# Patient Record
Sex: MALE | Race: WHITE | Employment: OTHER | URBAN - METROPOLITAN AREA
[De-identification: names, ages, dates, MRNs, and addresses within clinical notes are randomized per-mention and may not be internally consistent; named-entity substitution may affect disease eponyms.]

---

## 2017-03-15 ENCOUNTER — TELEPHONE (OUTPATIENT)
Dept: FAMILY MEDICINE CLINIC | Age: 42
End: 2017-03-15

## 2017-04-28 ENCOUNTER — TELEPHONE (OUTPATIENT)
Dept: FAMILY MEDICINE CLINIC | Age: 42
End: 2017-04-28

## 2017-05-09 ENCOUNTER — TELEPHONE (OUTPATIENT)
Dept: FAMILY MEDICINE CLINIC | Age: 42
End: 2017-05-09

## 2017-05-09 DIAGNOSIS — G40.909 SEIZURE DISORDER (HCC): Primary | ICD-10-CM

## 2017-05-12 ENCOUNTER — HOSPITAL ENCOUNTER (OUTPATIENT)
Dept: OTHER | Age: 42
Discharge: HOME | End: 2017-05-12
Attending: FAMILY MEDICINE | Admitting: FAMILY MEDICINE

## 2017-05-12 LAB
A/G RATIO: 1.2 (ref 1.1–2.2)
ALBUMIN SERPL-MCNC: 4.4 G/DL (ref 3.4–5)
ALP BLD-CCNC: 126 U/L (ref 40–129)
ALT SERPL-CCNC: 15 U/L (ref 10–40)
ANION GAP SERPL CALCULATED.3IONS-SCNC: 14 MMOL/L (ref 3–16)
AST SERPL-CCNC: 21 U/L (ref 15–37)
BASOPHILS ABSOLUTE: 0 K/UL (ref 0–0.2)
BASOPHILS RELATIVE PERCENT: 0.3 %
BILIRUB SERPL-MCNC: 0.3 MG/DL (ref 0–1)
BUN BLDV-MCNC: 9 MG/DL (ref 7–20)
CALCIUM SERPL-MCNC: 9.4 MG/DL (ref 8.3–10.6)
CHLORIDE BLD-SCNC: 96 MMOL/L (ref 99–110)
CO2: 27 MMOL/L (ref 21–32)
CREAT SERPL-MCNC: 0.7 MG/DL (ref 0.9–1.3)
EOSINOPHILS ABSOLUTE: 0.1 K/UL (ref 0–0.6)
EOSINOPHILS RELATIVE PERCENT: 1.5 %
GFR AFRICAN AMERICAN: >60
GFR NON-AFRICAN AMERICAN: >60
GLOBULIN: 3.8 G/DL
GLUCOSE BLD-MCNC: 99 MG/DL (ref 70–99)
HCT VFR BLD CALC: 38.4 % (ref 40.5–52.5)
HEMOGLOBIN: 12.8 G/DL (ref 13.5–17.5)
LYMPHOCYTES ABSOLUTE: 1.5 K/UL (ref 1–5.1)
LYMPHOCYTES RELATIVE PERCENT: 31.3 %
MCH RBC QN AUTO: 29.7 PG (ref 26–34)
MCHC RBC AUTO-ENTMCNC: 33.5 G/DL (ref 31–36)
MCV RBC AUTO: 88.6 FL (ref 80–100)
MONOCYTES ABSOLUTE: 0.6 K/UL (ref 0–1.3)
MONOCYTES RELATIVE PERCENT: 11.5 %
NEUTROPHILS ABSOLUTE: 2.7 K/UL (ref 1.7–7.7)
NEUTROPHILS RELATIVE PERCENT: 55.4 %
PDW BLD-RTO: 13.3 % (ref 12.4–15.4)
PHENYTOIN DOSE AMOUNT: ABNORMAL
PHENYTOIN LEVEL: 7.4 UG/ML (ref 10–20)
PLATELET # BLD: 190 K/UL (ref 135–450)
PMV BLD AUTO: 9.1 FL (ref 5–10.5)
POTASSIUM SERPL-SCNC: 4.3 MMOL/L (ref 3.5–5.1)
RBC # BLD: 4.33 M/UL (ref 4.2–5.9)
SODIUM BLD-SCNC: 137 MMOL/L (ref 136–145)
TOTAL PROTEIN: 8.2 G/DL (ref 6.4–8.2)
WBC # BLD: 4.9 K/UL (ref 4–11)

## 2017-05-13 LAB — LAMOTRIGINE LEVEL: 15 UG/ML (ref 2.5–15)

## 2017-05-16 ENCOUNTER — TELEPHONE (OUTPATIENT)
Dept: FAMILY MEDICINE CLINIC | Age: 42
End: 2017-05-16

## 2017-06-14 ENCOUNTER — OFFICE VISIT (OUTPATIENT)
Dept: FAMILY MEDICINE CLINIC | Age: 42
End: 2017-06-14

## 2017-06-14 VITALS
DIASTOLIC BLOOD PRESSURE: 80 MMHG | RESPIRATION RATE: 16 BRPM | SYSTOLIC BLOOD PRESSURE: 122 MMHG | WEIGHT: 153.5 LBS | HEART RATE: 75 BPM | OXYGEN SATURATION: 98 % | BODY MASS INDEX: 22.67 KG/M2

## 2017-06-14 DIAGNOSIS — L21.9 SEBORRHEIC DERMATITIS: ICD-10-CM

## 2017-06-14 DIAGNOSIS — G40.909 SEIZURE DISORDER (HCC): Primary | ICD-10-CM

## 2017-06-14 DIAGNOSIS — M75.00 ADHESIVE CAPSULITIS OF SHOULDER, UNSPECIFIED LATERALITY: ICD-10-CM

## 2017-06-14 DIAGNOSIS — N34.2 INFECTIVE URETHRITIS: ICD-10-CM

## 2017-06-14 PROCEDURE — 99214 OFFICE O/P EST MOD 30 MIN: CPT | Performed by: FAMILY MEDICINE

## 2017-06-14 RX ORDER — LEVETIRACETAM 500 MG/1
TABLET ORAL
Qty: 540 TABLET | Refills: 3 | Status: SHIPPED | OUTPATIENT
Start: 2017-06-14 | End: 2018-07-22 | Stop reason: SDUPTHER

## 2017-06-14 RX ORDER — PHENYTOIN SODIUM 100 MG/1
CAPSULE, EXTENDED RELEASE ORAL
Qty: 180 CAPSULE | Refills: 3 | Status: SHIPPED | OUTPATIENT
Start: 2017-06-14 | End: 2018-04-27 | Stop reason: SDUPTHER

## 2017-06-14 RX ORDER — DOXYCYCLINE 100 MG/1
100 TABLET ORAL 2 TIMES DAILY
Qty: 20 TABLET | Refills: 0 | Status: SHIPPED | OUTPATIENT
Start: 2017-06-14 | End: 2017-06-24

## 2017-06-14 RX ORDER — LAMOTRIGINE 150 MG/1
TABLET ORAL
Qty: 180 TABLET | Refills: 3 | Status: SHIPPED | OUTPATIENT
Start: 2017-06-14 | End: 2018-07-22 | Stop reason: SDUPTHER

## 2017-06-14 RX ORDER — LAMOTRIGINE 25 MG/1
TABLET ORAL
Qty: 180 TABLET | Refills: 3 | Status: SHIPPED | OUTPATIENT
Start: 2017-06-14 | End: 2018-07-22 | Stop reason: SDUPTHER

## 2017-06-14 RX ORDER — IBUPROFEN 800 MG/1
800 TABLET ORAL 3 TIMES DAILY PRN
Qty: 270 TABLET | Refills: 3 | Status: SHIPPED | OUTPATIENT
Start: 2017-06-14 | End: 2018-09-25 | Stop reason: SDUPTHER

## 2017-06-14 ASSESSMENT — PATIENT HEALTH QUESTIONNAIRE - PHQ9
SUM OF ALL RESPONSES TO PHQ9 QUESTIONS 1 & 2: 1
SUM OF ALL RESPONSES TO PHQ QUESTIONS 1-9: 1
2. FEELING DOWN, DEPRESSED OR HOPELESS: 0
1. LITTLE INTEREST OR PLEASURE IN DOING THINGS: 1

## 2017-10-20 ENCOUNTER — TELEPHONE (OUTPATIENT)
Dept: FAMILY MEDICINE CLINIC | Age: 42
End: 2017-10-20

## 2017-10-20 DIAGNOSIS — Z85.6 HISTORY OF LEUKEMIA: ICD-10-CM

## 2017-10-20 DIAGNOSIS — G40.909 SEIZURE DISORDER (HCC): Primary | ICD-10-CM

## 2017-10-20 NOTE — TELEPHONE ENCOUNTER
Pt request blood work order for [facility] - for everything - divantin level        FYI - right shoulder surgery will be around 3/1/18 - will call later for pre-op visit

## 2017-10-24 LAB
A/G RATIO: 1.2 (ref 1.1–2.2)
ALBUMIN SERPL-MCNC: 4.8 G/DL (ref 3.4–5)
ALP BLD-CCNC: 133 U/L (ref 40–129)
ALT SERPL-CCNC: 17 U/L (ref 10–40)
ANION GAP SERPL CALCULATED.3IONS-SCNC: 13 MMOL/L (ref 3–16)
AST SERPL-CCNC: 20 U/L (ref 15–37)
BILIRUB SERPL-MCNC: <0.2 MG/DL (ref 0–1)
BUN BLDV-MCNC: 13 MG/DL (ref 7–20)
CALCIUM SERPL-MCNC: 9.9 MG/DL (ref 8.3–10.6)
CHLORIDE BLD-SCNC: 102 MMOL/L (ref 99–110)
CO2: 30 MMOL/L (ref 21–32)
CREAT SERPL-MCNC: 0.7 MG/DL (ref 0.9–1.3)
GFR AFRICAN AMERICAN: >60
GFR NON-AFRICAN AMERICAN: >60
GLOBULIN: 4 G/DL
GLUCOSE BLD-MCNC: 99 MG/DL (ref 70–99)
HCT VFR BLD CALC: 43.1 % (ref 40.5–52.5)
HEMOGLOBIN: 14 G/DL (ref 13.5–17.5)
MCH RBC QN AUTO: 29.6 PG (ref 26–34)
MCHC RBC AUTO-ENTMCNC: 32.5 G/DL (ref 31–36)
MCV RBC AUTO: 91.1 FL (ref 80–100)
PDW BLD-RTO: 13.2 % (ref 12.4–15.4)
PLATELET # BLD: 194 K/UL (ref 135–450)
PMV BLD AUTO: 9.5 FL (ref 5–10.5)
POTASSIUM SERPL-SCNC: 4.4 MMOL/L (ref 3.5–5.1)
RBC # BLD: 4.73 M/UL (ref 4.2–5.9)
SODIUM BLD-SCNC: 145 MMOL/L (ref 136–145)
TOTAL PROTEIN: 8.8 G/DL (ref 6.4–8.2)
WBC # BLD: 4 K/UL (ref 4–11)

## 2017-10-26 LAB
PHENYTOIN % FREE: 9.6 % (ref 8–14)
PHENYTOIN DOSE AMOUNT: ABNORMAL
PHENYTOIN DOSE FREQUENCY: ABNORMAL
PHENYTOIN DOSE ROUTE: ABNORMAL
PHENYTOIN FREE: 0.8 UG/ML (ref 1–2.5)
PHENYTOIN LEVEL: 8.3 UG/ML (ref 10–20)
PHENYTOIN TYPE OF DRAW: ABNORMAL

## 2017-11-03 ENCOUNTER — HOSPITAL ENCOUNTER (OUTPATIENT)
Dept: OTHER | Age: 42
Discharge: HOME | End: 2017-11-03
Attending: FAMILY MEDICINE | Admitting: FAMILY MEDICINE

## 2018-02-13 ENCOUNTER — OFFICE VISIT (OUTPATIENT)
Dept: FAMILY MEDICINE CLINIC | Age: 43
End: 2018-02-13

## 2018-02-13 ENCOUNTER — HOSPITAL ENCOUNTER (OUTPATIENT)
Dept: OTHER | Age: 43
Discharge: HOME | End: 2018-02-13
Attending: PHYSICIAN ASSISTANT | Admitting: PHYSICIAN ASSISTANT

## 2018-02-13 VITALS
DIASTOLIC BLOOD PRESSURE: 88 MMHG | BODY MASS INDEX: 23.33 KG/M2 | TEMPERATURE: 98 F | SYSTOLIC BLOOD PRESSURE: 124 MMHG | WEIGHT: 158 LBS

## 2018-02-13 DIAGNOSIS — R10.33 PERIUMBILICAL ABDOMINAL PAIN: Primary | ICD-10-CM

## 2018-02-13 DIAGNOSIS — R10.33 PERIUMBILICAL ABDOMINAL PAIN: ICD-10-CM

## 2018-02-13 PROCEDURE — 99213 OFFICE O/P EST LOW 20 MIN: CPT | Performed by: PHYSICIAN ASSISTANT

## 2018-02-13 ASSESSMENT — ENCOUNTER SYMPTOMS
DIARRHEA: 0
SHORTNESS OF BREATH: 0
VOMITING: 0
NAUSEA: 0
BACK PAIN: 0
ABDOMINAL PAIN: 1
CONSTIPATION: 0

## 2018-02-13 NOTE — PROGRESS NOTES
Subjective:      Patient ID: Burton Camarillo is a 43 y.o. male. HPI  Patient is here today with a 4-5 day history of periumbilical abdominal pain. It is a constant pain that gets much worse and feels sharp when he stands up. He has to sit down to make it feel a little better. No n/v/d or fever. Appetite has been normal. He usually has a BM twice a week. He went last on Saturday. Pain does not radiate anywhere. He has no urinary symptoms. Review of Systems   Constitutional: Negative for appetite change, chills and fever. Respiratory: Negative for shortness of breath. Cardiovascular: Negative for chest pain. Gastrointestinal: Positive for abdominal pain. Negative for constipation, diarrhea, nausea and vomiting. Genitourinary: Negative for difficulty urinating, dysuria, frequency, hematuria and urgency. Musculoskeletal: Negative for back pain. Objective:   Physical Exam   Constitutional: He is oriented to person, place, and time. Vital signs are normal. He appears well-developed and well-nourished. He is cooperative. Cardiovascular: Normal rate, regular rhythm and normal heart sounds. Pulmonary/Chest: Effort normal and breath sounds normal. No respiratory distress. He has no decreased breath sounds. Abdominal: Soft. Normal appearance and bowel sounds are normal. He exhibits no distension and no mass. There is no hepatosplenomegaly. There is no tenderness. There is no rigidity, no rebound, no guarding and no CVA tenderness. No hernia. Neurological: He is alert and oriented to person, place, and time. Assessment:      Chela Marrero was seen today for abdominal pain. Diagnoses and all orders for this visit:    Periumbilical abdominal pain  -     XR ABDOMEN (complete, including decubitus and/or erect views)             Plan:      Get xrays, suspect possible constipation.

## 2018-02-15 ENCOUNTER — TELEPHONE (OUTPATIENT)
Dept: FAMILY MEDICINE CLINIC | Age: 43
End: 2018-02-15

## 2018-02-16 ENCOUNTER — HOSPITAL ENCOUNTER (OUTPATIENT)
Dept: OTHER | Age: 43
Discharge: HOME | End: 2018-02-16
Attending: PHYSICIAN ASSISTANT | Admitting: PHYSICIAN ASSISTANT

## 2018-02-16 ENCOUNTER — OFFICE VISIT (OUTPATIENT)
Dept: FAMILY MEDICINE CLINIC | Age: 43
End: 2018-02-16

## 2018-02-16 ENCOUNTER — HOSPITAL ENCOUNTER (OUTPATIENT)
Dept: CT IMAGING | Age: 43
Discharge: HOME | End: 2018-02-16
Attending: PHYSICIAN ASSISTANT | Admitting: PHYSICIAN ASSISTANT

## 2018-02-16 VITALS
TEMPERATURE: 97 F | OXYGEN SATURATION: 97 % | WEIGHT: 155.6 LBS | HEART RATE: 82 BPM | BODY MASS INDEX: 22.98 KG/M2 | SYSTOLIC BLOOD PRESSURE: 128 MMHG | DIASTOLIC BLOOD PRESSURE: 80 MMHG

## 2018-02-16 DIAGNOSIS — R10.32 LLQ ABDOMINAL PAIN: Primary | ICD-10-CM

## 2018-02-16 DIAGNOSIS — R10.32 LLQ ABDOMINAL PAIN: ICD-10-CM

## 2018-02-16 DIAGNOSIS — R19.7 DIARRHEA, UNSPECIFIED TYPE: ICD-10-CM

## 2018-02-16 DIAGNOSIS — K52.9 COLITIS: Primary | ICD-10-CM

## 2018-02-16 DIAGNOSIS — R10.32 LEFT LOWER QUADRANT PAIN: ICD-10-CM

## 2018-02-16 LAB
ANION GAP SERPL CALCULATED.3IONS-SCNC: 12 MMOL/L (ref 3–16)
BASOPHILS ABSOLUTE: 0 K/UL (ref 0–0.2)
BASOPHILS RELATIVE PERCENT: 0.2 %
BUN BLDV-MCNC: 16 MG/DL (ref 7–20)
CALCIUM SERPL-MCNC: 9.7 MG/DL (ref 8.3–10.6)
CHLORIDE BLD-SCNC: 100 MMOL/L (ref 99–110)
CO2: 29 MMOL/L (ref 21–32)
CREAT SERPL-MCNC: 0.8 MG/DL (ref 0.9–1.3)
EOSINOPHILS ABSOLUTE: 0.1 K/UL (ref 0–0.6)
EOSINOPHILS RELATIVE PERCENT: 1.9 %
GFR AFRICAN AMERICAN: >60
GFR NON-AFRICAN AMERICAN: >60
GLUCOSE BLD-MCNC: 88 MG/DL (ref 70–99)
HCT VFR BLD CALC: 40.6 % (ref 40.5–52.5)
HEMOGLOBIN: 13.7 G/DL (ref 13.5–17.5)
LYMPHOCYTES ABSOLUTE: 1.7 K/UL (ref 1–5.1)
LYMPHOCYTES RELATIVE PERCENT: 33.4 %
MCH RBC QN AUTO: 30.1 PG (ref 26–34)
MCHC RBC AUTO-ENTMCNC: 33.8 G/DL (ref 31–36)
MCV RBC AUTO: 89 FL (ref 80–100)
MONOCYTES ABSOLUTE: 0.6 K/UL (ref 0–1.3)
MONOCYTES RELATIVE PERCENT: 11.5 %
NEUTROPHILS ABSOLUTE: 2.8 K/UL (ref 1.7–7.7)
NEUTROPHILS RELATIVE PERCENT: 53 %
PDW BLD-RTO: 13.3 % (ref 12.4–15.4)
PLATELET # BLD: 239 K/UL (ref 135–450)
PMV BLD AUTO: 8.5 FL (ref 5–10.5)
POTASSIUM SERPL-SCNC: 4.4 MMOL/L (ref 3.5–5.1)
RBC # BLD: 4.56 M/UL (ref 4.2–5.9)
SODIUM BLD-SCNC: 141 MMOL/L (ref 136–145)
WBC # BLD: 5.2 K/UL (ref 4–11)

## 2018-02-16 PROCEDURE — 99213 OFFICE O/P EST LOW 20 MIN: CPT | Performed by: PHYSICIAN ASSISTANT

## 2018-02-16 RX ORDER — AMOXICILLIN AND CLAVULANATE POTASSIUM 875; 125 MG/1; MG/1
1 TABLET, FILM COATED ORAL 2 TIMES DAILY
Qty: 14 TABLET | Refills: 0 | Status: SHIPPED | OUTPATIENT
Start: 2018-02-16 | End: 2018-02-23

## 2018-02-16 ASSESSMENT — ENCOUNTER SYMPTOMS
BACK PAIN: 0
DIARRHEA: 1
CONSTIPATION: 0
SHORTNESS OF BREATH: 0
ABDOMINAL PAIN: 1
NAUSEA: 0
VOMITING: 0

## 2018-02-26 ENCOUNTER — TELEPHONE (OUTPATIENT)
Dept: FAMILY MEDICINE CLINIC | Age: 43
End: 2018-02-26

## 2018-03-02 DIAGNOSIS — K52.9 COLITIS: ICD-10-CM

## 2018-03-03 RX ORDER — CIPROFLOXACIN 500 MG/1
500 TABLET, FILM COATED ORAL 2 TIMES DAILY
Qty: 20 TABLET | Refills: 0 | Status: SHIPPED | OUTPATIENT
Start: 2018-03-03 | End: 2018-03-13

## 2018-03-03 NOTE — TELEPHONE ENCOUNTER
Patient is calling back wanting to know why is it taking so long to call this medication in. Patient is stating that he isn't feeling any better.       Once he stopped the medication all the symptoms all came back    Please advise

## 2018-03-28 RX ORDER — AMOXICILLIN AND CLAVULANATE POTASSIUM 875; 125 MG/1; MG/1
TABLET, FILM COATED ORAL
Qty: 14 TABLET | Refills: 0 | OUTPATIENT
Start: 2018-03-28

## 2018-04-20 ENCOUNTER — OFFICE VISIT (OUTPATIENT)
Dept: FAMILY MEDICINE CLINIC | Age: 43
End: 2018-04-20

## 2018-04-20 ENCOUNTER — HOSPITAL ENCOUNTER (OUTPATIENT)
Dept: OTHER | Age: 43
Discharge: HOME | End: 2018-04-20
Attending: FAMILY MEDICINE | Admitting: FAMILY MEDICINE

## 2018-04-20 VITALS
OXYGEN SATURATION: 97 % | TEMPERATURE: 98.7 F | WEIGHT: 155 LBS | DIASTOLIC BLOOD PRESSURE: 74 MMHG | BODY MASS INDEX: 20.99 KG/M2 | HEIGHT: 72 IN | HEART RATE: 94 BPM | SYSTOLIC BLOOD PRESSURE: 120 MMHG

## 2018-04-20 DIAGNOSIS — L21.9 SEBORRHEIC DERMATITIS: ICD-10-CM

## 2018-04-20 DIAGNOSIS — Z01.818 PREOP EXAMINATION: ICD-10-CM

## 2018-04-20 DIAGNOSIS — G40.909 SEIZURE DISORDER (HCC): ICD-10-CM

## 2018-04-20 DIAGNOSIS — M75.00 ADHESIVE CAPSULITIS OF SHOULDER, UNSPECIFIED LATERALITY: ICD-10-CM

## 2018-04-20 DIAGNOSIS — M75.121 COMPLETE TEAR OF RIGHT ROTATOR CUFF: Primary | ICD-10-CM

## 2018-04-20 LAB
PHENYTOIN DOSE AMOUNT: ABNORMAL
PHENYTOIN LEVEL: 6 UG/ML (ref 10–20)

## 2018-04-20 PROCEDURE — 99214 OFFICE O/P EST MOD 30 MIN: CPT | Performed by: FAMILY MEDICINE

## 2018-04-22 LAB — LAMOTRIGINE LEVEL: 12.3 UG/ML (ref 2.5–15)

## 2018-04-24 ENCOUNTER — HOSPITAL ENCOUNTER (OUTPATIENT)
Dept: PREADMISSION TESTING | Age: 43
Discharge: HOME | End: 2018-04-25
Attending: ORTHOPAEDIC SURGERY | Admitting: ORTHOPAEDIC SURGERY

## 2018-04-26 RX ORDER — SODIUM CHLORIDE, SODIUM LACTATE, POTASSIUM CHLORIDE, CALCIUM CHLORIDE 600; 310; 30; 20 MG/100ML; MG/100ML; MG/100ML; MG/100ML
INJECTION, SOLUTION INTRAVENOUS CONTINUOUS
Status: CANCELLED | OUTPATIENT
Start: 2018-04-26

## 2018-04-27 ENCOUNTER — HOSPITAL ENCOUNTER (OUTPATIENT)
Dept: SURGERY | Age: 43
Discharge: HOME | End: 2018-04-27
Admitting: ORTHOPAEDIC SURGERY

## 2018-04-27 VITALS
TEMPERATURE: 97.5 F | RESPIRATION RATE: 16 BRPM | HEART RATE: 98 BPM | OXYGEN SATURATION: 98 % | BODY MASS INDEX: 20.99 KG/M2 | WEIGHT: 155 LBS | DIASTOLIC BLOOD PRESSURE: 80 MMHG | SYSTOLIC BLOOD PRESSURE: 134 MMHG | HEIGHT: 72 IN

## 2018-04-27 DIAGNOSIS — M19.90 OSTEOARTHRITIS: ICD-10-CM

## 2018-04-27 DIAGNOSIS — M75.121 COMPLETE TEAR OF RIGHT ROTATOR CUFF: ICD-10-CM

## 2018-04-27 RX ORDER — HYDROMORPHONE HCL 110MG/55ML
0.25 PATIENT CONTROLLED ANALGESIA SYRINGE INTRAVENOUS EVERY 5 MIN PRN
Status: DISCONTINUED | OUTPATIENT
Start: 2018-04-27 | End: 2018-04-28 | Stop reason: HOSPADM

## 2018-04-27 RX ORDER — SODIUM CHLORIDE, SODIUM LACTATE, POTASSIUM CHLORIDE, CALCIUM CHLORIDE 600; 310; 30; 20 MG/100ML; MG/100ML; MG/100ML; MG/100ML
INJECTION, SOLUTION INTRAVENOUS CONTINUOUS
Status: DISCONTINUED | OUTPATIENT
Start: 2018-04-27 | End: 2018-04-28 | Stop reason: HOSPADM

## 2018-04-27 RX ORDER — METOCLOPRAMIDE HYDROCHLORIDE 5 MG/ML
10 INJECTION INTRAMUSCULAR; INTRAVENOUS
Status: COMPLETED | OUTPATIENT
Start: 2018-04-27 | End: 2018-04-27

## 2018-04-27 RX ORDER — PROMETHAZINE HYDROCHLORIDE 25 MG/ML
6.25 INJECTION, SOLUTION INTRAMUSCULAR; INTRAVENOUS
Status: ACTIVE | OUTPATIENT
Start: 2018-04-27 | End: 2018-04-27

## 2018-04-27 RX ORDER — MIDAZOLAM HYDROCHLORIDE 1 MG/ML
INJECTION INTRAMUSCULAR; INTRAVENOUS
Status: DISPENSED
Start: 2018-04-27 | End: 2018-04-27

## 2018-04-27 RX ORDER — LABETALOL HYDROCHLORIDE 5 MG/ML
5 INJECTION, SOLUTION INTRAVENOUS EVERY 10 MIN PRN
Status: DISCONTINUED | OUTPATIENT
Start: 2018-04-27 | End: 2018-04-28 | Stop reason: HOSPADM

## 2018-04-27 RX ORDER — ROPIVACAINE HYDROCHLORIDE 5 MG/ML
INJECTION, SOLUTION EPIDURAL; INFILTRATION; PERINEURAL
Status: DISPENSED
Start: 2018-04-27 | End: 2018-04-27

## 2018-04-27 RX ORDER — HYDRALAZINE HYDROCHLORIDE 20 MG/ML
5 INJECTION INTRAMUSCULAR; INTRAVENOUS EVERY 10 MIN PRN
Status: DISCONTINUED | OUTPATIENT
Start: 2018-04-27 | End: 2018-04-28 | Stop reason: HOSPADM

## 2018-04-27 RX ORDER — MORPHINE SULFATE 2 MG/ML
1 INJECTION, SOLUTION INTRAMUSCULAR; INTRAVENOUS EVERY 5 MIN PRN
Status: DISCONTINUED | OUTPATIENT
Start: 2018-04-27 | End: 2018-04-28 | Stop reason: HOSPADM

## 2018-04-27 RX ORDER — HYDROCODONE BITARTRATE AND ACETAMINOPHEN 10; 325 MG/1; MG/1
1-2 TABLET ORAL
Qty: 50 TABLET | Refills: 0 | Status: SHIPPED | OUTPATIENT
Start: 2018-04-27 | End: 2018-05-04

## 2018-04-27 RX ORDER — FENTANYL CITRATE 50 UG/ML
INJECTION, SOLUTION INTRAMUSCULAR; INTRAVENOUS
Status: DISPENSED
Start: 2018-04-27 | End: 2018-04-27

## 2018-04-27 RX ORDER — OXYCODONE HYDROCHLORIDE 5 MG/1
5 TABLET ORAL PRN
Status: ACTIVE | OUTPATIENT
Start: 2018-04-27 | End: 2018-04-27

## 2018-04-27 RX ORDER — HYDROMORPHONE HCL 110MG/55ML
0.5 PATIENT CONTROLLED ANALGESIA SYRINGE INTRAVENOUS EVERY 5 MIN PRN
Status: DISCONTINUED | OUTPATIENT
Start: 2018-04-27 | End: 2018-04-28 | Stop reason: HOSPADM

## 2018-04-27 RX ORDER — OXYCODONE HYDROCHLORIDE 5 MG/1
10 TABLET ORAL PRN
Status: ACTIVE | OUTPATIENT
Start: 2018-04-27 | End: 2018-04-27

## 2018-04-27 RX ORDER — MEPERIDINE HYDROCHLORIDE 25 MG/ML
12.5 INJECTION INTRAMUSCULAR; INTRAVENOUS; SUBCUTANEOUS EVERY 5 MIN PRN
Status: DISCONTINUED | OUTPATIENT
Start: 2018-04-27 | End: 2018-04-28 | Stop reason: HOSPADM

## 2018-04-27 RX ORDER — DIPHENHYDRAMINE HYDROCHLORIDE 50 MG/ML
12.5 INJECTION INTRAMUSCULAR; INTRAVENOUS
Status: ACTIVE | OUTPATIENT
Start: 2018-04-27 | End: 2018-04-27

## 2018-04-27 RX ORDER — PHENYTOIN SODIUM 100 MG/1
CAPSULE, EXTENDED RELEASE ORAL
Qty: 270 CAPSULE | Refills: 1 | Status: SHIPPED | OUTPATIENT
Start: 2018-04-27 | End: 2019-10-28 | Stop reason: SDUPTHER

## 2018-04-27 RX ADMIN — METOCLOPRAMIDE HYDROCHLORIDE 10 MG: 5 INJECTION INTRAMUSCULAR; INTRAVENOUS at 11:25

## 2018-04-27 RX ADMIN — SODIUM CHLORIDE, SODIUM LACTATE, POTASSIUM CHLORIDE, CALCIUM CHLORIDE: 600; 310; 30; 20 INJECTION, SOLUTION INTRAVENOUS at 07:08

## 2018-04-27 ASSESSMENT — PAIN - FUNCTIONAL ASSESSMENT: PAIN_FUNCTIONAL_ASSESSMENT: 0-10

## 2018-04-27 ASSESSMENT — PAIN SCALES - GENERAL
PAINLEVEL_OUTOF10: 0

## 2018-06-12 ENCOUNTER — OFFICE VISIT (OUTPATIENT)
Dept: FAMILY MEDICINE CLINIC | Age: 43
End: 2018-06-12

## 2018-06-12 VITALS
BODY MASS INDEX: 21.37 KG/M2 | HEART RATE: 97 BPM | WEIGHT: 155.4 LBS | TEMPERATURE: 98.7 F | OXYGEN SATURATION: 97 % | DIASTOLIC BLOOD PRESSURE: 70 MMHG | SYSTOLIC BLOOD PRESSURE: 118 MMHG

## 2018-06-12 DIAGNOSIS — L25.9 CONTACT DERMATITIS, UNSPECIFIED CONTACT DERMATITIS TYPE, UNSPECIFIED TRIGGER: Primary | ICD-10-CM

## 2018-06-12 PROCEDURE — 99213 OFFICE O/P EST LOW 20 MIN: CPT | Performed by: FAMILY MEDICINE

## 2018-06-12 RX ORDER — PREDNISONE 20 MG/1
TABLET ORAL
Qty: 15 TABLET | Refills: 0 | Status: SHIPPED | OUTPATIENT
Start: 2018-06-12 | End: 2019-10-28

## 2018-07-22 DIAGNOSIS — G40.909 SEIZURE DISORDER (HCC): ICD-10-CM

## 2018-07-23 RX ORDER — LAMOTRIGINE 150 MG/1
TABLET ORAL
Qty: 180 TABLET | Refills: 3 | Status: SHIPPED | OUTPATIENT
Start: 2018-07-23 | End: 2019-05-18 | Stop reason: SDUPTHER

## 2018-07-23 RX ORDER — LAMOTRIGINE 25 MG/1
TABLET ORAL
Qty: 180 TABLET | Refills: 3 | Status: SHIPPED | OUTPATIENT
Start: 2018-07-23 | End: 2019-05-18 | Stop reason: SDUPTHER

## 2018-07-23 RX ORDER — LEVETIRACETAM 500 MG/1
TABLET ORAL
Qty: 540 TABLET | Refills: 3 | Status: SHIPPED | OUTPATIENT
Start: 2018-07-23 | End: 2019-05-18 | Stop reason: SDUPTHER

## 2018-07-23 RX ORDER — PHENYTOIN SODIUM 100 MG/1
CAPSULE, EXTENDED RELEASE ORAL
Qty: 180 CAPSULE | Refills: 3 | Status: SHIPPED | OUTPATIENT
Start: 2018-07-23 | End: 2019-05-18 | Stop reason: SDUPTHER

## 2018-09-14 ENCOUNTER — HOSPITAL ENCOUNTER (OUTPATIENT)
Dept: OTHER | Age: 43
Discharge: HOME | End: 2018-09-14
Attending: PHYSICIAN ASSISTANT | Admitting: PHYSICIAN ASSISTANT

## 2018-09-14 ENCOUNTER — OFFICE VISIT (OUTPATIENT)
Dept: FAMILY MEDICINE CLINIC | Age: 43
End: 2018-09-14

## 2018-09-14 VITALS
HEART RATE: 75 BPM | WEIGHT: 152.6 LBS | OXYGEN SATURATION: 98 % | BODY MASS INDEX: 20.99 KG/M2 | DIASTOLIC BLOOD PRESSURE: 68 MMHG | SYSTOLIC BLOOD PRESSURE: 102 MMHG

## 2018-09-14 DIAGNOSIS — R22.1 NECK MASS: Primary | ICD-10-CM

## 2018-09-14 DIAGNOSIS — R22.1 NECK MASS: ICD-10-CM

## 2018-09-14 DIAGNOSIS — R22.1 LUMP ON NECK: Primary | ICD-10-CM

## 2018-09-14 PROCEDURE — 99213 OFFICE O/P EST LOW 20 MIN: CPT | Performed by: PHYSICIAN ASSISTANT

## 2018-09-14 ASSESSMENT — ENCOUNTER SYMPTOMS
CHEST TIGHTNESS: 0
BACK PAIN: 0
SHORTNESS OF BREATH: 0

## 2018-09-14 NOTE — PATIENT INSTRUCTIONS
Avinash Connell was seen today for mass. Diagnoses and all orders for this visit:    Neck mass  -     XR Cervical Spine 2 or 3 VW; Future  -     XR THORACIC SPINE (3 VIEWS); Future       Get xrays.

## 2018-09-20 ENCOUNTER — HOSPITAL ENCOUNTER (OUTPATIENT)
Dept: ULTRASOUND IMAGING | Age: 43
Discharge: HOME | End: 2018-09-20
Attending: PHYSICIAN ASSISTANT | Admitting: PHYSICIAN ASSISTANT

## 2018-09-20 DIAGNOSIS — R22.1 LOCALIZED SWELLING, MASS OR LUMP OF NECK: ICD-10-CM

## 2018-09-20 DIAGNOSIS — R22.1 LUMP ON NECK: ICD-10-CM

## 2018-09-25 DIAGNOSIS — M75.00 ADHESIVE CAPSULITIS OF SHOULDER, UNSPECIFIED LATERALITY: ICD-10-CM

## 2018-09-26 RX ORDER — IBUPROFEN 800 MG/1
TABLET ORAL
Qty: 270 TABLET | Refills: 0 | Status: SHIPPED | OUTPATIENT
Start: 2018-09-26 | End: 2019-02-09 | Stop reason: SDUPTHER

## 2018-10-01 ENCOUNTER — TELEPHONE (OUTPATIENT)
Dept: FAMILY MEDICINE CLINIC | Age: 43
End: 2018-10-01

## 2018-10-01 DIAGNOSIS — R22.1 LUMP ON NECK: Primary | ICD-10-CM

## 2018-10-02 ENCOUNTER — TELEPHONE (OUTPATIENT)
Dept: FAMILY MEDICINE CLINIC | Age: 43
End: 2018-10-02

## 2018-10-02 DIAGNOSIS — R22.1 LUMP ON NECK: Primary | ICD-10-CM

## 2019-01-29 ENCOUNTER — HOSPITAL ENCOUNTER (OUTPATIENT)
Dept: MRI IMAGING | Age: 44
Discharge: HOME | End: 2019-01-29
Payer: MEDICARE

## 2019-01-29 DIAGNOSIS — M54.2 NECK PAIN: ICD-10-CM

## 2019-01-29 PROCEDURE — 72141 MRI NECK SPINE W/O DYE: CPT

## 2019-02-06 ENCOUNTER — TELEPHONE (OUTPATIENT)
Dept: FAMILY MEDICINE CLINIC | Age: 44
End: 2019-02-06

## 2019-02-09 DIAGNOSIS — M75.00 ADHESIVE CAPSULITIS OF SHOULDER, UNSPECIFIED LATERALITY: ICD-10-CM

## 2019-02-12 RX ORDER — IBUPROFEN 800 MG/1
TABLET ORAL
Qty: 270 TABLET | Refills: 0 | Status: SHIPPED | OUTPATIENT
Start: 2019-02-12 | End: 2019-05-18 | Stop reason: SDUPTHER

## 2019-04-08 ENCOUNTER — OFFICE VISIT (OUTPATIENT)
Dept: FAMILY MEDICINE CLINIC | Age: 44
End: 2019-04-08
Payer: MEDICARE

## 2019-04-08 VITALS
OXYGEN SATURATION: 98 % | DIASTOLIC BLOOD PRESSURE: 76 MMHG | SYSTOLIC BLOOD PRESSURE: 124 MMHG | BODY MASS INDEX: 20.49 KG/M2 | HEART RATE: 82 BPM | TEMPERATURE: 98.2 F | WEIGHT: 149 LBS

## 2019-04-08 DIAGNOSIS — R10.30 LOWER ABDOMINAL PAIN: Primary | ICD-10-CM

## 2019-04-08 PROCEDURE — 99213 OFFICE O/P EST LOW 20 MIN: CPT | Performed by: PHYSICIAN ASSISTANT

## 2019-04-08 RX ORDER — CIPROFLOXACIN 500 MG/1
500 TABLET, FILM COATED ORAL 2 TIMES DAILY
Qty: 10 TABLET | Refills: 0 | Status: SHIPPED | OUTPATIENT
Start: 2019-04-08 | End: 2019-04-13

## 2019-04-08 ASSESSMENT — PATIENT HEALTH QUESTIONNAIRE - PHQ9
SUM OF ALL RESPONSES TO PHQ QUESTIONS 1-9: 2
1. LITTLE INTEREST OR PLEASURE IN DOING THINGS: 1
2. FEELING DOWN, DEPRESSED OR HOPELESS: 1
SUM OF ALL RESPONSES TO PHQ9 QUESTIONS 1 & 2: 2
SUM OF ALL RESPONSES TO PHQ QUESTIONS 1-9: 2

## 2019-04-08 ASSESSMENT — ENCOUNTER SYMPTOMS
ABDOMINAL PAIN: 1
DIARRHEA: 0
SHORTNESS OF BREATH: 0
BACK PAIN: 0
NAUSEA: 0
VOMITING: 0
CONSTIPATION: 0

## 2019-04-08 NOTE — PATIENT INSTRUCTIONS
Ab Luke was seen today for abdominal pain. Diagnoses and all orders for this visit:    Lower abdominal pain  -     ciprofloxacin (CIPRO) 500 MG tablet; Take 1 tablet by mouth 2 times daily for 5 days       Push fluids and bland diet to rest the colon. Call me Thursday with status.

## 2019-04-08 NOTE — PROGRESS NOTES
Subjective:      Patient ID: Jennifer Jett is a 40 y.o. male. HPI  Patient is here today for a 4 day history of lower abdominal pain. He says it is an intermittent pain between a 5-8/10. No n/v/d. He says he has a BM every 2-3 days. He says it is solid but not too hard. No black or bloody stools. He did have colitis last year and it feels the same way. Review of Systems   Constitutional: Negative for appetite change, chills and fever. Respiratory: Negative for shortness of breath. Cardiovascular: Negative for chest pain. Gastrointestinal: Positive for abdominal pain. Negative for constipation, diarrhea, nausea and vomiting. Genitourinary: Negative for difficulty urinating, dysuria, frequency, hematuria and urgency. Musculoskeletal: Negative for back pain. Objective:   Physical Exam   Constitutional: He is oriented to person, place, and time. Vital signs are normal. He appears well-developed and well-nourished. He is cooperative. Cardiovascular: Normal rate, regular rhythm and normal heart sounds. Pulmonary/Chest: Effort normal and breath sounds normal. He has no decreased breath sounds. He has no wheezes. He has no rhonchi. He has no rales. Abdominal: Soft. Normal appearance and bowel sounds are normal. He exhibits no distension and no mass. There is no hepatosplenomegaly. There is no tenderness. There is no rigidity, no rebound, no guarding and no CVA tenderness. No hernia. Neurological: He is alert and oriented to person, place, and time. Assessment:      Moraima Valdez was seen today for abdominal pain. Diagnoses and all orders for this visit:    Lower abdominal pain  -     ciprofloxacin (CIPRO) 500 MG tablet; Take 1 tablet by mouth 2 times daily for 5 days             Plan: Will treat for colitis, bowel rest with fluids and bland diet, call in 3 days with status.         Juana Ryder

## 2019-04-10 ENCOUNTER — TELEPHONE (OUTPATIENT)
Dept: FAMILY MEDICINE CLINIC | Age: 44
End: 2019-04-10

## 2019-04-10 NOTE — TELEPHONE ENCOUNTER
Patient called in to request to speak with Dr. Clifford Gonzales, he seen Polly Busby on 4/8/19 for abdominal pain, was given an antibiotic. Patient would not disclose to me exactly what he would like to speak directly to the physician. I advised him that the provider is in with patients, and that I would give him the message. Please call him back ASAP. Patient kept referring to Polly Busby as Reginaldo Ospina, insisted on speaking with Reginaldo Ospina, patient was confused. Please call patient back.

## 2019-04-10 NOTE — TELEPHONE ENCOUNTER
Patient wanted to let Efren Knapp know he did have a bm this morning and he is still experiencing lower abdominal pain. He is still taking Cipro and has 4 days to go but he wanted to know if he could bypass getting an x-ray and just get an MRI? States Efren Knapp told him the next step would be an x-ray and he does not think the x-ray will show anything and he would like to do the MRI instead. Please advise.

## 2019-04-12 ENCOUNTER — HOSPITAL ENCOUNTER (EMERGENCY)
Age: 44
Discharge: HOME | End: 2019-04-12
Attending: EMERGENCY MEDICINE
Payer: MEDICARE

## 2019-04-12 ENCOUNTER — HOSPITAL ENCOUNTER (OUTPATIENT)
Dept: CT IMAGING | Age: 44
Discharge: HOME | End: 2019-04-12
Payer: MEDICARE

## 2019-04-12 VITALS
OXYGEN SATURATION: 98 % | HEIGHT: 73 IN | HEART RATE: 81 BPM | SYSTOLIC BLOOD PRESSURE: 159 MMHG | BODY MASS INDEX: 19.88 KG/M2 | DIASTOLIC BLOOD PRESSURE: 87 MMHG | RESPIRATION RATE: 18 BRPM | TEMPERATURE: 97.5 F | WEIGHT: 150 LBS

## 2019-04-12 DIAGNOSIS — R10.30 LOWER ABDOMINAL PAIN: Primary | ICD-10-CM

## 2019-04-12 DIAGNOSIS — R10.30 LOWER ABDOMINAL PAIN: ICD-10-CM

## 2019-04-12 DIAGNOSIS — N20.0 KIDNEY STONE ON RIGHT SIDE: Primary | ICD-10-CM

## 2019-04-12 DIAGNOSIS — K52.9 COLITIS: ICD-10-CM

## 2019-04-12 LAB
A/G RATIO: 1.2 (ref 1.1–2.2)
ALBUMIN SERPL-MCNC: 4.5 G/DL (ref 3.4–5)
ALP BLD-CCNC: 120 U/L (ref 40–129)
ALT SERPL-CCNC: 12 U/L (ref 10–40)
ANION GAP SERPL CALCULATED.3IONS-SCNC: 10 MMOL/L (ref 3–16)
AST SERPL-CCNC: 15 U/L (ref 15–37)
BASOPHILS ABSOLUTE: 0 K/UL (ref 0–0.2)
BASOPHILS RELATIVE PERCENT: 0.4 %
BILIRUB SERPL-MCNC: 0.3 MG/DL (ref 0–1)
BILIRUBIN URINE: NEGATIVE
BLOOD, URINE: ABNORMAL
BUN BLDV-MCNC: 14 MG/DL (ref 7–20)
CALCIUM SERPL-MCNC: 9.2 MG/DL (ref 8.3–10.6)
CHLORIDE BLD-SCNC: 97 MMOL/L (ref 99–110)
CLARITY: CLEAR
CO2: 27 MMOL/L (ref 21–32)
COLOR: YELLOW
CREAT SERPL-MCNC: 0.9 MG/DL (ref 0.9–1.3)
EOSINOPHILS ABSOLUTE: 0.1 K/UL (ref 0–0.6)
EOSINOPHILS RELATIVE PERCENT: 1.5 %
EPITHELIAL CELLS, UA: 1 /HPF (ref 0–5)
GFR AFRICAN AMERICAN: >60
GFR NON-AFRICAN AMERICAN: >60
GLOBULIN: 3.7 G/DL
GLUCOSE BLD-MCNC: 99 MG/DL (ref 70–99)
GLUCOSE URINE: NEGATIVE MG/DL
HCT VFR BLD CALC: 41.1 % (ref 40.5–52.5)
HEMOGLOBIN: 13.8 G/DL (ref 13.5–17.5)
HYALINE CASTS: 2 /LPF (ref 0–8)
KEPPRA DOSE AMT: NORMAL
KEPPRA: 44.9 UG/ML (ref 6–46)
KETONES, URINE: NEGATIVE MG/DL
LEUKOCYTE ESTERASE, URINE: ABNORMAL
LYMPHOCYTES ABSOLUTE: 1.7 K/UL (ref 1–5.1)
LYMPHOCYTES RELATIVE PERCENT: 32.8 %
MCH RBC QN AUTO: 30.3 PG (ref 26–34)
MCHC RBC AUTO-ENTMCNC: 33.6 G/DL (ref 31–36)
MCV RBC AUTO: 90.2 FL (ref 80–100)
MICROSCOPIC EXAMINATION: YES
MONOCYTES ABSOLUTE: 0.6 K/UL (ref 0–1.3)
MONOCYTES RELATIVE PERCENT: 11.6 %
NEUTROPHILS ABSOLUTE: 2.7 K/UL (ref 1.7–7.7)
NEUTROPHILS RELATIVE PERCENT: 53.7 %
NITRITE, URINE: NEGATIVE
PDW BLD-RTO: 13.2 % (ref 12.4–15.4)
PH UA: 5.5 (ref 5–8)
PHENYTOIN DOSE AMOUNT: NORMAL
PHENYTOIN LEVEL: 12 UG/ML (ref 10–20)
PLATELET # BLD: 212 K/UL (ref 135–450)
PMV BLD AUTO: 8.3 FL (ref 5–10.5)
POTASSIUM SERPL-SCNC: 4.2 MMOL/L (ref 3.5–5.1)
PROTEIN UA: NEGATIVE MG/DL
RBC # BLD: 4.56 M/UL (ref 4.2–5.9)
RBC UA: 221 /HPF (ref 0–4)
SODIUM BLD-SCNC: 134 MMOL/L (ref 136–145)
SPECIFIC GRAVITY UA: >1.03 (ref 1–1.03)
TOTAL PROTEIN: 8.2 G/DL (ref 6.4–8.2)
URINE REFLEX TO CULTURE: YES
URINE TYPE: ABNORMAL
UROBILINOGEN, URINE: 0.2 E.U./DL
WBC # BLD: 5.1 K/UL (ref 4–11)
WBC UA: 5 /HPF (ref 0–5)

## 2019-04-12 PROCEDURE — 6370000000 HC RX 637 (ALT 250 FOR IP): Performed by: EMERGENCY MEDICINE

## 2019-04-12 PROCEDURE — 2580000003 HC RX 258: Performed by: EMERGENCY MEDICINE

## 2019-04-12 PROCEDURE — 96375 TX/PRO/DX INJ NEW DRUG ADDON: CPT

## 2019-04-12 PROCEDURE — 80053 COMPREHEN METABOLIC PANEL: CPT

## 2019-04-12 PROCEDURE — 80177 DRUG SCRN QUAN LEVETIRACETAM: CPT

## 2019-04-12 PROCEDURE — 6360000004 HC RX CONTRAST MEDICATION: Performed by: FAMILY MEDICINE

## 2019-04-12 PROCEDURE — 81001 URINALYSIS AUTO W/SCOPE: CPT

## 2019-04-12 PROCEDURE — 99283 EMERGENCY DEPT VISIT LOW MDM: CPT

## 2019-04-12 PROCEDURE — 96361 HYDRATE IV INFUSION ADD-ON: CPT

## 2019-04-12 PROCEDURE — 85025 COMPLETE CBC W/AUTO DIFF WBC: CPT

## 2019-04-12 PROCEDURE — 80185 ASSAY OF PHENYTOIN TOTAL: CPT

## 2019-04-12 PROCEDURE — 6360000002 HC RX W HCPCS: Performed by: EMERGENCY MEDICINE

## 2019-04-12 PROCEDURE — 96374 THER/PROPH/DIAG INJ IV PUSH: CPT

## 2019-04-12 PROCEDURE — 87086 URINE CULTURE/COLONY COUNT: CPT

## 2019-04-12 PROCEDURE — 74177 CT ABD & PELVIS W/CONTRAST: CPT

## 2019-04-12 RX ORDER — KETOROLAC TROMETHAMINE 30 MG/ML
30 INJECTION, SOLUTION INTRAMUSCULAR; INTRAVENOUS ONCE
Status: COMPLETED | OUTPATIENT
Start: 2019-04-12 | End: 2019-04-12

## 2019-04-12 RX ORDER — TAMSULOSIN HYDROCHLORIDE 0.4 MG/1
0.4 CAPSULE ORAL ONCE
Status: COMPLETED | OUTPATIENT
Start: 2019-04-12 | End: 2019-04-12

## 2019-04-12 RX ORDER — 0.9 % SODIUM CHLORIDE 0.9 %
1000 INTRAVENOUS SOLUTION INTRAVENOUS ONCE
Status: COMPLETED | OUTPATIENT
Start: 2019-04-12 | End: 2019-04-12

## 2019-04-12 RX ORDER — ONDANSETRON 2 MG/ML
4 INJECTION INTRAMUSCULAR; INTRAVENOUS
Status: DISCONTINUED | OUTPATIENT
Start: 2019-04-12 | End: 2019-04-12 | Stop reason: HOSPADM

## 2019-04-12 RX ORDER — ONDANSETRON 4 MG/1
4 TABLET, ORALLY DISINTEGRATING ORAL EVERY 8 HOURS PRN
Qty: 20 TABLET | Refills: 0 | Status: SHIPPED | OUTPATIENT
Start: 2019-04-12 | End: 2019-10-28 | Stop reason: ALTCHOICE

## 2019-04-12 RX ORDER — OXYCODONE HYDROCHLORIDE AND ACETAMINOPHEN 5; 325 MG/1; MG/1
1 TABLET ORAL EVERY 6 HOURS PRN
Qty: 16 TABLET | Refills: 0 | Status: SHIPPED | OUTPATIENT
Start: 2019-04-12 | End: 2019-04-15

## 2019-04-12 RX ORDER — MORPHINE SULFATE 4 MG/ML
4 INJECTION, SOLUTION INTRAMUSCULAR; INTRAVENOUS
Status: DISCONTINUED | OUTPATIENT
Start: 2019-04-12 | End: 2019-04-12 | Stop reason: HOSPADM

## 2019-04-12 RX ORDER — TAMSULOSIN HYDROCHLORIDE 0.4 MG/1
0.4 CAPSULE ORAL DAILY
Qty: 14 CAPSULE | Refills: 0 | Status: SHIPPED | OUTPATIENT
Start: 2019-04-12 | End: 2020-04-20 | Stop reason: ALTCHOICE

## 2019-04-12 RX ADMIN — TAMSULOSIN HYDROCHLORIDE 0.4 MG: 0.4 CAPSULE ORAL at 19:44

## 2019-04-12 RX ADMIN — IOPAMIDOL 75 ML: 755 INJECTION, SOLUTION INTRAVENOUS at 16:18

## 2019-04-12 RX ADMIN — KETOROLAC TROMETHAMINE 30 MG: 30 INJECTION, SOLUTION INTRAMUSCULAR at 19:44

## 2019-04-12 RX ADMIN — ONDANSETRON 4 MG: 2 INJECTION INTRAMUSCULAR; INTRAVENOUS at 19:44

## 2019-04-12 RX ADMIN — IOHEXOL 50 ML: 240 INJECTION, SOLUTION INTRATHECAL; INTRAVASCULAR; INTRAVENOUS; ORAL at 16:18

## 2019-04-12 RX ADMIN — MORPHINE SULFATE 4 MG: 4 INJECTION INTRAVENOUS at 19:44

## 2019-04-12 RX ADMIN — SODIUM CHLORIDE 1000 ML: 9 INJECTION, SOLUTION INTRAVENOUS at 19:44

## 2019-04-12 ASSESSMENT — PAIN DESCRIPTION - LOCATION: LOCATION: FLANK

## 2019-04-12 ASSESSMENT — PAIN SCALES - GENERAL
PAINLEVEL_OUTOF10: 10
PAINLEVEL_OUTOF10: 10
PAINLEVEL_OUTOF10: 5

## 2019-04-12 ASSESSMENT — PAIN DESCRIPTION - PAIN TYPE: TYPE: ACUTE PAIN

## 2019-04-12 ASSESSMENT — PAIN DESCRIPTION - ORIENTATION: ORIENTATION: RIGHT;LEFT

## 2019-04-13 NOTE — ED PROVIDER NOTES
[facility] Emergency Department    CHIEF COMPLAINT  Chief Complaint   Patient presents with    Flank Pain     pt has had bilateral flank pain for several wks. pt sent over from Ct due to having kidney stone and pain control. HISTORY OF PRESENT ILLNESS  Jovanna Powell is a 40 y.o. male  who presents to the ED complaining of CT showing R sided 3mm kidney stone with hydro just prior to arrival.  He has tried ibuprofen at home for sx but due to their severity he presents to the ED. Denies fevers. Sx for several weeks. No h/o stones. He has denied any dysuria, hematuria vomiting or diarrhea. He also has been on Cipro empirically for presumed colitis by PCP. Denies back pains. The pain is mostly in the suprapubic area and RLQ but also a little on the LLQ. No other complaints, modifying factors or associated symptoms. I have reviewed the following from the nursing documentation.     Past Medical History:   Diagnosis Date    Adhesive capsulitis of shoulder     Arthritis     Complex partial seizures with impaired consciousness at onset [facility])     [facility]ukemia in remission [facility] [address] [facility]     Seborrheic dermatitis, unspecified     Seizures ([facility] [address] [facility]     Last seizure 3/2018     Past Surgical History:   Procedure Laterality Date    LOBECTOMY FOR SEIZURE FOCUS      SHOULDER ARTHROSCOPY Right 04/27/2018    RIGHT SHOULDER ARTHROSCOPY SUBACROMIAL DECOMPRESSION, OPEN     Family History   Problem Relation Age of Onset    Diabetes Father      Social History     Socioeconomic History    Marital status: Single     Spouse name: Not on file    Number of children: Not on file    Years of education: Not on file    Highest education level: Not on file   Occupational History    Not on file   Social Needs    Financial resource strain: Not on file    Food insecurity:     Worry: Not on file     Inability: Not on file    Transportation needs:     Medical: Not on file     Non-medical: Not on file   Tobacco (KEPPRA) 500 MG tablet TAKE 3 TABLETS TWICE A  tablet 3    lamoTRIgine (LAMICTAL) 25 MG tablet TAKE 1 TABLET TWICE A  tablet 3    lamoTRIgine (LAMICTAL) 150 MG tablet TAKE 1 TABLET TWICE A  tablet 3    DILANTIN 100 MG ER capsule TAKE 1 CAPSULE TWICE A  capsule 3    predniSONE (DELTASONE) 20 MG tablet 1 TID for 3 day then 1 BID 15 tablet 0    DILANTIN 100 MG ER capsule Take one capsule in the morning and 2 at bedtime 270 capsule 1    Boswellia-Glucosamine-Vit D (GLUCOSAMINE COMPLEX PO) Take by mouth      betamethasone valerate (VALISONE) 0.1 % cream Apply topically 1 times daily. 30 g 5    multivitamin (THERAGRAN) per tablet Take 1 tablet by mouth daily. No Known Allergies    REVIEW OF SYSTEMS  10 systems reviewed, pertinent positives per HPI otherwise noted to be negative. PHYSICAL EXAM  BP (!) 159/87   Pulse 81   Temp 97.5 °F (36.4 °C) (Oral)   Resp 18   Ht 6' 1\" (1.854 m)   Wt 150 lb (68 kg)   SpO2 98%   BMI 19.79 kg/m²    GENERAL APPEARANCE: Awake and alert. Cooperative. No distress. HENT: Normocephalic. Atraumatic. Mucous membranes are moist.  NECK: Supple. EYES: PERRL. EOM's grossly intact. HEART/CHEST: RRR. No murmurs. No chest wall tenderness. LUNGS: Respirations unlabored. CTAB. Good air exchange. Speaking comfortably in full sentences. ABDOMEN: No CVAT bilat. Mild RLQ, suprapubic and LLQ ttp without any upper tenderness. Soft. Non-distended. No masses. No organomegaly. No guarding or rebound. Normal bowel sounds throughout. MUSCULOSKELETAL: No extremity edema. Compartments soft. No deformity. No tenderness in the extremities. All extremities neurovascularly intact. SKIN: Warm and dry. No acute rashes. NEUROLOGICAL: Alert and oriented. CN's 2-12 intact. No gross facial drooping. Strength 5/5, sensation intact. 2 plus DTR's in knees bilaterally. Gait normal.  PSYCHIATRIC: Normal mood and affect.     LABS  I have reviewed all labs for this visit.    Results for orders placed or performed during the hospital encounter of 04/12/19   CBC Auto Differential   Result Value Ref Range    WBC 5.1 4.0 - 11.0 K/uL    RBC 4.56 4.20 - 5.90 M/uL    Hemoglobin 13.8 13.5 - 17.5 g/dL    Hematocrit 41.1 40.5 - 52.5 %    MCV 90.2 80.0 - 100.0 fL    MCH 30.3 26.0 - 34.0 pg    MCHC 33.6 31.0 - 36.0 g/dL    RDW 13.2 12.4 - 15.4 %    Platelets 592 261 - 344 K/uL    MPV 8.3 5.0 - 10.5 fL    Neutrophils % 53.7 %    Lymphocytes % 32.8 %    Monocytes % 11.6 %    Eosinophils % 1.5 %    Basophils % 0.4 %    Neutrophils # 2.7 1.7 - 7.7 K/uL    Lymphocytes # 1.7 1.0 - 5.1 K/uL    Monocytes # 0.6 0.0 - 1.3 K/uL    Eosinophils # 0.1 0.0 - 0.6 K/uL    Basophils # 0.0 0.0 - 0.2 K/uL   Comprehensive Metabolic Panel   Result Value Ref Range    Sodium 134 (L) 136 - 145 mmol/L    Potassium 4.2 3.5 - 5.1 mmol/L    Chloride 97 (L) 99 - 110 mmol/L    CO2 27 21 - 32 mmol/L    Anion Gap 10 3 - 16    Glucose 99 70 - 99 mg/dL    BUN 14 7 - 20 mg/dL    CREATININE 0.9 0.9 - 1.3 mg/dL    GFR Non-African American >60 >60    GFR African American >60 >60    Calcium 9.2 8.3 - 10.6 mg/dL    Total Protein 8.2 6.4 - 8.2 g/dL    Alb 4.5 3.4 - 5.0 g/dL    Albumin/Globulin Ratio 1.2 1.1 - 2.2    Total Bilirubin 0.3 0.0 - 1.0 mg/dL    Alkaline Phosphatase 120 40 - 129 U/L    ALT 12 10 - 40 U/L    AST 15 15 - 37 U/L    Globulin 3.7 g/dL   Urinalysis Reflex to Culture   Result Value Ref Range    Color, UA YELLOW Straw/Yellow    Clarity, UA Clear Clear    Glucose, Ur Negative Negative mg/dL    Bilirubin Urine Negative Negative    Ketones, Urine Negative Negative mg/dL    Specific Gravity, UA >1.030 1.005 - 1.030    Blood, Urine LARGE (A) Negative    pH, UA 5.5 5.0 - 8.0    Protein, UA Negative Negative mg/dL    Urobilinogen, Urine 0.2 <2.0 E.U./dL    Nitrite, Urine Negative Negative    Leukocyte Esterase, Urine SMALL (A) Negative    Microscopic Examination YES     Urine Reflex to Culture Yes     Urine Type Not Specified    Levetiracetam level   Result Value Ref Range    Levetiracetam Lvl 44.9 6.0 - 46.0 ug/mL    KEPPRA Dose Amt Unknown    Phenytoin level, total   Result Value Ref Range    Phenytoin Lvl 12.0 10.0 - 20.0 ug/mL    Phenytoin Dose Amount Unknown    Microscopic Urinalysis   Result Value Ref Range    Hyaline Casts, UA 2 0 - 8 /LPF    WBC, UA 5 0 - 5 /HPF    RBC,  (H) 0 - 4 /HPF    Epi Cells 1 0 - 5 /HPF     RADIOLOGY    Ct Abdomen Pelvis W Iv Contrast Additional Contrast? Oral    Result Date: 4/12/2019  EXAMINATION: CT OF THE ABDOMEN AND PELVIS WITH CONTRAST 4/12/2019 4:10 pm TECHNIQUE: CT of the abdomen and pelvis was performed with the administration of intravenous contrast. Multiplanar reformatted images are provided for review. Dose modulation, iterative reconstruction, and/or weight based adjustment of the mA/kV was utilized to reduce the radiation dose to as low as reasonably achievable. COMPARISON: 02/16/2018 HISTORY: ORDERING SYSTEM PROVIDED HISTORY: Lower abdominal pain TECHNOLOGIST PROVIDED HISTORY: OKay to add pelvis per Dr. Emily Mckenna, patients order was abd only but patient is having lower abd pain Additional Contrast?->Oral Reason for exam:->lower abdominal pain, history of colitis Ordering Physician Provided Reason for Exam: lower abd pain Acuity: Acute Type of Exam: Initial FINDINGS: Lower Chest: Motion artifact degrades image quality. There is bibasilar atelectasis. Organs: The liver, spleen, pancreas, adrenal glands and kidneys are unremarkable. There are left simple renal cysts and bilateral punctate nonobstructing nephrolithiasis. However, within the right ureterovesicular junction, there is a 3 mm obstructing nephrolithiasis causing mild hydroureteronephrosis. GI/Bowel: There is no bowel obstruction. Stool is present throughout the colon. The appendix is not visualized. Pelvis: The pelvic viscera are within normal limits.  Peritoneum/Retroperitoneum: There is no evidence of free fluid or adenopathy. Bones/Soft Tissues: Degenerative changes involve the thoracolumbar spine and bilateral hips. 1. 3 mm obstructing nephrolithiasis within the right UVJ causing mild hydroureteronephrosis. ED COURSE/MDM  Patient seen and evaluated. Old records reviewed. Labs and imaging reviewed and results discussed with patient. After initial evaluation, differential diagnostic considerations included: kidney stone, pyelonephritis, UTI, appendicitis, bowel obstruction, diverticulitis, hernia, gastritis/gastroenteritis, pancreatitis, cholecystitis, hepatitis, constipation, IBS, IBD    The patient's ED workup was notable for R sided kidney stone, presumably present for weeks, without EDDIE or UTI or sepsis/systemic illness. Sx controlled well in ED. Trial of opiate/zofran and continuing nsaids for home as well as flomax and a urine strainer. Despite its small size it has been several weeks so I do not suspect it will pass at this point. Hydration encouraged. Outpt urology management is appropriate at this point with return precautions discussed. No fever. During the patient's ED course, the patient was given:  Medications   morphine injection 4 mg (4 mg Intravenous Given 4/12/19 1944)   ondansetron (ZOFRAN) injection 4 mg (4 mg Intravenous Given 4/12/19 1944)   ketorolac (TORADOL) injection 30 mg (30 mg Intravenous Given 4/12/19 1944)   0.9 % sodium chloride IV bolus 1,000 mL (0 mLs Intravenous Stopped 4/12/19 2047)   tamsulosin (FLOMAX) capsule 0.4 mg (0.4 mg Oral Given 4/12/19 1944)        CLINICAL IMPRESSION  1. Kidney stone on right side        Blood pressure (!) 159/87, pulse 81, temperature 97.5 °F (36.4 °C), temperature source Oral, resp. rate 18, height 6' 1\" (1.854 m), weight 150 lb (68 kg), SpO2 98 %. Dianna Barraza was discharged to home in stable condition.     I have discussed the findings of today's workup with the patient and addressed the patient's questions and concerns. Important warning signs as well as new or worsening symptoms which would necessitate immediate return to the ED were discussed. The plan is to discharge from the ED at this time, and the patient is in stable condition. The patient acknowledged understanding is agreeable with this plan. Patient was given scripts for the following medications. I counseled patient how to take these medications. Discharge Medication List as of 4/12/2019  8:39 PM      START taking these medications    Details   tamsulosin (FLOMAX) 0.4 MG capsule Take 1 capsule by mouth daily, Disp-14 capsule, R-0Print      oxyCODONE-acetaminophen (PERCOCET) 5-325 MG per tablet Take 1 tablet by mouth every 6 hours as needed for Pain (CAUTION: This medication can cause dizziness.  Do not drive or operate heavy machinery when on this medication.) for up to 3 days. , Disp-16 tablet, R-0Print      ondansetron (ZOFRAN ODT) 4 MG disintegrating tablet Take 1 tablet by mouth every 8 hours as needed for Nausea or Vomiting, Disp-20 tablet, R-0Print             Follow-up with:  Jojo Foss MD  [address] [phone]    Schedule an appointment as soon as possible for a visit in 3 days  For symptom re-evaluation    [facility] Emergency Department  [address]  Go to   If symptoms worsen      DISCLAIMER: This chart was created using Dragon dictation software. Efforts were made by me to ensure accuracy, however some errors may be present due to limitations of this technology and occasionally words are not transcribed correctly.         Jhonathan Fulton MD  04/12/19 [id number]

## 2019-04-14 LAB — URINE CULTURE, ROUTINE: NORMAL

## 2019-04-19 ENCOUNTER — APPOINTMENT (OUTPATIENT)
Dept: CT IMAGING | Age: 44
End: 2019-04-19
Payer: MEDICARE

## 2019-05-18 DIAGNOSIS — M75.00 ADHESIVE CAPSULITIS OF SHOULDER, UNSPECIFIED LATERALITY: ICD-10-CM

## 2019-05-18 DIAGNOSIS — G40.909 SEIZURE DISORDER (HCC): ICD-10-CM

## 2019-05-20 RX ORDER — PHENYTOIN SODIUM 100 MG/1
CAPSULE, EXTENDED RELEASE ORAL
Qty: 180 CAPSULE | Refills: 0 | Status: SHIPPED | OUTPATIENT
Start: 2019-05-20 | End: 2019-08-14 | Stop reason: SDUPTHER

## 2019-05-20 RX ORDER — IBUPROFEN 800 MG/1
TABLET ORAL
Qty: 270 TABLET | Refills: 0 | Status: SHIPPED | OUTPATIENT
Start: 2019-05-20 | End: 2019-08-14 | Stop reason: SDUPTHER

## 2019-05-20 RX ORDER — LEVETIRACETAM 500 MG/1
TABLET ORAL
Qty: 540 TABLET | Refills: 0 | Status: SHIPPED | OUTPATIENT
Start: 2019-05-20 | End: 2019-08-14 | Stop reason: SDUPTHER

## 2019-05-20 RX ORDER — LAMOTRIGINE 25 MG/1
TABLET ORAL
Qty: 180 TABLET | Refills: 0 | Status: SHIPPED | OUTPATIENT
Start: 2019-05-20 | End: 2019-08-14 | Stop reason: SDUPTHER

## 2019-05-20 RX ORDER — LAMOTRIGINE 150 MG/1
TABLET ORAL
Qty: 180 TABLET | Refills: 0 | Status: SHIPPED | OUTPATIENT
Start: 2019-05-20 | End: 2019-08-14 | Stop reason: SDUPTHER

## 2019-08-14 DIAGNOSIS — M75.00 ADHESIVE CAPSULITIS OF SHOULDER, UNSPECIFIED LATERALITY: ICD-10-CM

## 2019-08-14 DIAGNOSIS — G40.909 SEIZURE DISORDER (HCC): ICD-10-CM

## 2019-08-15 RX ORDER — LAMOTRIGINE 150 MG/1
TABLET ORAL
Qty: 180 TABLET | Refills: 0 | Status: SHIPPED | OUTPATIENT
Start: 2019-08-15 | End: 2019-10-28 | Stop reason: SDUPTHER

## 2019-08-15 RX ORDER — PHENYTOIN SODIUM 100 MG/1
CAPSULE, EXTENDED RELEASE ORAL
Qty: 180 CAPSULE | Refills: 0 | Status: SHIPPED | OUTPATIENT
Start: 2019-08-15 | End: 2019-10-28

## 2019-08-15 RX ORDER — IBUPROFEN 800 MG/1
TABLET ORAL
Qty: 270 TABLET | Refills: 0 | Status: SHIPPED | OUTPATIENT
Start: 2019-08-15 | End: 2019-10-28 | Stop reason: SDUPTHER

## 2019-08-15 RX ORDER — LEVETIRACETAM 500 MG/1
TABLET ORAL
Qty: 540 TABLET | Refills: 0 | Status: SHIPPED | OUTPATIENT
Start: 2019-08-15 | End: 2019-10-28 | Stop reason: SDUPTHER

## 2019-08-15 RX ORDER — LAMOTRIGINE 25 MG/1
TABLET ORAL
Qty: 180 TABLET | Refills: 0 | Status: SHIPPED | OUTPATIENT
Start: 2019-08-15 | End: 2019-10-28 | Stop reason: SDUPTHER

## 2019-09-23 ENCOUNTER — TELEPHONE (OUTPATIENT)
Dept: FAMILY MEDICINE CLINIC | Age: 44
End: 2019-09-23

## 2019-09-23 DIAGNOSIS — G40.909 SEIZURE DISORDER (HCC): Primary | ICD-10-CM

## 2019-10-24 DIAGNOSIS — G40.909 SEIZURE DISORDER (HCC): ICD-10-CM

## 2019-10-24 LAB
A/G RATIO: 1.4 (ref 1.1–2.2)
ALBUMIN SERPL-MCNC: 4.4 G/DL (ref 3.4–5)
ALP BLD-CCNC: 110 U/L (ref 40–129)
ALT SERPL-CCNC: 12 U/L (ref 10–40)
ANION GAP SERPL CALCULATED.3IONS-SCNC: 16 MMOL/L (ref 3–16)
AST SERPL-CCNC: 15 U/L (ref 15–37)
BILIRUB SERPL-MCNC: 0.3 MG/DL (ref 0–1)
BUN BLDV-MCNC: 14 MG/DL (ref 7–20)
CALCIUM SERPL-MCNC: 9.4 MG/DL (ref 8.3–10.6)
CHLORIDE BLD-SCNC: 102 MMOL/L (ref 99–110)
CO2: 27 MMOL/L (ref 21–32)
CREAT SERPL-MCNC: 0.9 MG/DL (ref 0.9–1.3)
GFR AFRICAN AMERICAN: >60
GFR NON-AFRICAN AMERICAN: >60
GLOBULIN: 3.1 G/DL
GLUCOSE BLD-MCNC: 100 MG/DL (ref 70–99)
HCT VFR BLD CALC: 41.7 % (ref 40.5–52.5)
HEMOGLOBIN: 13.9 G/DL (ref 13.5–17.5)
MCH RBC QN AUTO: 30.2 PG (ref 26–34)
MCHC RBC AUTO-ENTMCNC: 33.3 G/DL (ref 31–36)
MCV RBC AUTO: 90.8 FL (ref 80–100)
PDW BLD-RTO: 13.6 % (ref 12.4–15.4)
PHENYTOIN DOSE AMOUNT: NORMAL
PHENYTOIN LEVEL: 10.6 UG/ML (ref 10–20)
PLATELET # BLD: 213 K/UL (ref 135–450)
PMV BLD AUTO: 8.8 FL (ref 5–10.5)
POTASSIUM SERPL-SCNC: 4.4 MMOL/L (ref 3.5–5.1)
RBC # BLD: 4.59 M/UL (ref 4.2–5.9)
SODIUM BLD-SCNC: 145 MMOL/L (ref 136–145)
TOTAL PROTEIN: 7.5 G/DL (ref 6.4–8.2)
WBC # BLD: 4.5 K/UL (ref 4–11)

## 2019-10-26 LAB — LAMOTRIGINE LEVEL: 15.7 UG/ML (ref 2.5–15)

## 2019-10-28 ENCOUNTER — OFFICE VISIT (OUTPATIENT)
Dept: FAMILY MEDICINE CLINIC | Age: 44
End: 2019-10-28
Payer: MEDICARE

## 2019-10-28 VITALS
WEIGHT: 157.13 LBS | HEART RATE: 82 BPM | RESPIRATION RATE: 12 BRPM | BODY MASS INDEX: 22 KG/M2 | OXYGEN SATURATION: 97 % | SYSTOLIC BLOOD PRESSURE: 104 MMHG | HEIGHT: 71 IN | DIASTOLIC BLOOD PRESSURE: 80 MMHG

## 2019-10-28 DIAGNOSIS — M75.00 ADHESIVE CAPSULITIS OF SHOULDER, UNSPECIFIED LATERALITY: ICD-10-CM

## 2019-10-28 DIAGNOSIS — G40.909 SEIZURE DISORDER (HCC): Primary | ICD-10-CM

## 2019-10-28 DIAGNOSIS — Z23 NEED FOR INFLUENZA VACCINATION: ICD-10-CM

## 2019-10-28 DIAGNOSIS — Z85.6 HISTORY OF LEUKEMIA: ICD-10-CM

## 2019-10-28 DIAGNOSIS — R26.89 BALANCE DISORDER: ICD-10-CM

## 2019-10-28 PROCEDURE — 90686 IIV4 VACC NO PRSV 0.5 ML IM: CPT | Performed by: FAMILY MEDICINE

## 2019-10-28 PROCEDURE — 99214 OFFICE O/P EST MOD 30 MIN: CPT | Performed by: FAMILY MEDICINE

## 2019-10-28 PROCEDURE — G0008 ADMIN INFLUENZA VIRUS VAC: HCPCS | Performed by: FAMILY MEDICINE

## 2019-10-28 RX ORDER — LAMOTRIGINE 150 MG/1
TABLET ORAL
Qty: 180 TABLET | Refills: 1 | Status: SHIPPED | OUTPATIENT
Start: 2019-10-28 | End: 2020-04-20 | Stop reason: SDUPTHER

## 2019-10-28 RX ORDER — IBUPROFEN 800 MG/1
TABLET ORAL
Qty: 270 TABLET | Refills: 1 | Status: ON HOLD | OUTPATIENT
Start: 2019-10-28 | End: 2020-05-10

## 2019-10-28 RX ORDER — LEVETIRACETAM 500 MG/1
TABLET ORAL
Qty: 540 TABLET | Refills: 1 | Status: SHIPPED | OUTPATIENT
Start: 2019-10-28 | End: 2020-04-20 | Stop reason: SDUPTHER

## 2019-10-28 RX ORDER — PHENYTOIN SODIUM 100 MG/1
CAPSULE, EXTENDED RELEASE ORAL
Qty: 270 CAPSULE | Refills: 1 | Status: SHIPPED | OUTPATIENT
Start: 2019-10-28 | End: 2020-04-20 | Stop reason: SDUPTHER

## 2019-10-28 RX ORDER — LAMOTRIGINE 25 MG/1
TABLET ORAL
Qty: 180 TABLET | Refills: 1 | Status: SHIPPED | OUTPATIENT
Start: 2019-10-28 | End: 2020-04-20 | Stop reason: SDUPTHER

## 2020-01-30 NOTE — TELEPHONE ENCOUNTER
RAPID3 (0-30) Cumulative Score  12.5          RAPID3 Weighted Score (divide #4 by 3 and that is the weighted score)  4.2       Jade Laureano [facility]  1/30/2020 9:09 AM     Please advise

## 2020-04-20 ENCOUNTER — VIRTUAL VISIT (OUTPATIENT)
Dept: INTERNAL MEDICINE CLINIC | Age: 45
End: 2020-04-20
Payer: MEDICARE

## 2020-04-20 PROBLEM — M15.0 PRIMARY OSTEOARTHRITIS INVOLVING MULTIPLE JOINTS: Status: ACTIVE | Noted: 2020-04-20

## 2020-04-20 PROBLEM — M15.9 PRIMARY OSTEOARTHRITIS INVOLVING MULTIPLE JOINTS: Status: ACTIVE | Noted: 2020-04-20

## 2020-04-20 PROBLEM — M75.121 COMPLETE TEAR OF RIGHT ROTATOR CUFF: Status: RESOLVED | Noted: 2018-04-27 | Resolved: 2020-04-20

## 2020-04-20 PROCEDURE — 99214 OFFICE O/P EST MOD 30 MIN: CPT | Performed by: FAMILY MEDICINE

## 2020-04-20 RX ORDER — LAMOTRIGINE 25 MG/1
TABLET ORAL
Qty: 180 TABLET | Refills: 1 | Status: SHIPPED | OUTPATIENT
Start: 2020-04-20 | End: 2020-05-06 | Stop reason: SDUPTHER

## 2020-04-20 RX ORDER — LEVETIRACETAM 500 MG/1
TABLET ORAL
Qty: 540 TABLET | Refills: 1 | Status: SHIPPED | OUTPATIENT
Start: 2020-04-20 | End: 2020-05-06 | Stop reason: SDUPTHER

## 2020-04-20 RX ORDER — PHENYTOIN SODIUM 100 MG/1
CAPSULE, EXTENDED RELEASE ORAL
Qty: 270 CAPSULE | Refills: 1 | Status: SHIPPED | OUTPATIENT
Start: 2020-04-20 | End: 2020-05-06 | Stop reason: SDUPTHER

## 2020-04-20 RX ORDER — ACETAMINOPHEN 500 MG
1000 TABLET ORAL 3 TIMES DAILY PRN
Qty: 540 TABLET | Refills: 1 | Status: SHIPPED | OUTPATIENT
Start: 2020-04-20 | End: 2020-05-06 | Stop reason: SDUPTHER

## 2020-04-20 RX ORDER — LAMOTRIGINE 150 MG/1
TABLET ORAL
Qty: 180 TABLET | Refills: 1 | Status: SHIPPED | OUTPATIENT
Start: 2020-04-20 | End: 2020-05-06 | Stop reason: SDUPTHER

## 2020-04-20 NOTE — PROGRESS NOTES
Vitals/Constitutional/EENT/Resp/CV/GI//MS/Neuro/Skin/Heme-Lymph-Imm. ASSESSMENT/PLAN:  Shania Marcus was seen today for 6 month follow-up and joint pain. Diagnoses and all orders for this visit:    Seizure disorder ([facility] [address]  -     levETIRAcetam (KEPPRA) 500 MG tablet; TAKE 3 TABLETS TWICE A DAY    Primary osteoarthritis involving multiple joints    Adhesive capsulitis of shoulder, unspecified laterality    Seborrheic dermatitis    Other orders  -     lamoTRIgine (LAMICTAL) 25 MG tablet; TAKE 1 TABLET TWICE A DAY  -     lamoTRIgine (LAMICTAL) 150 MG tablet; TAKE 1 TABLET TWICE A DAY  -     DILANTIN 100 MG ER capsule; Take one capsule in the morning and 2 at bedtime  -     acetaminophen (TYLENOL) 500 MG tablet; Take 2 tablets by mouth 3 times daily as needed for Pain        Shania Marcus was seen today for 6 month follow-up and joint pain. Diagnoses and all orders for this visit:    Seizure disorder ([facility] [address]  -     levETIRAcetam (KEPPRA) 500 MG tablet; TAKE 3 TABLETS TWICE A DAY    Primary osteoarthritis involving multiple joints    Adhesive capsulitis of shoulder, unspecified laterality    Seborrheic dermatitis    Other orders  -     lamoTRIgine (LAMICTAL) 25 MG tablet; TAKE 1 TABLET TWICE A DAY  -     lamoTRIgine (LAMICTAL) 150 MG tablet; TAKE 1 TABLET TWICE A DAY  -     DILANTIN 100 MG ER capsule; Take one capsule in the morning and 2 at bedtime  -     acetaminophen (TYLENOL) 500 MG tablet; Take 2 tablets by mouth 3 times daily as needed for Pain    Patient was advised to continue with current medications regards to seizure disorder. He does need laboratory assessment performed and this will be done after the coronavirus pandemic. Continue with moisturizing soaps and cortisone cream to the skin area    With the symptoms consistent with osteoarthritis involving predominantly knees and elbows I recommend that he start taking Tylenol 1000 mg 3 times daily as needed and he still may use the ibuprofen if necessary.   I also recommended local heat to the affected areas especially at nighttime. If his symptoms are persistent after 2 weeks we discussed x-ray examination of knees and elbows. Return in about 6 months (around 10/20/2020). Please note that this chart was generated using Dragon dictation software. Although every effort was made to ensure the accuracy of this automated transcription, some errors in transcription may have occurred. An  electronic signature was used to authenticate this note. --Sandra Carver MD on 4/20/2020 at 12:57 PM        Pursuant to the emergency declaration under the [facility], [facility] waiver authority and the [employer] Act, this Virtual  Visit was conducted, with patient's consent, to reduce the patient's risk of exposure to COVID-19 and provide continuity of care for an established patient. Services were provided through a video synchronous discussion virtually to substitute for in-person clinic visit.

## 2020-05-01 ENCOUNTER — OFFICE VISIT (OUTPATIENT)
Dept: INTERNAL MEDICINE CLINIC | Age: 45
End: 2020-05-01
Payer: MEDICARE

## 2020-05-01 ENCOUNTER — TELEPHONE (OUTPATIENT)
Dept: FAMILY MEDICINE CLINIC | Age: 45
End: 2020-05-01

## 2020-05-01 VITALS
HEART RATE: 90 BPM | TEMPERATURE: 98.6 F | DIASTOLIC BLOOD PRESSURE: 82 MMHG | SYSTOLIC BLOOD PRESSURE: 124 MMHG | OXYGEN SATURATION: 96 %

## 2020-05-01 PROCEDURE — 99213 OFFICE O/P EST LOW 20 MIN: CPT | Performed by: FAMILY MEDICINE

## 2020-05-01 RX ORDER — POLYETHYLENE GLYCOL 3350 17 G/17G
17 POWDER, FOR SOLUTION ORAL DAILY
Qty: 1530 G | Refills: 1 | Status: ON HOLD | OUTPATIENT
Start: 2020-05-01 | End: 2020-06-06 | Stop reason: HOSPADM

## 2020-05-01 RX ORDER — CIPROFLOXACIN 500 MG/1
500 TABLET, FILM COATED ORAL 2 TIMES DAILY
Qty: 14 TABLET | Refills: 0 | Status: SHIPPED | OUTPATIENT
Start: 2020-05-01 | End: 2020-05-08

## 2020-05-01 RX ORDER — POLYETHYLENE GLYCOL 3350 17 G/17G
17 POWDER, FOR SOLUTION ORAL DAILY
Qty: 1530 G | Refills: 1 | Status: SHIPPED | OUTPATIENT
Start: 2020-05-01 | End: 2020-05-01 | Stop reason: SDUPTHER

## 2020-05-01 ASSESSMENT — PATIENT HEALTH QUESTIONNAIRE - PHQ9
SUM OF ALL RESPONSES TO PHQ9 QUESTIONS 1 & 2: 0
2. FEELING DOWN, DEPRESSED OR HOPELESS: 0
SUM OF ALL RESPONSES TO PHQ QUESTIONS 1-9: 0
SUM OF ALL RESPONSES TO PHQ QUESTIONS 1-9: 0
1. LITTLE INTEREST OR PLEASURE IN DOING THINGS: 0

## 2020-05-01 NOTE — PROGRESS NOTES
Subjective:      Patient ID: Robert Glaser is a 39 y.o. male. CC: Patient presents for acute medical problem-abdominal pain. Medical assistant notes reviewed. HPI Patient presents with lower abdominal pain for the past 3 days. Patient states he has been on Ibuprofen for quite some time now and he is wondering if that is part of his problem. For the last 3 days patient has lower abdominal discomfort. He feels this is just below his umbilicus. He started taking milk of magnesia and his bowels have been moving for the last couple days. Typically his bowels are moving once or twice a week. He had similar issues in 2018 and 2019. He had CT scans performed both demonstrating constipation although he did have a kidney stone last year. Kidney stone is resolved and he denies any issues in that regard. He states he is eating okay and denies any fevers or chills. The pain is persistent in nature and not changed with activity. Review of Systems     No Known Allergies    Objective:   Physical Exam  Constitutional:       General: He is not in acute distress. Appearance: Normal appearance. He is well-developed. Abdominal:      General: Bowel sounds are normal. There is no distension. Palpations: Abdomen is soft. Abdomen is not rigid. There is no hepatomegaly, splenomegaly or mass. Tenderness: There is abdominal tenderness in the suprapubic area. There is no right CVA tenderness, left CVA tenderness, guarding or rebound. Hernia: No hernia is present. Comments: Pain is located 3 cm below the umbilical area and somewhat suprapubic. Skin:     General: Skin is warm. Findings: No rash. Neurological:      Mental Status: He is alert. Mental status is at baseline. Psychiatric:         Behavior: Behavior is cooperative. Assessment:      Virginia Walters was seen today for abdominal pain.     Diagnoses and all orders for this visit:    Periumbilical abdominal pain    Other orders  - Discontinue: polyethylene glycol (GLYCOLAX) 17 GM/SCOOP powder; Take 17 g by mouth daily  -     ciprofloxacin (CIPRO) 500 MG tablet; Take 1 tablet by mouth 2 times daily for 7 days  -     polyethylene glycol (GLYCOLAX) 17 GM/SCOOP powder; Take 17 g by mouth daily            Plan:      CT scans for the last several years reviewed with patient. He did have a kidney stone last year but predominately has had constipation. Patient thought this was similar to when he had diverticulitis in 2018. Plan is to go ahead and treated with antibiotic therapy but I encouraged patient to stay on a high-fiber diet and start MiraLAX daily. RTC PRN    Please note that this chart was generated using Dragon dictation software. Although every effort was made to ensure the accuracy of this automated transcription, some errors in transcription may have occurred.

## 2020-05-01 NOTE — TELEPHONE ENCOUNTER
Pt stated that he is having pain as well on lower stomach and belly button. He was just in the office a few mins ago, he forgot to tell him.

## 2020-05-01 NOTE — PATIENT INSTRUCTIONS
Patient Education        Learning About Foods That Are Good Sources of Fiber  What foods are high in fiber? The foods you eat contain nutrients, such as vitamins and minerals. Fiber is a nutrient. Your body needs the right amount to stay healthy and work as it should. You can use the list below to help you make choices about which foods to eat. Here are some examples of foods that are good sources of fiber. Fruits  · Apple  · Apricot  · Avocado  · Banana  · Blackberries  · Cherries  · Melon  · Pear  · Raspberries  Grains  · Amaranth  · Barley  · Bran cereal  · Farro  · Oat bran  · Oatmeal  · Quinoa  · Rice (brown or wild)  · Whole-grain bread  · Whole-grain English muffin  Protein foods  · Almonds  · Beans (black, kidney, navy, castañeda)  · Jeffery seeds  · Garbanzo beans  · Lentils  · Pumpkin seeds  · Split peas  · Sunflower seeds  Vegetables  · Artichoke  · Broccoli  · Randolph sprouts  · Cabbage  · Carrots  · Cauliflower  · Eggplant  · Green peas  · Kale  · Pumpkin  · Sweet potato  · White potato  Work with your doctor to find out how much of this nutrient you need. Depending on your health, you may need more or less of it in your diet. Where can you learn more? Go to https://[email or web address]. org and sign in to your [employer] account. Enter F355 in the [facility] box to learn more about \"Learning About Foods That Are Good Sources of Fiber. \"     If you do not have an account, please click on the \"Sign Up Now\" link. Current as of: August 21, 2019Content Version: 12.4  © 1046-1475 Healthwise, Incorporated. Care instructions adapted under license by [facility] [place] ([facility]). If you have questions about a medical condition or this instruction, always ask your healthcare professional. [address] any warranty or liability for your use of this information.

## 2020-05-06 RX ORDER — LAMOTRIGINE 25 MG/1
TABLET ORAL
Qty: 180 TABLET | Refills: 1 | Status: SHIPPED | OUTPATIENT
Start: 2020-05-06 | End: 2020-05-07 | Stop reason: SDUPTHER

## 2020-05-06 RX ORDER — PHENYTOIN SODIUM 100 MG/1
CAPSULE, EXTENDED RELEASE ORAL
Qty: 270 CAPSULE | Refills: 1 | Status: SHIPPED | OUTPATIENT
Start: 2020-05-06 | End: 2020-05-07 | Stop reason: SDUPTHER

## 2020-05-06 RX ORDER — LEVETIRACETAM 500 MG/1
TABLET ORAL
Qty: 180 TABLET | Refills: 1 | Status: SHIPPED | OUTPATIENT
Start: 2020-05-06 | End: 2020-05-07 | Stop reason: SDUPTHER

## 2020-05-06 RX ORDER — ACETAMINOPHEN 500 MG
1000 TABLET ORAL 3 TIMES DAILY PRN
Qty: 180 TABLET | Refills: 0 | Status: SHIPPED | OUTPATIENT
Start: 2020-05-06

## 2020-05-06 RX ORDER — LAMOTRIGINE 150 MG/1
TABLET ORAL
Qty: 180 TABLET | Refills: 1 | Status: SHIPPED | OUTPATIENT
Start: 2020-05-06 | End: 2020-05-07 | Stop reason: SDUPTHER

## 2020-05-07 RX ORDER — LAMOTRIGINE 25 MG/1
TABLET ORAL
Qty: 180 TABLET | Refills: 1 | Status: ON HOLD | OUTPATIENT
Start: 2020-05-07 | End: 2020-05-31 | Stop reason: HOSPADM

## 2020-05-07 RX ORDER — PHENYTOIN SODIUM 100 MG/1
CAPSULE, EXTENDED RELEASE ORAL
Qty: 270 CAPSULE | Refills: 1 | Status: ON HOLD | OUTPATIENT
Start: 2020-05-07 | End: 2020-05-31 | Stop reason: HOSPADM

## 2020-05-07 RX ORDER — LEVETIRACETAM 500 MG/1
TABLET ORAL
Qty: 180 TABLET | Refills: 1 | Status: ON HOLD | OUTPATIENT
Start: 2020-05-07 | End: 2020-05-31 | Stop reason: HOSPADM

## 2020-05-07 RX ORDER — LAMOTRIGINE 150 MG/1
TABLET ORAL
Qty: 180 TABLET | Refills: 1 | Status: ON HOLD | OUTPATIENT
Start: 2020-05-07 | End: 2020-05-31 | Stop reason: HOSPADM

## 2020-05-10 ENCOUNTER — APPOINTMENT (OUTPATIENT)
Dept: CT IMAGING | Age: 45
DRG: 330 | End: 2020-05-10
Payer: MEDICARE

## 2020-05-10 ENCOUNTER — HOSPITAL ENCOUNTER (INPATIENT)
Age: 45
LOS: 21 days | Discharge: HOME HEALTH | DRG: 330 | End: 2020-05-31
Attending: EMERGENCY MEDICINE | Admitting: FAMILY MEDICINE
Payer: MEDICARE

## 2020-05-10 PROBLEM — K52.9 COLITIS: Status: ACTIVE | Noted: 2020-05-10

## 2020-05-10 LAB
A/G RATIO: 1.2 (ref 1.1–2.2)
ALBUMIN SERPL-MCNC: 4.6 G/DL (ref 3.4–5)
ALP BLD-CCNC: 122 U/L (ref 40–129)
ALT SERPL-CCNC: 18 U/L (ref 10–40)
ANION GAP SERPL CALCULATED.3IONS-SCNC: 10 MMOL/L (ref 3–16)
AST SERPL-CCNC: 19 U/L (ref 15–37)
BASOPHILS ABSOLUTE: 0 K/UL (ref 0–0.2)
BASOPHILS RELATIVE PERCENT: 0.4 %
BILIRUB SERPL-MCNC: 0.3 MG/DL (ref 0–1)
BILIRUBIN URINE: NEGATIVE
BLOOD, URINE: NEGATIVE
BUN BLDV-MCNC: 11 MG/DL (ref 7–20)
CALCIUM SERPL-MCNC: 9.5 MG/DL (ref 8.3–10.6)
CHLORIDE BLD-SCNC: 98 MMOL/L (ref 99–110)
CLARITY: CLEAR
CO2: 26 MMOL/L (ref 21–32)
COLOR: YELLOW
CREAT SERPL-MCNC: 0.9 MG/DL (ref 0.9–1.3)
EOSINOPHILS ABSOLUTE: 0 K/UL (ref 0–0.6)
EOSINOPHILS RELATIVE PERCENT: 0.9 %
EPITHELIAL CELLS, UA: 1 /HPF (ref 0–5)
GFR AFRICAN AMERICAN: >60
GFR NON-AFRICAN AMERICAN: >60
GLOBULIN: 4 G/DL
GLUCOSE BLD-MCNC: 108 MG/DL (ref 70–99)
GLUCOSE URINE: NEGATIVE MG/DL
HCT VFR BLD CALC: 42.6 % (ref 40.5–52.5)
HEMOGLOBIN: 14.3 G/DL (ref 13.5–17.5)
HYALINE CASTS: 2 /LPF (ref 0–8)
KETONES, URINE: NEGATIVE MG/DL
LACTIC ACID: 0.6 MMOL/L (ref 0.4–2)
LEUKOCYTE ESTERASE, URINE: ABNORMAL
LIPASE: 9 U/L (ref 13–60)
LYMPHOCYTES ABSOLUTE: 1 K/UL (ref 1–5.1)
LYMPHOCYTES RELATIVE PERCENT: 18.3 %
MCH RBC QN AUTO: 30.3 PG (ref 26–34)
MCHC RBC AUTO-ENTMCNC: 33.5 G/DL (ref 31–36)
MCV RBC AUTO: 90.4 FL (ref 80–100)
MICROSCOPIC EXAMINATION: YES
MONOCYTES ABSOLUTE: 0.6 K/UL (ref 0–1.3)
MONOCYTES RELATIVE PERCENT: 11.2 %
NEUTROPHILS ABSOLUTE: 3.8 K/UL (ref 1.7–7.7)
NEUTROPHILS RELATIVE PERCENT: 69.2 %
NITRITE, URINE: NEGATIVE
PDW BLD-RTO: 13.5 % (ref 12.4–15.4)
PH UA: 6.5 (ref 5–8)
PLATELET # BLD: 224 K/UL (ref 135–450)
PMV BLD AUTO: 8.6 FL (ref 5–10.5)
POTASSIUM SERPL-SCNC: 4.3 MMOL/L (ref 3.5–5.1)
PROTEIN UA: NEGATIVE MG/DL
RBC # BLD: 4.71 M/UL (ref 4.2–5.9)
RBC UA: 4 /HPF (ref 0–4)
SODIUM BLD-SCNC: 134 MMOL/L (ref 136–145)
SPECIFIC GRAVITY UA: >1.03 (ref 1–1.03)
TOTAL PROTEIN: 8.6 G/DL (ref 6.4–8.2)
URINE REFLEX TO CULTURE: ABNORMAL
URINE TYPE: ABNORMAL
UROBILINOGEN, URINE: 0.2 E.U./DL
WBC # BLD: 5.4 K/UL (ref 4–11)
WBC UA: 3 /HPF (ref 0–5)

## 2020-05-10 PROCEDURE — 36415 COLL VENOUS BLD VENIPUNCTURE: CPT

## 2020-05-10 PROCEDURE — 83605 ASSAY OF LACTIC ACID: CPT

## 2020-05-10 PROCEDURE — 74177 CT ABD & PELVIS W/CONTRAST: CPT

## 2020-05-10 PROCEDURE — 2580000003 HC RX 258: Performed by: FAMILY MEDICINE

## 2020-05-10 PROCEDURE — 2580000003 HC RX 258

## 2020-05-10 PROCEDURE — 85025 COMPLETE CBC W/AUTO DIFF WBC: CPT

## 2020-05-10 PROCEDURE — 80053 COMPREHEN METABOLIC PANEL: CPT

## 2020-05-10 PROCEDURE — 6370000000 HC RX 637 (ALT 250 FOR IP): Performed by: NURSE PRACTITIONER

## 2020-05-10 PROCEDURE — 83690 ASSAY OF LIPASE: CPT

## 2020-05-10 PROCEDURE — C9113 INJ PANTOPRAZOLE SODIUM, VIA: HCPCS | Performed by: PHYSICIAN ASSISTANT

## 2020-05-10 PROCEDURE — 99285 EMERGENCY DEPT VISIT HI MDM: CPT

## 2020-05-10 PROCEDURE — 96375 TX/PRO/DX INJ NEW DRUG ADDON: CPT

## 2020-05-10 PROCEDURE — 6360000004 HC RX CONTRAST MEDICATION: Performed by: PHYSICIAN ASSISTANT

## 2020-05-10 PROCEDURE — 1200000000 HC SEMI PRIVATE

## 2020-05-10 PROCEDURE — 6360000002 HC RX W HCPCS: Performed by: EMERGENCY MEDICINE

## 2020-05-10 PROCEDURE — 2580000003 HC RX 258: Performed by: EMERGENCY MEDICINE

## 2020-05-10 PROCEDURE — 6370000000 HC RX 637 (ALT 250 FOR IP): Performed by: EMERGENCY MEDICINE

## 2020-05-10 PROCEDURE — 6360000002 HC RX W HCPCS: Performed by: PHYSICIAN ASSISTANT

## 2020-05-10 PROCEDURE — 6360000002 HC RX W HCPCS: Performed by: FAMILY MEDICINE

## 2020-05-10 PROCEDURE — 6370000000 HC RX 637 (ALT 250 FOR IP): Performed by: FAMILY MEDICINE

## 2020-05-10 PROCEDURE — 81001 URINALYSIS AUTO W/SCOPE: CPT

## 2020-05-10 PROCEDURE — 96374 THER/PROPH/DIAG INJ IV PUSH: CPT

## 2020-05-10 RX ORDER — LACTULOSE 10 G/15ML
20 SOLUTION ORAL 3 TIMES DAILY
Status: COMPLETED | OUTPATIENT
Start: 2020-05-10 | End: 2020-05-10

## 2020-05-10 RX ORDER — 0.9 % SODIUM CHLORIDE 0.9 %
1000 INTRAVENOUS SOLUTION INTRAVENOUS ONCE
Status: COMPLETED | OUTPATIENT
Start: 2020-05-10 | End: 2020-05-10

## 2020-05-10 RX ORDER — SODIUM CHLORIDE 0.9 % (FLUSH) 0.9 %
10 SYRINGE (ML) INJECTION PRN
Status: DISCONTINUED | OUTPATIENT
Start: 2020-05-10 | End: 2020-05-31 | Stop reason: HOSPADM

## 2020-05-10 RX ORDER — PROMETHAZINE HYDROCHLORIDE 25 MG/1
12.5 TABLET ORAL EVERY 6 HOURS PRN
Status: DISCONTINUED | OUTPATIENT
Start: 2020-05-10 | End: 2020-05-10

## 2020-05-10 RX ORDER — ONDANSETRON 2 MG/ML
4 INJECTION INTRAMUSCULAR; INTRAVENOUS EVERY 6 HOURS PRN
Status: DISCONTINUED | OUTPATIENT
Start: 2020-05-10 | End: 2020-05-31 | Stop reason: HOSPADM

## 2020-05-10 RX ORDER — SODIUM CHLORIDE 9 MG/ML
INJECTION, SOLUTION INTRAVENOUS CONTINUOUS
Status: DISCONTINUED | OUTPATIENT
Start: 2020-05-10 | End: 2020-05-25

## 2020-05-10 RX ORDER — ONDANSETRON 2 MG/ML
4 INJECTION INTRAMUSCULAR; INTRAVENOUS EVERY 6 HOURS PRN
Status: DISCONTINUED | OUTPATIENT
Start: 2020-05-10 | End: 2020-05-10

## 2020-05-10 RX ORDER — ACETAMINOPHEN 650 MG/1
650 SUPPOSITORY RECTAL EVERY 6 HOURS PRN
Status: DISCONTINUED | OUTPATIENT
Start: 2020-05-10 | End: 2020-05-31 | Stop reason: HOSPADM

## 2020-05-10 RX ORDER — HYDROMORPHONE HYDROCHLORIDE 1 MG/ML
1 INJECTION, SOLUTION INTRAMUSCULAR; INTRAVENOUS; SUBCUTANEOUS ONCE
Status: COMPLETED | OUTPATIENT
Start: 2020-05-10 | End: 2020-05-10

## 2020-05-10 RX ORDER — LEVETIRACETAM 500 MG/1
1500 TABLET ORAL 2 TIMES DAILY
Status: DISCONTINUED | OUTPATIENT
Start: 2020-05-10 | End: 2020-05-11

## 2020-05-10 RX ORDER — SODIUM CHLORIDE 0.9 % (FLUSH) 0.9 %
10 SYRINGE (ML) INJECTION EVERY 12 HOURS SCHEDULED
Status: DISCONTINUED | OUTPATIENT
Start: 2020-05-10 | End: 2020-05-31 | Stop reason: HOSPADM

## 2020-05-10 RX ORDER — LAMOTRIGINE 100 MG/1
100 TABLET ORAL 2 TIMES DAILY
Status: DISCONTINUED | OUTPATIENT
Start: 2020-05-10 | End: 2020-05-10 | Stop reason: DRUGHIGH

## 2020-05-10 RX ORDER — SODIUM CHLORIDE 9 MG/ML
INJECTION, SOLUTION INTRAVENOUS
Status: COMPLETED
Start: 2020-05-10 | End: 2020-05-10

## 2020-05-10 RX ORDER — POLYETHYLENE GLYCOL 3350 17 G/17G
17 POWDER, FOR SOLUTION ORAL DAILY PRN
Status: DISCONTINUED | OUTPATIENT
Start: 2020-05-10 | End: 2020-05-16

## 2020-05-10 RX ORDER — PROMETHAZINE HYDROCHLORIDE 25 MG/1
12.5 TABLET ORAL EVERY 6 HOURS PRN
Status: DISCONTINUED | OUTPATIENT
Start: 2020-05-10 | End: 2020-05-31 | Stop reason: HOSPADM

## 2020-05-10 RX ORDER — HYDROMORPHONE HYDROCHLORIDE 1 MG/ML
1 INJECTION, SOLUTION INTRAMUSCULAR; INTRAVENOUS; SUBCUTANEOUS
Status: DISCONTINUED | OUTPATIENT
Start: 2020-05-10 | End: 2020-05-11

## 2020-05-10 RX ORDER — ONDANSETRON 2 MG/ML
4 INJECTION INTRAMUSCULAR; INTRAVENOUS ONCE
Status: COMPLETED | OUTPATIENT
Start: 2020-05-10 | End: 2020-05-10

## 2020-05-10 RX ORDER — MORPHINE SULFATE 4 MG/ML
4 INJECTION, SOLUTION INTRAMUSCULAR; INTRAVENOUS EVERY 4 HOURS PRN
Status: DISCONTINUED | OUTPATIENT
Start: 2020-05-10 | End: 2020-05-19

## 2020-05-10 RX ORDER — ACETAMINOPHEN 325 MG/1
650 TABLET ORAL EVERY 6 HOURS PRN
Status: DISCONTINUED | OUTPATIENT
Start: 2020-05-10 | End: 2020-05-27

## 2020-05-10 RX ORDER — PANTOPRAZOLE SODIUM 40 MG/10ML
40 INJECTION, POWDER, LYOPHILIZED, FOR SOLUTION INTRAVENOUS ONCE
Status: COMPLETED | OUTPATIENT
Start: 2020-05-10 | End: 2020-05-10

## 2020-05-10 RX ADMIN — SODIUM CHLORIDE 1000 ML: 9 INJECTION, SOLUTION INTRAVENOUS at 14:44

## 2020-05-10 RX ADMIN — PANTOPRAZOLE SODIUM 40 MG: 40 INJECTION, POWDER, FOR SOLUTION INTRAVENOUS at 13:08

## 2020-05-10 RX ADMIN — IOPAMIDOL 75 ML: 755 INJECTION, SOLUTION INTRAVENOUS at 13:42

## 2020-05-10 RX ADMIN — ENOXAPARIN SODIUM 40 MG: 40 INJECTION SUBCUTANEOUS at 21:53

## 2020-05-10 RX ADMIN — ONDANSETRON 4 MG: 2 INJECTION INTRAMUSCULAR; INTRAVENOUS at 17:08

## 2020-05-10 RX ADMIN — LEVETIRACETAM 1500 MG: 500 TABLET ORAL at 21:53

## 2020-05-10 RX ADMIN — LACTULOSE 20 G: 20 SOLUTION ORAL at 21:54

## 2020-05-10 RX ADMIN — PROMETHAZINE HYDROCHLORIDE 12.5 MG: 25 TABLET ORAL at 21:53

## 2020-05-10 RX ADMIN — LAMOTRIGINE 175 MG: 25 TABLET ORAL at 21:53

## 2020-05-10 RX ADMIN — HYDROMORPHONE HYDROCHLORIDE 1 MG: 1 INJECTION, SOLUTION INTRAMUSCULAR; INTRAVENOUS; SUBCUTANEOUS at 16:02

## 2020-05-10 RX ADMIN — POLYETHYLENE GLYCOL 3350, SODIUM SULFATE ANHYDROUS, SODIUM BICARBONATE, SODIUM CHLORIDE, POTASSIUM CHLORIDE 4000 ML: 236; 22.74; 6.74; 5.86; 2.97 POWDER, FOR SOLUTION ORAL at 18:10

## 2020-05-10 RX ADMIN — SODIUM CHLORIDE: 9 INJECTION, SOLUTION INTRAVENOUS at 16:45

## 2020-05-10 RX ADMIN — Medication 1000 ML: at 13:04

## 2020-05-10 RX ADMIN — HYDROMORPHONE HYDROCHLORIDE 1 MG: 1 INJECTION, SOLUTION INTRAMUSCULAR; INTRAVENOUS; SUBCUTANEOUS at 13:05

## 2020-05-10 RX ADMIN — ONDANSETRON 4 MG: 2 INJECTION INTRAMUSCULAR; INTRAVENOUS at 13:05

## 2020-05-10 RX ADMIN — MORPHINE SULFATE 4 MG: 4 INJECTION INTRAVENOUS at 18:10

## 2020-05-10 RX ADMIN — MORPHINE SULFATE 4 MG: 4 INJECTION INTRAVENOUS at 23:19

## 2020-05-10 RX ADMIN — SODIUM CHLORIDE 1000 ML: 9 INJECTION, SOLUTION INTRAVENOUS at 13:04

## 2020-05-10 ASSESSMENT — ENCOUNTER SYMPTOMS
ABDOMINAL PAIN: 1
NAUSEA: 1
WHEEZING: 0
BLOOD IN STOOL: 0
ANAL BLEEDING: 0
VOMITING: 1
DIARRHEA: 0
COLOR CHANGE: 0
STRIDOR: 0
COUGH: 0
ABDOMINAL DISTENTION: 0
RECTAL PAIN: 0
SHORTNESS OF BREATH: 0
BACK PAIN: 0

## 2020-05-10 ASSESSMENT — PAIN SCALES - GENERAL
PAINLEVEL_OUTOF10: 5
PAINLEVEL_OUTOF10: 10
PAINLEVEL_OUTOF10: 7
PAINLEVEL_OUTOF10: 7
PAINLEVEL_OUTOF10: 10
PAINLEVEL_OUTOF10: 10
PAINLEVEL_OUTOF10: 7

## 2020-05-10 ASSESSMENT — PAIN DESCRIPTION - ORIENTATION: ORIENTATION: LOWER

## 2020-05-10 ASSESSMENT — PAIN DESCRIPTION - LOCATION
LOCATION: ABDOMEN

## 2020-05-10 ASSESSMENT — PAIN DESCRIPTION - PAIN TYPE
TYPE: ACUTE PAIN

## 2020-05-10 ASSESSMENT — PAIN DESCRIPTION - PROGRESSION: CLINICAL_PROGRESSION: NOT CHANGED

## 2020-05-10 ASSESSMENT — PAIN DESCRIPTION - ONSET: ONSET: ON-GOING

## 2020-05-10 ASSESSMENT — PAIN DESCRIPTION - DESCRIPTORS
DESCRIPTORS: STABBING
DESCRIPTORS: SORE;SHARP;STABBING

## 2020-05-10 ASSESSMENT — PAIN DESCRIPTION - FREQUENCY: FREQUENCY: CONTINUOUS

## 2020-05-10 NOTE — ED PROVIDER NOTES
[address] [facility]        Pt Name: Isaac Barron  MRN: [id number]  [place] 1975  Date of evaluation: 5/10/2020  Provider: Yamila Parry PA-C  PCP: Melanie Willis MD     I have seen and evaluated this patient with my supervising physician Regina Simpson MD.    [address] [place] [address]       Chief Complaint   Patient presents with    Abdominal Pain     Pt to er with bilateral lower abod pain and emesis for a week, states emesis is brown. pt denies diarrhea and abdo hx. constipation       HISTORY OF PRESENT ILLNESS   (Location, Timing/Onset, Context/Setting, Quality, Duration, Modifying Factors, Severity, Associated Signs and Symptoms)  Note limiting factors. Isaac Barron is a 39 y.o. male with history of leukemia in remission, seizure disorder, arthritis who presents to the emergency department complaining of generalized abdominal pain, nausea and vomiting for 1 week. Patient denies diarrhea,  obstipation, bloody or black stool, documented fever or chills. He feels very fatigued and weak. He rates his generalized abdominal pain to be a 10 out of 10 on pain scale. He reports his emesis to appear bilious at times and sometimes coffee-ground. He has no history of peptic ulcer disease or bleeding ulcer. He does take a lot of ibuprofen. Denies ingestion of seafood,  food, spicy food, alcohol prior to the onset of his symptoms. He suffers from chronic constipation but unchanged from his baseline. Patient reports that he has been on Cipro and Flagyl since the first of this month. He addressed these issues with his primary care doctor. He is not feeling any better. Nursing Notes were all reviewed and agreed with or any disagreements were addressed in the HPI. REVIEW OF SYSTEMS    (2-9 systems for level 4, 10 or more for level 5)     Review of Systems   Constitutional: Positive for fatigue.  Negative for chills and THREE TIMES A DAY AS NEEDED FOR PAIN    LAMOTRIGINE (LAMICTAL) 150 MG TABLET    TAKE 1 TABLET TWICE A DAY    LAMOTRIGINE (LAMICTAL) 25 MG TABLET    TAKE 1 TABLET TWICE A DAY    LEVETIRACETAM (KEPPRA) 500 MG TABLET    TAKE 3 TABLETS TWICE A DAY    POLYETHYLENE GLYCOL (GLYCOLAX) 17 GM/SCOOP POWDER    Take 17 g by mouth daily         ALLERGIES     Patient has no known allergies. FAMILYHISTORY       Family History   Problem Relation Age of Onset    Diabetes Father           SOCIAL HISTORY       Social History     Tobacco Use    Smoking status: Never Smoker    Smokeless tobacco: Never Used   Substance Use Topics    Alcohol use: No    Drug use: No       SCREENINGS             PHYSICAL EXAM    (up to 7 for level 4, 8 or more for level 5)     ED Triage Vitals [05/10/20 1233]   BP Temp Temp Source Pulse Resp SpO2 Height Weight   (!) 143/86 98 °F (36.7 °C) Oral 100 18 95 % 6' (1.829 m) 159 lb 8 oz (72.3 kg)       Physical Exam  Vitals signs and nursing note reviewed. Constitutional:       Appearance: Normal appearance. He is well-developed. He is not toxic-appearing or diaphoretic. HENT:      Head: Normocephalic and atraumatic. Right Ear: External ear normal.      Left Ear: External ear normal.      Nose: Nose normal.      Mouth/Throat:      Mouth: Mucous membranes are moist.      Pharynx: Oropharynx is clear. Eyes:      General: No scleral icterus. Right eye: No discharge. Left eye: No discharge. Extraocular Movements: Extraocular movements intact. Conjunctiva/sclera: Conjunctivae normal.      Pupils: Pupils are equal, round, and reactive to light. Neck:      Musculoskeletal: Normal range of motion. Cardiovascular:      Rate and Rhythm: Normal rate. Pulmonary:      Effort: Pulmonary effort is normal.      Breath sounds: Normal breath sounds. Abdominal:      General: Bowel sounds are normal. There is no distension or abdominal bruit. Palpations: Abdomen is soft.  There Ultrasound and MRI are read by the radiologist. Plain radiographic images are visualized and preliminarily interpreted by the ED Provider with the below findings:        Interpretation per the Radiologist below, if available at the time of this note:    CT ABDOMEN PELVIS W IV CONTRAST Additional Contrast? None   Preliminary Result   Heavy stool burden within the colon. Proximal to a focal area of extremely   dense stool possibly related to impaction is inflammatory changes involving   the colon over a moderate length segment compatible with underlying colitis. Bilateral nonobstructing renal calculi. PROCEDURES   Unless otherwise noted below, none     Procedures    CRITICAL CARE TIME   N/A    CONSULTS:  Toño Brown HOSPITALIST  Dr Ricardo Baez and my attending spoke at [phone]. See his note for details regarding this conversation.          EMERGENCY DEPARTMENT COURSE and DIFFERENTIAL DIAGNOSIS/MDM:   Vitals:    Vitals:    05/10/20 1300 05/10/20 1330 05/10/20 1400 05/10/20 1430   BP: (!) 160/85 (!) 165/81 (!) 145/93 (!) 154/89   Pulse:  98  85   Resp:  16  16   Temp:       TempSrc:       SpO2:  97%  98%   Weight:       Height:           Patient was given the following medications:  Medications   0.9 % sodium chloride IV bolus 1,000 mL (1,000 mLs Intravenous New Bag 5/10/20 1444)   polyethylene glycol (GoLYTELY) solution 4,000 mL (has no administration in time range)   HYDROmorphone HCl PF (DILAUDID) injection 1 mg (has no administration in time range)   0.9 % sodium chloride bolus (0 mLs Intravenous Stopped 5/10/20 1431)   ondansetron (ZOFRAN) injection 4 mg (4 mg Intravenous Given 5/10/20 1305)   pantoprazole (PROTONIX) injection 40 mg (40 mg Intravenous Given 5/10/20 1308)   HYDROmorphone HCl PF (DILAUDID) injection 1 mg (1 mg Intravenous Given 5/10/20 1305)   iopamidol (ISOVUE-370) 76 % injection 75 mL (75 mLs Intravenous Given 5/10/20 1342)       This patient presents to the

## 2020-05-11 ENCOUNTER — APPOINTMENT (OUTPATIENT)
Dept: GENERAL RADIOLOGY | Age: 45
DRG: 330 | End: 2020-05-11
Payer: MEDICARE

## 2020-05-11 PROCEDURE — 6360000002 HC RX W HCPCS: Performed by: FAMILY MEDICINE

## 2020-05-11 PROCEDURE — 74019 RADEX ABDOMEN 2 VIEWS: CPT

## 2020-05-11 PROCEDURE — 94760 N-INVAS EAR/PLS OXIMETRY 1: CPT

## 2020-05-11 PROCEDURE — 1200000000 HC SEMI PRIVATE

## 2020-05-11 PROCEDURE — 6360000002 HC RX W HCPCS: Performed by: NURSE PRACTITIONER

## 2020-05-11 PROCEDURE — 74018 RADEX ABDOMEN 1 VIEW: CPT

## 2020-05-11 PROCEDURE — 6370000000 HC RX 637 (ALT 250 FOR IP): Performed by: NURSE PRACTITIONER

## 2020-05-11 PROCEDURE — 6370000000 HC RX 637 (ALT 250 FOR IP): Performed by: INTERNAL MEDICINE

## 2020-05-11 PROCEDURE — 6370000000 HC RX 637 (ALT 250 FOR IP): Performed by: FAMILY MEDICINE

## 2020-05-11 RX ORDER — BISACODYL 10 MG
10 SUPPOSITORY, RECTAL RECTAL DAILY PRN
Status: DISCONTINUED | OUTPATIENT
Start: 2020-05-11 | End: 2020-05-16

## 2020-05-11 RX ORDER — TAMSULOSIN HYDROCHLORIDE 0.4 MG/1
0.4 CAPSULE ORAL DAILY
Qty: 14 CAPSULE | Refills: 0 | Status: SHIPPED | OUTPATIENT
Start: 2020-05-11 | End: 2020-06-12 | Stop reason: ALTCHOICE

## 2020-05-11 RX ORDER — LEVETIRACETAM 5 MG/ML
500 INJECTION INTRAVASCULAR EVERY 12 HOURS
Status: DISCONTINUED | OUTPATIENT
Start: 2020-05-11 | End: 2020-05-12

## 2020-05-11 RX ORDER — LORAZEPAM 2 MG/ML
1 INJECTION INTRAMUSCULAR ONCE
Status: COMPLETED | OUTPATIENT
Start: 2020-05-11 | End: 2020-05-11

## 2020-05-11 RX ORDER — OXYMETAZOLINE HYDROCHLORIDE 0.05 G/100ML
2 SPRAY NASAL 2 TIMES DAILY
Status: DISPENSED | OUTPATIENT
Start: 2020-05-11 | End: 2020-05-14

## 2020-05-11 RX ADMIN — Medication 2 SPRAY: at 20:25

## 2020-05-11 RX ADMIN — ONDANSETRON 4 MG: 2 INJECTION INTRAMUSCULAR; INTRAVENOUS at 02:27

## 2020-05-11 RX ADMIN — LEVETIRACETAM 500 MG: 5 INJECTION INTRAVENOUS at 09:15

## 2020-05-11 RX ADMIN — POLYETHYLENE GLYCOL-3350 AND ELECTROLYTES 4000 ML: 236; 6.74; 5.86; 2.97; 22.74 POWDER, FOR SOLUTION ORAL at 20:26

## 2020-05-11 RX ADMIN — LAMOTRIGINE 175 MG: 25 TABLET ORAL at 20:25

## 2020-05-11 RX ADMIN — LORAZEPAM 1 MG: 2 INJECTION INTRAMUSCULAR; INTRAVENOUS at 05:18

## 2020-05-11 RX ADMIN — MORPHINE SULFATE 4 MG: 4 INJECTION INTRAVENOUS at 15:56

## 2020-05-11 RX ADMIN — ONDANSETRON 4 MG: 2 INJECTION INTRAMUSCULAR; INTRAVENOUS at 20:25

## 2020-05-11 RX ADMIN — LEVETIRACETAM 500 MG: 5 INJECTION INTRAVENOUS at 20:25

## 2020-05-11 ASSESSMENT — PAIN SCALES - GENERAL
PAINLEVEL_OUTOF10: 0
PAINLEVEL_OUTOF10: 9

## 2020-05-11 NOTE — PROGRESS NOTES
[facility] PROGRESS NOTE    5/11/2020 2:42 PM        Name: Star Martínez . Admitted: 5/10/2020  Primary Care Provider: Jamilah Vo MD (Tel: [phone])      Subjective:  Hx seizure disorder, childhood leukemia in remission, RA. He presented to hospital with n/v, abdominal pain. Found to have evidence of colitis and constipation on CT scan. Resting in bed. Says he is miserable with NG tube and would like to have it discontinued. Denies abdominal pain, nausea. GI consult pending. Nursing concerned with bleeding from \"skin tag\" left side. Note multiple red raised papules, possible cherry angiomas on skin.         Reviewed interval ancillary notes    Current Medications  bisacodyl (DULCOLAX) suppository 10 mg, Daily PRN  oxymetazoline (AFRIN) 0.05 % nasal spray 2 spray, BID  levetiracetam (KEPPRA) 500 mg/100 mL IVPB, Q12H  HYDROmorphone HCl PF (DILAUDID) injection 1 mg, Q1H PRN  0.9 % sodium chloride infusion, Continuous  0.9 % sodium chloride infusion, Continuous  morphine injection 4 mg, Q4H PRN  sodium chloride flush 0.9 % injection 10 mL, 2 times per day  sodium chloride flush 0.9 % injection 10 mL, PRN  acetaminophen (TYLENOL) tablet 650 mg, Q6H PRN    Or  acetaminophen (TYLENOL) suppository 650 mg, Q6H PRN  polyethylene glycol (GLYCOLAX) packet 17 g, Daily PRN  enoxaparin (LOVENOX) injection 40 mg, Nightly  lamoTRIgine (LAMICTAL) tablet 175 mg, BID  ondansetron (ZOFRAN) injection 4 mg, Q6H PRN  promethazine (PHENERGAN) tablet 12.5 mg, Q6H PRN        Objective:  BP (!) 160/85   Pulse 97   Temp 98.6 °F (37 °C) (Oral)   Resp 16   Ht 6' (1.829 m)   Wt 159 lb 8 oz (72.3 kg)   SpO2 97%   BMI 21.63 kg/m²     Intake/Output Summary (Last 24 hours) at 5/11/2020 1442  Last data filed at 5/10/2020 2143  Gross per 24 hour   Intake 120 ml   Output --   Net 120 ml      Wt Readings from Last 3 Encounters: with heavy stool burden, possible underlying colitis. Has received dulcolax, lactulose without significant results. GI consult pending,   3. Hx seizure. Continue IV anti-epileptics until taking po.   4. Abnormal UA. Small leukocytes, only 3 WBC on HPF. No dysuria. No culture or treatment indicated.        Diet: DIET CLEAR LIQUID;  Code:Full Code  DVT PPX: on enoxaparin      JOHN Germain CNP   5/11/2020 2:42 PM

## 2020-05-11 NOTE — PROGRESS NOTES
Pt unable to tolerate golytely, now vomiting. Unable to give antiemetic based on current orders. Page sent to hospitalist to adjust orders for antiemetics.

## 2020-05-11 NOTE — CONSULTS
GI Consult Note      Admission Date: 5/10/2020  Hospital Day: Hospital Day: 2  Attending: Baltazar Rock MD  Date of service: 5/11/20    Subjective:     Chief complaint/ Reason for consult:   Severe constipation     HPI: Liz Young is a 39 y.o.  male patient, who was seen at the request of Dr. Baltazar Rock MD.    History was obtained from chart review and the patient. Patient has h/o ALL dx when he was 2 and was in remission at age 9. He also has seizure disorder on multiple antiseizure medications. He has chronic intermittent constipation. However, developed severe constipation the past week. This is associated with nausea, vomiting and abdominal pain. He was unable to eat the past 2 days. CT A/P severe stool load, with areas of colonic thickening, concerning for colitis. He denies diarrhea or rectal bleeding. NGT was inserted overnight for N/V. He has RA and takes ibuprofen 1-2 tabs a day, sometimes as much as 1600 mg a day. He has been doing this for years. No narcotic use. He had tried MOM and Miralax but was ineffective. He also has a bleeding raised mole in this RLQ        Past Endoscopic History:  No prior EGD and colonoscopy                     Past Medical History:     Past Medical History:   Diagnosis Date    Adhesive capsulitis of shoulder     Arthritis     Complex partial seizures with impaired consciousness at onset [facility])     [facility]uk[facility] remission ([facility] [address]     Seborrheic dermatitis, unspecified     Seizures ([facility] [address]     Last seizure 3/2018       Past Surgical History:    Past Surgical History:   Procedure Laterality Date    LOBECTOMY FOR SEIZURE FOCUS      SHOULDER ARTHROSCOPY Right 04/27/2018    RIGHT SHOULDER ARTHROSCOPY SUBACROMIAL DECOMPRESSION, OPEN         Social History:     · Tobacco use:   reports that he has never smoked. He has never used smokeless tobacco.  · Alcohol use:   reports no history of alcohol use.   · Currently lives in:

## 2020-05-11 NOTE — H&P
HOSPITALISTS HISTORY AND PHYSICAL    [id number]    Patient Information:  Ranjana Finch is a 39 y.o. male [id number]  PCP:  Lc Wetzel MD (Tel: [phone] )    Chief complaint:    Chief Complaint   Patient presents with    Abdominal Pain     Pt to er with bilateral lower abod pain and emesis for a week, states emesis is brown. pt denies diarrhea and abdo hx. constipation    *    History of Present Illness:  Christian Osborn is a 39 y.o. male with h/o leukemia, seizure d/o presents with c/o vomiting and abdominal pain ongoing for several days. CT abdomen showed constipation and stool load. And concern for colitis. There is no bowel obstruction. The pt had multiple episodes of non bilious of emesis since admission  No diarrhea fever or chills. Denies blood in the stool or hematemesis     REVIEW OF SYSTEMS:   Constitutional: Negative for fever,chills or night sweats  ENT: Negative for rhinorrhea, epistaxis, hoarseness, sore throat. Respiratory: Negative for shortness of breath,wheezing  Cardiovascular: Negative for chest pain, palpitations   Gastrointestinal: Negative for nausea, vomiting, diarrhea  Genitourinary: Negative for polyuria, dysuria   Hematologic/Lymphatic: Negative for bleeding tendency, easy bruising  Musculoskeletal: Negative for myalgias and arthralgias  Neurologic: Negative for confusion,dysarthria. Skin: Negative for itching,rash  Psychiatric: Negative for depression,anxiety, agitation. Endocrine: Negative for polydipsia,polyuria,heat /cold intolerance. Past Medical History:   has a past medical history of Adhesive capsulitis of shoulder, Arthritis, Complex partial seizures with impaired consciousness at onset [facility]), Leukemia in remission ([facility] [address] Seborrheic dermatitis, unspecified, and Seizures ([facility] [address]      Past Surgical History:   has a past surgical history that includes lobectomy for seizure focus and Shoulder arthroscopy (TYLENOL) suppository 650 mg  650 mg Rectal Q6H PRN Gisselle Ryan MD        polyethylene glycol (GLYCOLAX) packet 17 g  17 g Oral Daily PRN Gisselle Ryan MD        enoxaparin (LOVENOX) injection 40 mg  40 mg Subcutaneous Nightly Amy Bonds MD   40 mg at 05/10/20 2153    lamoTRIgine (LAMICTAL) tablet 175 mg  175 mg Oral BID Gisselle Ryan MD   175 mg at 05/10/20 2153    ondansetron (ZOFRAN) injection 4 mg  4 mg Intravenous Q6H PRN Delories Saner, APRN - NP   4 mg at 05/11/20 4944    promethazine (PHENERGAN) tablet 12.5 mg  12.5 mg Oral Q6H PRN Delories Saner, APRN - NP   12.5 mg at 05/10/20 2153     Allergies:  No Known Allergies     Social History:   reports that he has never smoked. He has never used smokeless tobacco. He reports that he does not drink alcohol or use drugs. Family History:  family history includes Diabetes in his father. , *    Physical Exam:  BP (!) 171/95   Pulse 90   Temp 98 °F (36.7 °C) (Oral)   Resp 16   Ht 6' (1.829 m)   Wt 159 lb 8 oz (72.3 kg)   SpO2 97%   BMI 21.63 kg/m²     General appearance:  Appears comfortable. Well nourished  Eyes: Sclera clear, pupils equal  ENT: Moist mucus membranes, no thrush. Trachea midline. Cardiovascular: Regular rhythm, normal S1, S2. No murmur, gallop, rub. No edema in lower extremities  Respiratory: Clear to auscultation bilaterally, no wheeze, good inspiratory effort  Gastrointestinal: Abdomen soft, non-tender, not distended, normal bowel sounds  Musculoskeletal: No cyanosis in digits, neck supple  Neurology: Cranial nerves grossly intact. Alert and oriented in time, place and person. No speech or motor deficits  Psychiatry: Appropriate affect.  Not agitated  Skin: Warm, dry, normal turgor, no rash    Labs:  CBC:   Lab Results   Component Value Date    WBC 5.4 05/10/2020    RBC 4.71 05/10/2020    HGB 14.3 05/10/2020    HCT 42.6 05/10/2020    MCV 90.4 05/10/2020    MCH 30.3 05/10/2020    MCHC 33.5 05/10/2020    RDW 13.5 05/10/2020    PLT

## 2020-05-12 ENCOUNTER — APPOINTMENT (OUTPATIENT)
Dept: GENERAL RADIOLOGY | Age: 45
DRG: 330 | End: 2020-05-12
Payer: MEDICARE

## 2020-05-12 LAB
ANION GAP SERPL CALCULATED.3IONS-SCNC: 11 MMOL/L (ref 3–16)
BUN BLDV-MCNC: 10 MG/DL (ref 7–20)
CALCIUM SERPL-MCNC: 9.1 MG/DL (ref 8.3–10.6)
CHLORIDE BLD-SCNC: 101 MMOL/L (ref 99–110)
CO2: 24 MMOL/L (ref 21–32)
CREAT SERPL-MCNC: 0.7 MG/DL (ref 0.9–1.3)
GFR AFRICAN AMERICAN: >60
GFR NON-AFRICAN AMERICAN: >60
GLUCOSE BLD-MCNC: 79 MG/DL (ref 70–99)
HCT VFR BLD CALC: 37.5 % (ref 40.5–52.5)
HEMOGLOBIN: 12.7 G/DL (ref 13.5–17.5)
MCH RBC QN AUTO: 30.8 PG (ref 26–34)
MCHC RBC AUTO-ENTMCNC: 33.9 G/DL (ref 31–36)
MCV RBC AUTO: 90.8 FL (ref 80–100)
PDW BLD-RTO: 13.3 % (ref 12.4–15.4)
PLATELET # BLD: 200 K/UL (ref 135–450)
PMV BLD AUTO: 8.3 FL (ref 5–10.5)
POTASSIUM SERPL-SCNC: 4.6 MMOL/L (ref 3.5–5.1)
RBC # BLD: 4.13 M/UL (ref 4.2–5.9)
SODIUM BLD-SCNC: 136 MMOL/L (ref 136–145)
T4 FREE: 1 NG/DL (ref 0.9–1.8)
TSH REFLEX: 6.06 UIU/ML (ref 0.27–4.2)
WBC # BLD: 7.1 K/UL (ref 4–11)

## 2020-05-12 PROCEDURE — 94760 N-INVAS EAR/PLS OXIMETRY 1: CPT

## 2020-05-12 PROCEDURE — 84439 ASSAY OF FREE THYROXINE: CPT

## 2020-05-12 PROCEDURE — 6360000002 HC RX W HCPCS: Performed by: FAMILY MEDICINE

## 2020-05-12 PROCEDURE — 2580000003 HC RX 258: Performed by: NURSE PRACTITIONER

## 2020-05-12 PROCEDURE — 6360000002 HC RX W HCPCS: Performed by: NURSE PRACTITIONER

## 2020-05-12 PROCEDURE — 6370000000 HC RX 637 (ALT 250 FOR IP): Performed by: FAMILY MEDICINE

## 2020-05-12 PROCEDURE — 80048 BASIC METABOLIC PNL TOTAL CA: CPT

## 2020-05-12 PROCEDURE — 1200000000 HC SEMI PRIVATE

## 2020-05-12 PROCEDURE — 74019 RADEX ABDOMEN 2 VIEWS: CPT

## 2020-05-12 PROCEDURE — 2580000003 HC RX 258: Performed by: FAMILY MEDICINE

## 2020-05-12 PROCEDURE — 36415 COLL VENOUS BLD VENIPUNCTURE: CPT

## 2020-05-12 PROCEDURE — 84443 ASSAY THYROID STIM HORMONE: CPT

## 2020-05-12 PROCEDURE — 85027 COMPLETE CBC AUTOMATED: CPT

## 2020-05-12 RX ORDER — PHENYTOIN SODIUM 50 MG/ML
100 INJECTION, SOLUTION INTRAMUSCULAR; INTRAVENOUS EVERY 12 HOURS
Status: DISCONTINUED | OUTPATIENT
Start: 2020-05-12 | End: 2020-05-12 | Stop reason: SDUPTHER

## 2020-05-12 RX ADMIN — Medication 2 SPRAY: at 08:43

## 2020-05-12 RX ADMIN — SODIUM CHLORIDE: 9 INJECTION, SOLUTION INTRAVENOUS at 21:00

## 2020-05-12 RX ADMIN — LEVETIRACETAM 1500 MG: 100 INJECTION, SOLUTION INTRAVENOUS at 22:56

## 2020-05-12 RX ADMIN — MORPHINE SULFATE 4 MG: 4 INJECTION INTRAVENOUS at 04:04

## 2020-05-12 RX ADMIN — ONDANSETRON 4 MG: 2 INJECTION INTRAMUSCULAR; INTRAVENOUS at 04:14

## 2020-05-12 RX ADMIN — LEVETIRACETAM 500 MG: 5 INJECTION INTRAVENOUS at 08:43

## 2020-05-12 RX ADMIN — Medication 2 SPRAY: at 22:03

## 2020-05-12 RX ADMIN — SODIUM CHLORIDE: 9 INJECTION, SOLUTION INTRAVENOUS at 10:42

## 2020-05-12 RX ADMIN — ENOXAPARIN SODIUM 40 MG: 40 INJECTION SUBCUTANEOUS at 22:02

## 2020-05-12 RX ADMIN — LAMOTRIGINE 175 MG: 25 TABLET ORAL at 22:17

## 2020-05-12 RX ADMIN — SODIUM CHLORIDE: 9 INJECTION, SOLUTION INTRAVENOUS at 00:11

## 2020-05-12 RX ADMIN — ONDANSETRON 4 MG: 2 INJECTION INTRAMUSCULAR; INTRAVENOUS at 14:27

## 2020-05-12 RX ADMIN — LAMOTRIGINE 175 MG: 25 TABLET ORAL at 08:41

## 2020-05-12 RX ADMIN — PHENYTOIN SODIUM 100 MG: 50 INJECTION INTRAMUSCULAR; INTRAVENOUS at 11:26

## 2020-05-12 RX ADMIN — PHENYTOIN SODIUM 100 MG: 50 INJECTION INTRAMUSCULAR; INTRAVENOUS at 22:56

## 2020-05-12 RX ADMIN — Medication 10 ML: at 08:46

## 2020-05-12 ASSESSMENT — PAIN DESCRIPTION - ORIENTATION: ORIENTATION: LOWER

## 2020-05-12 ASSESSMENT — PAIN DESCRIPTION - PAIN TYPE: TYPE: ACUTE PAIN

## 2020-05-12 ASSESSMENT — PAIN DESCRIPTION - DESCRIPTORS: DESCRIPTORS: SHARP;CRAMPING

## 2020-05-12 ASSESSMENT — PAIN DESCRIPTION - FREQUENCY: FREQUENCY: CONTINUOUS

## 2020-05-12 ASSESSMENT — PAIN DESCRIPTION - LOCATION: LOCATION: ABDOMEN

## 2020-05-12 ASSESSMENT — PAIN SCALES - GENERAL
PAINLEVEL_OUTOF10: 10
PAINLEVEL_OUTOF10: 5

## 2020-05-12 ASSESSMENT — PAIN DESCRIPTION - PROGRESSION: CLINICAL_PROGRESSION: NOT CHANGED

## 2020-05-12 ASSESSMENT — PAIN DESCRIPTION - ONSET: ONSET: ON-GOING

## 2020-05-12 NOTE — PROGRESS NOTES
[facility] PROGRESS NOTE    5/12/2020 8:56 AM        Name: Franco Frankel . Admitted: 5/10/2020  Primary Care Provider: Maria L Sanchez MD (Tel: [phone])      Subjective:  Hx seizure disorder, childhood leukemia in remission, RA. He presented to hospital with n/v, abdominal pain. Found to have evidence of colitis and constipation on CT scan. Resting in bed. NG tube in place, currently clamped. Continues to report nausea, and abdominal discomfort but no vomiting. Received Go-lytely per NG tube and patient said he did have small BM.          Reviewed interval ancillary notes    Current Medications  bisacodyl (DULCOLAX) suppository 10 mg, Daily PRN  oxymetazoline (AFRIN) 0.05 % nasal spray 2 spray, BID  levetiracetam (KEPPRA) 500 mg/100 mL IVPB, Q12H  0.9 % sodium chloride infusion, Continuous  0.9 % sodium chloride infusion, Continuous  morphine injection 4 mg, Q4H PRN  sodium chloride flush 0.9 % injection 10 mL, 2 times per day  sodium chloride flush 0.9 % injection 10 mL, PRN  acetaminophen (TYLENOL) tablet 650 mg, Q6H PRN    Or  acetaminophen (TYLENOL) suppository 650 mg, Q6H PRN  polyethylene glycol (GLYCOLAX) packet 17 g, Daily PRN  enoxaparin (LOVENOX) injection 40 mg, Nightly  lamoTRIgine (LAMICTAL) tablet 175 mg, BID  ondansetron (ZOFRAN) injection 4 mg, Q6H PRN  promethazine (PHENERGAN) tablet 12.5 mg, Q6H PRN        Objective:  BP (!) 161/87   Pulse 96   Temp 98.4 °F (36.9 °C) (Oral)   Resp 14   Ht 6' (1.829 m)   Wt 159 lb 8 oz (72.3 kg)   SpO2 96%   BMI 21.63 kg/m²     Intake/Output Summary (Last 24 hours) at 5/12/2020 0856  Last data filed at 5/12/2020 0840  Gross per 24 hour   Intake 5355 ml   Output 250 ml   Net 5105 ml      Wt Readings from Last 3 Encounters:   05/10/20 159 lb 8 oz (72.3 kg)   10/28/19 157 lb 2 oz (71.3 kg)   04/12/19 150 lb (68 kg)     General:  Awake, alert, oriented in NAD  Skin:  Warm and dry. No active bleeding from mole/cherry angioma left side  Neck:  Supple. No JVD appreciated  Chest:  Normal effort. Clear to auscultation  Cardiovascular:  RRR, normal S1/S2, no murmur/gallop/rub  Abdomen:  Soft, nontender, +bowel sounds  Extremities:  No edema  Neurological: No focal deficits  Psychological: Normal mood and affect      Labs and Tests:  CBC:   Recent Labs     05/10/20  1310 05/12/20  0606   WBC 5.4 7.1   HGB 14.3 12.7*    200     BMP:    Recent Labs     05/10/20  1310 05/12/20  0606   * 136   K 4.3 4.6   CL 98* 101   CO2 26 24   BUN 11 10   CREATININE 0.9 0.7*   GLUCOSE 108* 79     Hepatic:   Recent Labs     05/10/20  1310   AST 19   ALT 18   BILITOT 0.3   ALKPHOS 122     CT abdomen 5/10/2020:  Heavy stool burden within the colon.  Proximal to a focal area of extremely   dense stool possibly related to impaction is inflammatory changes involving   the colon over a moderate length segment compatible with underlying colitis.       Bilateral nonobstructing renal calculi.         KUB for NG placement 5/10/2020:  FINDINGS:   The nasogastric tube, including distal side port, is in the gastric fundus. Nonspecific bowel gas pattern is noted. Marinus Brice is no free air.  Lung bases   are clear. Problem List  Active Problems:    Colitis  Resolved Problems:    * No resolved hospital problems. *       Assessment & Plan:   1. Intractable vomiting. Improved. NG clamped, no vomiting. Continue IV hydration until NG out and tolerating po.    2. Constipation. CT scan with heavy stool burden, possible underlying colitis. Received Go-lytely yesterday with \"small\" BM per patient. Appreciate GI recs. 3. Hx seizure. Resumed dilantin IV, confirmed dose with patient and increased Lamictal as taking at home. No seizure activity. 4. Abnormal UA. Small leukocytes, only 3 WBC on HPF. No dysuria. No culture or treatment indicated. 5. Skin lesion left side.  Possible raised

## 2020-05-12 NOTE — PROGRESS NOTES
Pt not tolerating prescribed golytley rate, reports \"pressure\" of stomach. Will slow administration for pt comfort.

## 2020-05-12 NOTE — PROGRESS NOTES
PRN morphine administered per pt request for pain 10/10 of lower left abdomen. PRN zofran administered for nausea. So far no vomiting of golytely, continue administration through NG tube until 1/2 gallon total given.

## 2020-05-12 NOTE — PROGRESS NOTES
tube within the stomach. 3. Bilateral nephrolithiasis. XR ABDOMEN FOR NG/OG/NE TUBE PLACEMENT   Final Result   Nasogastric tube projects to the proximal stomach. XR ABDOMEN (KUB) (SINGLE AP VIEW)   Final Result   NG tube placement. CT ABDOMEN PELVIS W IV CONTRAST Additional Contrast? None   Final Result   Heavy stool burden within the colon. Proximal to a focal area of extremely   dense stool possibly related to impaction is inflammatory changes involving   the colon over a moderate length segment compatible with underlying colitis. Bilateral nonobstructing renal calculi. Assessment:   The patient is a 39 y.o. old male  with following problems:     Active Problems:    Colitis  Resolved Problems:    * No resolved hospital problems. *     Severe constipation, as seen on CT with patchy colitis (? stercoral). Repeat Abd XR 5/11 confirms severe constipation with no SBO   Seizure disorder  RA. Heavy NSAID use   ALL as a child, now in remision   Bleeding raised mole/skin nodule in the RLQ - may need surgical excision as OP     Recommendations:   Zofran PRN   Repeat abdominal XR today    May start with clear liquids.  However, if GI symptoms worsen, keep NPO    Depending on clinical progress, we will do a colonoscopy (IP vs OP)       Aren Wan MD, MSc  [facility] [address]

## 2020-05-13 ENCOUNTER — ANESTHESIA EVENT (OUTPATIENT)
Dept: ENDOSCOPY | Age: 45
DRG: 330 | End: 2020-05-13
Payer: MEDICARE

## 2020-05-13 ENCOUNTER — ANESTHESIA (OUTPATIENT)
Dept: ENDOSCOPY | Age: 45
DRG: 330 | End: 2020-05-13
Payer: MEDICARE

## 2020-05-13 VITALS — DIASTOLIC BLOOD PRESSURE: 53 MMHG | OXYGEN SATURATION: 100 % | SYSTOLIC BLOOD PRESSURE: 108 MMHG

## 2020-05-13 PROCEDURE — 1200000000 HC SEMI PRIVATE

## 2020-05-13 PROCEDURE — 2709999900 HC NON-CHARGEABLE SUPPLY: Performed by: INTERNAL MEDICINE

## 2020-05-13 PROCEDURE — 2580000003 HC RX 258: Performed by: NURSE ANESTHETIST, CERTIFIED REGISTERED

## 2020-05-13 PROCEDURE — 6360000002 HC RX W HCPCS: Performed by: INTERNAL MEDICINE

## 2020-05-13 PROCEDURE — 2580000003 HC RX 258: Performed by: INTERNAL MEDICINE

## 2020-05-13 PROCEDURE — 2500000003 HC RX 250 WO HCPCS: Performed by: NURSE ANESTHETIST, CERTIFIED REGISTERED

## 2020-05-13 PROCEDURE — 7100000001 HC PACU RECOVERY - ADDTL 15 MIN: Performed by: INTERNAL MEDICINE

## 2020-05-13 PROCEDURE — 6360000002 HC RX W HCPCS: Performed by: NURSE PRACTITIONER

## 2020-05-13 PROCEDURE — 3700000001 HC ADD 15 MINUTES (ANESTHESIA): Performed by: INTERNAL MEDICINE

## 2020-05-13 PROCEDURE — 3609010300 HC COLONOSCOPY W/BIOPSY SINGLE/MULTIPLE: Performed by: INTERNAL MEDICINE

## 2020-05-13 PROCEDURE — 6360000002 HC RX W HCPCS: Performed by: NURSE ANESTHETIST, CERTIFIED REGISTERED

## 2020-05-13 PROCEDURE — 88342 IMHCHEM/IMCYTCHM 1ST ANTB: CPT

## 2020-05-13 PROCEDURE — 3609155600 HC COLONOSCOPY WITH DECOMPRESSION: Performed by: INTERNAL MEDICINE

## 2020-05-13 PROCEDURE — 0D7N8ZZ DILATION OF SIGMOID COLON, VIA NATURAL OR ARTIFICIAL OPENING ENDOSCOPIC: ICD-10-PCS | Performed by: INTERNAL MEDICINE

## 2020-05-13 PROCEDURE — 7100000000 HC PACU RECOVERY - FIRST 15 MIN: Performed by: INTERNAL MEDICINE

## 2020-05-13 PROCEDURE — 6370000000 HC RX 637 (ALT 250 FOR IP): Performed by: INTERNAL MEDICINE

## 2020-05-13 PROCEDURE — 88305 TISSUE EXAM BY PATHOLOGIST: CPT

## 2020-05-13 PROCEDURE — 6360000002 HC RX W HCPCS: Performed by: FAMILY MEDICINE

## 2020-05-13 PROCEDURE — 3700000000 HC ANESTHESIA ATTENDED CARE: Performed by: INTERNAL MEDICINE

## 2020-05-13 PROCEDURE — 2580000003 HC RX 258: Performed by: NURSE PRACTITIONER

## 2020-05-13 RX ORDER — SODIUM CHLORIDE 0.9 % (FLUSH) 0.9 %
10 SYRINGE (ML) INJECTION EVERY 12 HOURS SCHEDULED
Status: DISCONTINUED | OUTPATIENT
Start: 2020-05-13 | End: 2020-05-13 | Stop reason: HOSPADM

## 2020-05-13 RX ORDER — ONDANSETRON 2 MG/ML
4 INJECTION INTRAMUSCULAR; INTRAVENOUS
Status: DISCONTINUED | OUTPATIENT
Start: 2020-05-13 | End: 2020-05-13

## 2020-05-13 RX ORDER — LIDOCAINE HYDROCHLORIDE 20 MG/ML
INJECTION, SOLUTION EPIDURAL; INFILTRATION; INTRACAUDAL; PERINEURAL PRN
Status: DISCONTINUED | OUTPATIENT
Start: 2020-05-13 | End: 2020-05-13 | Stop reason: SDUPTHER

## 2020-05-13 RX ORDER — PROMETHAZINE HYDROCHLORIDE 25 MG/ML
6.25 INJECTION, SOLUTION INTRAMUSCULAR; INTRAVENOUS
Status: DISCONTINUED | OUTPATIENT
Start: 2020-05-13 | End: 2020-05-13

## 2020-05-13 RX ORDER — PROPOFOL 10 MG/ML
INJECTION, EMULSION INTRAVENOUS CONTINUOUS PRN
Status: DISCONTINUED | OUTPATIENT
Start: 2020-05-13 | End: 2020-05-13 | Stop reason: SDUPTHER

## 2020-05-13 RX ORDER — LABETALOL HYDROCHLORIDE 5 MG/ML
5 INJECTION, SOLUTION INTRAVENOUS EVERY 10 MIN PRN
Status: DISCONTINUED | OUTPATIENT
Start: 2020-05-13 | End: 2020-05-13

## 2020-05-13 RX ORDER — SODIUM CHLORIDE 9 MG/ML
INJECTION, SOLUTION INTRAVENOUS CONTINUOUS PRN
Status: DISCONTINUED | OUTPATIENT
Start: 2020-05-13 | End: 2020-05-13 | Stop reason: SDUPTHER

## 2020-05-13 RX ORDER — SODIUM CHLORIDE 9 MG/ML
INJECTION, SOLUTION INTRAVENOUS CONTINUOUS
Status: DISCONTINUED | OUTPATIENT
Start: 2020-05-13 | End: 2020-05-18

## 2020-05-13 RX ORDER — SODIUM CHLORIDE 0.9 % (FLUSH) 0.9 %
10 SYRINGE (ML) INJECTION PRN
Status: DISCONTINUED | OUTPATIENT
Start: 2020-05-13 | End: 2020-05-13 | Stop reason: HOSPADM

## 2020-05-13 RX ORDER — PROPOFOL 10 MG/ML
INJECTION, EMULSION INTRAVENOUS PRN
Status: DISCONTINUED | OUTPATIENT
Start: 2020-05-13 | End: 2020-05-13 | Stop reason: SDUPTHER

## 2020-05-13 RX ADMIN — ONDANSETRON 4 MG: 2 INJECTION INTRAMUSCULAR; INTRAVENOUS at 01:24

## 2020-05-13 RX ADMIN — LIDOCAINE HYDROCHLORIDE 40 MG: 20 INJECTION, SOLUTION EPIDURAL; INFILTRATION; INTRACAUDAL; PERINEURAL at 10:13

## 2020-05-13 RX ADMIN — LEVETIRACETAM 1500 MG: 100 INJECTION, SOLUTION INTRAVENOUS at 08:41

## 2020-05-13 RX ADMIN — MORPHINE SULFATE 4 MG: 4 INJECTION INTRAVENOUS at 20:33

## 2020-05-13 RX ADMIN — Medication 10 ML: at 20:33

## 2020-05-13 RX ADMIN — PROPOFOL 140 MCG/KG/MIN: 10 INJECTION, EMULSION INTRAVENOUS at 10:15

## 2020-05-13 RX ADMIN — Medication 2 SPRAY: at 20:34

## 2020-05-13 RX ADMIN — SODIUM CHLORIDE: 9 INJECTION, SOLUTION INTRAVENOUS at 10:09

## 2020-05-13 RX ADMIN — MORPHINE SULFATE 4 MG: 4 INJECTION INTRAVENOUS at 01:24

## 2020-05-13 RX ADMIN — SODIUM CHLORIDE: 9 INJECTION, SOLUTION INTRAVENOUS at 11:01

## 2020-05-13 RX ADMIN — LAMOTRIGINE 175 MG: 25 TABLET ORAL at 20:34

## 2020-05-13 RX ADMIN — PROPOFOL 60 MG: 10 INJECTION, EMULSION INTRAVENOUS at 10:14

## 2020-05-13 RX ADMIN — PHENYTOIN SODIUM 100 MG: 50 INJECTION INTRAMUSCULAR; INTRAVENOUS at 22:46

## 2020-05-13 RX ADMIN — ENOXAPARIN SODIUM 40 MG: 40 INJECTION SUBCUTANEOUS at 20:33

## 2020-05-13 RX ADMIN — LEVETIRACETAM 1500 MG: 100 INJECTION, SOLUTION INTRAVENOUS at 21:06

## 2020-05-13 ASSESSMENT — PAIN SCALES - GENERAL
PAINLEVEL_OUTOF10: 8
PAINLEVEL_OUTOF10: 10
PAINLEVEL_OUTOF10: 8
PAINLEVEL_OUTOF10: 8

## 2020-05-13 ASSESSMENT — PAIN DESCRIPTION - FREQUENCY: FREQUENCY: INTERMITTENT

## 2020-05-13 ASSESSMENT — PAIN DESCRIPTION - LOCATION: LOCATION: ABDOMEN

## 2020-05-13 ASSESSMENT — PAIN DESCRIPTION - ONSET: ONSET: ON-GOING

## 2020-05-13 ASSESSMENT — PAIN DESCRIPTION - DESCRIPTORS: DESCRIPTORS: DISCOMFORT;STABBING

## 2020-05-13 ASSESSMENT — PAIN DESCRIPTION - PAIN TYPE: TYPE: ACUTE PAIN

## 2020-05-13 NOTE — PROGRESS NOTES
BMI 21.63 kg/m²     Intake/Output Summary (Last 24 hours) at 5/13/2020 1406  Last data filed at 5/13/2020 1101  Gross per 24 hour   Intake 1593 ml   Output --   Net 1593 ml      Wt Readings from Last 3 Encounters:   05/10/20 159 lb 8 oz (72.3 kg)   10/28/19 157 lb 2 oz (71.3 kg)   04/12/19 150 lb (68 kg)     General:  Awake, alert, oriented in NAD  Skin:  Warm and dry. Neck:  Supple. No JVD appreciated  Chest:  Normal effort. Clear to auscultation  Cardiovascular:  RRR, normal S1/S2, no murmur/gallop/rub  Abdomen:  Soft, nontender, +bowel sounds  Extremities:  No edema  Neurological: No focal deficits  Psychological: Normal mood and affect      Labs and Tests:  CBC:   Recent Labs     05/12/20  0606   WBC 7.1   HGB 12.7*        BMP:    Recent Labs     05/12/20  0606      K 4.6      CO2 24   BUN 10   CREATININE 0.7*   GLUCOSE 79     Hepatic:   No results for input(s): AST, ALT, ALB, BILITOT, ALKPHOS in the last 72 hours. COLONOSCOPY 5/13/2020:  Fecal impaction, in the rectum, sigmoid and descending colon. Few scattered stercoral type ulcers, biopsied. Attempt at colonic decompression was made. The patient is behaving as colonic inertia/slow transit constipation  PLAN:   Await biopsies. Clear liquid diet. Follow clinical response to colonic decompression. He will need outpatient colonoscopy to fully evaluate his colon. CT abdomen 5/10/2020:  Heavy stool burden within the colon.  Proximal to a focal area of extremely   dense stool possibly related to impaction is inflammatory changes involving   the colon over a moderate length segment compatible with underlying colitis.       Bilateral nonobstructing renal calculi.         KUB for NG placement 5/10/2020:  FINDINGS:   The nasogastric tube, including distal side port, is in the gastric fundus. Nonspecific bowel gas pattern is noted. Lieutenant Lines is no free air.  Lung bases   are clear.        Problem List  Active Problems:    Colitis  Resolved

## 2020-05-14 ENCOUNTER — APPOINTMENT (OUTPATIENT)
Dept: GENERAL RADIOLOGY | Age: 45
DRG: 330 | End: 2020-05-14
Payer: MEDICARE

## 2020-05-14 LAB
A/G RATIO: 1 (ref 1.1–2.2)
ALBUMIN SERPL-MCNC: 3.3 G/DL (ref 3.4–5)
ALP BLD-CCNC: 77 U/L (ref 40–129)
ALT SERPL-CCNC: 25 U/L (ref 10–40)
ANION GAP SERPL CALCULATED.3IONS-SCNC: 15 MMOL/L (ref 3–16)
AST SERPL-CCNC: 32 U/L (ref 15–37)
BILIRUB SERPL-MCNC: 0.4 MG/DL (ref 0–1)
BUN BLDV-MCNC: 5 MG/DL (ref 7–20)
CALCIUM SERPL-MCNC: 8.4 MG/DL (ref 8.3–10.6)
CHLORIDE BLD-SCNC: 102 MMOL/L (ref 99–110)
CO2: 20 MMOL/L (ref 21–32)
CREAT SERPL-MCNC: 0.7 MG/DL (ref 0.9–1.3)
GFR AFRICAN AMERICAN: >60
GFR NON-AFRICAN AMERICAN: >60
GLOBULIN: 3.3 G/DL
GLUCOSE BLD-MCNC: 76 MG/DL (ref 70–99)
HCT VFR BLD CALC: 33.5 % (ref 40.5–52.5)
HEMOGLOBIN: 11.2 G/DL (ref 13.5–17.5)
MCH RBC QN AUTO: 30.1 PG (ref 26–34)
MCHC RBC AUTO-ENTMCNC: 33.4 G/DL (ref 31–36)
MCV RBC AUTO: 90 FL (ref 80–100)
PDW BLD-RTO: 13.1 % (ref 12.4–15.4)
PLATELET # BLD: 208 K/UL (ref 135–450)
PMV BLD AUTO: 8.8 FL (ref 5–10.5)
POTASSIUM SERPL-SCNC: 4.5 MMOL/L (ref 3.5–5.1)
RBC # BLD: 3.73 M/UL (ref 4.2–5.9)
SODIUM BLD-SCNC: 137 MMOL/L (ref 136–145)
TOTAL PROTEIN: 6.6 G/DL (ref 6.4–8.2)
WBC # BLD: 7 K/UL (ref 4–11)

## 2020-05-14 PROCEDURE — 85027 COMPLETE CBC AUTOMATED: CPT

## 2020-05-14 PROCEDURE — 2580000003 HC RX 258: Performed by: INTERNAL MEDICINE

## 2020-05-14 PROCEDURE — 74019 RADEX ABDOMEN 2 VIEWS: CPT

## 2020-05-14 PROCEDURE — 36415 COLL VENOUS BLD VENIPUNCTURE: CPT

## 2020-05-14 PROCEDURE — 80053 COMPREHEN METABOLIC PANEL: CPT

## 2020-05-14 PROCEDURE — 1200000000 HC SEMI PRIVATE

## 2020-05-14 PROCEDURE — 6370000000 HC RX 637 (ALT 250 FOR IP): Performed by: INTERNAL MEDICINE

## 2020-05-14 PROCEDURE — 6360000002 HC RX W HCPCS: Performed by: INTERNAL MEDICINE

## 2020-05-14 RX ADMIN — MORPHINE SULFATE 4 MG: 4 INJECTION INTRAVENOUS at 22:18

## 2020-05-14 RX ADMIN — SODIUM CHLORIDE: 9 INJECTION, SOLUTION INTRAVENOUS at 03:16

## 2020-05-14 RX ADMIN — LAMOTRIGINE 175 MG: 25 TABLET ORAL at 22:13

## 2020-05-14 RX ADMIN — LAMOTRIGINE 175 MG: 25 TABLET ORAL at 10:22

## 2020-05-14 RX ADMIN — MORPHINE SULFATE 4 MG: 4 INJECTION INTRAVENOUS at 02:26

## 2020-05-14 RX ADMIN — LEVETIRACETAM 1500 MG: 100 INJECTION, SOLUTION INTRAVENOUS at 22:18

## 2020-05-14 RX ADMIN — LEVETIRACETAM 1500 MG: 100 INJECTION, SOLUTION INTRAVENOUS at 10:22

## 2020-05-14 RX ADMIN — PHENYTOIN SODIUM 100 MG: 50 INJECTION INTRAMUSCULAR; INTRAVENOUS at 10:22

## 2020-05-14 RX ADMIN — MORPHINE SULFATE 4 MG: 4 INJECTION INTRAVENOUS at 10:34

## 2020-05-14 ASSESSMENT — PAIN SCALES - GENERAL
PAINLEVEL_OUTOF10: 9
PAINLEVEL_OUTOF10: 8
PAINLEVEL_OUTOF10: 0
PAINLEVEL_OUTOF10: 10
PAINLEVEL_OUTOF10: 9

## 2020-05-14 ASSESSMENT — PAIN DESCRIPTION - LOCATION
LOCATION: ABDOMEN
LOCATION: ABDOMEN

## 2020-05-14 ASSESSMENT — PAIN DESCRIPTION - ONSET: ONSET: ON-GOING

## 2020-05-14 ASSESSMENT — PAIN DESCRIPTION - PROGRESSION: CLINICAL_PROGRESSION: NOT CHANGED

## 2020-05-14 ASSESSMENT — PAIN DESCRIPTION - PAIN TYPE
TYPE: ACUTE PAIN
TYPE: ACUTE PAIN

## 2020-05-14 ASSESSMENT — PAIN DESCRIPTION - ORIENTATION
ORIENTATION: LOWER
ORIENTATION: LOWER

## 2020-05-14 ASSESSMENT — PAIN DESCRIPTION - DESCRIPTORS: DESCRIPTORS: SORE

## 2020-05-14 ASSESSMENT — PAIN DESCRIPTION - FREQUENCY: FREQUENCY: CONTINUOUS

## 2020-05-14 NOTE — PROGRESS NOTES
[facility] GI  Gastroenterology Progress Note    Cheo Jarrell is a 39 y.o. male patient. 1. Acute colitis    2. Acute abdominal pain    3. Constipation, unspecified constipation type    4. Failure of outpatient treatment        SUBJECTIVE:    Sore throat gone after NGT removed  Able to tolerate liquids, no nausea and vomiting  Abdominal pain is worse with bending and movement   No effective BM since yesterday (after colonic decompression and fecal impaction)    ROS:  Cardiovascular ROS: no chest pain or dyspnea on exertion  Gastrointestinal ROS: see above  Respiratory ROS: no cough, shortness of breath, or wheezing    Physical    VITALS:  BP (!) 144/74   Pulse 93   Temp 98.1 °F (36.7 °C) (Oral)   Resp 16   Ht 6' (1.829 m)   Wt 159 lb 8 oz (72.3 kg)   SpO2 95%   BMI 21.63 kg/m²   TEMPERATURE:  Current - Temp: 98.1 °F (36.7 °C); Max - Temp  Av °F (36.7 °C)  Min: 97 °F (36.1 °C)  Max: 98.8 °F (37.1 °C)    NAD  RRR, Nl s1s2  Lungs CTA Bilaterally, normal effort  Abdomen soft, diffusely tender, no rebound, no HSM, Bowel sounds normal  AAOx3, No asterixis     Data      CBC:   Recent Labs     20  0620  0615   WBC 7.1 7.0   RBC 4.13* 3.73*   HGB 12.7* 11.2*   HCT 37.5* 33.5*    208   MCV 90.8 90.0   MCH 30.8 30.1   MCHC 33.9 33.4   RDW 13.3 13.1        BMP:  Recent Labs     20  0620  0615    137   K 4.6 4.5    102   CO2 24 20*   BUN 10 5*   CREATININE 0.7* 0.7*   CALCIUM 9.1 8.4   GLUCOSE 79 76        Hepatic Function Panel:   Recent Labs     2015   AST 32   ALT 25   BILITOT 0.4   ALKPHOS 77       No results for input(s): LIPASE, AMYLASE in the last 72 hours. No results for input(s): PROTIME, INR in the last 72 hours. No results for input(s): PTT in the last 72 hours. No results for input(s): OCCULTBLD in the last 72 hours. Radiology Review:    XR ABDOMEN (2 VIEWS)   Final Result   Increased gaseous distention of the transverse colon.       Moderate

## 2020-05-14 NOTE — PROGRESS NOTES
Upon entering room, noted NG to be at 30 cm, initial placement measurement was 65 cm. Informed pt that it had to be reinserted to initial measurement. Pt refusing, states \"Can't we just take it out, they are taking it out in the morning anyway. \" Pt would not allow this RN to reinsert to proper measurement. NG removed at this time. Pt denies nausea, no episodes of vomiting thus far. Tolerating small amounts of clear liquids. Will monitor. Pt c/o pain lower abdomen 9/10.  PRN morphine administered per request.

## 2020-05-14 NOTE — PROGRESS NOTES
04/12/19 150 lb (68 kg)     General:  Awake, alert, oriented in NAD  Skin:  Warm and dry. Neck:  Supple. No JVD appreciated  Chest:  Normal effort. Clear to auscultation  Cardiovascular:  RRR, normal S1/S2, no murmur/gallop/rub  Abdomen:  Soft, nontender, +bowel sounds  Extremities:  No edema  Neurological: No focal deficits  Psychological: Normal mood and affect    Labs and Tests:  CBC:   Recent Labs     05/12/20  0606 05/14/20  0615   WBC 7.1 7.0   HGB 12.7* 11.2*    208     BMP:    Recent Labs     05/12/20  0606 05/14/20  0615    137   K 4.6 4.5    102   CO2 24 20*   BUN 10 5*   CREATININE 0.7* 0.7*   GLUCOSE 79 76     Hepatic:   Recent Labs     05/14/20  0615   AST 32   ALT 25   BILITOT 0.4   ALKPHOS 77       COLONOSCOPY 5/13/2020:  Fecal impaction, in the rectum, sigmoid and descending colon. Few scattered stercoral type ulcers, biopsied. Attempt at colonic decompression was made. The patient is behaving as colonic inertia/slow transit constipation  PLAN:   Await biopsies. Clear liquid diet. Follow clinical response to colonic decompression. He will need outpatient colonoscopy to fully evaluate his colon. CT abdomen 5/10/2020:  Heavy stool burden within the colon.  Proximal to a focal area of extremely   dense stool possibly related to impaction is inflammatory changes involving   the colon over a moderate length segment compatible with underlying colitis.       Bilateral nonobstructing renal calculi.         KUB for NG placement 5/10/2020:  FINDINGS:   The nasogastric tube, including distal side port, is in the gastric fundus. Nonspecific bowel gas pattern is noted. Claudia Seat is no free air.  Lung bases   are clear. Problem List  Active Problems:    Colitis  Resolved Problems:    * No resolved hospital problems. *       Assessment & Plan:   1. Intractable vomiting. Improved. NG tube removed overnight, no vomiting. Tolerating liquid diet. 2. Constipation.   CT scan with heavy stool burden, possible underlying colitis. Colonoscopy (5/13) revealed fecal impaction, few scattered stercoral type ulcers, attempt made at disimpaction. 3. Hx seizure. Continue IV dilantin and Keppra, po Lamictal. No seizure activity. 4. Abnormal UA. Small leukocytes, only 3 WBC on HPF. No dysuria. No culture or treatment indicated. 5. Skin lesion left side. Possible raised mole/cherry angioma. No further bleeding today. Would recommend excision as outpatient.        Diet: DIET CLEAR LIQUID;  Code:Full Code  DVT PPX: on enoxaparin      Tyrese Marin, APRN - CNP   5/14/2020 12:19 PM

## 2020-05-15 ENCOUNTER — APPOINTMENT (OUTPATIENT)
Dept: GENERAL RADIOLOGY | Age: 45
DRG: 330 | End: 2020-05-15
Payer: MEDICARE

## 2020-05-15 PROCEDURE — 6370000000 HC RX 637 (ALT 250 FOR IP): Performed by: NURSE PRACTITIONER

## 2020-05-15 PROCEDURE — 6360000004 HC RX CONTRAST MEDICATION

## 2020-05-15 PROCEDURE — 2580000003 HC RX 258: Performed by: INTERNAL MEDICINE

## 2020-05-15 PROCEDURE — 6360000002 HC RX W HCPCS: Performed by: INTERNAL MEDICINE

## 2020-05-15 PROCEDURE — 1200000000 HC SEMI PRIVATE

## 2020-05-15 PROCEDURE — 74270 X-RAY XM COLON 1CNTRST STD: CPT

## 2020-05-15 PROCEDURE — 6370000000 HC RX 637 (ALT 250 FOR IP): Performed by: INTERNAL MEDICINE

## 2020-05-15 RX ORDER — PHENYTOIN SODIUM 100 MG/1
200 CAPSULE, EXTENDED RELEASE ORAL NIGHTLY
Status: DISCONTINUED | OUTPATIENT
Start: 2020-05-15 | End: 2020-05-31 | Stop reason: HOSPADM

## 2020-05-15 RX ORDER — LEVETIRACETAM 500 MG/1
1500 TABLET ORAL 2 TIMES DAILY
Status: DISCONTINUED | OUTPATIENT
Start: 2020-05-15 | End: 2020-05-31 | Stop reason: HOSPADM

## 2020-05-15 RX ORDER — PHENYTOIN SODIUM 100 MG/1
100 CAPSULE, EXTENDED RELEASE ORAL DAILY
Status: DISCONTINUED | OUTPATIENT
Start: 2020-05-16 | End: 2020-05-31 | Stop reason: HOSPADM

## 2020-05-15 RX ADMIN — MORPHINE SULFATE 4 MG: 4 INJECTION INTRAVENOUS at 19:13

## 2020-05-15 RX ADMIN — ENOXAPARIN SODIUM 40 MG: 40 INJECTION SUBCUTANEOUS at 21:14

## 2020-05-15 RX ADMIN — NALOXEGOL OXALATE 25 MG: 25 TABLET, FILM COATED ORAL at 09:26

## 2020-05-15 RX ADMIN — SODIUM CHLORIDE: 9 INJECTION, SOLUTION INTRAVENOUS at 21:14

## 2020-05-15 RX ADMIN — DIATRIZOATE MEGLUMINE AND DIATRIZOATE SODIUM 480 ML: 660; 100 LIQUID ORAL; RECTAL at 11:40

## 2020-05-15 RX ADMIN — PHENYTOIN SODIUM 100 MG: 50 INJECTION INTRAMUSCULAR; INTRAVENOUS at 11:55

## 2020-05-15 RX ADMIN — MORPHINE SULFATE 4 MG: 4 INJECTION INTRAVENOUS at 14:59

## 2020-05-15 RX ADMIN — LAMOTRIGINE 175 MG: 25 TABLET ORAL at 21:14

## 2020-05-15 RX ADMIN — SODIUM CHLORIDE: 9 INJECTION, SOLUTION INTRAVENOUS at 09:36

## 2020-05-15 RX ADMIN — PHENYTOIN SODIUM 100 MG: 50 INJECTION INTRAMUSCULAR; INTRAVENOUS at 00:06

## 2020-05-15 RX ADMIN — MORPHINE SULFATE 4 MG: 4 INJECTION INTRAVENOUS at 09:25

## 2020-05-15 RX ADMIN — LEVETIRACETAM 1500 MG: 100 INJECTION, SOLUTION INTRAVENOUS at 11:55

## 2020-05-15 RX ADMIN — LAMOTRIGINE 175 MG: 25 TABLET ORAL at 09:25

## 2020-05-15 RX ADMIN — Medication 10 ML: at 09:27

## 2020-05-15 RX ADMIN — PHENYTOIN SODIUM 200 MG: 100 CAPSULE ORAL at 21:14

## 2020-05-15 RX ADMIN — MORPHINE SULFATE 4 MG: 4 INJECTION INTRAVENOUS at 23:33

## 2020-05-15 RX ADMIN — LEVETIRACETAM 1500 MG: 500 TABLET ORAL at 21:14

## 2020-05-15 ASSESSMENT — PAIN SCALES - GENERAL
PAINLEVEL_OUTOF10: 9
PAINLEVEL_OUTOF10: 10
PAINLEVEL_OUTOF10: 10
PAINLEVEL_OUTOF10: 8

## 2020-05-15 NOTE — PROGRESS NOTES
[facility] PROGRESS NOTE    5/15/2020 2:19 PM        Name: Gladys Au . Admitted: 5/10/2020  Primary Care Provider: Martha Simms MD (Tel: [phone])      Subjective:  Patient is a 38 yo male with hx seizure disorder, childhood leukemia in remission, RA. He presented to hospital with n/v, abdominal pain. Found to have evidence of colitis and constipation on CT scan. Resting in bed. Reports he is feeling a little better overall He had soap suds enema last night without results, hypaque enema this am and reports 2 stools since. Still some lower abdominal pain but denies nausea.        Reviewed interval ancillary notes    Current Medications  naloxegol (MOVANTIK) tablet 25 mg, QAM  0.9 % sodium chloride infusion, Continuous  levETIRAcetam (KEPPRA) 1,500 mg in sodium chloride 0.9 % 100 mL IVPB, Q12H  phenytoin (DILANTIN) 100 mg in sodium chloride 0.9 % 100 mL IVPB (maintenance dose), Q12H  bisacodyl (DULCOLAX) suppository 10 mg, Daily PRN  0.9 % sodium chloride infusion, Continuous  0.9 % sodium chloride infusion, Continuous  morphine injection 4 mg, Q4H PRN  sodium chloride flush 0.9 % injection 10 mL, 2 times per day  sodium chloride flush 0.9 % injection 10 mL, PRN  acetaminophen (TYLENOL) tablet 650 mg, Q6H PRN    Or  acetaminophen (TYLENOL) suppository 650 mg, Q6H PRN  polyethylene glycol (GLYCOLAX) packet 17 g, Daily PRN  enoxaparin (LOVENOX) injection 40 mg, Nightly  lamoTRIgine (LAMICTAL) tablet 175 mg, BID  ondansetron (ZOFRAN) injection 4 mg, Q6H PRN  promethazine (PHENERGAN) tablet 12.5 mg, Q6H PRN      Objective:  BP (!) 161/91   Pulse 89   Temp 98.4 °F (36.9 °C) (Oral)   Resp 16   Ht 6' (1.829 m)   Wt 159 lb 8 oz (72.3 kg)   SpO2 95%   BMI 21.63 kg/m²   No intake or output data in the 24 hours ending 05/15/20 1419   Wt Readings from Last 3 Encounters:   05/10/20 159 lb 8 oz (72.3 kg) 10/28/19 157 lb 2 oz (71.3 kg)   04/12/19 150 lb (68 kg)     General:  Awake, alert, oriented in NAD  Skin:  Warm and dry. No unusual bruising or rash  Neck:  Supple. Chest:  Normal effort. Clear to auscultation  Cardiovascular:  RRR, normal S1/S2, no murmur/gallop/rub  Abdomen:  Soft, mild tenderness lower abdomen, +bowel sounds  Extremities:  No edema  Neurological: No focal deficits  Psychological: Normal mood and affect    Labs and Tests:  CBC:   Recent Labs     05/14/20 0615   WBC 7.0   HGB 11.2*        BMP:    Recent Labs     05/14/20 0615      K 4.5      CO2 20*   BUN 5*   CREATININE 0.7*   GLUCOSE 76     Hepatic:   Recent Labs     05/14/20 0615   AST 32   ALT 25   BILITOT 0.4   ALKPHOS 77       COLONOSCOPY 5/13/2020:  Fecal impaction, in the rectum, sigmoid and descending colon. Few scattered stercoral type ulcers, biopsied. Attempt at colonic decompression was made. The patient is behaving as colonic inertia/slow transit constipation  PLAN:   Await biopsies. Clear liquid diet. Follow clinical response to colonic decompression. He will need outpatient colonoscopy to fully evaluate his colon. CT abdomen 5/10/2020:  Heavy stool burden within the colon.  Proximal to a focal area of extremely   dense stool possibly related to impaction is inflammatory changes involving   the colon over a moderate length segment compatible with underlying colitis.       Bilateral nonobstructing renal calculi.         KUB for NG placement 5/10/2020:  FINDINGS:   The nasogastric tube, including distal side port, is in the gastric fundus. Nonspecific bowel gas pattern is noted. Luster Schlichter is no free air.  Lung bases   are clear. Xray abd 5/14/2020: Moderate stool persists in the distal colon and rectum indicating   constipation.  Fecal impaction cannot be excluded with mild air-filled   distention of the more proximal colon.      Barium enema 5/15/2020:  Unremarkable single contrast barium

## 2020-05-15 NOTE — PROGRESS NOTES
Pt had a large BM with formed, loose, and water stool this afternoon after enema down in radiology. Pt doing better, hopefully pt will have another BM later and it be more formed.

## 2020-05-16 LAB
ANION GAP SERPL CALCULATED.3IONS-SCNC: 10 MMOL/L (ref 3–16)
BUN BLDV-MCNC: <2 MG/DL (ref 7–20)
CALCIUM SERPL-MCNC: 8.5 MG/DL (ref 8.3–10.6)
CHLORIDE BLD-SCNC: 102 MMOL/L (ref 99–110)
CO2: 27 MMOL/L (ref 21–32)
CREAT SERPL-MCNC: 0.6 MG/DL (ref 0.9–1.3)
GFR AFRICAN AMERICAN: >60
GFR NON-AFRICAN AMERICAN: >60
GLUCOSE BLD-MCNC: 120 MG/DL (ref 70–99)
HCT VFR BLD CALC: 31.1 % (ref 40.5–52.5)
HEMOGLOBIN: 10.6 G/DL (ref 13.5–17.5)
MCH RBC QN AUTO: 30.1 PG (ref 26–34)
MCHC RBC AUTO-ENTMCNC: 33.9 G/DL (ref 31–36)
MCV RBC AUTO: 88.6 FL (ref 80–100)
PDW BLD-RTO: 13.3 % (ref 12.4–15.4)
PLATELET # BLD: 207 K/UL (ref 135–450)
PMV BLD AUTO: 7.9 FL (ref 5–10.5)
POTASSIUM SERPL-SCNC: 3.5 MMOL/L (ref 3.5–5.1)
RBC # BLD: 3.51 M/UL (ref 4.2–5.9)
SODIUM BLD-SCNC: 139 MMOL/L (ref 136–145)
WBC # BLD: 3.7 K/UL (ref 4–11)

## 2020-05-16 PROCEDURE — 6370000000 HC RX 637 (ALT 250 FOR IP): Performed by: NURSE PRACTITIONER

## 2020-05-16 PROCEDURE — 80048 BASIC METABOLIC PNL TOTAL CA: CPT

## 2020-05-16 PROCEDURE — 6360000002 HC RX W HCPCS: Performed by: INTERNAL MEDICINE

## 2020-05-16 PROCEDURE — 2580000003 HC RX 258: Performed by: INTERNAL MEDICINE

## 2020-05-16 PROCEDURE — 1200000000 HC SEMI PRIVATE

## 2020-05-16 PROCEDURE — 85027 COMPLETE CBC AUTOMATED: CPT

## 2020-05-16 PROCEDURE — 6370000000 HC RX 637 (ALT 250 FOR IP): Performed by: INTERNAL MEDICINE

## 2020-05-16 PROCEDURE — 36415 COLL VENOUS BLD VENIPUNCTURE: CPT

## 2020-05-16 RX ORDER — POLYETHYLENE GLYCOL 3350 17 G/17G
17 POWDER, FOR SOLUTION ORAL
Status: DISCONTINUED | OUTPATIENT
Start: 2020-05-16 | End: 2020-05-18

## 2020-05-16 RX ADMIN — POLYETHYLENE GLYCOL 3350 17 G: 17 POWDER, FOR SOLUTION ORAL at 16:33

## 2020-05-16 RX ADMIN — SODIUM CHLORIDE: 9 INJECTION, SOLUTION INTRAVENOUS at 16:33

## 2020-05-16 RX ADMIN — NALOXEGOL OXALATE 25 MG: 25 TABLET, FILM COATED ORAL at 08:52

## 2020-05-16 RX ADMIN — ACETAMINOPHEN 650 MG: 325 TABLET, FILM COATED ORAL at 04:16

## 2020-05-16 RX ADMIN — LAMOTRIGINE 175 MG: 25 TABLET ORAL at 08:52

## 2020-05-16 RX ADMIN — LEVETIRACETAM 1500 MG: 500 TABLET ORAL at 21:49

## 2020-05-16 RX ADMIN — PHENYTOIN SODIUM 100 MG: 100 CAPSULE ORAL at 08:52

## 2020-05-16 RX ADMIN — POLYETHYLENE GLYCOL 3350 17 G: 17 POWDER, FOR SOLUTION ORAL at 23:59

## 2020-05-16 RX ADMIN — LEVETIRACETAM 1500 MG: 500 TABLET ORAL at 08:52

## 2020-05-16 RX ADMIN — MORPHINE SULFATE 4 MG: 4 INJECTION INTRAVENOUS at 14:02

## 2020-05-16 RX ADMIN — POLYETHYLENE GLYCOL 3350 17 G: 17 POWDER, FOR SOLUTION ORAL at 10:29

## 2020-05-16 RX ADMIN — POLYETHYLENE GLYCOL 3350 17 G: 17 POWDER, FOR SOLUTION ORAL at 14:02

## 2020-05-16 RX ADMIN — MORPHINE SULFATE 4 MG: 4 INJECTION INTRAVENOUS at 21:49

## 2020-05-16 RX ADMIN — POLYETHYLENE GLYCOL 3350 17 G: 17 POWDER, FOR SOLUTION ORAL at 18:46

## 2020-05-16 RX ADMIN — POLYETHYLENE GLYCOL 3350 17 G: 17 POWDER, FOR SOLUTION ORAL at 21:49

## 2020-05-16 RX ADMIN — PHENYTOIN SODIUM 200 MG: 100 CAPSULE ORAL at 21:50

## 2020-05-16 RX ADMIN — Medication 10 ML: at 08:52

## 2020-05-16 RX ADMIN — LAMOTRIGINE 175 MG: 25 TABLET ORAL at 21:49

## 2020-05-16 ASSESSMENT — PAIN DESCRIPTION - PAIN TYPE: TYPE: ACUTE PAIN

## 2020-05-16 ASSESSMENT — PAIN SCALES - GENERAL
PAINLEVEL_OUTOF10: 9
PAINLEVEL_OUTOF10: 3
PAINLEVEL_OUTOF10: 2
PAINLEVEL_OUTOF10: 8
PAINLEVEL_OUTOF10: 2

## 2020-05-16 NOTE — PROGRESS NOTES
Shift assessment complete see flowsheets. Meds per orders see eMAR. Patient alert and oriented x4, 1 watery BM so far this shift. Complaining of lower abdominal pain intermittently through shift. The care plan and education has been reviewed and mutually agreed upon with the patient.      Electronically signed by Wilian Mireles RN on 5/16/2020 at 1:26 AM

## 2020-05-16 NOTE — PLAN OF CARE
Problem: Pain:  Description: Pain management should include both nonpharmacologic and pharmacologic interventions. Goal: Pain level will decrease  Description: Pain level will decrease  Outcome: Ongoing  Goal: Control of acute pain  Description: Control of acute pain  Outcome: Ongoing  Goal: Control of chronic pain  Description: Control of chronic pain  Outcome: Ongoing     Problem:  Bowel/Gastric:  Goal: Bowel function will improve  Description: Bowel function will improve  Outcome: Ongoing  Goal: Occurrences of vomiting will decrease  Description: Occurrences of vomiting will decrease  Outcome: Ongoing  Goal: Control of bowel function will improve  Description: Control of bowel function will improve  Outcome: Ongoing  Goal: Ability to achieve a regular elimination pattern will improve  Description: Ability to achieve a regular elimination pattern will improve  Outcome: Ongoing     Problem: Fluid Volume:  Goal: Maintenance of adequate hydration will improve  Description: Maintenance of adequate hydration will improve  Outcome: Ongoing     Problem: Safety:  Goal: Ability to remain free from injury will improve  Description: Ability to remain free from injury will improve  Outcome: Ongoing     Problem: Falls - Risk of:  Goal: Will remain free from falls  Description: Will remain free from falls  Outcome: Ongoing  Goal: Absence of physical injury  Description: Absence of physical injury  Outcome: Ongoing

## 2020-05-16 NOTE — PROGRESS NOTES
[facility] GI  Gastroenterology Progress Note    Beau Jerome is a 39 y.o. male patient. 1. Acute colitis    2. Acute abdominal pain    3. Constipation, unspecified constipation type    4. Failure of outpatient treatment        SUBJECTIVE:    Passed stools after normal barium enema. Tolerating clears. ROS:  Cardiovascular ROS: no chest pain or dyspnea on exertion  Gastrointestinal ROS: see above  Respiratory ROS: no cough, shortness of breath, or wheezing    Physical    VITALS:  BP (!) 141/77   Pulse 86   Temp 97.8 °F (36.6 °C) (Oral)   Resp 16   Ht 6' (1.829 m)   Wt 159 lb 8 oz (72.3 kg)   SpO2 94%   BMI 21.63 kg/m²   TEMPERATURE:  Current - Temp: 97.8 °F (36.6 °C); Max - Temp  Av.1 °F (36.7 °C)  Min: 97.8 °F (36.6 °C)  Max: 98.4 °F (36.9 °C)    NAD  RRR, Nl s1s2  Lungs CTA Bilaterally, normal effort  Abdomen minimal rlq soreness but no significant tenderness or distension. AAOx3, No asterixis     Data      CBC:   Recent Labs     20  0615   WBC 7.0   RBC 3.73*   HGB 11.2*   HCT 33.5*      MCV 90.0   MCH 30.1   MCHC 33.4   RDW 13.1        BMP:  Recent Labs     20  0615      K 4.5      CO2 20*   BUN 5*   CREATININE 0.7*   CALCIUM 8.4   GLUCOSE 76        Hepatic Function Panel:   Recent Labs     20  0615   AST 32   ALT 25   BILITOT 0.4   ALKPHOS 77       No results for input(s): LIPASE, AMYLASE in the last 72 hours. No results for input(s): PROTIME, INR in the last 72 hours. No results for input(s): PTT in the last 72 hours. No results for input(s): OCCULTBLD in the last 72 hours. Radiology Review:    FL BARIUM ENEMA   Final Result   Unremarkable single contrast barium enema. XR ABDOMEN (2 VIEWS)   Final Result   Moderate stool persists in the distal colon and rectum indicating   constipation. Fecal impaction cannot be excluded with mild air-filled   distention of the more proximal colon.          XR ABDOMEN (2 VIEWS)   Final Result   Increased

## 2020-05-16 NOTE — PROGRESS NOTES
Hospitalist Progress Note      PCP: Bubba Montero MD    Date of Admission: 5/10/2020    Chief Complaint: Nausea vomiting abdominal pain constipation    Hospital Course: Admitted with above complaints. Found to have severe symptomatic fecal impaction. Initially had NG tube. Currently out. Required disimpaction stercoral ulcer was seen. Currently on clear liquids. Having some bowel movements, probably left over stools from before. Subjective: No chest pain, no shortness of breath, no abdominal pain, mild nausea with no vomiting. Medications:  Reviewed    Infusion Medications    sodium chloride 125 mL/hr at 05/13/20 1409    sodium chloride      sodium chloride 100 mL/hr at 05/15/20 2114     Scheduled Medications    polyethylene glycol  17 g Oral Q3H (SCHEDULED)    levETIRAcetam  1,500 mg Oral BID    phenytoin  100 mg Oral Daily    phenytoin  200 mg Oral Nightly    naloxegol  25 mg Oral QAM    sodium chloride flush  10 mL Intravenous 2 times per day    enoxaparin  40 mg Subcutaneous Nightly    lamoTRIgine  175 mg Oral BID     PRN Meds: morphine, sodium chloride flush, acetaminophen **OR** acetaminophen, ondansetron, promethazine    No intake or output data in the 24 hours ending 05/16/20 1222    Physical Exam Performed:    BP (!) 141/77   Pulse 86   Temp 97.8 °F (36.6 °C) (Oral)   Resp 16   Ht 6' (1.829 m)   Wt 159 lb 8 oz (72.3 kg)   SpO2 94%   BMI 21.63 kg/m²     General appearance: No apparent distress, appears stated age and cooperative. HEENT: Pupils equal, round, and reactive to light. Conjunctivae/corneas clear. Neck: Supple, with full range of motion. No jugular venous distention. Trachea midline. Respiratory:  Normal respiratory effort. Clear to auscultation, bilaterally without Rales/Wheezes/Rhonchi. Cardiovascular: Regular rate and rhythm with normal S1/S2 without murmurs, rubs or gallops.   Abdomen: Soft, non-tender, non-distended with normal bowel nephrolithiasis. XR ABDOMEN FOR NG/OG/NE TUBE PLACEMENT   Final Result   Nasogastric tube projects to the proximal stomach. XR ABDOMEN (KUB) (SINGLE AP VIEW)   Final Result   NG tube placement. CT ABDOMEN PELVIS W IV CONTRAST Additional Contrast? None   Final Result   Heavy stool burden within the colon. Proximal to a focal area of extremely   dense stool possibly related to impaction is inflammatory changes involving   the colon over a moderate length segment compatible with underlying colitis. Bilateral nonobstructing renal calculi. XR ABDOMEN (KUB) (SINGLE AP VIEW)    (Results Pending)           Assessment/Plan:    Active Hospital Problems    Diagnosis    Colitis [K52.9]       PLAN:    Intractable nausea and vomiting  Secondary to fecal impaction. Continue symptomatic management    Stercoral colitis  Continue laxatives. Colonoscopy did not show any inflammatory bowel disease. Chronic constipation  Continue medical and supportive management as recommended by gastroenterologist    Seizure disorder  Continue home dose of antiepileptics. No recent seizure. DVT Prophylaxis: Lovenox  Diet: DIET CLEAR LIQUID;  Code Status: Full Code    PT/OT Eval Status: Not needed yet    Dispo -inpatient, pending ability to tolerate regular diet and having regular and consistent bowel movements. Probably a couple more days.     Alfredo Taylor MD

## 2020-05-16 NOTE — PLAN OF CARE
Nutrition Problem: Inadequate energy intake  Intervention: Food and/or Nutrient Delivery: Continue current diet, Start ONS  Nutritional Goals: adv to solid food within next 48 hr vs nutrition support

## 2020-05-17 ENCOUNTER — APPOINTMENT (OUTPATIENT)
Dept: GENERAL RADIOLOGY | Age: 45
DRG: 330 | End: 2020-05-17
Payer: MEDICARE

## 2020-05-17 PROCEDURE — 6360000002 HC RX W HCPCS: Performed by: INTERNAL MEDICINE

## 2020-05-17 PROCEDURE — 6370000000 HC RX 637 (ALT 250 FOR IP): Performed by: INTERNAL MEDICINE

## 2020-05-17 PROCEDURE — 6370000000 HC RX 637 (ALT 250 FOR IP): Performed by: NURSE PRACTITIONER

## 2020-05-17 PROCEDURE — 74018 RADEX ABDOMEN 1 VIEW: CPT

## 2020-05-17 PROCEDURE — 1200000000 HC SEMI PRIVATE

## 2020-05-17 PROCEDURE — 2580000003 HC RX 258: Performed by: INTERNAL MEDICINE

## 2020-05-17 RX ORDER — PANTOPRAZOLE SODIUM 40 MG/1
40 TABLET, DELAYED RELEASE ORAL
Status: DISCONTINUED | OUTPATIENT
Start: 2020-05-17 | End: 2020-05-31 | Stop reason: HOSPADM

## 2020-05-17 RX ADMIN — PHENYTOIN SODIUM 200 MG: 100 CAPSULE ORAL at 23:27

## 2020-05-17 RX ADMIN — PANTOPRAZOLE SODIUM 40 MG: 40 TABLET, DELAYED RELEASE ORAL at 16:30

## 2020-05-17 RX ADMIN — LAMOTRIGINE 175 MG: 25 TABLET ORAL at 23:26

## 2020-05-17 RX ADMIN — POLYETHYLENE GLYCOL 3350 17 G: 17 POWDER, FOR SOLUTION ORAL at 07:11

## 2020-05-17 RX ADMIN — LEVETIRACETAM 1500 MG: 500 TABLET ORAL at 08:28

## 2020-05-17 RX ADMIN — POLYETHYLENE GLYCOL 3350 17 G: 17 POWDER, FOR SOLUTION ORAL at 18:18

## 2020-05-17 RX ADMIN — MORPHINE SULFATE 4 MG: 4 INJECTION INTRAVENOUS at 19:12

## 2020-05-17 RX ADMIN — POLYETHYLENE GLYCOL 3350 17 G: 17 POWDER, FOR SOLUTION ORAL at 14:13

## 2020-05-17 RX ADMIN — LEVETIRACETAM 1500 MG: 500 TABLET ORAL at 23:27

## 2020-05-17 RX ADMIN — SODIUM CHLORIDE: 9 INJECTION, SOLUTION INTRAVENOUS at 14:15

## 2020-05-17 RX ADMIN — PHENYTOIN SODIUM 100 MG: 100 CAPSULE ORAL at 08:28

## 2020-05-17 RX ADMIN — NALOXEGOL OXALATE 25 MG: 25 TABLET, FILM COATED ORAL at 08:28

## 2020-05-17 RX ADMIN — MORPHINE SULFATE 4 MG: 4 INJECTION INTRAVENOUS at 08:56

## 2020-05-17 RX ADMIN — POLYETHYLENE GLYCOL 3350 17 G: 17 POWDER, FOR SOLUTION ORAL at 10:50

## 2020-05-17 RX ADMIN — LAMOTRIGINE 175 MG: 25 TABLET ORAL at 08:28

## 2020-05-17 RX ADMIN — POLYETHYLENE GLYCOL 3350 17 G: 17 POWDER, FOR SOLUTION ORAL at 16:30

## 2020-05-17 RX ADMIN — SODIUM CHLORIDE: 9 INJECTION, SOLUTION INTRAVENOUS at 23:57

## 2020-05-17 RX ADMIN — POLYETHYLENE GLYCOL 3350 17 G: 17 POWDER, FOR SOLUTION ORAL at 23:27

## 2020-05-17 ASSESSMENT — PAIN DESCRIPTION - DESCRIPTORS: DESCRIPTORS: SORE

## 2020-05-17 ASSESSMENT — PAIN DESCRIPTION - ONSET: ONSET: ON-GOING

## 2020-05-17 ASSESSMENT — PAIN DESCRIPTION - FREQUENCY: FREQUENCY: CONTINUOUS

## 2020-05-17 ASSESSMENT — PAIN SCALES - GENERAL
PAINLEVEL_OUTOF10: 7
PAINLEVEL_OUTOF10: 8
PAINLEVEL_OUTOF10: 6
PAINLEVEL_OUTOF10: 3

## 2020-05-17 ASSESSMENT — PAIN DESCRIPTION - LOCATION: LOCATION: ABDOMEN

## 2020-05-17 ASSESSMENT — PAIN DESCRIPTION - PAIN TYPE: TYPE: ACUTE PAIN

## 2020-05-17 ASSESSMENT — PAIN DESCRIPTION - ORIENTATION: ORIENTATION: LOWER

## 2020-05-17 NOTE — PROGRESS NOTES
4 watery BM's this shift, nothing solid noted by patient.     Electronically signed by Damion Arshad RN on 5/17/2020 at 5:26 AM

## 2020-05-17 NOTE — PROGRESS NOTES
Shift assessment complete. VSS. Alert and oriented x4. See flowsheets. Morning medications given per MAR. The care plan and education has been reviewed and mutually agreed upon with the patient. Pt needs expressed, call light within reach, will continue to monitor.

## 2020-05-17 NOTE — PROGRESS NOTES
constipation. Fecal impaction cannot be excluded with mild air-filled   distention of the more proximal colon. XR ABDOMEN (2 VIEWS)   Final Result   Increased gaseous distention of the transverse colon. Moderate stool in descending colon         XR ABDOMEN (2 VIEWS)   Final Result   1. Nonspecific, though favored to be nonobstructive bowel gas pattern,   without evidence of free air. There is a large fecal load within the left   hemicolon. 2. NG tube within the stomach. 3. Bilateral nephrolithiasis. XR ABDOMEN FOR NG/OG/NE TUBE PLACEMENT   Final Result   Nasogastric tube projects to the proximal stomach. XR ABDOMEN (KUB) (SINGLE AP VIEW)   Final Result   NG tube placement. CT ABDOMEN PELVIS W IV CONTRAST Additional Contrast? None   Final Result   Heavy stool burden within the colon. Proximal to a focal area of extremely   dense stool possibly related to impaction is inflammatory changes involving   the colon over a moderate length segment compatible with underlying colitis. Bilateral nonobstructing renal calculi. FINAL DIAGNOSIS:    Right colon ulcer, biopsy:  - Focal nonspecific ulcer with nonspecific acute inflammation.  See  comment.     ASSESSMENT   Severe constipation with fecal impaction, suspicious for colonic inertia. Digitally disimpacted 5/13 during colonoscopy. Colonoscopy revealed stercoral type ulcer (bx nonspecific ulcer) and left sided solid stools. Repeat abdominal XR 5/14 revealed same findings on 5/12. This means the colonic decompression was unsuccessful. Limited success with soap suds enema 5/14. Hypaque enema 5/15 (read as normal colon) with passage of larger stools since enema. 5/16 kub stable distension but less stool.  continues Tolerating clears and miralax (vomited golytely at admit so wants avoid) and passing voluminous liquid stools.       PLAN    Continue Movantik  Minimize narcotics   Continue Clear liquids today  Continue miralax every 3hours today to continue clear colon  Plan colonoscopy tomorrow since last attempt severly limited by solid stool. Also add ppi for c/o reflux type sx  Check mg  Recheck cbc tomorrow with mild anemia. If he remains fecally impacted, consult Dr. Orelia Hatchet for laparoscopy to stool evacuation. If not effective, may even need colostomy. Dr Cheryl Vegas had discussed this case with Dr. Orelia Hatchet previously.      Kumar Dowling MD  [id number] [facility]

## 2020-05-17 NOTE — PROGRESS NOTES
stool in descending colon         XR ABDOMEN (2 VIEWS)   Final Result   1. Nonspecific, though favored to be nonobstructive bowel gas pattern,   without evidence of free air. There is a large fecal load within the left   hemicolon. 2. NG tube within the stomach. 3. Bilateral nephrolithiasis. XR ABDOMEN FOR NG/OG/NE TUBE PLACEMENT   Final Result   Nasogastric tube projects to the proximal stomach. XR ABDOMEN (KUB) (SINGLE AP VIEW)   Final Result   NG tube placement. CT ABDOMEN PELVIS W IV CONTRAST Additional Contrast? None   Final Result   Heavy stool burden within the colon. Proximal to a focal area of extremely   dense stool possibly related to impaction is inflammatory changes involving   the colon over a moderate length segment compatible with underlying colitis. Bilateral nonobstructing renal calculi. Assessment/Plan:    Active Hospital Problems    Diagnosis    Colitis [K52.9]       PLAN:    Intractable nausea and vomiting  Secondary to fecal impaction. Continue symptomatic management  Symptoms have resolved. Stercoral colitis  Continue laxatives. Colonoscopy did not show any inflammatory bowel disease. X-ray shows decreased stool load, but colon remains distended. Chronic constipation  Continue medical and supportive management as recommended by gastroenterologist  Slow improvement noted. Seizure disorder  Continue home dose of antiepileptics. No recent seizure. DVT Prophylaxis: Lovenox  Diet: DIET CLEAR LIQUID;  Code Status: Full Code    PT/OT Eval Status: Not needed yet    Dispo -inpatient, pending ability to tolerate regular diet and having regular and consistent bowel movements.   Probably another day or two    Daryl Samayoa MD

## 2020-05-17 NOTE — PLAN OF CARE
Problem: Falls - Risk of:  Goal: Will remain free from falls  Description: Will remain free from falls  5/17/2020 1225 by Goyo Betts RN  Outcome: Ongoing  5/17/2020 0208 by Jono Valdez RN  Outcome: Ongoing  Goal: Absence of physical injury  Description: Absence of physical injury  5/17/2020 1225 by Goyo Betts RN  Outcome: Ongoing  5/17/2020 0208 by Jono Valdez RN  Outcome: Ongoing     Problem: Nutrition  Goal: Optimal nutrition therapy  5/17/2020 1225 by Goyo Betts RN  Outcome: Ongoing  5/17/2020 0208 by Jono Valdez RN  Outcome: Ongoing

## 2020-05-18 ENCOUNTER — ANESTHESIA (OUTPATIENT)
Dept: ENDOSCOPY | Age: 45
DRG: 330 | End: 2020-05-18
Payer: MEDICARE

## 2020-05-18 ENCOUNTER — ANESTHESIA EVENT (OUTPATIENT)
Dept: ENDOSCOPY | Age: 45
DRG: 330 | End: 2020-05-18
Payer: MEDICARE

## 2020-05-18 VITALS
RESPIRATION RATE: 9 BRPM | DIASTOLIC BLOOD PRESSURE: 55 MMHG | OXYGEN SATURATION: 100 % | SYSTOLIC BLOOD PRESSURE: 101 MMHG

## 2020-05-18 LAB
ANION GAP SERPL CALCULATED.3IONS-SCNC: 7 MMOL/L (ref 3–16)
BUN BLDV-MCNC: <2 MG/DL (ref 7–20)
CALCIUM SERPL-MCNC: 8.8 MG/DL (ref 8.3–10.6)
CHLORIDE BLD-SCNC: 103 MMOL/L (ref 99–110)
CO2: 30 MMOL/L (ref 21–32)
CREAT SERPL-MCNC: 0.6 MG/DL (ref 0.9–1.3)
GFR AFRICAN AMERICAN: >60
GFR NON-AFRICAN AMERICAN: >60
GLUCOSE BLD-MCNC: 105 MG/DL (ref 70–99)
HCT VFR BLD CALC: 33.2 % (ref 40.5–52.5)
HEMOGLOBIN: 11.1 G/DL (ref 13.5–17.5)
MAGNESIUM: 1.9 MG/DL (ref 1.8–2.4)
MCH RBC QN AUTO: 29.8 PG (ref 26–34)
MCHC RBC AUTO-ENTMCNC: 33.4 G/DL (ref 31–36)
MCV RBC AUTO: 89.5 FL (ref 80–100)
PDW BLD-RTO: 13.6 % (ref 12.4–15.4)
PLATELET # BLD: 230 K/UL (ref 135–450)
PMV BLD AUTO: 8.2 FL (ref 5–10.5)
POTASSIUM SERPL-SCNC: 3.4 MMOL/L (ref 3.5–5.1)
RBC # BLD: 3.72 M/UL (ref 4.2–5.9)
SODIUM BLD-SCNC: 140 MMOL/L (ref 136–145)
WBC # BLD: 3.7 K/UL (ref 4–11)

## 2020-05-18 PROCEDURE — 6370000000 HC RX 637 (ALT 250 FOR IP): Performed by: INTERNAL MEDICINE

## 2020-05-18 PROCEDURE — 1200000000 HC SEMI PRIVATE

## 2020-05-18 PROCEDURE — 6360000002 HC RX W HCPCS: Performed by: NURSE ANESTHETIST, CERTIFIED REGISTERED

## 2020-05-18 PROCEDURE — 83735 ASSAY OF MAGNESIUM: CPT

## 2020-05-18 PROCEDURE — 3609008400 HC SIGMOIDOSCOPY DIAGNOSTIC: Performed by: INTERNAL MEDICINE

## 2020-05-18 PROCEDURE — 3700000000 HC ANESTHESIA ATTENDED CARE: Performed by: INTERNAL MEDICINE

## 2020-05-18 PROCEDURE — 7100000001 HC PACU RECOVERY - ADDTL 15 MIN: Performed by: INTERNAL MEDICINE

## 2020-05-18 PROCEDURE — 7100000000 HC PACU RECOVERY - FIRST 15 MIN: Performed by: INTERNAL MEDICINE

## 2020-05-18 PROCEDURE — 6360000002 HC RX W HCPCS: Performed by: INTERNAL MEDICINE

## 2020-05-18 PROCEDURE — 2700000000 HC OXYGEN THERAPY PER DAY

## 2020-05-18 PROCEDURE — 2500000003 HC RX 250 WO HCPCS: Performed by: NURSE ANESTHETIST, CERTIFIED REGISTERED

## 2020-05-18 PROCEDURE — 36415 COLL VENOUS BLD VENIPUNCTURE: CPT

## 2020-05-18 PROCEDURE — 0DD68ZX EXTRACTION OF STOMACH, VIA NATURAL OR ARTIFICIAL OPENING ENDOSCOPIC, DIAGNOSTIC: ICD-10-PCS | Performed by: INTERNAL MEDICINE

## 2020-05-18 PROCEDURE — 80048 BASIC METABOLIC PNL TOTAL CA: CPT

## 2020-05-18 PROCEDURE — 3609027000 HC COLONOSCOPY: Performed by: INTERNAL MEDICINE

## 2020-05-18 PROCEDURE — 3609012400 HC EGD TRANSORAL BIOPSY SINGLE/MULTIPLE: Performed by: INTERNAL MEDICINE

## 2020-05-18 PROCEDURE — 2580000003 HC RX 258: Performed by: NURSE ANESTHETIST, CERTIFIED REGISTERED

## 2020-05-18 PROCEDURE — 6370000000 HC RX 637 (ALT 250 FOR IP): Performed by: NURSE PRACTITIONER

## 2020-05-18 PROCEDURE — 85027 COMPLETE CBC AUTOMATED: CPT

## 2020-05-18 PROCEDURE — 88342 IMHCHEM/IMCYTCHM 1ST ANTB: CPT

## 2020-05-18 PROCEDURE — 2580000003 HC RX 258: Performed by: INTERNAL MEDICINE

## 2020-05-18 PROCEDURE — 3700000001 HC ADD 15 MINUTES (ANESTHESIA): Performed by: INTERNAL MEDICINE

## 2020-05-18 PROCEDURE — 88305 TISSUE EXAM BY PATHOLOGIST: CPT

## 2020-05-18 PROCEDURE — 2709999900 HC NON-CHARGEABLE SUPPLY: Performed by: INTERNAL MEDICINE

## 2020-05-18 RX ORDER — SODIUM CHLORIDE 9 MG/ML
INJECTION, SOLUTION INTRAVENOUS CONTINUOUS PRN
Status: DISCONTINUED | OUTPATIENT
Start: 2020-05-18 | End: 2020-05-18 | Stop reason: SDUPTHER

## 2020-05-18 RX ORDER — POLYETHYLENE GLYCOL 3350 17 G/17G
17 POWDER, FOR SOLUTION ORAL
Status: DISCONTINUED | OUTPATIENT
Start: 2020-05-18 | End: 2020-05-25

## 2020-05-18 RX ORDER — MIDAZOLAM HYDROCHLORIDE 1 MG/ML
INJECTION INTRAMUSCULAR; INTRAVENOUS PRN
Status: DISCONTINUED | OUTPATIENT
Start: 2020-05-18 | End: 2020-05-18 | Stop reason: SDUPTHER

## 2020-05-18 RX ORDER — LIDOCAINE HYDROCHLORIDE 20 MG/ML
INJECTION, SOLUTION EPIDURAL; INFILTRATION; INTRACAUDAL; PERINEURAL PRN
Status: DISCONTINUED | OUTPATIENT
Start: 2020-05-18 | End: 2020-05-18 | Stop reason: SDUPTHER

## 2020-05-18 RX ORDER — PROPOFOL 10 MG/ML
INJECTION, EMULSION INTRAVENOUS PRN
Status: DISCONTINUED | OUTPATIENT
Start: 2020-05-18 | End: 2020-05-18 | Stop reason: SDUPTHER

## 2020-05-18 RX ORDER — POLYETHYLENE GLYCOL 3350 17 G/17G
POWDER, FOR SOLUTION ORAL
Status: DISPENSED
Start: 2020-05-18 | End: 2020-05-19

## 2020-05-18 RX ORDER — PROPOFOL 10 MG/ML
INJECTION, EMULSION INTRAVENOUS CONTINUOUS PRN
Status: DISCONTINUED | OUTPATIENT
Start: 2020-05-18 | End: 2020-05-18 | Stop reason: SDUPTHER

## 2020-05-18 RX ADMIN — POLYETHYLENE GLYCOL 3350 17 G: 17 POWDER, FOR SOLUTION ORAL at 16:05

## 2020-05-18 RX ADMIN — PHENYLEPHRINE HYDROCHLORIDE 100 MCG: 10 INJECTION INTRAVENOUS at 09:50

## 2020-05-18 RX ADMIN — PHENYTOIN SODIUM 200 MG: 100 CAPSULE ORAL at 20:19

## 2020-05-18 RX ADMIN — POLYETHYLENE GLYCOL 3350 17 G: 17 POWDER, FOR SOLUTION ORAL at 05:34

## 2020-05-18 RX ADMIN — Medication 10 ML: at 20:22

## 2020-05-18 RX ADMIN — SODIUM CHLORIDE: 9 INJECTION, SOLUTION INTRAVENOUS at 09:31

## 2020-05-18 RX ADMIN — PROPOFOL 100 MG: 10 INJECTION, EMULSION INTRAVENOUS at 09:37

## 2020-05-18 RX ADMIN — PHENYLEPHRINE HYDROCHLORIDE 100 MCG: 10 INJECTION INTRAVENOUS at 10:00

## 2020-05-18 RX ADMIN — LEVETIRACETAM 1500 MG: 500 TABLET ORAL at 20:19

## 2020-05-18 RX ADMIN — PANTOPRAZOLE SODIUM 40 MG: 40 TABLET, DELAYED RELEASE ORAL at 05:35

## 2020-05-18 RX ADMIN — POLYETHYLENE GLYCOL 3350 17 G: 17 POWDER, FOR SOLUTION ORAL at 22:00

## 2020-05-18 RX ADMIN — LIDOCAINE HYDROCHLORIDE 80 MG: 20 INJECTION, SOLUTION EPIDURAL; INFILTRATION; INTRACAUDAL; PERINEURAL at 09:37

## 2020-05-18 RX ADMIN — PROPOFOL 120 MCG/KG/MIN: 10 INJECTION, EMULSION INTRAVENOUS at 09:37

## 2020-05-18 RX ADMIN — PHENYTOIN SODIUM 100 MG: 100 CAPSULE ORAL at 07:43

## 2020-05-18 RX ADMIN — POLYETHYLENE GLYCOL 3350 17 G: 17 POWDER, FOR SOLUTION ORAL at 18:21

## 2020-05-18 RX ADMIN — MIDAZOLAM 1 MG: 1 INJECTION INTRAMUSCULAR; INTRAVENOUS at 09:37

## 2020-05-18 RX ADMIN — MORPHINE SULFATE 4 MG: 4 INJECTION INTRAVENOUS at 12:26

## 2020-05-18 RX ADMIN — POLYETHYLENE GLYCOL 3350 17 G: 17 POWDER, FOR SOLUTION ORAL at 13:27

## 2020-05-18 RX ADMIN — POLYETHYLENE GLYCOL 3350 17 G: 17 POWDER, FOR SOLUTION ORAL at 20:21

## 2020-05-18 RX ADMIN — NALOXEGOL OXALATE 25 MG: 25 TABLET, FILM COATED ORAL at 08:27

## 2020-05-18 RX ADMIN — PHENYLEPHRINE HYDROCHLORIDE 100 MCG: 10 INJECTION INTRAVENOUS at 10:11

## 2020-05-18 RX ADMIN — LAMOTRIGINE 175 MG: 25 TABLET ORAL at 20:20

## 2020-05-18 RX ADMIN — LAMOTRIGINE 175 MG: 25 TABLET ORAL at 07:42

## 2020-05-18 RX ADMIN — ONDANSETRON 4 MG: 2 INJECTION INTRAMUSCULAR; INTRAVENOUS at 20:20

## 2020-05-18 RX ADMIN — LEVETIRACETAM 1500 MG: 500 TABLET ORAL at 07:43

## 2020-05-18 RX ADMIN — PROMETHAZINE HYDROCHLORIDE 12.5 MG: 25 TABLET ORAL at 20:20

## 2020-05-18 RX ADMIN — MORPHINE SULFATE 4 MG: 4 INJECTION INTRAVENOUS at 16:05

## 2020-05-18 RX ADMIN — MORPHINE SULFATE 4 MG: 4 INJECTION INTRAVENOUS at 07:43

## 2020-05-18 RX ADMIN — MORPHINE SULFATE 4 MG: 4 INJECTION INTRAVENOUS at 21:01

## 2020-05-18 ASSESSMENT — PAIN SCALES - GENERAL
PAINLEVEL_OUTOF10: 4
PAINLEVEL_OUTOF10: 3
PAINLEVEL_OUTOF10: 7
PAINLEVEL_OUTOF10: 7
PAINLEVEL_OUTOF10: 9
PAINLEVEL_OUTOF10: 3
PAINLEVEL_OUTOF10: 7
PAINLEVEL_OUTOF10: 7

## 2020-05-18 NOTE — PROGRESS NOTES
Wt: 159 lb (72.1 kg)(5/10 admit wt)  · Admission Body Wt: 159 lb (72.1 kg)  · % Weight Change:  ,  no changes; 157lb in October 2019  · Ideal Body Wt: 178 lb (80.7 kg),   · BMI Classification: BMI 18.5 - 24.9 Normal Weight    Nutrition Interventions:   Continue current diet, Start ONS  Continued Inpatient Monitoring, Education Not Indicated    Nutrition Evaluation:   · Evaluation: No progress toward goals   · Goals: adv to solid food within next 48 hr vs nutrition support    · Monitoring: Nutrition Progression, Meal Intake, Supplement Intake, Diet Tolerance, Weight, Monitor Bowel Function      Electronically signed by Angus Sneed RD, LD on 5/18/20 at 2:29 PM EDT    Contact Number: [phone]

## 2020-05-18 NOTE — PROGRESS NOTES
Pt arrived to PACU from Endoscopy in Stable condition and is RASS -1 (Drowsy) . Respirations are Regular Pattern; RR 8-20 = 0 on 3L O2 per nasal cannula. Skin warm and dry. Abd is  soft. Pain: denies at this time. Will continue to monitor for safety and comfort. S/P: EGD and Colonoscopy, with Dr. Sofi Marley at [facility].

## 2020-05-18 NOTE — PROGRESS NOTES
[facility] PROGRESS NOTE    5/18/2020 1:57 PM        Name: Nadia Carlson . Admitted: 5/10/2020  Primary Care Provider: Malik Bob MD (Tel: [phone])      Subjective:  Patient is a 38 yo male with hx seizure disorder, childhood leukemia in remission, RA. He presented to hospital with n/v, abdominal pain. Found to have evidence of colitis and constipation on CT scan. Presently resting in bed. Patient underwent repeat EGD and colonoscopy this am, results not yet in chart. Patient does not recall what he was told post procedure. Overall, patient is feeling better, still with lower abdominal discomfort but not as severe. Denies nausea or vomiting.         Reviewed interval ancillary notes    Current Medications  polyethylene glycol (GLYCOLAX) packet 17 g, Q2H  polyethylene glycol (GLYCOLAX) 17 g packet,   pantoprazole (PROTONIX) tablet 40 mg, QAM AC  levETIRAcetam (KEPPRA) tablet 1,500 mg, BID  phenytoin (DILANTIN) ER capsule 100 mg, Daily  phenytoin (DILANTIN) ER capsule 200 mg, Nightly  naloxegol (MOVANTIK) tablet 25 mg, QAM  0.9 % sodium chloride infusion, Continuous  0.9 % sodium chloride infusion, Continuous  morphine injection 4 mg, Q4H PRN  sodium chloride flush 0.9 % injection 10 mL, 2 times per day  sodium chloride flush 0.9 % injection 10 mL, PRN  acetaminophen (TYLENOL) tablet 650 mg, Q6H PRN    Or  acetaminophen (TYLENOL) suppository 650 mg, Q6H PRN  enoxaparin (LOVENOX) injection 40 mg, Nightly  lamoTRIgine (LAMICTAL) tablet 175 mg, BID  ondansetron (ZOFRAN) injection 4 mg, Q6H PRN  promethazine (PHENERGAN) tablet 12.5 mg, Q6H PRN      Objective:  /72   Pulse 88   Temp 98 °F (36.7 °C) (Oral)   Resp 16   Ht 6' (1.829 m)   Wt 159 lb 8 oz (72.3 kg)   SpO2 99%   BMI 21.63 kg/m²     Intake/Output Summary (Last 24 hours) at 5/18/2020 1357  Last data filed at 5/18/2020 1046  Gross per 24 hour   Intake 550 ml   Output 200 ml   Net 350 ml      Wt Readings from Last 3 Encounters:   05/10/20 159 lb 8 oz (72.3 kg)   10/28/19 157 lb 2 oz (71.3 kg)   04/12/19 150 lb (68 kg)     General:  Awake, alert, oriented in NAD  Skin:  Warm and dry. No unusual bruising or rash  Neck:  Supple. No JVD appreciated  Chest:  Normal effort. Clear to auscultation  Cardiovascular:  RRR, normal S1/S2, no murmur/gallop/rub  Abdomen:  Soft, mild lower abdominal tenderness, +bowel sounds  Extremities:  No edema  Neurological: No focal deficits  Psychological: Normal mood and affect    Labs and Tests:  CBC:   Recent Labs     05/16/20  1043 05/18/20  0727   WBC 3.7* 3.7*   HGB 10.6* 11.1*    230     BMP:    Recent Labs     05/16/20  1043 05/18/20  0727    140   K 3.5 3.4*    103   CO2 27 30   BUN <2* <2*   CREATININE 0.6* 0.6*   GLUCOSE 120* 105*     Hepatic:   No results for input(s): AST, ALT, ALB, BILITOT, ALKPHOS in the last 72 hours. COLONOSCOPY 5/13/2020:  Fecal impaction, in the rectum, sigmoid and descending colon. Few scattered stercoral type ulcers, biopsied. Attempt at colonic decompression was made. The patient is behaving as colonic inertia/slow transit constipation  PLAN:   Await biopsies. Clear liquid diet. Follow clinical response to colonic decompression. He will need outpatient colonoscopy to fully evaluate his colon. CT abdomen 5/10/2020:  Heavy stool burden within the colon.  Proximal to a focal area of extremely   dense stool possibly related to impaction is inflammatory changes involving   the colon over a moderate length segment compatible with underlying colitis.       Bilateral nonobstructing renal calculi.         KUB for NG placement 5/10/2020:  FINDINGS:   The nasogastric tube, including distal side port, is in the gastric fundus. Nonspecific bowel gas pattern is noted. Pipe Benne is no free air.  Lung bases   are clear. Xray abd 5/14/2020:   Moderate stool persists in the

## 2020-05-19 LAB
ANION GAP SERPL CALCULATED.3IONS-SCNC: 8 MMOL/L (ref 3–16)
BUN BLDV-MCNC: <2 MG/DL (ref 7–20)
CALCIUM SERPL-MCNC: 8.6 MG/DL (ref 8.3–10.6)
CHLORIDE BLD-SCNC: 103 MMOL/L (ref 99–110)
CO2: 29 MMOL/L (ref 21–32)
CREAT SERPL-MCNC: 0.7 MG/DL (ref 0.9–1.3)
GFR AFRICAN AMERICAN: >60
GFR NON-AFRICAN AMERICAN: >60
GLUCOSE BLD-MCNC: 98 MG/DL (ref 70–99)
HCT VFR BLD CALC: 34.8 % (ref 40.5–52.5)
HEMOGLOBIN: 11.7 G/DL (ref 13.5–17.5)
MCH RBC QN AUTO: 30.4 PG (ref 26–34)
MCHC RBC AUTO-ENTMCNC: 33.6 G/DL (ref 31–36)
MCV RBC AUTO: 90.4 FL (ref 80–100)
PDW BLD-RTO: 13.5 % (ref 12.4–15.4)
PLATELET # BLD: 235 K/UL (ref 135–450)
PMV BLD AUTO: 8.4 FL (ref 5–10.5)
POTASSIUM REFLEX MAGNESIUM: 4.1 MMOL/L (ref 3.5–5.1)
RBC # BLD: 3.85 M/UL (ref 4.2–5.9)
SODIUM BLD-SCNC: 140 MMOL/L (ref 136–145)
WBC # BLD: 3.6 K/UL (ref 4–11)

## 2020-05-19 PROCEDURE — 2580000003 HC RX 258: Performed by: INTERNAL MEDICINE

## 2020-05-19 PROCEDURE — 85027 COMPLETE CBC AUTOMATED: CPT

## 2020-05-19 PROCEDURE — 6370000000 HC RX 637 (ALT 250 FOR IP): Performed by: INTERNAL MEDICINE

## 2020-05-19 PROCEDURE — 1200000000 HC SEMI PRIVATE

## 2020-05-19 PROCEDURE — 99222 1ST HOSP IP/OBS MODERATE 55: CPT | Performed by: SURGERY

## 2020-05-19 PROCEDURE — 36415 COLL VENOUS BLD VENIPUNCTURE: CPT

## 2020-05-19 PROCEDURE — 80048 BASIC METABOLIC PNL TOTAL CA: CPT

## 2020-05-19 PROCEDURE — APPNB30 APP NON BILLABLE TIME 0-30 MINS: Performed by: NURSE PRACTITIONER

## 2020-05-19 RX ADMIN — NALOXEGOL OXALATE 25 MG: 25 TABLET, FILM COATED ORAL at 08:08

## 2020-05-19 RX ADMIN — PHENYTOIN SODIUM 100 MG: 100 CAPSULE ORAL at 08:09

## 2020-05-19 RX ADMIN — POLYETHYLENE GLYCOL 3350 17 G: 17 POWDER, FOR SOLUTION ORAL at 06:25

## 2020-05-19 RX ADMIN — POLYETHYLENE GLYCOL 3350 17 G: 17 POWDER, FOR SOLUTION ORAL at 10:05

## 2020-05-19 RX ADMIN — POLYETHYLENE GLYCOL 3350 17 G: 17 POWDER, FOR SOLUTION ORAL at 20:05

## 2020-05-19 RX ADMIN — LEVETIRACETAM 1500 MG: 500 TABLET ORAL at 20:01

## 2020-05-19 RX ADMIN — SODIUM CHLORIDE: 9 INJECTION, SOLUTION INTRAVENOUS at 00:23

## 2020-05-19 RX ADMIN — SODIUM CHLORIDE: 9 INJECTION, SOLUTION INTRAVENOUS at 10:05

## 2020-05-19 RX ADMIN — ACETAMINOPHEN 650 MG: 325 TABLET, FILM COATED ORAL at 13:57

## 2020-05-19 RX ADMIN — LAMOTRIGINE 175 MG: 25 TABLET ORAL at 20:01

## 2020-05-19 RX ADMIN — LEVETIRACETAM 1500 MG: 500 TABLET ORAL at 08:07

## 2020-05-19 RX ADMIN — LAMOTRIGINE 175 MG: 25 TABLET ORAL at 08:07

## 2020-05-19 RX ADMIN — ACETAMINOPHEN 650 MG: 325 TABLET, FILM COATED ORAL at 20:10

## 2020-05-19 RX ADMIN — SODIUM CHLORIDE: 9 INJECTION, SOLUTION INTRAVENOUS at 19:57

## 2020-05-19 RX ADMIN — POLYETHYLENE GLYCOL 3350 17 G: 17 POWDER, FOR SOLUTION ORAL at 22:25

## 2020-05-19 RX ADMIN — POLYETHYLENE GLYCOL 3350 17 G: 17 POWDER, FOR SOLUTION ORAL at 08:08

## 2020-05-19 RX ADMIN — PHENYTOIN SODIUM 200 MG: 100 CAPSULE ORAL at 20:02

## 2020-05-19 RX ADMIN — PANTOPRAZOLE SODIUM 40 MG: 40 TABLET, DELAYED RELEASE ORAL at 06:25

## 2020-05-19 RX ADMIN — POLYETHYLENE GLYCOL 3350 17 G: 17 POWDER, FOR SOLUTION ORAL at 01:48

## 2020-05-19 RX ADMIN — POLYETHYLENE GLYCOL 3350 17 G: 17 POWDER, FOR SOLUTION ORAL at 18:39

## 2020-05-19 RX ADMIN — POLYETHYLENE GLYCOL 3350 17 G: 17 POWDER, FOR SOLUTION ORAL at 12:51

## 2020-05-19 RX ADMIN — POLYETHYLENE GLYCOL 3350 17 G: 17 POWDER, FOR SOLUTION ORAL at 16:06

## 2020-05-19 RX ADMIN — POLYETHYLENE GLYCOL 3350 17 G: 17 POWDER, FOR SOLUTION ORAL at 13:56

## 2020-05-19 ASSESSMENT — PAIN SCALES - GENERAL
PAINLEVEL_OUTOF10: 8
PAINLEVEL_OUTOF10: 5
PAINLEVEL_OUTOF10: 4
PAINLEVEL_OUTOF10: 0

## 2020-05-19 NOTE — PROGRESS NOTES
[facility] PROGRESS NOTE    5/19/2020 12:14 PM        Name: Nadia Carlson . Admitted: 5/10/2020  Primary Care Provider: Malik Bob MD (Tel: [phone])      Subjective:  Patient is a 38 yo male with hx seizure disorder, childhood leukemia in remission, RA. He presented to hospital with n/v, abdominal pain. Found to have evidence of colitis and constipation on CT scan. Presently resting in bed. He is frustrated that not much progress has been made and he has been here for over a week now. He is concerned that narcotics have been discontinued by surgery, denies pain at present. Repeat colonoscopy planned for Thursday. Tolerating clear liquids, no nausea or vomiting.         Reviewed interval ancillary notes    Current Medications  polyethylene glycol (GLYCOLAX) packet 17 g, Q2H  pantoprazole (PROTONIX) tablet 40 mg, QAM AC  levETIRAcetam (KEPPRA) tablet 1,500 mg, BID  phenytoin (DILANTIN) ER capsule 100 mg, Daily  phenytoin (DILANTIN) ER capsule 200 mg, Nightly  naloxegol (MOVANTIK) tablet 25 mg, QAM  0.9 % sodium chloride infusion, Continuous  0.9 % sodium chloride infusion, Continuous  sodium chloride flush 0.9 % injection 10 mL, 2 times per day  sodium chloride flush 0.9 % injection 10 mL, PRN  acetaminophen (TYLENOL) tablet 650 mg, Q6H PRN    Or  acetaminophen (TYLENOL) suppository 650 mg, Q6H PRN  enoxaparin (LOVENOX) injection 40 mg, Nightly  lamoTRIgine (LAMICTAL) tablet 175 mg, BID  ondansetron (ZOFRAN) injection 4 mg, Q6H PRN  promethazine (PHENERGAN) tablet 12.5 mg, Q6H PRN      Objective:  BP (!) 154/86   Pulse 99   Temp 98.1 °F (36.7 °C) (Oral)   Resp 18   Ht 6' (1.829 m)   Wt 159 lb 8 oz (72.3 kg)   SpO2 98%   BMI 21.63 kg/m²   No intake or output data in the 24 hours ending 05/19/20 1214   Wt Readings from Last 3 Encounters:   05/10/20 159 lb 8 oz (72.3 kg)   10/28/19 157 lb 2 oz (71.3 kg)   04/12/19 150 lb (68 kg)     General:  Awake, alert, oriented in NAD  Skin:  Warm and dry. No unusual bruising or rash  Neck:  Supple. No JVD appreciated  Chest:  Normal effort. Clear to auscultation  Cardiovascular:  RRR, normal S1/S2, no murmur/gallop/rub  Abdomen:  Soft, nontender, +bowel sounds  Extremities:  No edema  Neurological: No focal deficits  Psychological: Normal mood and affect      Labs and Tests:  CBC:   Recent Labs     05/18/20  0727   WBC 3.7*   HGB 11.1*        BMP:    Recent Labs     05/18/20  0727      K 3.4*      CO2 30   BUN <2*   CREATININE 0.6*   GLUCOSE 105*     Hepatic:   No results for input(s): AST, ALT, ALB, BILITOT, ALKPHOS in the last 72 hours. COLONOSCOPY 5/13/2020:  Fecal impaction, in the rectum, sigmoid and descending colon. Few scattered stercoral type ulcers, biopsied. Attempt at colonic decompression was made. The patient is behaving as colonic inertia/slow transit constipation  PLAN:   Await biopsies. Clear liquid diet. Follow clinical response to colonic decompression. He will need outpatient colonoscopy to fully evaluate his colon. CT abdomen 5/10/2020:  Heavy stool burden within the colon.  Proximal to a focal area of extremely   dense stool possibly related to impaction is inflammatory changes involving   the colon over a moderate length segment compatible with underlying colitis.       Bilateral nonobstructing renal calculi.         KUB for NG placement 5/10/2020:  FINDINGS:   The nasogastric tube, including distal side port, is in the gastric fundus. Nonspecific bowel gas pattern is noted. Art Stake is no free air.  Lung bases   are clear. Xray abd 5/14/2020: Moderate stool persists in the distal colon and rectum indicating   constipation.  Fecal impaction cannot be excluded with mild air-filled   distention of the more proximal colon. Barium enema 5/15/2020:  Unremarkable single contrast barium enema.     KUB abdomen 5/17/2020:  Decreased stool load.  Retained water-soluble contrast is seen throughout the   colon.  Colon remains distended       Problem List  Active Problems:    Colitis  Resolved Problems:    * No resolved hospital problems. *       Assessment & Plan:   1. Intractable vomiting. Resolved. NG tube out 5/14. Tolerating liquid diet. 2. Constipation. Severe with fecal impaction suspicious for colonic inertia. CT scan with heavy stool burden, possible underlying colitis. Colonoscopy (5/13) revealed fecal impaction, few scattered stercoral type ulcers, attempt made at disimpaction. KUB 5/17 with decreased stool load but colon remained distended. Underwent repeat colonoscopy 5/18 with stool impaction in descending colon. GI plans to repeat colonoscopy on Thursday. He has been evaluated by surgery, narcotics stopped, to receive sse. 3. Hypokalemia. Resolved. Potassium 4.1 today. 4. Hx seizure. Tolerating clear liquids, continue antiepileptics (dilantin, Keppra, and Lamictal). No seizure activity. 5. Abnormal UA. Small leukocytes, only 3 WBC on HPF. No dysuria. No culture or treatment indicated. 6. Skin lesion left side. Possible raised mole/cherry angioma. No further bleeding today. Would recommend excision as outpatient.        Diet: DIET CLEAR LIQUID;  Dietary Nutrition Supplements: Clear Liquid Oral Supplement  Code:Full Code  DVT PPX: on enoxaparin      JOHN Park CNP   5/19/2020 12:14 PM

## 2020-05-19 NOTE — CONSULTS
[facility] GENERAL AND LAPAROSCOPIC SURGERY                       PATIENT NAME: Faraz Yun     ADMISSION DATE: 5/10/2020 12:35 PM      TODAY'S DATE: 5/19/2020    Reason for Consult:  Fecal imp[action    Requesting Physician:  Dr. Lockwood Led:              The patient is a 39 y.o. male who presents with constipation. Pt admitted last week and is undergoing extensive treatment for severe colonic impaction. Did have lower sigmoid clear with treatment, but remains impacted up higher. Had N/V, not now, and denies apin at this time. On clears and Miralax. Prior emesis with Golytely. No prior intestinal surgery. Has had Sx disorder and Leukemia as a child.     Past Medical History:        Diagnosis Date    Adhesive capsulitis of shoulder     Arthritis     Complex partial seizures with impaired consciousness at onset [facility])     Leukemia in remission ([facility]     Seborrheic dermatitis, unspecified     Seizures ([facility]     Last seizure 3/2018       Past Surgical History:        Procedure Laterality Date    COLONOSCOPY N/A 5/13/2020    COLONOSCOPY FLEXIBLE W/ DECOMPRESSION performed by Maximo Garner MD at [address] [facility]  5/13/2020    COLONOSCOPY WITH BIOPSY performed by Maximo Garner MD at [address] [facility]  5/18/2020    COLONOSCOPY DIAGNOSTIC performed by Geovanna Pyle MD at [address] [facility] ARTHROSCOPY Right 04/27/2018    RIGHT SHOULDER ARTHROSCOPY SUBACROMIAL DECOMPRESSION, OPEN    UPPER GASTROINTESTINAL ENDOSCOPY N/A 5/18/2020    EGD BIOPSY performed by Geovanna Pyle MD at [address] [place] [address]       Current Medications:   Current Facility-Administered Medications: polyethylene glycol (GLYCOLAX) packet 17 g, 17 g, Oral, Q2H  pantoprazole (PROTONIX) tablet 40 mg, 40 mg, Oral, QAM AC  levETIRAcetam (KEPPRA) tablet 1,500 mg, 1,500 mg, Oral, BID  phenytoin (DILANTIN) ER capsule 100 mg, 100 mg, Oral, unremarkable. Peritoneum/Retroperitoneum: The aorta is normal in caliber. No suspicious   retroperitoneal adenopathy. Bones/Soft Tissues:  No acute abnormality of the visualized osseous   structures. Impression   Heavy stool burden within the colon. Proximal to a focal area of extremely   dense stool possibly related to impaction is inflammatory changes involving   the colon over a moderate length segment compatible with underlying colitis. Bilateral nonobstructing renal calculi. No results found. IMPRESSION/RECOMMENDATIONS:    Fecal impaction  Medication or dysmotility issue  Pt with extensive stool load in colon proving difficult to mobilize. Is making some slow progress and have encouraged pt to continue on the same tract for now. Additional laxatives being ordered, stop narcotics. Benign exam so have not encouraged surgical approach at this time.       Sujatha Vega

## 2020-05-19 NOTE — PROGRESS NOTES
[facility] GI  Gastroenterology Progress Note    Enoch Carmona is a 39 y.o. male patient. 1. Acute colitis    2. Acute abdominal pain    3. Constipation, unspecified constipation type    4. Failure of outpatient treatment        SUBJECTIVE:    Continues tolerate clears and miralax, states passing multiple watery brown stools. ROS:  Gastrointestinal ROS: see above      Physical    VITALS:  BP (!) 154/86   Pulse 99   Temp 98.1 °F (36.7 °C) (Oral)   Resp 18   Ht 6' (1.829 m)   Wt 159 lb 8 oz (72.3 kg)   SpO2 98%   BMI 21.63 kg/m²   TEMPERATURE:  Current - Temp: 98.1 °F (36.7 °C); Max - Temp  Av.6 °F (36.4 °C)  Min: 96.8 °F (36 °C)  Max: 98.1 °F (36.7 °C)    NAD  RRR, Nl s1s2  Lungs CTA Bilaterally, normal effort  Abdomen stable exam, minimal rlq soreness but no significant tenderness or distension. AAOx3    Data      CBC:   Recent Labs     20  1043 20  0727   WBC 3.7* 3.7*   RBC 3.51* 3.72*   HGB 10.6* 11.1*   HCT 31.1* 33.2*    230   MCV 88.6 89.5   MCH 30.1 29.8   MCHC 33.9 33.4   RDW 13.3 13.6        BMP:  Recent Labs     20  1043 20  0727    140   K 3.5 3.4*    103   CO2 27 30   BUN <2* <2*   CREATININE 0.6* 0.6*   CALCIUM 8.5 8.8   GLUCOSE 120* 105*        Hepatic Function Panel:   No results for input(s): AST, ALT, BILIDIR, BILITOT, ALKPHOS in the last 72 hours. No results for input(s): LIPASE, AMYLASE in the last 72 hours. No results for input(s): PROTIME, INR in the last 72 hours. No results for input(s): PTT in the last 72 hours. No results for input(s): OCCULTBLD in the last 72 hours. Radiology Review:    XR ABDOMEN (KUB) (SINGLE AP VIEW)   Final Result   Decreased stool load. Retained water-soluble contrast is seen throughout the   colon. Colon remains distended         FL BARIUM ENEMA   Final Result   Unremarkable single contrast barium enema.          XR ABDOMEN (2 VIEWS)   Final Result   Moderate stool persists in the distal colon and

## 2020-05-20 ENCOUNTER — ANESTHESIA EVENT (OUTPATIENT)
Dept: ENDOSCOPY | Age: 45
DRG: 330 | End: 2020-05-20
Payer: MEDICARE

## 2020-05-20 PROCEDURE — APPNB30 APP NON BILLABLE TIME 0-30 MINS: Performed by: NURSE PRACTITIONER

## 2020-05-20 PROCEDURE — 2580000003 HC RX 258: Performed by: INTERNAL MEDICINE

## 2020-05-20 PROCEDURE — 1200000000 HC SEMI PRIVATE

## 2020-05-20 PROCEDURE — 6370000000 HC RX 637 (ALT 250 FOR IP): Performed by: INTERNAL MEDICINE

## 2020-05-20 PROCEDURE — 99232 SBSQ HOSP IP/OBS MODERATE 35: CPT | Performed by: SURGERY

## 2020-05-20 PROCEDURE — APPSS15 APP SPLIT SHARED TIME 0-15 MINUTES: Performed by: NURSE PRACTITIONER

## 2020-05-20 RX ADMIN — ACETAMINOPHEN 650 MG: 325 TABLET, FILM COATED ORAL at 10:20

## 2020-05-20 RX ADMIN — NALOXEGOL OXALATE 25 MG: 25 TABLET, FILM COATED ORAL at 09:00

## 2020-05-20 RX ADMIN — POLYETHYLENE GLYCOL 3350 17 G: 17 POWDER, FOR SOLUTION ORAL at 11:49

## 2020-05-20 RX ADMIN — LEVETIRACETAM 1500 MG: 500 TABLET ORAL at 09:00

## 2020-05-20 RX ADMIN — PHENYTOIN SODIUM 100 MG: 100 CAPSULE ORAL at 09:00

## 2020-05-20 RX ADMIN — POLYETHYLENE GLYCOL 3350 17 G: 17 POWDER, FOR SOLUTION ORAL at 02:14

## 2020-05-20 RX ADMIN — POLYETHYLENE GLYCOL 3350 17 G: 17 POWDER, FOR SOLUTION ORAL at 08:58

## 2020-05-20 RX ADMIN — SODIUM CHLORIDE: 9 INJECTION, SOLUTION INTRAVENOUS at 05:20

## 2020-05-20 RX ADMIN — PANTOPRAZOLE SODIUM 40 MG: 40 TABLET, DELAYED RELEASE ORAL at 05:16

## 2020-05-20 RX ADMIN — POLYETHYLENE GLYCOL 3350 17 G: 17 POWDER, FOR SOLUTION ORAL at 10:19

## 2020-05-20 RX ADMIN — POLYETHYLENE GLYCOL 3350 17 G: 17 POWDER, FOR SOLUTION ORAL at 13:49

## 2020-05-20 RX ADMIN — POLYETHYLENE GLYCOL 3350 17 G: 17 POWDER, FOR SOLUTION ORAL at 20:02

## 2020-05-20 RX ADMIN — PHENYTOIN SODIUM 200 MG: 100 CAPSULE ORAL at 20:01

## 2020-05-20 RX ADMIN — POLYETHYLENE GLYCOL 3350 17 G: 17 POWDER, FOR SOLUTION ORAL at 22:00

## 2020-05-20 RX ADMIN — POLYETHYLENE GLYCOL 3350 17 G: 17 POWDER, FOR SOLUTION ORAL at 17:34

## 2020-05-20 RX ADMIN — ACETAMINOPHEN 650 MG: 325 TABLET, FILM COATED ORAL at 02:14

## 2020-05-20 RX ADMIN — LAMOTRIGINE 175 MG: 25 TABLET ORAL at 09:00

## 2020-05-20 RX ADMIN — POLYETHYLENE GLYCOL 3350 17 G: 17 POWDER, FOR SOLUTION ORAL at 16:06

## 2020-05-20 RX ADMIN — ACETAMINOPHEN 650 MG: 325 TABLET, FILM COATED ORAL at 17:33

## 2020-05-20 RX ADMIN — SODIUM CHLORIDE: 9 INJECTION, SOLUTION INTRAVENOUS at 15:19

## 2020-05-20 RX ADMIN — POLYETHYLENE GLYCOL 3350 17 G: 17 POWDER, FOR SOLUTION ORAL at 00:28

## 2020-05-20 RX ADMIN — LEVETIRACETAM 1500 MG: 500 TABLET ORAL at 20:01

## 2020-05-20 RX ADMIN — LAMOTRIGINE 175 MG: 25 TABLET ORAL at 20:01

## 2020-05-20 RX ADMIN — POLYETHYLENE GLYCOL 3350 17 G: 17 POWDER, FOR SOLUTION ORAL at 05:16

## 2020-05-20 ASSESSMENT — PAIN DESCRIPTION - ONSET
ONSET: ON-GOING
ONSET: ON-GOING

## 2020-05-20 ASSESSMENT — PAIN DESCRIPTION - ORIENTATION
ORIENTATION: LOWER
ORIENTATION: MID;LOWER

## 2020-05-20 ASSESSMENT — PAIN SCALES - GENERAL
PAINLEVEL_OUTOF10: 4
PAINLEVEL_OUTOF10: 2
PAINLEVEL_OUTOF10: 8
PAINLEVEL_OUTOF10: 3
PAINLEVEL_OUTOF10: 6
PAINLEVEL_OUTOF10: 7
PAINLEVEL_OUTOF10: 7
PAINLEVEL_OUTOF10: 10
PAINLEVEL_OUTOF10: 7

## 2020-05-20 ASSESSMENT — PAIN DESCRIPTION - LOCATION
LOCATION: ABDOMEN
LOCATION: ABDOMEN

## 2020-05-20 ASSESSMENT — PAIN DESCRIPTION - DESCRIPTORS
DESCRIPTORS: SORE
DESCRIPTORS: ACHING

## 2020-05-20 ASSESSMENT — PAIN DESCRIPTION - PROGRESSION: CLINICAL_PROGRESSION: NOT CHANGED

## 2020-05-20 ASSESSMENT — PAIN DESCRIPTION - FREQUENCY
FREQUENCY: CONTINUOUS
FREQUENCY: CONTINUOUS

## 2020-05-20 ASSESSMENT — PAIN DESCRIPTION - PAIN TYPE
TYPE: ACUTE PAIN
TYPE: ACUTE PAIN

## 2020-05-20 NOTE — ANESTHESIA PRE PROCEDURE
Deena Tariq MD        acetaminophen (TYLENOL) tablet 650 mg  650 mg Oral Q6H PRN Deena Tariq MD   650 mg at 20 1020    Or    acetaminophen (TYLENOL) suppository 650 mg  650 mg Rectal Q6H PRN Deena Tariq MD        enoxaparin (LOVENOX) injection 40 mg  40 mg Subcutaneous Nightly Deena Tariq MD   40 mg at 05/15/20 2114    lamoTRIgine (LAMICTAL) tablet 175 mg  175 mg Oral BID Deena Tariq MD   175 mg at 20 0900    ondansetron (ZOFRAN) injection 4 mg  4 mg Intravenous Q6H PRN Deena Tariq MD   4 mg at 20    promethazine (PHENERGAN) tablet 12.5 mg  12.5 mg Oral Q6H PRN Deena Tariq MD   12.5 mg at 20     Vital Signs (Current)   There were no vitals filed for this visit. Vital Signs Statistics (for past 48 hrs)     Temp  Av.6 °C (97.9 °F)  Min: 36.3 °C (97.4 °F)   Min taken time: 20  Max: 36.7 °C (98.1 °F)   Max taken time: 20 1432  Pulse  Av.5  Min: 78   Min taken time: 20 0531  Max: 102   Max taken time: 20 1432  Resp  Av.7  Min: 12   Min taken time: 20 1127  Max: 18   Max taken time: 20 0531  BP  Min: 125/75   Min taken time: 20 0531  Max: 166/88   Max taken time: 20 1703  MAP (mmHg)  Av  Min: 92   Min taken time: 20 0531  Max: 99   Max taken time: 20 0028  SpO2  Av.8 %  Min: 94 %   Min taken time: 20 0444  Max: 98 %   Max taken time: 20 0800    BP Readings from Last 3 Encounters:   20 136/83   20 (!) 101/55   20 (!) 108/53     BMI  There is no height or weight on file to calculate BMI. Estimated body mass index is 21.63 kg/m² as calculated from the following:    Height as of 5/10/20: 6' (1.829 m). Weight as of 5/10/20: 159 lb 8 oz (72.3 kg).     CBC   Lab Results   Component Value Date    WBC 3.6 2020    RBC 3.85 2020    HGB 11.7 2020    HCT 34.8 2020    MCV 90.4 2020    RDW 13.5 2020    PLT Induction: intravenous. Anesthetic plan and risks discussed with patient. Plan discussed with attending.               This pre-anesthesia assessment may be used as a history and physical.        JOHN Spann - CRNA  May 20, 2020  1:27 PM

## 2020-05-20 NOTE — PROGRESS NOTES
Nutrition Assessment    Type and Reason for Visit: Reassess    Nutrition Recommendations:   1. Continue to offer Ensure Clear BID and encourage intake  2. Diet advancement as appropriate per GI/surgery. 3. If unable to begin to advance towards solid food diet s/p colonoscopy tomorrow, may need to consider TPN to start. RD remains available to provide recommendations as needed/appropriate. Nutrition Assessment: Pt's nutrition status is declining. Pt now NPO or clear liquid diet for the majority of 10 day admission. Pt on clear liquid diet today, tolerating well. Pt reports consuming about 75% of broth and jello and 50% of Ensure Clear. RD encouraged supplement intake to better meet protein needs. Note plans for repeat colonoscopy tomorrow. If unable to begin to advance towards a solid diet after colonoscopy tomorrow, may need to consider TPN to start. RD remains available to provide recommendations as needed/appropriate. Malnutrition Assessment:  · Malnutrition Status: At risk for malnutrition  · Context: Acute illness or injury  · Findings of the 6 clinical characteristics of malnutrition (Minimum of 2 out of 6 clinical characteristics is required to make the diagnosis of moderate or severe Protein Calorie Malnutrition based on AND/ASPEN Guidelines):  1. Energy Intake-Less than or equal to 75% of estimated energy requirement, Greater than or equal to 7 days    2. Weight Loss-No significant weight loss,    3. Fat Loss-No significant subcutaneous fat loss,    4. Muscle Loss-Unable to assess,    5. Fluid Accumulation-No significant fluid accumulation,    6.   Strength-Not measured    Nutrition Risk Level: High    Nutrient Needs:  · Estimated Daily Total Kcal: 1800 - 2160  · Estimated Daily Protein (g): 86 - 108  · Estimated Daily Total Fluid (ml/day): 1 mL/kcal    Nutrition Diagnosis:   · Problem: Inadequate oral intake  · Etiology: related to Insufficient energy/nutrient consumption     Signs and

## 2020-05-20 NOTE — PROGRESS NOTES
[facility] GI  Gastroenterology Progress Note    Isaac Barron is a 39 y.o. male patient. 1. Acute colitis    2. Acute abdominal pain    3. Constipation, unspecified constipation type    4. Failure of outpatient treatment        SUBJECTIVE:    Continues tolerate clears and miralax, states passing multiple watery stools. ROS:  Gastrointestinal ROS: see above      Physical    VITALS:  /83   Pulse 83   Temp 97.9 °F (36.6 °C) (Oral)   Resp 16   Ht 6' (1.829 m)   Wt 159 lb 8 oz (72.3 kg)   SpO2 97%   BMI 21.63 kg/m²   TEMPERATURE:  Current - Temp: 97.9 °F (36.6 °C); Max - Temp  Av.9 °F (36.6 °C)  Min: 97.8 °F (36.6 °C)  Max: 98.1 °F (36.7 °C)    NAD  RRR, Nl s1s2  Lungs CTA Bilaterally, normal effort  Abdomen stable exam, minimal rlq soreness but no significant tenderness or distension. AAOx3    Data      CBC:   Recent Labs     20  0727 20  1210   WBC 3.7* 3.6*   RBC 3.72* 3.85*   HGB 11.1* 11.7*   HCT 33.2* 34.8*    235   MCV 89.5 90.4   MCH 29.8 30.4   MCHC 33.4 33.6   RDW 13.6 13.5        BMP:  Recent Labs     20  0727 20  1356    140   K 3.4* 4.1    103   CO2 30 29   BUN <2* <2*   CREATININE 0.6* 0.7*   CALCIUM 8.8 8.6   GLUCOSE 105* 98        Hepatic Function Panel:   No results for input(s): AST, ALT, BILIDIR, BILITOT, ALKPHOS in the last 72 hours. No results for input(s): LIPASE, AMYLASE in the last 72 hours. No results for input(s): PROTIME, INR in the last 72 hours. No results for input(s): PTT in the last 72 hours. No results for input(s): OCCULTBLD in the last 72 hours. Radiology Review:    XR ABDOMEN (KUB) (SINGLE AP VIEW)   Final Result   Decreased stool load. Retained water-soluble contrast is seen throughout the   colon. Colon remains distended         FL BARIUM ENEMA   Final Result   Unremarkable single contrast barium enema.          XR ABDOMEN (2 VIEWS)   Final Result   Moderate stool persists in the distal colon and rectum

## 2020-05-20 NOTE — PLAN OF CARE
Problem: Pain:  Goal: Pain level will decrease  Description: Pain level will decrease  Outcome: Ongoing  Goal: Control of acute pain  Description: Control of acute pain  Outcome: Ongoing  Goal: Control of chronic pain  Description: Control of chronic pain  Outcome: Ongoing     Problem:  Bowel/Gastric:  Goal: Bowel function will improve  Description: Bowel function will improve  Outcome: Ongoing  Goal: Occurrences of vomiting will decrease  Description: Occurrences of vomiting will decrease  Outcome: Ongoing  Goal: Control of bowel function will improve  Description: Control of bowel function will improve  Outcome: Ongoing  Goal: Ability to achieve a regular elimination pattern will improve  Description: Ability to achieve a regular elimination pattern will improve  Outcome: Ongoing     Problem: Fluid Volume:  Goal: Maintenance of adequate hydration will improve  Description: Maintenance of adequate hydration will improve  Outcome: Ongoing     Problem: Safety:  Goal: Ability to remain free from injury will improve  Description: Ability to remain free from injury will improve  Outcome: Ongoing     Problem: Falls - Risk of:  Goal: Will remain free from falls  Description: Will remain free from falls  Outcome: Ongoing  Goal: Absence of physical injury  Description: Absence of physical injury  Outcome: Ongoing     Problem: Nutrition  Goal: Optimal nutrition therapy  Outcome: Ongoing

## 2020-05-20 NOTE — PROGRESS NOTES
Mag:    Lab Results   Component Value Date    MG 1.90 05/18/2020     Phos:   No results found for: PHOS   Coags: No results found for: PROTIME, INR, APTT    Cultures:  Anaerobic culture  No results found for: LABANAE  Fungus stain  No results found for requested labs within last 30 days. Gram stain  No results found for requested labs within last 30 days. Organism  No results found for: [facility]  Surgical culture  No results found for: CXSURG  Blood culture 1  No results found for requested labs within last 30 days. Blood culture 2  No results found for requested labs within last 30 days. Fecal occult  No results found for requested labs within last 30 days. GI bacterial pathogens by PCR  No results found for requested labs within last 30 days. C. difficile  No results found for requested labs within last 30 days. Urine culture  Lab Results   Component Value Date    LABURIN  04/12/2019     <50,000 CFU/ml mixed skin/urogenital ruth. No further workup       Pathology:  No relevant pathology     Imaging:  I have personally reviewed the following films:    No results found. Scheduled Meds:   polyethylene glycol  17 g Oral Q2H    pantoprazole  40 mg Oral QAM AC    levETIRAcetam  1,500 mg Oral BID    phenytoin  100 mg Oral Daily    phenytoin  200 mg Oral Nightly    naloxegol  25 mg Oral QAM    sodium chloride flush  10 mL Intravenous 2 times per day    enoxaparin  40 mg Subcutaneous Nightly    lamoTRIgine  175 mg Oral BID     Continuous Infusions:   sodium chloride 100 mL/hr at 05/20/20 0520    sodium chloride 100 mL/hr at 05/15/20 2114     PRN Meds:.sodium chloride flush, acetaminophen **OR** acetaminophen, ondansetron, promethazine      Assessment:  39 y.o. male admitted with   1. Acute colitis    2. Acute abdominal pain    3. Constipation, unspecified constipation type    4. Failure of outpatient treatment        Fecal impaction       Plan:  1.  Continue bowel regimen, passing loose stool but still suspect more formed stool from right colon has not passed; no plans for surgical intervention at this time  2. Clear diet as tolerated  3. IV hydration; monitor and correct electrolytes  4. Encouraged activity as tolerated, ambulate TID, up to chair for all meals  5. PRN analgesics and antiemetics--no narcotics  6. DVT prophylaxis with Lovenox  7. Management of medical comorbid etiologies per primary team and consulting services    EDUCATION:  Educated patient on plan of care and disease process--all questions answered. Plans discussed with patient and nursing. Reviewed and discussed with Dr. Cyn Carcamo. Signed:  JOHN Peñaloza - CNP  5/20/2020 9:36 AM     Surg Staff:   Pt seen and examined with NP  See full note above  Pt with about the same abd exam, states passed medium amt of liquid stool, but needs to mobilize and pass large hard stool balls  Will do another soap suds enema this afternoon  Repeat colonoscopy tomorrow.  Hopefully will show improvement    Neville Hilton

## 2020-05-20 NOTE — PROGRESS NOTES
[facility]    5/20/2020 10:30 AM        Name: Yan Zayas . Admitted: 5/10/2020  Primary Care Provider: Jeffrey Bautista MD (Tel: [phone])      Subjective:  Patient is a 38 yo male with hx seizure disorder, childhood leukemia in remission, RA. He presented to hospital with n/v, abdominal pain. Found to have evidence of colitis and constipation on CT scan. Presently resting in bed. Continues on Miralax, having liquid stools. Notes some intermittent cramping. Plan is for repeat colonoscopy tomorrow. Tolerating clear liquids, no nausea or vomiting. Reviewed interval ancillary notes    Current Medications  polyethylene glycol (GLYCOLAX) packet 17 g, Q2H  pantoprazole (PROTONIX) tablet 40 mg, QAM AC  levETIRAcetam (KEPPRA) tablet 1,500 mg, BID  phenytoin (DILANTIN) ER capsule 100 mg, Daily  phenytoin (DILANTIN) ER capsule 200 mg, Nightly  naloxegol (MOVANTIK) tablet 25 mg, QAM  0.9 % sodium chloride infusion, Continuous  0.9 % sodium chloride infusion, Continuous  sodium chloride flush 0.9 % injection 10 mL, 2 times per day  sodium chloride flush 0.9 % injection 10 mL, PRN  acetaminophen (TYLENOL) tablet 650 mg, Q6H PRN    Or  acetaminophen (TYLENOL) suppository 650 mg, Q6H PRN  enoxaparin (LOVENOX) injection 40 mg, Nightly  lamoTRIgine (LAMICTAL) tablet 175 mg, BID  ondansetron (ZOFRAN) injection 4 mg, Q6H PRN  promethazine (PHENERGAN) tablet 12.5 mg, Q6H PRN      Objective:  BP (!) 145/79   Pulse 93   Temp 97.8 °F (36.6 °C) (Oral)   Resp 16   Ht 6' (1.829 m)   Wt 159 lb 8 oz (72.3 kg)   SpO2 96%   BMI 21.63 kg/m²   No intake or output data in the 24 hours ending 05/20/20 1030   Wt Readings from Last 3 Encounters:   05/10/20 159 lb 8 oz (72.3 kg)   10/28/19 157 lb 2 oz (71.3 kg)   04/12/19 150 lb (68 kg)     General:  Awake, alert, oriented in NAD  Skin:  Warm and dry.   No unusual bruising or rash  Neck:  Supple. No JVD appreciated  Chest:  Normal effort. Clear to auscultation  Cardiovascular:  RRR, normal S1/S2, no murmur/gallop/rub  Abdomen:  Soft, nontender, +bowel sounds  Extremities:  No edema  Neurological: No focal deficits  Psychological: Normal mood and affect      Labs and Tests:  CBC:   Recent Labs     05/18/20  0727 05/19/20  1210   WBC 3.7* 3.6*   HGB 11.1* 11.7*    235     BMP:    Recent Labs     05/18/20  0727 05/19/20  1356    140   K 3.4* 4.1    103   CO2 30 29   BUN <2* <2*   CREATININE 0.6* 0.7*   GLUCOSE 105* 98     Hepatic:   No results for input(s): AST, ALT, ALB, BILITOT, ALKPHOS in the last 72 hours. COLONOSCOPY 5/13/2020:  Fecal impaction, in the rectum, sigmoid and descending colon. Few scattered stercoral type ulcers, biopsied. Attempt at colonic decompression was made. The patient is behaving as colonic inertia/slow transit constipation  PLAN:   Await biopsies. Clear liquid diet. Follow clinical response to colonic decompression. He will need outpatient colonoscopy to fully evaluate his colon. CT abdomen 5/10/2020:  Heavy stool burden within the colon.  Proximal to a focal area of extremely   dense stool possibly related to impaction is inflammatory changes involving   the colon over a moderate length segment compatible with underlying colitis.       Bilateral nonobstructing renal calculi.         KUB for NG placement 5/10/2020:  FINDINGS:   The nasogastric tube, including distal side port, is in the gastric fundus. Nonspecific bowel gas pattern is noted. March Bame is no free air.  Lung bases   are clear. Xray abd 5/14/2020: Moderate stool persists in the distal colon and rectum indicating   constipation.  Fecal impaction cannot be excluded with mild air-filled   distention of the more proximal colon. Barium enema 5/15/2020:  Unremarkable single contrast barium enema.     KUB abdomen 5/17/2020:  Decreased stool load.  Retained water-soluble contrast is seen throughout the   colon.  Colon remains distended       Problem List  Active Problems:    Acute colitis  Resolved Problems:    * No resolved hospital problems. *       Assessment & Plan:   1. Intractable vomiting. Resolved. NG tube out 5/14. Tolerating liquid diet. 2. Constipation. Severe with fecal impaction suspicious for colonic inertia. CT scan with heavy stool burden, possible underlying colitis. Colonoscopy (5/13) revealed fecal impaction, few scattered stercoral type ulcers, attempt made at disimpaction. KUB 5/17 with decreased stool load but colon remained distended. Underwent repeat colonoscopy 5/18 with stool impaction in descending colon. GI plans to repeat colonoscopy on Thursday. Surgery on board. 3. Hypokalemia. Resolved. Recheck labs in am. .    4. Hx seizure. Tolerating clear liquids, continue antiepileptics (dilantin, Keppra, and Lamictal). No seizure activity. 5. Abnormal UA. Small leukocytes, only 3 WBC on HPF. No dysuria. No culture or treatment indicated. 6. Skin lesion left side. Possible raised mole/cherry angioma. No further bleeding today. Would recommend excision as outpatient.        Diet: Dietary Nutrition Supplements: Clear Liquid Oral Supplement  DIET CLEAR LIQUID;  Diet NPO, After Midnight Exceptions are: Sips with Meds  Code:Full Code  DVT PPX: on enoxaparin      JOHN Rubin CNP   5/20/2020 10:30 AM

## 2020-05-21 ENCOUNTER — ANESTHESIA (OUTPATIENT)
Dept: ENDOSCOPY | Age: 45
DRG: 330 | End: 2020-05-21
Payer: MEDICARE

## 2020-05-21 ENCOUNTER — TELEPHONE (OUTPATIENT)
Dept: FAMILY MEDICINE CLINIC | Age: 45
End: 2020-05-21

## 2020-05-21 ENCOUNTER — APPOINTMENT (OUTPATIENT)
Dept: CT IMAGING | Age: 45
DRG: 330 | End: 2020-05-21
Payer: MEDICARE

## 2020-05-21 VITALS
RESPIRATION RATE: 14 BRPM | OXYGEN SATURATION: 99 % | SYSTOLIC BLOOD PRESSURE: 82 MMHG | DIASTOLIC BLOOD PRESSURE: 53 MMHG

## 2020-05-21 LAB
ANION GAP SERPL CALCULATED.3IONS-SCNC: 10 MMOL/L (ref 3–16)
BUN BLDV-MCNC: 3 MG/DL (ref 7–20)
CALCIUM SERPL-MCNC: 8.9 MG/DL (ref 8.3–10.6)
CHLORIDE BLD-SCNC: 102 MMOL/L (ref 99–110)
CO2: 25 MMOL/L (ref 21–32)
CREAT SERPL-MCNC: 0.7 MG/DL (ref 0.9–1.3)
GFR AFRICAN AMERICAN: >60
GFR NON-AFRICAN AMERICAN: >60
GLUCOSE BLD-MCNC: 94 MG/DL (ref 70–99)
HCT VFR BLD CALC: 34.7 % (ref 40.5–52.5)
HEMOGLOBIN: 11.5 G/DL (ref 13.5–17.5)
MCH RBC QN AUTO: 29.9 PG (ref 26–34)
MCHC RBC AUTO-ENTMCNC: 33.2 G/DL (ref 31–36)
MCV RBC AUTO: 90.1 FL (ref 80–100)
PDW BLD-RTO: 13.7 % (ref 12.4–15.4)
PLATELET # BLD: 239 K/UL (ref 135–450)
PMV BLD AUTO: 8.4 FL (ref 5–10.5)
POTASSIUM REFLEX MAGNESIUM: 4.1 MMOL/L (ref 3.5–5.1)
RBC # BLD: 3.85 M/UL (ref 4.2–5.9)
SODIUM BLD-SCNC: 137 MMOL/L (ref 136–145)
WBC # BLD: 3.6 K/UL (ref 4–11)

## 2020-05-21 PROCEDURE — 6360000004 HC RX CONTRAST MEDICATION: Performed by: FAMILY MEDICINE

## 2020-05-21 PROCEDURE — 36415 COLL VENOUS BLD VENIPUNCTURE: CPT

## 2020-05-21 PROCEDURE — 7100000001 HC PACU RECOVERY - ADDTL 15 MIN: Performed by: INTERNAL MEDICINE

## 2020-05-21 PROCEDURE — 3609008400 HC SIGMOIDOSCOPY DIAGNOSTIC: Performed by: INTERNAL MEDICINE

## 2020-05-21 PROCEDURE — 7100000000 HC PACU RECOVERY - FIRST 15 MIN: Performed by: INTERNAL MEDICINE

## 2020-05-21 PROCEDURE — 2500000003 HC RX 250 WO HCPCS: Performed by: NURSE ANESTHETIST, CERTIFIED REGISTERED

## 2020-05-21 PROCEDURE — 80048 BASIC METABOLIC PNL TOTAL CA: CPT

## 2020-05-21 PROCEDURE — 99232 SBSQ HOSP IP/OBS MODERATE 35: CPT | Performed by: SURGERY

## 2020-05-21 PROCEDURE — 6370000000 HC RX 637 (ALT 250 FOR IP): Performed by: INTERNAL MEDICINE

## 2020-05-21 PROCEDURE — 85027 COMPLETE CBC AUTOMATED: CPT

## 2020-05-21 PROCEDURE — 6360000002 HC RX W HCPCS: Performed by: INTERNAL MEDICINE

## 2020-05-21 PROCEDURE — 94760 N-INVAS EAR/PLS OXIMETRY 1: CPT

## 2020-05-21 PROCEDURE — 2580000003 HC RX 258: Performed by: INTERNAL MEDICINE

## 2020-05-21 PROCEDURE — 6360000004 HC RX CONTRAST MEDICATION

## 2020-05-21 PROCEDURE — 2709999900 HC NON-CHARGEABLE SUPPLY: Performed by: INTERNAL MEDICINE

## 2020-05-21 PROCEDURE — 74177 CT ABD & PELVIS W/CONTRAST: CPT

## 2020-05-21 PROCEDURE — 6360000002 HC RX W HCPCS: Performed by: NURSE ANESTHETIST, CERTIFIED REGISTERED

## 2020-05-21 PROCEDURE — APPNB30 APP NON BILLABLE TIME 0-30 MINS: Performed by: NURSE PRACTITIONER

## 2020-05-21 PROCEDURE — 3700000001 HC ADD 15 MINUTES (ANESTHESIA): Performed by: INTERNAL MEDICINE

## 2020-05-21 PROCEDURE — 1200000000 HC SEMI PRIVATE

## 2020-05-21 PROCEDURE — APPSS15 APP SPLIT SHARED TIME 0-15 MINUTES: Performed by: NURSE PRACTITIONER

## 2020-05-21 PROCEDURE — 3700000000 HC ANESTHESIA ATTENDED CARE: Performed by: INTERNAL MEDICINE

## 2020-05-21 RX ORDER — PROPOFOL 10 MG/ML
INJECTION, EMULSION INTRAVENOUS CONTINUOUS PRN
Status: DISCONTINUED | OUTPATIENT
Start: 2020-05-21 | End: 2020-05-21 | Stop reason: SDUPTHER

## 2020-05-21 RX ORDER — LIDOCAINE HYDROCHLORIDE 20 MG/ML
INJECTION, SOLUTION EPIDURAL; INFILTRATION; INTRACAUDAL; PERINEURAL PRN
Status: DISCONTINUED | OUTPATIENT
Start: 2020-05-21 | End: 2020-05-21 | Stop reason: SDUPTHER

## 2020-05-21 RX ADMIN — PROPOFOL 180 MCG/KG/MIN: 10 INJECTION, EMULSION INTRAVENOUS at 08:18

## 2020-05-21 RX ADMIN — POLYETHYLENE GLYCOL 3350 17 G: 17 POWDER, FOR SOLUTION ORAL at 21:54

## 2020-05-21 RX ADMIN — POLYETHYLENE GLYCOL 3350 17 G: 17 POWDER, FOR SOLUTION ORAL at 01:41

## 2020-05-21 RX ADMIN — LEVETIRACETAM 1500 MG: 500 TABLET ORAL at 19:45

## 2020-05-21 RX ADMIN — LEVETIRACETAM 1500 MG: 500 TABLET ORAL at 14:44

## 2020-05-21 RX ADMIN — POLYETHYLENE GLYCOL 3350 17 G: 17 POWDER, FOR SOLUTION ORAL at 19:45

## 2020-05-21 RX ADMIN — PHENYTOIN SODIUM 200 MG: 100 CAPSULE ORAL at 19:45

## 2020-05-21 RX ADMIN — LIDOCAINE HYDROCHLORIDE 100 MG: 20 INJECTION, SOLUTION EPIDURAL; INFILTRATION; INTRACAUDAL; PERINEURAL at 08:18

## 2020-05-21 RX ADMIN — IOPAMIDOL 75 ML: 755 INJECTION, SOLUTION INTRAVENOUS at 13:32

## 2020-05-21 RX ADMIN — SODIUM CHLORIDE: 9 INJECTION, SOLUTION INTRAVENOUS at 16:14

## 2020-05-21 RX ADMIN — LAMOTRIGINE 175 MG: 25 TABLET ORAL at 19:44

## 2020-05-21 RX ADMIN — POLYETHYLENE GLYCOL 3350 17 G: 17 POWDER, FOR SOLUTION ORAL at 16:14

## 2020-05-21 RX ADMIN — ACETAMINOPHEN 650 MG: 325 TABLET, FILM COATED ORAL at 01:28

## 2020-05-21 RX ADMIN — ACETAMINOPHEN 650 MG: 325 TABLET, FILM COATED ORAL at 21:54

## 2020-05-21 RX ADMIN — POLYETHYLENE GLYCOL 3350 17 G: 17 POWDER, FOR SOLUTION ORAL at 18:04

## 2020-05-21 RX ADMIN — PHENYTOIN SODIUM 100 MG: 100 CAPSULE ORAL at 14:44

## 2020-05-21 RX ADMIN — IOHEXOL 50 ML: 240 INJECTION, SOLUTION INTRATHECAL; INTRAVASCULAR; INTRAVENOUS; ORAL at 10:17

## 2020-05-21 RX ADMIN — POLYETHYLENE GLYCOL 3350 17 G: 17 POWDER, FOR SOLUTION ORAL at 14:45

## 2020-05-21 RX ADMIN — POLYETHYLENE GLYCOL 3350 17 G: 17 POWDER, FOR SOLUTION ORAL at 01:29

## 2020-05-21 RX ADMIN — NALOXEGOL OXALATE 25 MG: 25 TABLET, FILM COATED ORAL at 14:44

## 2020-05-21 RX ADMIN — PANTOPRAZOLE SODIUM 40 MG: 40 TABLET, DELAYED RELEASE ORAL at 14:44

## 2020-05-21 RX ADMIN — LAMOTRIGINE 175 MG: 25 TABLET ORAL at 14:43

## 2020-05-21 ASSESSMENT — PAIN DESCRIPTION - PROGRESSION
CLINICAL_PROGRESSION: NOT CHANGED

## 2020-05-21 ASSESSMENT — PAIN DESCRIPTION - PAIN TYPE
TYPE: ACUTE PAIN

## 2020-05-21 ASSESSMENT — PAIN SCALES - GENERAL
PAINLEVEL_OUTOF10: 3
PAINLEVEL_OUTOF10: 8
PAINLEVEL_OUTOF10: 6
PAINLEVEL_OUTOF10: 9
PAINLEVEL_OUTOF10: 8
PAINLEVEL_OUTOF10: 10
PAINLEVEL_OUTOF10: 0
PAINLEVEL_OUTOF10: 9

## 2020-05-21 ASSESSMENT — PAIN SCALES - WONG BAKER: WONGBAKER_NUMERICALRESPONSE: 0

## 2020-05-21 ASSESSMENT — PAIN DESCRIPTION - DESCRIPTORS
DESCRIPTORS: CONSTANT
DESCRIPTORS: ACHING

## 2020-05-21 ASSESSMENT — PAIN DESCRIPTION - ORIENTATION
ORIENTATION: MID;LOWER

## 2020-05-21 ASSESSMENT — PAIN DESCRIPTION - FREQUENCY
FREQUENCY: CONTINUOUS

## 2020-05-21 ASSESSMENT — PAIN DESCRIPTION - ONSET
ONSET: ON-GOING

## 2020-05-21 ASSESSMENT — PAIN - FUNCTIONAL ASSESSMENT: PAIN_FUNCTIONAL_ASSESSMENT: 0-10

## 2020-05-21 ASSESSMENT — PAIN DESCRIPTION - LOCATION
LOCATION: ABDOMEN

## 2020-05-21 NOTE — PROGRESS NOTES
nursing. Reviewed and discussed with Dr. Caren Macedo.       Signed:  JOHN Santillan - CNP  5/21/2020 2:17 PM     Patient seen and examined  39year old male with a fecal impaction  Case discussed with Dr. Conchis Salcido and C-scope findings noted  Abdominal examination is benign  Continue bowel regimen as per GI   Will follow

## 2020-05-21 NOTE — PROGRESS NOTES
Shift assessment complete. Vital signs stable. Abdomen mildly distended with active bowel sounds. Denies N/V. Tolerating clear liquid diet. Admits to having mid, lower continuous abdominal discomfort. Patient up to bathroom with very small watery bowel movement. Returned to bed. Scheduled medications administered. Patient understands that Glycolax will be administered Q2h tonight to clear colon. Will be NPO at midnight for colonoscopy tomorrow. The care plan and education has been reviewed and mutually agreed upon with the patient. Ambulating independently in room without difficulty. All needs met and call light within reach. Will continue to monitor.

## 2020-05-21 NOTE — PLAN OF CARE
Problem: Pain:  Goal: Pain level will decrease  Description: Pain level will decrease  5/21/2020 0940 by Kin Mcgill RN  Outcome: Ongoing  5/20/2020 2016 by Castro Seymour RN  Outcome: Ongoing  Goal: Control of acute pain  Description: Control of acute pain  5/21/2020 0940 by Kin Mcgill RN  Outcome: Ongoing  5/20/2020 2016 by Castro Seymour RN  Outcome: Met This Shift  Goal: Control of chronic pain  Description: Control of chronic pain  5/21/2020 0940 by Kin Mcgill RN  Outcome: Ongoing  5/20/2020 2016 by Castro Seymour RN  Outcome: Met This Shift     Problem:  Bowel/Gastric:  Goal: Bowel function will improve  Description: Bowel function will improve  5/21/2020 0940 by Kin Mcgill RN  Outcome: Ongoing  5/20/2020 2016 by Castro Seymour RN  Outcome: Ongoing  Goal: Occurrences of vomiting will decrease  Description: Occurrences of vomiting will decrease  5/21/2020 0940 by Kin Mcgill RN  Outcome: Ongoing  5/20/2020 2016 by Castro Seymour RN  Outcome: Met This Shift  Goal: Control of bowel function will improve  Description: Control of bowel function will improve  5/21/2020 0940 by Kin Mcgill RN  Outcome: Ongoing  5/20/2020 2016 by Castro Seymour RN  Outcome: Ongoing  Goal: Ability to achieve a regular elimination pattern will improve  Description: Ability to achieve a regular elimination pattern will improve  5/21/2020 0940 by Kin Mcgill RN  Outcome: Ongoing  5/20/2020 2016 by Castro Seymour RN  Outcome: Ongoing     Problem: Fluid Volume:  Goal: Maintenance of adequate hydration will improve  Description: Maintenance of adequate hydration will improve  5/21/2020 0940 by Kin Mcgill RN  Outcome: Ongoing  5/20/2020 2016 by Castro Seymour RN  Outcome: Met This Shift     Problem: Safety:  Goal: Ability to remain free from injury will improve  Description: Ability to remain free from injury will improve  5/21/2020 0940 by Kin Mcgill RN  Outcome: Ongoing  5/20/2020 2016 by

## 2020-05-21 NOTE — PLAN OF CARE
Problem: Pain:  Goal: Pain level will decrease  Description: Pain level will decrease  5/20/2020 2016 by Fauzia Powell RN  Outcome: Ongoing  5/20/2020 0952 by Colin Sim RN  Outcome: Ongoing  Goal: Control of acute pain  Description: Control of acute pain  5/20/2020 2016 by Fauzia Powell RN  Outcome: Met This Shift  5/20/2020 0952 by Colin Sim RN  Outcome: Ongoing  Goal: Control of chronic pain  Description: Control of chronic pain  5/20/2020 2016 by Fauzia Powell RN  Outcome: Met This Shift  5/20/2020 0952 by Colin Sim RN  Outcome: Ongoing     Problem:  Bowel/Gastric:  Goal: Bowel function will improve  Description: Bowel function will improve  5/20/2020 2016 by Fauzia Powell RN  Outcome: Ongoing  5/20/2020 0952 by Colin Sim RN  Outcome: Ongoing  Goal: Occurrences of vomiting will decrease  Description: Occurrences of vomiting will decrease  5/20/2020 2016 by Fauzia Powell RN  Outcome: Met This Shift  5/20/2020 0952 by Colin Sim RN  Outcome: Ongoing  Goal: Control of bowel function will improve  Description: Control of bowel function will improve  5/20/2020 2016 by Fauzia Powell RN  Outcome: Ongoing  5/20/2020 0952 by Colin Sim RN  Outcome: Ongoing  Goal: Ability to achieve a regular elimination pattern will improve  Description: Ability to achieve a regular elimination pattern will improve  5/20/2020 2016 by Fauzia Powell RN  Outcome: Ongoing  5/20/2020 0952 by Colin Sim RN  Outcome: Ongoing     Problem: Fluid Volume:  Goal: Maintenance of adequate hydration will improve  Description: Maintenance of adequate hydration will improve  5/20/2020 2016 by Fauzia Powell RN  Outcome: Met This Shift  5/20/2020 0952 by Colin Sim RN  Outcome: Ongoing     Problem: Safety:  Goal: Ability to remain free from injury will improve  Description: Ability to remain free from injury will improve  5/20/2020 2016 by Fauzia Powell RN  Outcome: Met This Shift  5/20/2020

## 2020-05-21 NOTE — PROGRESS NOTES
Readings from Last 3 Encounters:   05/10/20 159 lb 8 oz (72.3 kg)   10/28/19 157 lb 2 oz (71.3 kg)   04/12/19 150 lb (68 kg)     General:  Awake, alert, oriented in NAD  Skin:  Warm and dry. No unusual bruising or rash  Neck:  Supple. No JVD appreciated  Chest:  Normal effort. Clear to auscultation  Cardiovascular:  RRR, normal S1/S2, no murmur/gallop/rub  Abdomen:  Soft, nontender, +bowel sounds  Extremities:  No edema  Neurological: No focal deficits  Psychological: Normal mood and affect      Labs and Tests:  CBC:   Recent Labs     05/19/20  1210 05/21/20  1020   WBC 3.6* 3.6*   HGB 11.7* 11.5*    239     BMP:    Recent Labs     05/19/20  1356 05/21/20  1020    137   K 4.1 4.1    102   CO2 29 25   BUN <2* 3*   CREATININE 0.7* 0.7*   GLUCOSE 98 94     Hepatic:   No results for input(s): AST, ALT, ALB, BILITOT, ALKPHOS in the last 72 hours. COLONOSCOPY 5/13/2020:  Fecal impaction, in the rectum, sigmoid and descending colon. Few scattered stercoral type ulcers, biopsied. Attempt at colonic decompression was made. The patient is behaving as colonic inertia/slow transit constipation  PLAN:   Await biopsies. Clear liquid diet. Follow clinical response to colonic decompression. He will need outpatient colonoscopy to fully evaluate his colon. CT abdomen 5/10/2020:  Heavy stool burden within the colon.  Proximal to a focal area of extremely   dense stool possibly related to impaction is inflammatory changes involving   the colon over a moderate length segment compatible with underlying colitis.       Bilateral nonobstructing renal calculi.         KUB for NG placement 5/10/2020:  FINDINGS:   The nasogastric tube, including distal side port, is in the gastric fundus. Nonspecific bowel gas pattern is noted. Merleen Borden is no free air.  Lung bases   are clear. Xray abd 5/14/2020:   Moderate stool persists in the distal colon and rectum indicating   constipation.  Fecal impaction cannot be excluded with mild air-filled   distention of the more proximal colon. Barium enema 5/15/2020:  Unremarkable single contrast barium enema. KUB abdomen 5/17/2020:  Decreased stool load.  Retained water-soluble contrast is seen throughout the   colon.  Colon remains distended       Problem List  Active Problems:    Acute colitis  Resolved Problems:    * No resolved hospital problems. *       Assessment & Plan:   1. Intractable vomiting. Resolved. NG tube out 5/14. Tolerating liquid diet. 2. Constipation / fecal impaction. Severe with fecal impaction suspicious for colonic inertia. CT scan with heavy stool burden, possible underlying colitis. Colonoscopy (5/13) revealed fecal impaction, few scattered stercoral type ulcers, attempt made at disimpaction. KUB 5/17 with decreased stool load but colon remained distended. Underwent repeat colonoscopy 5/18 with stool impaction in descending colon. Colonoscopy today reveals continued fecal impaction. CT abdomen pending. GI and surgery on board. 3. Hypokalemia. Resolved. Potassium 4.1.    4. Hx seizure. Tolerating clear liquid diet, continue antiepileptics (dilantin, Keppra, and Lamictal). No seizure activity. 5. Abnormal UA. Small leukocytes, only 3 WBC on HPF. No dysuria. No culture or treatment indicated. 6. Skin lesion left side. Possible raised mole/cherry angioma. No further bleeding. Recommend excision as outpatient.        Diet: Dietary Nutrition Supplements: Clear Liquid Oral Supplement  DIET CLEAR LIQUID;  Code:Full Code  DVT PPX: on enoxaparin      JOHN Colin CNP   5/21/2020 12:59 PM

## 2020-05-21 NOTE — ANESTHESIA PRE PROCEDURE
Pre-Anesthesia Evaluation/Consultation       Name:  Christina Aj  : 1975  Age:  39 y.o.                                            MRN:  [id number]  Date: 2020           Surgeon: Surgeon(s):  Gaither Apley, MD    Procedure: Procedure(s):  COLONOSCOPY DIAGNOSTIC     No Known Allergies  Patient Active Problem List   Diagnosis    [facility]    Adhesive capsulitis of shoulder    Seborrheic dermatitis    History of leukemia    Balance disorder    Primary osteoarthritis involving multiple joints    Acute colitis     Past Medical History:   Diagnosis Date    Adhesive capsulitis of shoulder     Arthritis     Complex partial seizures with impaired consciousness at onset [facility])     Leukemia in remission ([facility]     Seborrheic dermatitis, unspecified     Seizures ([facility]     Last seizure 3/2018     Past Surgical History:   Procedure Laterality Date    COLONOSCOPY N/A 2020    COLONOSCOPY FLEXIBLE W/ DECOMPRESSION performed by Dilia Campa MD at [place] [address]  2020    COLONOSCOPY WITH BIOPSY performed by Dilia Campa MD at [place] [address]  2020    COLONOSCOPY DIAGNOSTIC performed by Gaither Apley, MD at [address] ARTHROSCOPY Right 2018    RIGHT SHOULDER ARTHROSCOPY SUBACROMIAL DECOMPRESSION, OPEN    UPPER GASTROINTESTINAL ENDOSCOPY N/A 2020    EGD BIOPSY performed by Gaither Apley, MD at [address] History     Tobacco Use    Smoking status: Never Smoker    Smokeless tobacco: Never Used   Substance Use Topics    Alcohol use: No    Drug use: No     Medications  Current Facility-Administered Medications on File Prior to Visit   Medication Dose Route Frequency Provider Last Rate Last Dose    polyethylene glycol (GLYCOLAX) packet 17 g  17 g Oral Q2H Gaither Apley, MD   17 g at 20 0141    pantoprazole (PROTONIX) tablet 40 mg  40 mg Oral QAM Caryle Shook, MD   40 mg at 05/20/20 3343    levETIRAcetam (KEPPRA) tablet 1,500 mg  1,500 mg Oral BID Jared Elizondo MD   1,500 mg at 05/20/20 2001    phenytoin (DILANTIN) ER capsule 100 mg  100 mg Oral Daily Jared Elizondo MD   100 mg at 05/20/20 0900    phenytoin (DILANTIN) ER capsule 200 mg  200 mg Oral Nightly Jared Elizondo MD   200 mg at 05/20/20 2001    naloxegol (MOVANTIK) tablet 25 mg  25 mg Oral QAM Jared Elizondo MD   25 mg at 05/20/20 0900    0.9 % sodium chloride infusion   Intravenous Continuous Jared Elizondo  mL/hr at 05/20/20 1519      0.9 % sodium chloride infusion   Intravenous Continuous Jared Elizondo  mL/hr at 05/15/20 2114      sodium chloride flush 0.9 % injection 10 mL  10 mL Intravenous 2 times per day Jared Elizondo MD   10 mL at 05/18/20 2022    sodium chloride flush 0.9 % injection 10 mL  10 mL Intravenous PRN Jared Elizondo MD        acetaminophen (TYLENOL) tablet 650 mg  650 mg Oral Q6H PRN Jared Elizondo MD   650 mg at 05/21/20 0128    Or    acetaminophen (TYLENOL) suppository 650 mg  650 mg Rectal Q6H PRN Jared Elizondo MD        enoxaparin (LOVENOX) injection 40 mg  40 mg Subcutaneous Nightly Jared Elizondo MD   40 mg at 05/15/20 2114    lamoTRIgine (LAMICTAL) tablet 175 mg  175 mg Oral BID Jared Elizondo MD   175 mg at 05/20/20 2001    ondansetron [facility]) injection 4 mg  4 mg Intravenous Q6H PRN Jared Elizondo MD   4 mg at 05/18/20 2020    promethazine (PHENERGAN) tablet 12.5 mg  12.5 mg Oral Q6H PRN Jared Elizondo MD   12.5 mg at 05/18/20 2020     Current Outpatient Medications on File Prior to Visit   Medication Sig Dispense Refill    betamethasone valerate (VALISONE) 0.1 % cream Apply topically 1 times daily.  30 g 5    tamsulosin (FLOMAX) 0.4 MG capsule Take 1 capsule by mouth daily 14 capsule 0    levETIRAcetam (KEPPRA) 500 MG tablet TAKE 3 TABLETS TWICE A  tablet 1    lamoTRIgine (LAMICTAL) 25 MG tablet Component Value Date     05/19/2020    K 4.1 05/19/2020     05/19/2020    CO2 29 05/19/2020    BUN <2 05/19/2020    CREATININE 0.7 05/19/2020    GFRAA >60 05/19/2020    GFRAA >60 02/04/2013    AGRATIO 1.0 05/14/2020    LABGLOM >60 05/19/2020    GLUCOSE 98 05/19/2020    PROT 6.6 05/14/2020    PROT 7.6 02/04/2013    CALCIUM 8.6 05/19/2020    BILITOT 0.4 05/14/2020    ALKPHOS 77 05/14/2020    AST 32 05/14/2020    ALT 25 05/14/2020     BMP    Lab Results   Component Value Date     05/19/2020    K 4.1 05/19/2020     05/19/2020    CO2 29 05/19/2020    BUN <2 05/19/2020    CREATININE 0.7 05/19/2020    CALCIUM 8.6 05/19/2020    GFRAA >60 05/19/2020    GFRAA >60 02/04/2013    LABGLOM >60 05/19/2020    GLUCOSE 98 05/19/2020     POCGlucose  Recent Labs     05/18/20  0727 05/19/20  1356   GLUCOSE 105* 98      Coags  No results found for: PROTIME, INR, APTT  HCG (If Applicable) No results found for: PREGTESTUR, PREGSERUM, HCG, HCGQUANT   ABGs No results found for: PHART, PO2ART, QCB3RKQ, DHY2FYO, BEART, X1QRKIAA   Type & Screen (If Applicable)  No results found for: LABABO, LABRH                         BMI: Wt Readings from Last 3 Encounters:       NPO Status:                          Anesthesia Evaluation  Patient summary reviewed and Nursing notes reviewed no history of anesthetic complications:   Airway: Mallampati: III  TM distance: >3 FB   Neck ROM: full  Mouth opening: > = 3 FB Dental:          Pulmonary:Negative Pulmonary ROS                              Cardiovascular:Negative CV ROS  Exercise tolerance: good (>4 METS),            Beta Blocker:  Not on Beta Blocker         Neuro/Psych:   (+) seizures: well controlled,             GI/Hepatic/Renal: Neg GI/Hepatic/Renal ROS            Endo/Other: Negative Endo/Other ROS   (+) Cancer:  h/o leukemia. .    Cancer:  h/o leukemia. Abdominal:           Vascular: negative vascular ROS.                                          Anesthesia

## 2020-05-21 NOTE — PROGRESS NOTES
Patient arrived from [facility] to pacu. On room air. NSR on the monitor.  VSS    Electronically signed by Xavi Belcher RN on 5/21/2020 at 5045

## 2020-05-21 NOTE — ADT AUTH CERT
Dose: 200 mg   Freq: NIGHTLY Route: PO      polyethylene glycol (GLYCOLAX) packet 17 g    Dose: 17 g   Freq: EVERY 2 HOURS Route: PO      0.9 % sodium chloride infusion    Rate: 100 mL/hr Freq: CONTINUOUS Route: IV      acetaminophen (TYLENOL) tablet 650 mg    Dose: 650 mg   Freq: EVERY 6 HOURS PRN Route: PO x 3

## 2020-05-22 ENCOUNTER — TELEPHONE (OUTPATIENT)
Dept: FAMILY MEDICINE CLINIC | Age: 45
End: 2020-05-22

## 2020-05-22 PROCEDURE — 99232 SBSQ HOSP IP/OBS MODERATE 35: CPT | Performed by: SURGERY

## 2020-05-22 PROCEDURE — 6370000000 HC RX 637 (ALT 250 FOR IP): Performed by: INTERNAL MEDICINE

## 2020-05-22 PROCEDURE — 1200000000 HC SEMI PRIVATE

## 2020-05-22 RX ADMIN — POLYETHYLENE GLYCOL 3350 17 G: 17 POWDER, FOR SOLUTION ORAL at 12:39

## 2020-05-22 RX ADMIN — POLYETHYLENE GLYCOL 3350 17 G: 17 POWDER, FOR SOLUTION ORAL at 04:10

## 2020-05-22 RX ADMIN — ACETAMINOPHEN 650 MG: 325 TABLET, FILM COATED ORAL at 14:23

## 2020-05-22 RX ADMIN — POLYETHYLENE GLYCOL 3350 17 G: 17 POWDER, FOR SOLUTION ORAL at 02:15

## 2020-05-22 RX ADMIN — POLYETHYLENE GLYCOL 3350 17 G: 17 POWDER, FOR SOLUTION ORAL at 06:25

## 2020-05-22 RX ADMIN — POLYETHYLENE GLYCOL 3350 17 G: 17 POWDER, FOR SOLUTION ORAL at 18:55

## 2020-05-22 RX ADMIN — LAMOTRIGINE 175 MG: 25 TABLET ORAL at 09:13

## 2020-05-22 RX ADMIN — POLYETHYLENE GLYCOL 3350 17 G: 17 POWDER, FOR SOLUTION ORAL at 21:47

## 2020-05-22 RX ADMIN — ACETAMINOPHEN 650 MG: 325 TABLET, FILM COATED ORAL at 21:47

## 2020-05-22 RX ADMIN — PHENYTOIN SODIUM 100 MG: 100 CAPSULE ORAL at 09:13

## 2020-05-22 RX ADMIN — POLYETHYLENE GLYCOL 3350 17 G: 17 POWDER, FOR SOLUTION ORAL at 19:51

## 2020-05-22 RX ADMIN — LEVETIRACETAM 1500 MG: 500 TABLET ORAL at 20:30

## 2020-05-22 RX ADMIN — PHENYTOIN SODIUM 200 MG: 100 CAPSULE ORAL at 20:30

## 2020-05-22 RX ADMIN — ACETAMINOPHEN 650 MG: 325 TABLET, FILM COATED ORAL at 04:49

## 2020-05-22 RX ADMIN — PANTOPRAZOLE SODIUM 40 MG: 40 TABLET, DELAYED RELEASE ORAL at 09:14

## 2020-05-22 RX ADMIN — POLYETHYLENE GLYCOL 3350 17 G: 17 POWDER, FOR SOLUTION ORAL at 00:30

## 2020-05-22 RX ADMIN — LAMOTRIGINE 175 MG: 25 TABLET ORAL at 20:30

## 2020-05-22 RX ADMIN — POLYETHYLENE GLYCOL 3350 17 G: 17 POWDER, FOR SOLUTION ORAL at 09:14

## 2020-05-22 RX ADMIN — NALOXEGOL OXALATE 25 MG: 25 TABLET, FILM COATED ORAL at 09:13

## 2020-05-22 RX ADMIN — POLYETHYLENE GLYCOL 3350 17 G: 17 POWDER, FOR SOLUTION ORAL at 14:25

## 2020-05-22 RX ADMIN — LEVETIRACETAM 1500 MG: 500 TABLET ORAL at 09:13

## 2020-05-22 ASSESSMENT — PAIN SCALES - WONG BAKER: WONGBAKER_NUMERICALRESPONSE: 0

## 2020-05-22 ASSESSMENT — PAIN SCALES - GENERAL
PAINLEVEL_OUTOF10: 3
PAINLEVEL_OUTOF10: 0
PAINLEVEL_OUTOF10: 3
PAINLEVEL_OUTOF10: 4
PAINLEVEL_OUTOF10: 0
PAINLEVEL_OUTOF10: 1
PAINLEVEL_OUTOF10: 0
PAINLEVEL_OUTOF10: 0

## 2020-05-22 NOTE — PLAN OF CARE
Nutrition Problem: Inadequate energy intake  Intervention: Food and/or Nutrient Delivery: Continue current diet, Continue current ONS  Nutritional Goals: adv to solid food within next 48 hr vs nutrition support

## 2020-05-22 NOTE — PROGRESS NOTES
[facility] GI  Gastroenterology Progress Note    Trina Victor is a 39 y.o. male patient. 1. Acute colitis    2. Acute abdominal pain    3. Constipation, unspecified constipation type    4. Failure of outpatient treatment        SUBJECTIVE:    Continues tolerate clears and miralax (drinks about half of each dose), states passing multiple watery stools. ROS:  Gastrointestinal ROS: see above      Physical    VITALS:  /73   Pulse 91   Temp 97.8 °F (36.6 °C) (Temporal)   Resp 16   Ht 6' (1.829 m)   Wt 159 lb 8 oz (72.3 kg)   SpO2 97%   BMI 21.63 kg/m²   TEMPERATURE:  Current - Temp: 97.8 °F (36.6 °C); Max - Temp  Av °F (36.7 °C)  Min: 97.8 °F (36.6 °C)  Max: 98.2 °F (36.8 °C)    NAD  RRR, Nl s1s2  Lungs CTA Bilaterally, normal effort  Abdomen stable exam, minimal rlq soreness but no significant tenderness or distension. AAOx3    Data      CBC:   Recent Labs     20  1020   WBC 3.6*   RBC 3.85*   HGB 11.5*   HCT 34.7*      MCV 90.1   MCH 29.9   MCHC 33.2   RDW 13.7        BMP:  Recent Labs     20  1020      K 4.1      CO2 25   BUN 3*   CREATININE 0.7*   CALCIUM 8.9   GLUCOSE 94        Hepatic Function Panel:   No results for input(s): AST, ALT, BILIDIR, BILITOT, ALKPHOS in the last 72 hours. No results for input(s): LIPASE, AMYLASE in the last 72 hours. No results for input(s): PROTIME, INR in the last 72 hours. No results for input(s): PTT in the last 72 hours. No results for input(s): OCCULTBLD in the last 72 hours. Radiology Review:    CT ABDOMEN PELVIS W IV CONTRAST Additional Contrast? Oral   Final Result   1. Large concretion of stool in the proximal sigmoid colon. This has   progressed distally, having previously been located in the mid descending   colon.   There is mild colonic wall thickening from the mid descending through   the proximal sigmoid compatible with colitis, presumably secondary to   compression or irritation of the colonic wall by the ulcer) and left sided solid stools. Repeat abdominal XR 5/14 revealed same findings on 5/12. This means the colonic decompression was unsuccessful. Limited success with soap suds enema 5/14. Hypaque enema 5/15 (read as normal colon) with passage of larger stools since enema. 5/16 kub stable distension but less stool. continues Tolerating clears and miralax (vomited golytely at admit so wants avoid) and passing voluminous liquid stools. EGD 5/18 mild patchy gastric erythema. Repeat colonoscopy 5/18 with stool impaction in descending colon. Appreciate surgery eval - continue conservative management. Repeat colonosocpy 5/21 with fecal impaction at 70 cm. Repeat ct scan 5/21 shows large fecal bolus has progressed distally from descending colon to proximal sigmoid colon. Continuing with miralax q2 hours (but only drinks about half). Passing watery stool.      PLAN    Full liquids  Continue miralax every 2 hours to continue clear colon  PPI     Discussed with Dr. Simon Viramontes, [address]    I have personally performed a face to face diagnostic evaluation on this patient. I have interviewed and examined the patient and I agree with the findings and recommended plan of care. In summary, my findings and plan are the following: large fecal bolus on yesterday ct is progressing through colon and rest of colon stool has cleared so progress being made. tolareing clears. abd soft/notender. will advance to full liqud diet and supplements and cont miralax as this is what patientn feels he tolerates the best. Concern that if don't complete the cleanout he will just develop rapid recurrent fecal impaction thorughtout colon. Once last of the stool cleared will need aggressive bowel regimen going forward to prevent recurrance.        Mook Gallardo MD  [address]

## 2020-05-23 LAB
ANION GAP SERPL CALCULATED.3IONS-SCNC: 9 MMOL/L (ref 3–16)
BUN BLDV-MCNC: 5 MG/DL (ref 7–20)
CALCIUM SERPL-MCNC: 9.4 MG/DL (ref 8.3–10.6)
CHLORIDE BLD-SCNC: 98 MMOL/L (ref 99–110)
CO2: 29 MMOL/L (ref 21–32)
CREAT SERPL-MCNC: 0.9 MG/DL (ref 0.9–1.3)
GFR AFRICAN AMERICAN: >60
GFR NON-AFRICAN AMERICAN: >60
GLUCOSE BLD-MCNC: 109 MG/DL (ref 70–99)
POTASSIUM SERPL-SCNC: 4.4 MMOL/L (ref 3.5–5.1)
SODIUM BLD-SCNC: 136 MMOL/L (ref 136–145)

## 2020-05-23 PROCEDURE — APPSS15 APP SPLIT SHARED TIME 0-15 MINUTES: Performed by: NURSE PRACTITIONER

## 2020-05-23 PROCEDURE — 1200000000 HC SEMI PRIVATE

## 2020-05-23 PROCEDURE — 6370000000 HC RX 637 (ALT 250 FOR IP): Performed by: INTERNAL MEDICINE

## 2020-05-23 PROCEDURE — APPNB30 APP NON BILLABLE TIME 0-30 MINS: Performed by: NURSE PRACTITIONER

## 2020-05-23 PROCEDURE — 99232 SBSQ HOSP IP/OBS MODERATE 35: CPT | Performed by: SURGERY

## 2020-05-23 PROCEDURE — 80048 BASIC METABOLIC PNL TOTAL CA: CPT

## 2020-05-23 PROCEDURE — 94760 N-INVAS EAR/PLS OXIMETRY 1: CPT

## 2020-05-23 PROCEDURE — 36415 COLL VENOUS BLD VENIPUNCTURE: CPT

## 2020-05-23 RX ORDER — PEG-3350, SODIUM SULFATE, SODIUM CHLORIDE, POTASSIUM CHLORIDE, SODIUM ASCORBATE AND ASCORBIC ACID 7.5-2.691G
100 KIT ORAL ONCE
Status: COMPLETED | OUTPATIENT
Start: 2020-05-23 | End: 2020-05-23

## 2020-05-23 RX ADMIN — POLYETHYLENE GLYCOL 3350, SODIUM SULFATE, SODIUM CHLORIDE, POTASSIUM CHLORIDE, ASCORBIC ACID, SODIUM ASCORBATE 100 G: KIT at 14:30

## 2020-05-23 RX ADMIN — LEVETIRACETAM 1500 MG: 500 TABLET ORAL at 23:28

## 2020-05-23 RX ADMIN — ACETAMINOPHEN 650 MG: 325 TABLET, FILM COATED ORAL at 19:33

## 2020-05-23 RX ADMIN — PHENYTOIN SODIUM 100 MG: 100 CAPSULE ORAL at 09:54

## 2020-05-23 RX ADMIN — NALOXEGOL OXALATE 25 MG: 25 TABLET, FILM COATED ORAL at 09:55

## 2020-05-23 RX ADMIN — POLYETHYLENE GLYCOL 3350, SODIUM SULFATE, SODIUM CHLORIDE, POTASSIUM CHLORIDE, ASCORBIC ACID, SODIUM ASCORBATE 100 G: KIT at 11:57

## 2020-05-23 RX ADMIN — PHENYTOIN SODIUM 200 MG: 100 CAPSULE ORAL at 23:28

## 2020-05-23 RX ADMIN — POLYETHYLENE GLYCOL 3350 17 G: 17 POWDER, FOR SOLUTION ORAL at 08:20

## 2020-05-23 RX ADMIN — LEVETIRACETAM 1500 MG: 500 TABLET ORAL at 09:53

## 2020-05-23 RX ADMIN — POLYETHYLENE GLYCOL 3350 17 G: 17 POWDER, FOR SOLUTION ORAL at 04:25

## 2020-05-23 RX ADMIN — POLYETHYLENE GLYCOL 3350 17 G: 17 POWDER, FOR SOLUTION ORAL at 09:58

## 2020-05-23 RX ADMIN — LAMOTRIGINE 175 MG: 25 TABLET ORAL at 23:28

## 2020-05-23 RX ADMIN — LAMOTRIGINE 175 MG: 25 TABLET ORAL at 09:53

## 2020-05-23 RX ADMIN — PANTOPRAZOLE SODIUM 40 MG: 40 TABLET, DELAYED RELEASE ORAL at 05:46

## 2020-05-23 ASSESSMENT — PAIN SCALES - GENERAL
PAINLEVEL_OUTOF10: 0
PAINLEVEL_OUTOF10: 2

## 2020-05-23 NOTE — PROGRESS NOTES
Dr Flores Raz in to see patient at this time. Will plan to try 2 liters of Moviprep to help with stool elimination and hold off on the Miralax for now. Pt agreeable to try Moviprep.

## 2020-05-23 NOTE — PROGRESS NOTES
levETIRAcetam  1,500 mg Oral BID    phenytoin  100 mg Oral Daily    phenytoin  200 mg Oral Nightly    naloxegol  25 mg Oral QAM    sodium chloride flush  10 mL Intravenous 2 times per day    enoxaparin  40 mg Subcutaneous Nightly    lamoTRIgine  175 mg Oral BID     Continuous Infusions:   sodium chloride 100 mL/hr at 05/21/20 1614    sodium chloride Stopped (05/21/20 0852)     PRN Meds:.sodium chloride flush, acetaminophen **OR** acetaminophen, ondansetron, promethazine      Assessment:  39 y.o. male admitted with   1. Acute colitis    2. Acute abdominal pain    3. Constipation, unspecified constipation type    4. Failure of outpatient treatment        Fecal impaction       Plan:  1. Continue bowel regimen per GI, passing loose stool, stable abdominal exam with overall gradual improvement; no plans for surgical intervention at this time  2. Clear diet as tolerated  3. IV hydration; monitor and correct electrolytes  4. Encouraged activity as tolerated, ambulate TID, up to chair for all meals  5. PRN analgesics and antiemetics--no narcotics  6. DVT prophylaxis with Lovenox  7. Management of medical comorbid etiologies per primary team and consulting services    EDUCATION:  Educated patient on plan of care and disease process--all questions answered. Plans discussed with patient and nursing. Reviewed and discussed with Dr. Agustina Morejon. Signed:  JOHN Almodovar CNP  5/23/2020 11:46 AM     Surg Staff:   Pt seen and examined with NP  See full note above  Pt in good spirits, has had mild pain only, abd is soft. Has had additional liquid BM, but as far as I can tell the large stool mass in the sigmoid has not passed.  Addn prep / laxatives ordered which are hopefully going to be helpful  Think we should make sure stool ball mass is passed or dissolved into liquid prior to discharge or pt will have a large bolus and nidus in the colon to reaccumulate a whole column of solid stool behind again with

## 2020-05-23 NOTE — PLAN OF CARE
Problem: Pain:  Goal: Pain level will decrease  Description: Pain level will decrease  Outcome: Ongoing     Problem:  Bowel/Gastric:  Goal: Bowel function will improve  Description: Bowel function will improve  Outcome: Ongoing     Problem: Fluid Volume:  Goal: Maintenance of adequate hydration will improve  Description: Maintenance of adequate hydration will improve  Outcome: Ongoing

## 2020-05-23 NOTE — PROGRESS NOTES
Ht 6' (1.829 m)   Wt 159 lb 3.2 oz (72.2 kg)   SpO2 98%   BMI 21.59 kg/m²     Intake/Output Summary (Last 24 hours) at 5/23/2020 1155  Last data filed at 5/22/2020 1449  Gross per 24 hour   Intake 220 ml   Output --   Net 220 ml      Wt Readings from Last 3 Encounters:   05/23/20 159 lb 3.2 oz (72.2 kg)   10/28/19 157 lb 2 oz (71.3 kg)   04/12/19 150 lb (68 kg)     General:  Awake, alert, oriented in NAD  Skin:  Warm and dry. No unusual bruising or rash  Neck:  Supple. No JVD appreciated  Chest:  Normal effort. Clear to auscultation  Cardiovascular:  RRR, normal S1/S2, no murmur/gallop/rub  Abdomen:  Soft, nontender, +bowel sounds  Extremities:  No edema  Neurological: No focal deficits  Psychological: Normal mood and affect      Labs and Tests:  CBC:   Recent Labs     05/21/20  1020   WBC 3.6*   HGB 11.5*        BMP:    Recent Labs     05/21/20  1020      K 4.1      CO2 25   BUN 3*   CREATININE 0.7*   GLUCOSE 94     Hepatic:   No results for input(s): AST, ALT, ALB, BILITOT, ALKPHOS in the last 72 hours. COLONOSCOPY 5/13/2020:  Fecal impaction, in the rectum, sigmoid and descending colon. Few scattered stercoral type ulcers, biopsied. Attempt at colonic decompression was made. The patient is behaving as colonic inertia/slow transit constipation  PLAN:   Await biopsies. Clear liquid diet. Follow clinical response to colonic decompression. He will need outpatient colonoscopy to fully evaluate his colon. CT abdomen 5/10/2020:  Heavy stool burden within the colon.  Proximal to a focal area of extremely   dense stool possibly related to impaction is inflammatory changes involving   the colon over a moderate length segment compatible with underlying colitis.       Bilateral nonobstructing renal calculi.         KUB for NG placement 5/10/2020:  FINDINGS:   The nasogastric tube, including distal side port, is in the gastric fundus.    Nonspecific bowel gas pattern is noted. Evita Rafiq is no free air.  Lung bases   are clear. Xray abd 5/14/2020: Moderate stool persists in the distal colon and rectum indicating   constipation.  Fecal impaction cannot be excluded with mild air-filled   distention of the more proximal colon. Barium enema 5/15/2020:  Unremarkable single contrast barium enema. KUB abdomen 5/17/2020:  Decreased stool load.  Retained water-soluble contrast is seen throughout the   colon.  Colon remains distended       Problem List  Active Problems:    Acute colitis  Resolved Problems:    * No resolved hospital problems. *       Assessment & Plan:   1. Intractable vomiting. Resolved. NG tube out 5/14. Tolerating liquid diet. 2. Constipation / fecal impaction. Severe with fecal impaction suspicious for colonic inertia. CT scan with heavy stool burden, possible underlying colitis. Colonoscopy (5/13) revealed fecal impaction, few scattered stercoral type ulcers, attempt made at disimpaction. KUB 5/17 with decreased stool load but colon remained distended. Underwent repeat colonoscopy 5/18 with stool impaction in descending colon. Colonoscopy today reveals continued fecal impaction. CT abdomen suggests some distal movement. GI and surgery on board. Discussed with surgery and GI. Try movantik today. If passing stool could advance diet and dc. 3. Hypokalemia. Resolved. 4. Hx seizure. Tolerating clear liquid diet, continue antiepileptics (dilantin, Keppra, and Lamictal). No seizure activity.         Diet: Dietary Nutrition Supplements: Clear Liquid Oral Supplement  DIET FULL LIQUID;  Code:Full Code  DVT PPX: enoxaparin      Mehrdad Mcknight PA-C   5/23/2020 11:55 AM

## 2020-05-24 ENCOUNTER — APPOINTMENT (OUTPATIENT)
Dept: CT IMAGING | Age: 45
DRG: 330 | End: 2020-05-24
Payer: MEDICARE

## 2020-05-24 PROCEDURE — 6370000000 HC RX 637 (ALT 250 FOR IP): Performed by: INTERNAL MEDICINE

## 2020-05-24 PROCEDURE — 2580000003 HC RX 258: Performed by: INTERNAL MEDICINE

## 2020-05-24 PROCEDURE — 99231 SBSQ HOSP IP/OBS SF/LOW 25: CPT | Performed by: SURGERY

## 2020-05-24 PROCEDURE — 74176 CT ABD & PELVIS W/O CONTRAST: CPT

## 2020-05-24 PROCEDURE — 6360000002 HC RX W HCPCS: Performed by: INTERNAL MEDICINE

## 2020-05-24 PROCEDURE — APPNB30 APP NON BILLABLE TIME 0-30 MINS: Performed by: NURSE PRACTITIONER

## 2020-05-24 PROCEDURE — 1200000000 HC SEMI PRIVATE

## 2020-05-24 PROCEDURE — APPSS15 APP SPLIT SHARED TIME 0-15 MINUTES: Performed by: NURSE PRACTITIONER

## 2020-05-24 RX ADMIN — LAMOTRIGINE 175 MG: 25 TABLET ORAL at 21:29

## 2020-05-24 RX ADMIN — LAMOTRIGINE 175 MG: 25 TABLET ORAL at 09:26

## 2020-05-24 RX ADMIN — PANTOPRAZOLE SODIUM 40 MG: 40 TABLET, DELAYED RELEASE ORAL at 05:29

## 2020-05-24 RX ADMIN — ACETAMINOPHEN 650 MG: 325 TABLET, FILM COATED ORAL at 05:32

## 2020-05-24 RX ADMIN — POLYETHYLENE GLYCOL 3350 17 G: 17 POWDER, FOR SOLUTION ORAL at 09:27

## 2020-05-24 RX ADMIN — LEVETIRACETAM 1500 MG: 500 TABLET ORAL at 21:29

## 2020-05-24 RX ADMIN — PHENYTOIN SODIUM 200 MG: 100 CAPSULE ORAL at 21:29

## 2020-05-24 RX ADMIN — POLYETHYLENE GLYCOL 3350 17 G: 17 POWDER, FOR SOLUTION ORAL at 19:39

## 2020-05-24 RX ADMIN — POLYETHYLENE GLYCOL 3350 17 G: 17 POWDER, FOR SOLUTION ORAL at 21:28

## 2020-05-24 RX ADMIN — Medication 10 ML: at 09:27

## 2020-05-24 RX ADMIN — LEVETIRACETAM 1500 MG: 500 TABLET ORAL at 10:17

## 2020-05-24 RX ADMIN — POLYETHYLENE GLYCOL 3350 17 G: 17 POWDER, FOR SOLUTION ORAL at 17:48

## 2020-05-24 RX ADMIN — NALOXEGOL OXALATE 25 MG: 25 TABLET, FILM COATED ORAL at 09:26

## 2020-05-24 RX ADMIN — ACETAMINOPHEN 650 MG: 325 TABLET, FILM COATED ORAL at 18:30

## 2020-05-24 RX ADMIN — PHENYTOIN SODIUM 100 MG: 100 CAPSULE ORAL at 09:27

## 2020-05-24 ASSESSMENT — PAIN SCALES - GENERAL
PAINLEVEL_OUTOF10: 0
PAINLEVEL_OUTOF10: 3
PAINLEVEL_OUTOF10: 0
PAINLEVEL_OUTOF10: 1
PAINLEVEL_OUTOF10: 3
PAINLEVEL_OUTOF10: 0

## 2020-05-24 ASSESSMENT — PAIN DESCRIPTION - DESCRIPTORS: DESCRIPTORS: DISCOMFORT;DULL

## 2020-05-24 ASSESSMENT — PAIN DESCRIPTION - PAIN TYPE: TYPE: ACUTE PAIN

## 2020-05-24 ASSESSMENT — PAIN DESCRIPTION - LOCATION: LOCATION: ABDOMEN

## 2020-05-24 NOTE — PROGRESS NOTES
[facility]    5/24/2020 12:17 PM        Name: Oswaldo Powers . Admitted: 5/10/2020  Primary Care Provider: Bubba Montero MD (Tel: [phone])      Subjective:  Patient is a 40 yo male with hx seizure disorder, childhood leukemia in remission, RA. He presented to hospital with n/v, abdominal pain. Found to have evidence of colitis and constipation on CT scan. Had 3rd Colonoscopy this admission yesterday. Continues to reveal fecal impaction. Patient is frustrated as he feels no progress has been made. CT of abdomen suggested some distal movement of impaction . Continues to tolerate clear liquid diet, no nausea, or vomiting. Reports intermittent cramping pain in lower abdomen. He cannot tolerate frequent miralax. He seems in better spirits today. He is going to try movantik.      Reviewed interval ancillary notes    Current Medications  polyethylene glycol (GLYCOLAX) packet 17 g, Q2H  pantoprazole (PROTONIX) tablet 40 mg, QAM AC  levETIRAcetam (KEPPRA) tablet 1,500 mg, BID  phenytoin (DILANTIN) ER capsule 100 mg, Daily  phenytoin (DILANTIN) ER capsule 200 mg, Nightly  naloxegol (MOVANTIK) tablet 25 mg, QAM  0.9 % sodium chloride infusion, Continuous  0.9 % sodium chloride infusion, Continuous  sodium chloride flush 0.9 % injection 10 mL, 2 times per day  sodium chloride flush 0.9 % injection 10 mL, PRN  acetaminophen (TYLENOL) tablet 650 mg, Q6H PRN    Or  acetaminophen (TYLENOL) suppository 650 mg, Q6H PRN  enoxaparin (LOVENOX) injection 40 mg, Nightly  lamoTRIgine (LAMICTAL) tablet 175 mg, BID  ondansetron (ZOFRAN) injection 4 mg, Q6H PRN  promethazine (PHENERGAN) tablet 12.5 mg, Q6H PRN      Objective:  /80   Pulse 100   Temp 98 °F (36.7 °C) (Oral)   Resp 16   Ht 6' (1.829 m)   Wt 159 lb 3.2 oz (72.2 kg)   SpO2 97%   BMI 21.59 kg/m²     Intake/Output Summary (Last 24 hours) at 5/24/2020 1217  Last data filed at 5/24/2020 1018  Gross per 24 hour   Intake 720 ml   Output --   Net 720 ml      Wt Readings from Last 3 Encounters:   05/23/20 159 lb 3.2 oz (72.2 kg)   10/28/19 157 lb 2 oz (71.3 kg)   04/12/19 150 lb (68 kg)     General:  Awake, alert, oriented in NAD  Skin:  Warm and dry. No unusual bruising or rash  Neck:  Supple. No JVD appreciated  Chest:  Normal effort. Clear to auscultation  Cardiovascular:  RRR, normal S1/S2, no murmur/gallop/rub  Abdomen:  Soft, nontender, +bowel sounds  Extremities:  No edema  Neurological: No focal deficits  Psychological: Normal mood and affect      Labs and Tests:  CBC:   No results for input(s): WBC, HGB, PLT in the last 72 hours. BMP:    Recent Labs     05/23/20  1458      K 4.4   CL 98*   CO2 29   BUN 5*   CREATININE 0.9   GLUCOSE 109*     Hepatic:   No results for input(s): AST, ALT, ALB, BILITOT, ALKPHOS in the last 72 hours. COLONOSCOPY 5/13/2020:  Fecal impaction, in the rectum, sigmoid and descending colon. Few scattered stercoral type ulcers, biopsied. Attempt at colonic decompression was made. The patient is behaving as colonic inertia/slow transit constipation  PLAN:   Await biopsies. Clear liquid diet. Follow clinical response to colonic decompression. He will need outpatient colonoscopy to fully evaluate his colon. CT abdomen 5/10/2020:  Heavy stool burden within the colon.  Proximal to a focal area of extremely   dense stool possibly related to impaction is inflammatory changes involving   the colon over a moderate length segment compatible with underlying colitis.       Bilateral nonobstructing renal calculi.         KUB for NG placement 5/10/2020:  FINDINGS:   The nasogastric tube, including distal side port, is in the gastric fundus. Nonspecific bowel gas pattern is noted. Evita Rafiq is no free air.  Lung bases   are clear. Xray abd 5/14/2020:   Moderate stool persists in the distal colon and rectum indicating

## 2020-05-24 NOTE — PROGRESS NOTES
1000 ml soap suds enema given MO at this time as ordered. Pt able to retain solution for only a few minutes and returned large amount of clear fluids with a slight yellowish brown tint to it. No hard or formed stool noted. Pt tolerated procedure well. Will continue to monitor.

## 2020-05-24 NOTE — PROGRESS NOTES
for: PROTIME, INR, APTT    Cultures:  Anaerobic culture  No results found for: LABANAE  Fungus stain  No results found for requested labs within last 30 days. Gram stain  No results found for requested labs within last 30 days. Organism  No results found for: [facility]  Surgical culture  No results found for: CXSURG  Blood culture 1  No results found for requested labs within last 30 days. Blood culture 2  No results found for requested labs within last 30 days. Fecal occult  No results found for requested labs within last 30 days. GI bacterial pathogens by PCR  No results found for requested labs within last 30 days. C. difficile  No results found for requested labs within last 30 days. Urine culture  Lab Results   Component Value Date    LABURIN  04/12/2019     <50,000 CFU/ml mixed skin/urogenital ruth. No further workup       Pathology:  No relevant pathology     Imaging:  I have personally reviewed the following films:    No results found. Scheduled Meds:   polyethylene glycol  17 g Oral Q2H    pantoprazole  40 mg Oral QAM AC    levETIRAcetam  1,500 mg Oral BID    phenytoin  100 mg Oral Daily    phenytoin  200 mg Oral Nightly    naloxegol  25 mg Oral QAM    sodium chloride flush  10 mL Intravenous 2 times per day    enoxaparin  40 mg Subcutaneous Nightly    lamoTRIgine  175 mg Oral BID     Continuous Infusions:   sodium chloride 100 mL/hr at 05/21/20 1614    sodium chloride Stopped (05/21/20 0852)     PRN Meds:.sodium chloride flush, acetaminophen **OR** acetaminophen, ondansetron, promethazine      Assessment:  39 y.o. male admitted with   1. Acute colitis    2. Acute abdominal pain    3. Constipation, unspecified constipation type    4. Failure of outpatient treatment        Fecal impaction       Plan:  1.  Continue bowel regimen per GI, passing loose stool, stable abdominal exam with overall gradual improvement; no plans for surgical intervention at this time, may need to

## 2020-05-24 NOTE — PROGRESS NOTES
Shift assessment completed and documented at this time. Resting quietly in bed, resps easy and unlabored. Denies c/o pain or nausea this AM. Does reports he had some acid reflux at times but did receive Protonix earlier this morning. Requests to sleep in \"just a little longer. \" The care plan and education has been reviewed and mutually agreed upon with the patient. Call light in reach. Denies needs at this time. Will continue to monitor.

## 2020-05-25 PROCEDURE — 6360000002 HC RX W HCPCS: Performed by: SURGERY

## 2020-05-25 PROCEDURE — 6370000000 HC RX 637 (ALT 250 FOR IP): Performed by: SURGERY

## 2020-05-25 PROCEDURE — 2580000003 HC RX 258: Performed by: INTERNAL MEDICINE

## 2020-05-25 PROCEDURE — 6370000000 HC RX 637 (ALT 250 FOR IP): Performed by: INTERNAL MEDICINE

## 2020-05-25 PROCEDURE — 99232 SBSQ HOSP IP/OBS MODERATE 35: CPT | Performed by: SURGERY

## 2020-05-25 PROCEDURE — 6360000002 HC RX W HCPCS: Performed by: INTERNAL MEDICINE

## 2020-05-25 PROCEDURE — 1200000000 HC SEMI PRIVATE

## 2020-05-25 RX ORDER — POLYETHYLENE GLYCOL 3350 17 G/17G
17 POWDER, FOR SOLUTION ORAL EVERY 6 HOURS
Status: DISCONTINUED | OUTPATIENT
Start: 2020-05-25 | End: 2020-05-26

## 2020-05-25 RX ORDER — METOCLOPRAMIDE HYDROCHLORIDE 5 MG/ML
10 INJECTION INTRAMUSCULAR; INTRAVENOUS EVERY 6 HOURS
Status: COMPLETED | OUTPATIENT
Start: 2020-05-25 | End: 2020-05-25

## 2020-05-25 RX ADMIN — ACETAMINOPHEN 650 MG: 325 TABLET, FILM COATED ORAL at 00:25

## 2020-05-25 RX ADMIN — NALOXEGOL OXALATE 25 MG: 25 TABLET, FILM COATED ORAL at 08:28

## 2020-05-25 RX ADMIN — LAMOTRIGINE 175 MG: 25 TABLET ORAL at 19:54

## 2020-05-25 RX ADMIN — POLYETHYLENE GLYCOL 3350 17 G: 17 POWDER, FOR SOLUTION ORAL at 06:00

## 2020-05-25 RX ADMIN — LEVETIRACETAM 1500 MG: 500 TABLET ORAL at 19:55

## 2020-05-25 RX ADMIN — LEVETIRACETAM 1500 MG: 500 TABLET ORAL at 08:29

## 2020-05-25 RX ADMIN — PHENYTOIN SODIUM 100 MG: 100 CAPSULE ORAL at 08:29

## 2020-05-25 RX ADMIN — POLYETHYLENE GLYCOL 3350 17 G: 17 POWDER, FOR SOLUTION ORAL at 09:34

## 2020-05-25 RX ADMIN — Medication 10 ML: at 19:54

## 2020-05-25 RX ADMIN — PROMETHAZINE HYDROCHLORIDE 12.5 MG: 25 TABLET ORAL at 12:41

## 2020-05-25 RX ADMIN — POLYETHYLENE GLYCOL 3350 17 G: 17 POWDER, FOR SOLUTION ORAL at 02:00

## 2020-05-25 RX ADMIN — ACETAMINOPHEN 650 MG: 325 TABLET, FILM COATED ORAL at 09:34

## 2020-05-25 RX ADMIN — PHENYTOIN SODIUM 200 MG: 100 CAPSULE ORAL at 19:55

## 2020-05-25 RX ADMIN — PANTOPRAZOLE SODIUM 40 MG: 40 TABLET, DELAYED RELEASE ORAL at 08:29

## 2020-05-25 RX ADMIN — POLYETHYLENE GLYCOL 3350 17 G: 17 POWDER, FOR SOLUTION ORAL at 03:23

## 2020-05-25 RX ADMIN — POLYETHYLENE GLYCOL-3350 AND ELECTROLYTES 2000 ML: 236; 6.74; 5.86; 2.97; 22.74 POWDER, FOR SOLUTION ORAL at 12:01

## 2020-05-25 RX ADMIN — MAGNESIUM CITRATE 296 ML: 1.75 LIQUID ORAL at 16:05

## 2020-05-25 RX ADMIN — LAMOTRIGINE 175 MG: 25 TABLET ORAL at 08:33

## 2020-05-25 RX ADMIN — POLYETHYLENE GLYCOL 3350 17 G: 17 POWDER, FOR SOLUTION ORAL at 11:36

## 2020-05-25 RX ADMIN — POLYETHYLENE GLYCOL 3350 17 G: 17 POWDER, FOR SOLUTION ORAL at 00:26

## 2020-05-25 RX ADMIN — POLYETHYLENE GLYCOL 3350 17 G: 17 POWDER, FOR SOLUTION ORAL at 19:56

## 2020-05-25 RX ADMIN — ACETAMINOPHEN 650 MG: 325 TABLET, FILM COATED ORAL at 19:55

## 2020-05-25 RX ADMIN — METOCLOPRAMIDE HYDROCHLORIDE 10 MG: 5 INJECTION INTRAMUSCULAR; INTRAVENOUS at 13:59

## 2020-05-25 RX ADMIN — ONDANSETRON 4 MG: 2 INJECTION INTRAMUSCULAR; INTRAVENOUS at 13:59

## 2020-05-25 RX ADMIN — POLYETHYLENE GLYCOL 3350 17 G: 17 POWDER, FOR SOLUTION ORAL at 13:59

## 2020-05-25 RX ADMIN — POLYETHYLENE GLYCOL 3350 17 G: 17 POWDER, FOR SOLUTION ORAL at 08:28

## 2020-05-25 RX ADMIN — METOCLOPRAMIDE HYDROCHLORIDE 10 MG: 5 INJECTION INTRAMUSCULAR; INTRAVENOUS at 19:54

## 2020-05-25 ASSESSMENT — PAIN DESCRIPTION - LOCATION
LOCATION: ABDOMEN
LOCATION: ABDOMEN

## 2020-05-25 ASSESSMENT — PAIN SCALES - WONG BAKER
WONGBAKER_NUMERICALRESPONSE: 0

## 2020-05-25 ASSESSMENT — PAIN DESCRIPTION - DESCRIPTORS: DESCRIPTORS: DISCOMFORT

## 2020-05-25 ASSESSMENT — PAIN DESCRIPTION - PAIN TYPE
TYPE: ACUTE PAIN
TYPE: ACUTE PAIN

## 2020-05-25 ASSESSMENT — PAIN DESCRIPTION - PROGRESSION
CLINICAL_PROGRESSION: GRADUALLY IMPROVING
CLINICAL_PROGRESSION: NOT CHANGED
CLINICAL_PROGRESSION: GRADUALLY IMPROVING
CLINICAL_PROGRESSION: GRADUALLY IMPROVING

## 2020-05-25 ASSESSMENT — PAIN SCALES - GENERAL
PAINLEVEL_OUTOF10: 3
PAINLEVEL_OUTOF10: 0
PAINLEVEL_OUTOF10: 4
PAINLEVEL_OUTOF10: 0
PAINLEVEL_OUTOF10: 2
PAINLEVEL_OUTOF10: 3
PAINLEVEL_OUTOF10: 5

## 2020-05-25 ASSESSMENT — PAIN DESCRIPTION - ORIENTATION: ORIENTATION: MID;LOWER

## 2020-05-25 ASSESSMENT — PAIN DESCRIPTION - ONSET: ONSET: ON-GOING

## 2020-05-25 ASSESSMENT — PAIN DESCRIPTION - FREQUENCY: FREQUENCY: CONTINUOUS

## 2020-05-25 NOTE — PROGRESS NOTES
[address] filed at 5/25/2020 0324  Gross per 24 hour   Intake 2450 ml   Output --   Net 2450 ml      Wt Readings from Last 3 Encounters:   05/23/20 159 lb 3.2 oz (72.2 kg)   10/28/19 157 lb 2 oz (71.3 kg)   04/12/19 150 lb (68 kg)     General:  Awake, alert, oriented in NAD  Skin:  Warm and dry. No unusual bruising or rash  Neck:  Supple. No JVD apreciated  Chest:  Normal effort. Clear to auscultation  Cardiovascular:  RRR, normal S1/S2, no murmur/gallop/rub  Abdomen:  Soft, nontender, +bowel sounds  Extremities:  No edema  Neurological: No focal deficits  Psychological: Normal mood and affect    Labs and Tests:  CBC:   No results for input(s): WBC, HGB, PLT in the last 72 hours. BMP:    Recent Labs     05/23/20  1458      K 4.4   CL 98*   CO2 29   BUN 5*   CREATININE 0.9   GLUCOSE 109*     Hepatic:   No results for input(s): AST, ALT, ALB, BILITOT, ALKPHOS in the last 72 hours. COLONOSCOPY 5/13/2020:  Fecal impaction, in the rectum, sigmoid and descending colon. Few scattered stercoral type ulcers, biopsied. Attempt at colonic decompression was made. The patient is behaving as colonic inertia/slow transit constipation    EGD 5/18/2020:  Mild patchy erythema, biopsies taken  Colonoscopy 5/18/2020:  Large stool impaction remains at 65 cm from anal verge, scant ulcers of mucosa around stool impaction but no signs ischemia. Attempt made to loosen but no success. Colonoscopy 5/21/2020:  Fecal impaction still present at 70 cm from anal verge. Attempt made to loosen and snare unsuccessful, scope unable to pass. CT abdomen 5/10/2020:  Heavy stool burden within the colon.  Proximal to a focal area of extremely   dense stool possibly related to impaction is inflammatory changes involving   the colon over a moderate length segment compatible with underlying colitis.       Bilateral nonobstructing renal calculi.         CT abdomen 5/21/2020:  Large concretion of stool in the proximal sigmoid colon. impacted stool. GI and surgery on board. Continuing laxatives and enemas, if no results, may need surgery. 3. Hx seizure. Tolerating clear liquid diet, continue antiepileptics (dilantin, Keppra, and Lamictal). No seizure activity. 4. Skin lesion left side. Possible raised mole/cherry angioma. No further bleeding. Recommend excision as outpatient.        Diet: Dietary Nutrition Supplements: Clear Liquid Oral Supplement  DIET FULL LIQUID;  Code:Full Code  DVT PPX: on enoxaparin      JOHN Montilla CNP   5/25/2020 12:28 PM

## 2020-05-25 NOTE — PROGRESS NOTES
AM assessment complete. Pt c/o constipation, medications per orders, will monitor. Bowel sounds present. No distension, ABD soft, will monitor. The care plan and education has been reviewed and mutually agreed upon with the patient. Pt denies any further questions or concerns at this time, call light w/in reach.  Corinne Santa RN

## 2020-05-25 NOTE — PROGRESS NOTES
Bilateral nephrolithiasis without hydronephrosis or ureter calculus. 3. Bilateral medullary calcinosis can be seen with medullary sponge kidney. CT ABDOMEN PELVIS W IV CONTRAST Additional Contrast? Oral   Final Result   1. Large concretion of stool in the proximal sigmoid colon. This has   progressed distally, having previously been located in the mid descending   colon. There is mild colonic wall thickening from the mid descending through   the proximal sigmoid compatible with colitis, presumably secondary to   compression or irritation of the colonic wall by the stool concretion. The   remainder of the colon is devoid of stool and contains fluid likely related   to bowel cleansing preparation   2. Bilateral nephrolithiasis         XR ABDOMEN (KUB) (SINGLE AP VIEW)   Final Result   Decreased stool load. Retained water-soluble contrast is seen throughout the   colon. Colon remains distended         FL BARIUM ENEMA   Final Result   Unremarkable single contrast barium enema. XR ABDOMEN (2 VIEWS)   Final Result   Moderate stool persists in the distal colon and rectum indicating   constipation. Fecal impaction cannot be excluded with mild air-filled   distention of the more proximal colon. XR ABDOMEN (2 VIEWS)   Final Result   Increased gaseous distention of the transverse colon. Moderate stool in descending colon         XR ABDOMEN (2 VIEWS)   Final Result   1. Nonspecific, though favored to be nonobstructive bowel gas pattern,   without evidence of free air. There is a large fecal load within the left   hemicolon. 2. NG tube within the stomach. 3. Bilateral nephrolithiasis. XR ABDOMEN FOR NG/OG/NE TUBE PLACEMENT   Final Result   Nasogastric tube projects to the proximal stomach. XR ABDOMEN (KUB) (SINGLE AP VIEW)   Final Result   NG tube placement.          CT ABDOMEN PELVIS W IV CONTRAST Additional Contrast? None   Final Result   Heavy stool burden within the

## 2020-05-25 NOTE — PROGRESS NOTES
Pt states nausea is improving, has not been able to attempt more golytely, and pt states he does not think he will be able to drink it. Call placed to Dr. Blakely Just to notify, no new orders obtained at this time.  Carmen Hanna RN

## 2020-05-25 NOTE — PROGRESS NOTES
Pt does not think he will be able to tolerate goLYTELY, c/o nausea, oral phenergan per orders, will monitor. Dr. Mary Kaplan notified, no new orders obtained at this time.  Katty Combs RN

## 2020-05-25 NOTE — PROGRESS NOTES
found for: PROTIME, INR, APTT    Cultures:  Anaerobic culture  No results found for: LABANAE  Fungus stain  No results found for requested labs within last 30 days. Gram stain  No results found for requested labs within last 30 days. Organism  No results found for: [facility]  Surgical culture  No results found for: [facility]  Blood culture 1  No results found for requested labs within last 30 days. Blood culture 2  No results found for requested labs within last 30 days. Fecal occult  No results found for requested labs within last 30 days. GI bacterial pathogens by PCR  No results found for requested labs within last 30 days. C. difficile  No results found for requested labs within last 30 days. Urine culture  Lab Results   Component Value Date    LABURIN  04/12/2019     <50,000 CFU/ml mixed skin/urogenital ruth. No further workup       Pathology:  No relevant pathology     Imaging:  I have personally reviewed the following films:    [[id number]]    Collected: 05/24/20 1716    Updated: 05/24/20 1728    Narrative:     EXAMINATION:  CT OF THE ABDOMEN AND PELVIS WITHOUT CONTRAST 5/24/2020 4:12 pm    TECHNIQUE:  CT of the abdomen and pelvis was performed without the administration of  intravenous contrast. Multiplanar reformatted images are provided for review. Dose modulation, iterative reconstruction, and/or weight based adjustment of  the mA/kV was utilized to reduce the radiation dose to as low as reasonably  achievable. COMPARISON:  CT abdomen and pelvis 05/21/2020    HISTORY:  ORDERING SYSTEM PROVIDED HISTORY: assess fecal impaction, abdominal pain and  distension  Additional Contrast?-> none    FINDINGS:  Lower Chest: Visualized portions of the lungs are clear.  Cardiac and  posterior mediastinal structures visualized are unremarkable. Organs:  The liver demonstrates normal attenuation and texture.  No discrete  hepatic lesion or intrahepatic bile duct dilatation is seen. Joanne Boles Continuous Infusions:   sodium chloride 100 mL/hr at 05/21/20 1614    sodium chloride Stopped (05/21/20 0852)     PRN Meds:.sodium chloride flush, acetaminophen **OR** acetaminophen, ondansetron, promethazine      Assessment:  39 y.o. male admitted with   1. Acute colitis    2. Acute abdominal pain    3. Constipation, unspecified constipation type    4. Failure of outpatient treatment        Fecal impaction       Plan:  1. Continue bowel regimen per GI, passing loose watery stool, stable abdominal exam  2. Full liquid diet as tolerated  3. Monitor and correct electrolytes  4. Encouraged activity as tolerated, ambulate TID, up to chair for all meals  5. PRN analgesics and antiemetics--no narcotics  6. DVT prophylaxis with Lovenox  7. Management of medical comorbid etiologies per primary team and consulting services  8. Sigmoid concretion stool remains. Add SS enema and Golytely today. Try for another 48 hours medically, but plan for OR Wed if not cleared tomorrow    EDUCATION:  Educated patient on plan of care and disease process--all questions answered. Plans discussed with patient and Dr. Sandra Orourke.         Signed:    Aris Donis

## 2020-05-25 NOTE — PLAN OF CARE
Problem: Pain:  Goal: Pain level will decrease  Description: Pain level will decrease  5/25/2020 0849 by Jean Sanders RN  Outcome: Ongoing  5/24/2020 2205 by Nicolette Chavez RN  Outcome: Ongoing  Goal: Control of acute pain  Description: Control of acute pain  5/25/2020 0849 by Jean Sanders RN  Outcome: Ongoing  5/24/2020 2205 by Nicolette Chavez RN  Outcome: Ongoing  Goal: Control of chronic pain  Description: Control of chronic pain  5/25/2020 0849 by Jean Sanders RN  Outcome: Ongoing  5/24/2020 2205 by Nicolette Chavez RN  Outcome: Ongoing     Problem:  Bowel/Gastric:  Goal: Bowel function will improve  Description: Bowel function will improve  5/25/2020 0849 by Jean Sanders RN  Outcome: Ongoing  5/24/2020 2205 by Nicolette Chavez RN  Outcome: Ongoing  Goal: Occurrences of vomiting will decrease  Description: Occurrences of vomiting will decrease  5/25/2020 0849 by Jean Sanders RN  Outcome: Ongoing  5/24/2020 2205 by Nicolette Chavez RN  Outcome: Ongoing  Goal: Control of bowel function will improve  Description: Control of bowel function will improve  5/25/2020 0849 by Jean Sanders RN  Outcome: Ongoing  5/24/2020 2205 by Nicolette Chavez RN  Outcome: Ongoing  Goal: Ability to achieve a regular elimination pattern will improve  Description: Ability to achieve a regular elimination pattern will improve  5/25/2020 0849 by Jean Sanders RN  Outcome: Ongoing  5/24/2020 2205 by Nicolette Chavez RN  Outcome: Ongoing     Problem: Fluid Volume:  Goal: Maintenance of adequate hydration will improve  Description: Maintenance of adequate hydration will improve  5/25/2020 0849 by Jean Sanders RN  Outcome: Ongoing  5/24/2020 2205 by Nicolette Chavez RN  Outcome: Ongoing     Problem: Safety:  Goal: Ability to remain free from injury will improve  Description: Ability to remain free from injury will improve  5/25/2020 0849 by Jean Sanders RN  Outcome: Ongoing  5/24/2020 2205 by Nicolette Chavez RN  Outcome: Ongoing Problem: Falls - Risk of:  Goal: Will remain free from falls  Description: Will remain free from falls  5/25/2020 0849 by Simon Cline RN  Outcome: Ongoing  5/24/2020 2205 by Evita Hernandez RN  Outcome: Ongoing  Goal: Absence of physical injury  Description: Absence of physical injury  5/25/2020 0849 by Simon Cline RN  Outcome: Ongoing  5/24/2020 2205 by Evita Hernandez RN  Outcome: Ongoing     Problem: Nutrition  Goal: Optimal nutrition therapy  5/25/2020 0849 by Simon Cline RN  Outcome: Ongoing  5/24/2020 2205 by Evita Hernandez RN  Outcome: Ongoing

## 2020-05-25 NOTE — PROGRESS NOTES
1930: Head to toe completed at this time. Patient noted with no IV access and orders for continuous fluids. Patient stated not having IV in for last few days. Lovenox refused by patient at this time, education provided but patient refused still. Plan of care reviewed and agreed upon with patient at this time. All needs met at this time, VSS, bed in lowest position with call light in reach. 2157: Dr. Fer Bautista notified about loss of IV access and order for continuous fluids. Stated okay to leave IV out at this time since patient is tolerating PO.

## 2020-05-26 ENCOUNTER — APPOINTMENT (OUTPATIENT)
Dept: GENERAL RADIOLOGY | Age: 45
DRG: 330 | End: 2020-05-26
Payer: MEDICARE

## 2020-05-26 ENCOUNTER — ANESTHESIA EVENT (OUTPATIENT)
Dept: OPERATING ROOM | Age: 45
DRG: 330 | End: 2020-05-26
Payer: MEDICARE

## 2020-05-26 LAB
A/G RATIO: 1.1 (ref 1.1–2.2)
ALBUMIN SERPL-MCNC: 3.9 G/DL (ref 3.4–5)
ALP BLD-CCNC: 117 U/L (ref 40–129)
ALT SERPL-CCNC: 20 U/L (ref 10–40)
ANION GAP SERPL CALCULATED.3IONS-SCNC: 9 MMOL/L (ref 3–16)
AST SERPL-CCNC: 23 U/L (ref 15–37)
BILIRUB SERPL-MCNC: 0.3 MG/DL (ref 0–1)
BUN BLDV-MCNC: 6 MG/DL (ref 7–20)
CALCIUM SERPL-MCNC: 9.2 MG/DL (ref 8.3–10.6)
CHLORIDE BLD-SCNC: 99 MMOL/L (ref 99–110)
CO2: 28 MMOL/L (ref 21–32)
CREAT SERPL-MCNC: 0.9 MG/DL (ref 0.9–1.3)
GFR AFRICAN AMERICAN: >60
GFR NON-AFRICAN AMERICAN: >60
GLOBULIN: 3.5 G/DL
GLUCOSE BLD-MCNC: 91 MG/DL (ref 70–99)
HCT VFR BLD CALC: 40.8 % (ref 40.5–52.5)
HEMOGLOBIN: 13.6 G/DL (ref 13.5–17.5)
MCH RBC QN AUTO: 30.1 PG (ref 26–34)
MCHC RBC AUTO-ENTMCNC: 33.2 G/DL (ref 31–36)
MCV RBC AUTO: 90.5 FL (ref 80–100)
PDW BLD-RTO: 13.7 % (ref 12.4–15.4)
PLATELET # BLD: 259 K/UL (ref 135–450)
PMV BLD AUTO: 8.6 FL (ref 5–10.5)
POTASSIUM SERPL-SCNC: 4.8 MMOL/L (ref 3.5–5.1)
RBC # BLD: 4.52 M/UL (ref 4.2–5.9)
SODIUM BLD-SCNC: 136 MMOL/L (ref 136–145)
TOTAL PROTEIN: 7.4 G/DL (ref 6.4–8.2)
WBC # BLD: 4.4 K/UL (ref 4–11)

## 2020-05-26 PROCEDURE — 6370000000 HC RX 637 (ALT 250 FOR IP): Performed by: INTERNAL MEDICINE

## 2020-05-26 PROCEDURE — APPSS15 APP SPLIT SHARED TIME 0-15 MINUTES: Performed by: NURSE PRACTITIONER

## 2020-05-26 PROCEDURE — 99232 SBSQ HOSP IP/OBS MODERATE 35: CPT | Performed by: SURGERY

## 2020-05-26 PROCEDURE — APPNB30 APP NON BILLABLE TIME 0-30 MINS: Performed by: NURSE PRACTITIONER

## 2020-05-26 PROCEDURE — 2580000003 HC RX 258: Performed by: INTERNAL MEDICINE

## 2020-05-26 PROCEDURE — 74270 X-RAY XM COLON 1CNTRST STD: CPT

## 2020-05-26 PROCEDURE — 80053 COMPREHEN METABOLIC PANEL: CPT

## 2020-05-26 PROCEDURE — 85027 COMPLETE CBC AUTOMATED: CPT

## 2020-05-26 PROCEDURE — 6370000000 HC RX 637 (ALT 250 FOR IP): Performed by: HOSPITALIST

## 2020-05-26 PROCEDURE — 6360000002 HC RX W HCPCS: Performed by: INTERNAL MEDICINE

## 2020-05-26 PROCEDURE — 6360000004 HC RX CONTRAST MEDICATION

## 2020-05-26 PROCEDURE — 1200000000 HC SEMI PRIVATE

## 2020-05-26 PROCEDURE — 6370000000 HC RX 637 (ALT 250 FOR IP): Performed by: SURGERY

## 2020-05-26 PROCEDURE — 36415 COLL VENOUS BLD VENIPUNCTURE: CPT

## 2020-05-26 RX ADMIN — PROMETHAZINE HYDROCHLORIDE 12.5 MG: 25 TABLET ORAL at 00:38

## 2020-05-26 RX ADMIN — PHENYTOIN SODIUM 200 MG: 100 CAPSULE ORAL at 21:59

## 2020-05-26 RX ADMIN — POLYETHYLENE GLYCOL 3350 17 G: 17 POWDER, FOR SOLUTION ORAL at 03:16

## 2020-05-26 RX ADMIN — LEVETIRACETAM 1500 MG: 500 TABLET ORAL at 21:59

## 2020-05-26 RX ADMIN — PHENYTOIN SODIUM 100 MG: 100 CAPSULE ORAL at 09:24

## 2020-05-26 RX ADMIN — PANTOPRAZOLE SODIUM 40 MG: 40 TABLET, DELAYED RELEASE ORAL at 05:00

## 2020-05-26 RX ADMIN — BENZOCAINE AND MENTHOL 1 LOZENGE: 15; 3.6 LOZENGE ORAL at 05:03

## 2020-05-26 RX ADMIN — LAMOTRIGINE 175 MG: 25 TABLET ORAL at 09:24

## 2020-05-26 RX ADMIN — BENZOCAINE AND MENTHOL 1 LOZENGE: 15; 3.6 LOZENGE ORAL at 00:07

## 2020-05-26 RX ADMIN — DIATRIZOATE MEGLUMINE AND DIATRIZOATE SODIUM 480 ML: 660; 100 LIQUID ORAL; RECTAL at 15:08

## 2020-05-26 RX ADMIN — LEVETIRACETAM 1500 MG: 500 TABLET ORAL at 09:23

## 2020-05-26 RX ADMIN — NALOXEGOL OXALATE 25 MG: 25 TABLET, FILM COATED ORAL at 09:24

## 2020-05-26 RX ADMIN — ONDANSETRON 4 MG: 2 INJECTION INTRAMUSCULAR; INTRAVENOUS at 15:36

## 2020-05-26 RX ADMIN — ACETAMINOPHEN 650 MG: 325 TABLET, FILM COATED ORAL at 03:16

## 2020-05-26 RX ADMIN — LAMOTRIGINE 175 MG: 25 TABLET ORAL at 21:58

## 2020-05-26 RX ADMIN — Medication 10 ML: at 22:01

## 2020-05-26 ASSESSMENT — PAIN SCALES - WONG BAKER: WONGBAKER_NUMERICALRESPONSE: 0

## 2020-05-26 ASSESSMENT — PAIN SCALES - GENERAL
PAINLEVEL_OUTOF10: 0
PAINLEVEL_OUTOF10: 7
PAINLEVEL_OUTOF10: 7

## 2020-05-26 ASSESSMENT — PAIN DESCRIPTION - LOCATION: LOCATION: GENERALIZED

## 2020-05-26 ASSESSMENT — PAIN DESCRIPTION - DESCRIPTORS: DESCRIPTORS: ACHING;DISCOMFORT

## 2020-05-26 ASSESSMENT — PAIN DESCRIPTION - PAIN TYPE: TYPE: ACUTE PAIN

## 2020-05-26 NOTE — PLAN OF CARE
of:  Goal: Will remain free from falls  Description: Will remain free from falls  5/26/2020 0554 by Arminda Gallo RN  Outcome: Ongoing  5/26/2020 0553 by Arminda Gallo RN  Outcome: Ongoing  Goal: Absence of physical injury  Description: Absence of physical injury  5/26/2020 0554 by Arminda Gallo RN  Outcome: Ongoing  5/26/2020 0553 by Arminda Gallo RN  Outcome: Ongoing     Problem: Nutrition  Goal: Optimal nutrition therapy  5/26/2020 0554 by Arminda Gallo RN  Outcome: Ongoing  5/26/2020 0553 by Arminda Gallo RN  Outcome: Ongoing

## 2020-05-26 NOTE — ANESTHESIA PRE PROCEDURE
05/26/20 0924    phenytoin (DILANTIN) ER capsule 200 mg  200 mg Oral Nightly Erica Hoyos MD   200 mg at 05/25/20 1955    naloxegol (MOVANTIK) tablet 25 mg  25 mg Oral QAM Erica Hoyos MD   25 mg at 05/26/20 3255    sodium chloride flush 0.9 % injection 10 mL  10 mL Intravenous 2 times per day Erica Hoyos MD   10 mL at 05/25/20 1954    sodium chloride flush 0.9 % injection 10 mL  10 mL Intravenous PRN Erica Hoyos MD        acetaminophen (TYLENOL) tablet 650 mg  650 mg Oral Q6H PRN Erica Hoyos MD   650 mg at 05/26/20 2139    Or    acetaminophen (TYLENOL) suppository 650 mg  650 mg Rectal Q6H PRN Erica Hoyos MD        enoxaparin (LOVENOX) injection 40 mg  40 mg Subcutaneous Nightly Erica Hoyos MD   40 mg at 05/15/20 2114    lamoTRIgine (LAMICTAL) tablet 175 mg  175 mg Oral BID Erica Hoyos MD   175 mg at 05/26/20 4106    ondansetron (ZOFRAN) injection 4 mg  4 mg Intravenous Q6H PRN Erica Hoyos MD   4 mg at 05/25/20 1359    promethazine (PHENERGAN) tablet 12.5 mg  12.5 mg Oral Q6H PRN Erica Hoyos MD   12.5 mg at 05/26/20 0038       Allergies:  No Known Allergies    Problem List:    Patient Active Problem List   Diagnosis Code    Seizure disorder ([facility] [address] G40.909    Adhesive capsulitis of shoulder M75.00    Seborrheic dermatitis L21.9    History of leukemia Z85.6    Balance disorder R26.89    Primary osteoarthritis involving multiple joints M15.0    Acute colitis K52.9       Past Medical History:        Diagnosis Date    Adhesive capsulitis of shoulder     Arthritis     Complex partial seizures with impaired consciousness at onset [facility])     Leukemia in remission ([facility] [address]     Seborrheic dermatitis, unspecified     Seizures ([facility] [address]     Last seizure 3/2018       Past Surgical History:        Procedure Laterality Date    COLONOSCOPY N/A 5/13/2020    COLONOSCOPY FLEXIBLE W/ DECOMPRESSION performed by Tyrese Valdez MD at [facility]  5/13/2020 COLONOSCOPY WITH BIOPSY performed by Mary Lezama MD at [address] [facility]  5/18/2020    COLONOSCOPY DIAGNOSTIC performed by Starr Burrell MD at [address] [facility] ARTHROSCOPY Right 04/27/2018    RIGHT SHOULDER ARTHROSCOPY SUBACROMIAL DECOMPRESSION, OPEN    SIGMOIDOSCOPY N/A 5/21/2020    SIGMOIDOSCOPY DIAGNOSTIC FLEXIBLE performed by Starr Burrell MD at [address] [facility] [address] N/A 5/18/2020    EGD BIOPSY performed by Starr Burrell MD at [address] [place] [address] History:    Social History     Tobacco Use    Smoking status: Never Smoker    Smokeless tobacco: Never Used   Substance Use Topics    Alcohol use: No                                Counseling given: Not Answered      Vital Signs (Current):   Vitals:    05/25/20 1947 05/25/20 2330 05/26/20 0345 05/26/20 0929   BP: (!) 157/89 135/84 124/62 124/83   Pulse: 86 86 82 93   Resp: 16 16 17 16   Temp: 36.7 °C (98 °F) 36.5 °C (97.7 °F) 36.6 °C (97.8 °F) 36.5 °C (97.7 °F)   TempSrc: Oral Oral Oral Oral   SpO2: 97% 96% 95% 97%   Weight:       Height:                                                  BP Readings from Last 3 Encounters:   05/26/20 124/83   05/21/20 (!) 82/53   05/18/20 (!) 101/55       NPO Status: Time of last liquid consumption: 0000                        Time of last solid consumption: 0000                        Date of last liquid consumption: 05/21/20                        Date of last solid food consumption: 05/08/20    BMI:   Wt Readings from Last 3 Encounters:   05/23/20 159 lb 3.2 oz (72.2 kg)   10/28/19 157 lb 2 oz (71.3 kg)   04/12/19 150 lb (68 kg)     Body mass index is 21.59 kg/m².     CBC:   Lab Results   Component Value Date    WBC 3.6 05/21/2020    RBC 3.85 05/21/2020    HGB 11.5 05/21/2020    HCT 34.7 05/21/2020    MCV 90.1 05/21/2020    RDW 13.7 05/21/2020     05/21/2020       CMP:   Lab Results   Component Value Date

## 2020-05-26 NOTE — PROGRESS NOTES
[facility]        Progress Note    Patient Name: Cheo Jarrell  MRN: [id number]  YOB: 1975  Date of Evaluation: 2020    Chief Complaint: Abdominal pain / fecal impaction    Subjective:  No acute events overnight  Pain stable, worse with pressure  Denies nausea or vomiting, tolerating liquids  Passing flatus and watery brown stool along with a few small pieces of more formed stool  Resting in bed at this time      Vital Signs:  Patient Vitals for the past 24 hrs:   BP Temp Temp src Pulse Resp SpO2   20 0929 124/83 97.7 °F (36.5 °C) Oral 93 16 97 %   20 0345 124/62 97.8 °F (36.6 °C) Oral 82 17 95 %   20 2330 135/84 97.7 °F (36.5 °C) Oral 86 16 96 %   20 1947 (!) 157/89 98 °F (36.7 °C) Oral 86 16 97 %   20 1710 118/74 98 °F (36.7 °C) Oral 84 16 98 %   20 1240 108/72 97.4 °F (36.3 °C) Oral 100 16 95 %      TEMPERATURE HISTORY 24H: Temp (24hrs), Av.8 °F (36.6 °C), Min:97.4 °F (36.3 °C), Max:98 °F (36.7 °C)    BLOOD PRESSURE HISTORY: Systolic (45AXM), QIA:526 , Min:108 , ZMF:280    Diastolic (95QDD), PFV:67, Min:62, Max:89      Intake/Output:  I/O last 3 completed shifts: In: 644 [P.O.:860; I.V.:10]  Out: -   No intake/output data recorded. Drain/tube Output:       Physical Exam:  General: awake, alert, oriented to  person, place, time  Cardiovascular:  regular rate and rhythm and no murmur noted  Lungs: clear to auscultation  Abdomen: soft, mild left-sided tenderness, mildly distended, active bowel sounds    Labs:  CBC:    No results for input(s): WBC, HGB, HCT, PLT in the last 72 hours. BMP:    Recent Labs     20  1458      K 4.4   CL 98*   CO2 29   BUN 5*   CREATININE 0.9   GLUCOSE 109*     Hepatic:  No results for input(s): AST, ALT, ALB, BILITOT, ALKPHOS in the last 72 hours.   Amylase:  No results found for: AMYLASE  Lipase:    Lab Results   Component Value Date    LIPASE 9.0 05/10/2020    LIPASE 20 Oral BID     Continuous Infusions:    PRN Meds:.benzocaine-menthol, sodium chloride flush, acetaminophen **OR** acetaminophen, ondansetron, promethazine      Assessment:  39 y.o. male admitted with   1. Acute colitis    2. Acute abdominal pain    3. Constipation, unspecified constipation type    4. Failure of outpatient treatment        Fecal impaction       Plan:  1. Stop bowel regimen, passing mostly watery stool, no large amounts of formed stool reported, stable abdominal exam; hypaque enema per GI today, if no results will proceed with surgery tomorrow  2. NPO  3. Monitor and correct electrolytes  4. Activity as tolerated, ambulate TID  5. PRN analgesics and antiemetics--no narcotics  6. DVT prophylaxis with Lovenox  7. Management of medical comorbid etiologies per primary team and consulting services    EDUCATION:  Educated patient on plan of care and disease process--all questions answered. Plans discussed with patient and nursing. Reviewed and discussed with Dr. Ayanna Henley. Signed:  JOHN Felipe - CNP  5/26/2020 12:12 PM     Surg Staff;   Pt seen and examined with NP  See full note above  Abd feels distended and bloated this am, has had a little fluid out but has not passed hard stool bell. OR tomorrow if unable to flush out and clear with the HE today  Keep NPO and stop oral laxatives at this point. Making him more distended and feel worse.     Bess Love

## 2020-05-26 NOTE — PROGRESS NOTES
[facility]  Gastroenterology Progress Note    Kendrick Rodriguez is a 39 y.o. male patient. 1. Acute colitis    2. Acute abdominal pain    3. Constipation, unspecified constipation type    4. Failure of outpatient treatment        SUBJECTIVE:      No new complaints. No overt results with Golytely and not tolerating very well. ROS:  Gastrointestinal ROS: see above      Physical    VITALS:  /62   Pulse 82   Temp 97.8 °F (36.6 °C) (Oral)   Resp 17   Ht 6' (1.829 m)   Wt 159 lb 3.2 oz (72.2 kg)   SpO2 95%   BMI 21.59 kg/m²   TEMPERATURE:  Current - Temp: 97.8 °F (36.6 °C); Max - Temp  Av.8 °F (36.6 °C)  Min: 97.4 °F (36.3 °C)  Max: 98 °F (36.7 °C)    NAD  RRR, Nl s1s2  Lungs CTA Bilaterally, normal effort  Abdomen stable exam, minimal rlq soreness but no significant tenderness or distension. AAOx3    Data      CBC:   No results for input(s): WBC, RBC, HGB, HCT, PLT, MCV, MCH, MCHC, RDW, NRBC, SEGSPCT, BANDSPCT in the last 72 hours. BMP:  Recent Labs     20  1458      K 4.4   CL 98*   CO2 29   BUN 5*   CREATININE 0.9   CALCIUM 9.4   GLUCOSE 109*        Hepatic Function Panel:   No results for input(s): AST, ALT, BILIDIR, BILITOT, ALKPHOS in the last 72 hours. No results for input(s): LIPASE, AMYLASE in the last 72 hours. No results for input(s): PROTIME, INR in the last 72 hours. No results for input(s): PTT in the last 72 hours. No results for input(s): OCCULTBLD in the last 72 hours. Radiology Review:    CT ABDOMEN PELVIS WO CONTRAST Additional Contrast? None   Final Result   1. The laminated stool ball at the proximal sigmoid colon has passed distally   somewhat since prior study. Note that though this projects deep within the   pelvis, adjacent to the urinary bladder, this is at the proximal sigmoid   colon. This has at least 36 cm of colon remaining to pass through to be at   the rectum. 2. Bilateral nephrolithiasis without hydronephrosis or ureter calculus.    3.

## 2020-05-26 NOTE — PROGRESS NOTES
[facility]    5/26/2020 8:17 AM        Name: Claribel Munoz . Admitted: 5/10/2020  Primary Care Provider: Sherry Monte MD (Tel: [phone])      Subjective:  Patient is a 40 yo male with hx seizure disorder, childhood leukemia in remission, RA. He presented to hospital with n/v, abdominal pain. Found to have evidence of colitis and constipation on CT scan. Presently resting in bed. Currently NPO, to have hypaque enema today, if no results then will have surgery tomorrow. Patient says he is relieved he no longer has to drink the bowel prep. Reports passing liquid stool but not much formed stool. Denies nausea, says he is hungry. Hopes he maybe at least gets some fluids after the enema today. Denies nausea, continues to have abdominal discomfort.      Reviewed interval ancillary notes    Current Medications  polyethylene glycol (GLYCOLAX) packet 17 g, Q6H  benzocaine-menthol (CEPACOL SORE THROAT) lozenge 1 lozenge, Q2H PRN  pantoprazole (PROTONIX) tablet 40 mg, QAM AC  levETIRAcetam (KEPPRA) tablet 1,500 mg, BID  phenytoin (DILANTIN) ER capsule 100 mg, Daily  phenytoin (DILANTIN) ER capsule 200 mg, Nightly  naloxegol (MOVANTIK) tablet 25 mg, QAM  sodium chloride flush 0.9 % injection 10 mL, 2 times per day  sodium chloride flush 0.9 % injection 10 mL, PRN  acetaminophen (TYLENOL) tablet 650 mg, Q6H PRN    Or  acetaminophen (TYLENOL) suppository 650 mg, Q6H PRN  enoxaparin (LOVENOX) injection 40 mg, Nightly  lamoTRIgine (LAMICTAL) tablet 175 mg, BID  ondansetron (ZOFRAN) injection 4 mg, Q6H PRN  promethazine (PHENERGAN) tablet 12.5 mg, Q6H PRN      Objective:  /62   Pulse 82   Temp 97.8 °F (36.6 °C) (Oral)   Resp 17   Ht 6' (1.829 m)   Wt 159 lb 3.2 oz (72.2 kg)   SpO2 95%   BMI 21.59 kg/m²     Intake/Output Summary (Last 24 hours) at 5/26/2020 0817  Last data filed at 5/26/2020 previously been located in the mid descending   colon. Mayela Nam is mild colonic wall thickening from the mid descending through   the proximal sigmoid compatible with colitis, presumably secondary to   compression or irritation of the colonic wall by the stool concretion.  The   remainder of the colon is devoid of stool and contains fluid likely related   to bowel cleansing preparation   2. Bilateral nephrolithiasis     CT abdomen 5/24/2020:  1. The laminated stool ball at the proximal sigmoid colon has passed distally   somewhat since prior study.  Note that though this projects deep within the   pelvis, adjacent to the urinary bladder, this is at the proximal sigmoid   colon. This has at least 36 cm of colon remaining to pass through to be at   the rectum. 2. Bilateral nephrolithiasis without hydronephrosis or ureter calculus. 3. Bilateral medullary calcinosis can be seen with medullary sponge kidney. KUB for NG placement 5/10/2020:  FINDINGS:   The nasogastric tube, including distal side port, is in the gastric fundus. Nonspecific bowel gas pattern is noted. Mayela Nam is no free air.  Lung bases   are clear. Xray abd 5/14/2020: Moderate stool persists in the distal colon and rectum indicating   constipation.  Fecal impaction cannot be excluded with mild air-filled   distention of the more proximal colon. Barium enema 5/15/2020:  Unremarkable single contrast barium enema. KUB abdomen 5/17/2020:  Decreased stool load.  Retained water-soluble contrast is seen throughout the   colon.  Colon remains distended       Problem List  Active Problems:    Acute colitis  Resolved Problems:    * No resolved hospital problems. *       Assessment & Plan:   1. Intractable vomiting. Resolved. NG tube out 5/14. Currently NPO. 2. Constipation / fecal impaction. Severe fecal impaction suspicious for colonic inertia. To have hypaque enema today and if no results surgery planned for tomorrow.  GI and surgery on

## 2020-05-27 ENCOUNTER — ANESTHESIA (OUTPATIENT)
Dept: OPERATING ROOM | Age: 45
DRG: 330 | End: 2020-05-27
Payer: MEDICARE

## 2020-05-27 VITALS
TEMPERATURE: 95.4 F | DIASTOLIC BLOOD PRESSURE: 70 MMHG | OXYGEN SATURATION: 100 % | RESPIRATION RATE: 4 BRPM | SYSTOLIC BLOOD PRESSURE: 148 MMHG

## 2020-05-27 PROCEDURE — 2700000000 HC OXYGEN THERAPY PER DAY

## 2020-05-27 PROCEDURE — 2500000003 HC RX 250 WO HCPCS: Performed by: SURGERY

## 2020-05-27 PROCEDURE — 2500000003 HC RX 250 WO HCPCS: Performed by: NURSE ANESTHETIST, CERTIFIED REGISTERED

## 2020-05-27 PROCEDURE — 1200000000 HC SEMI PRIVATE

## 2020-05-27 PROCEDURE — 6360000002 HC RX W HCPCS: Performed by: SURGERY

## 2020-05-27 PROCEDURE — 6360000002 HC RX W HCPCS: Performed by: INTERNAL MEDICINE

## 2020-05-27 PROCEDURE — 7100000001 HC PACU RECOVERY - ADDTL 15 MIN: Performed by: SURGERY

## 2020-05-27 PROCEDURE — 7100000000 HC PACU RECOVERY - FIRST 15 MIN: Performed by: SURGERY

## 2020-05-27 PROCEDURE — 3600000004 HC SURGERY LEVEL 4 BASE: Performed by: SURGERY

## 2020-05-27 PROCEDURE — 88307 TISSUE EXAM BY PATHOLOGIST: CPT

## 2020-05-27 PROCEDURE — 0DTN4ZZ RESECTION OF SIGMOID COLON, PERCUTANEOUS ENDOSCOPIC APPROACH: ICD-10-PCS | Performed by: SURGERY

## 2020-05-27 PROCEDURE — 2720000010 HC SURG SUPPLY STERILE: Performed by: SURGERY

## 2020-05-27 PROCEDURE — 6370000000 HC RX 637 (ALT 250 FOR IP): Performed by: SURGERY

## 2020-05-27 PROCEDURE — 2580000003 HC RX 258: Performed by: SURGERY

## 2020-05-27 PROCEDURE — 6360000002 HC RX W HCPCS: Performed by: NURSE ANESTHETIST, CERTIFIED REGISTERED

## 2020-05-27 PROCEDURE — 3700000001 HC ADD 15 MINUTES (ANESTHESIA): Performed by: SURGERY

## 2020-05-27 PROCEDURE — 3600000014 HC SURGERY LEVEL 4 ADDTL 15MIN: Performed by: SURGERY

## 2020-05-27 PROCEDURE — 3700000000 HC ANESTHESIA ATTENDED CARE: Performed by: SURGERY

## 2020-05-27 PROCEDURE — 94761 N-INVAS EAR/PLS OXIMETRY MLT: CPT

## 2020-05-27 PROCEDURE — 2580000003 HC RX 258: Performed by: NURSE ANESTHETIST, CERTIFIED REGISTERED

## 2020-05-27 PROCEDURE — 2709999900 HC NON-CHARGEABLE SUPPLY: Performed by: SURGERY

## 2020-05-27 PROCEDURE — 6360000002 HC RX W HCPCS: Performed by: ANESTHESIOLOGY

## 2020-05-27 PROCEDURE — 99232 SBSQ HOSP IP/OBS MODERATE 35: CPT | Performed by: SURGERY

## 2020-05-27 PROCEDURE — 44204 LAPARO PARTIAL COLECTOMY: CPT | Performed by: SURGERY

## 2020-05-27 RX ORDER — ONDANSETRON 2 MG/ML
INJECTION INTRAMUSCULAR; INTRAVENOUS PRN
Status: DISCONTINUED | OUTPATIENT
Start: 2020-05-27 | End: 2020-05-27 | Stop reason: SDUPTHER

## 2020-05-27 RX ORDER — FENTANYL CITRATE 50 UG/ML
INJECTION, SOLUTION INTRAMUSCULAR; INTRAVENOUS PRN
Status: DISCONTINUED | OUTPATIENT
Start: 2020-05-27 | End: 2020-05-27 | Stop reason: SDUPTHER

## 2020-05-27 RX ORDER — DOCUSATE SODIUM 100 MG/1
100 CAPSULE, LIQUID FILLED ORAL 2 TIMES DAILY
Status: DISCONTINUED | OUTPATIENT
Start: 2020-05-28 | End: 2020-05-31 | Stop reason: HOSPADM

## 2020-05-27 RX ORDER — LORAZEPAM 2 MG/ML
0.5 INJECTION INTRAMUSCULAR
Status: DISCONTINUED | OUTPATIENT
Start: 2020-05-27 | End: 2020-05-28

## 2020-05-27 RX ORDER — MIDAZOLAM HYDROCHLORIDE 1 MG/ML
INJECTION INTRAMUSCULAR; INTRAVENOUS PRN
Status: DISCONTINUED | OUTPATIENT
Start: 2020-05-27 | End: 2020-05-27 | Stop reason: SDUPTHER

## 2020-05-27 RX ORDER — LIDOCAINE HYDROCHLORIDE 10 MG/ML
1 INJECTION, SOLUTION EPIDURAL; INFILTRATION; INTRACAUDAL; PERINEURAL
Status: DISCONTINUED | OUTPATIENT
Start: 2020-05-27 | End: 2020-05-27 | Stop reason: HOSPADM

## 2020-05-27 RX ORDER — HYDROCODONE BITARTRATE AND ACETAMINOPHEN 5; 325 MG/1; MG/1
1 TABLET ORAL
Status: DISCONTINUED | OUTPATIENT
Start: 2020-05-27 | End: 2020-05-27 | Stop reason: HOSPADM

## 2020-05-27 RX ORDER — NEOSTIGMINE METHYLSULFATE 5 MG/5 ML
SYRINGE (ML) INTRAVENOUS PRN
Status: DISCONTINUED | OUTPATIENT
Start: 2020-05-27 | End: 2020-05-27 | Stop reason: SDUPTHER

## 2020-05-27 RX ORDER — DEXAMETHASONE SODIUM PHOSPHATE 4 MG/ML
INJECTION, SOLUTION INTRA-ARTICULAR; INTRALESIONAL; INTRAMUSCULAR; INTRAVENOUS; SOFT TISSUE PRN
Status: DISCONTINUED | OUTPATIENT
Start: 2020-05-27 | End: 2020-05-27 | Stop reason: SDUPTHER

## 2020-05-27 RX ORDER — MORPHINE SULFATE 2 MG/ML
2 INJECTION, SOLUTION INTRAMUSCULAR; INTRAVENOUS
Status: DISCONTINUED | OUTPATIENT
Start: 2020-05-27 | End: 2020-05-28

## 2020-05-27 RX ORDER — LIDOCAINE HYDROCHLORIDE 20 MG/ML
INJECTION, SOLUTION EPIDURAL; INFILTRATION; INTRACAUDAL; PERINEURAL PRN
Status: DISCONTINUED | OUTPATIENT
Start: 2020-05-27 | End: 2020-05-27 | Stop reason: SDUPTHER

## 2020-05-27 RX ORDER — HYDROMORPHONE HCL 110MG/55ML
0.5 PATIENT CONTROLLED ANALGESIA SYRINGE INTRAVENOUS EVERY 5 MIN PRN
Status: COMPLETED | OUTPATIENT
Start: 2020-05-27 | End: 2020-05-27

## 2020-05-27 RX ORDER — DEXTROSE, SODIUM CHLORIDE, AND POTASSIUM CHLORIDE 5; .45; .15 G/100ML; G/100ML; G/100ML
INJECTION INTRAVENOUS CONTINUOUS
Status: DISCONTINUED | OUTPATIENT
Start: 2020-05-27 | End: 2020-05-29

## 2020-05-27 RX ORDER — CIPROFLOXACIN 2 MG/ML
400 INJECTION, SOLUTION INTRAVENOUS ONCE
Status: COMPLETED | OUTPATIENT
Start: 2020-05-27 | End: 2020-05-27

## 2020-05-27 RX ORDER — MAGNESIUM HYDROXIDE 1200 MG/15ML
LIQUID ORAL CONTINUOUS PRN
Status: COMPLETED | OUTPATIENT
Start: 2020-05-27 | End: 2020-05-27

## 2020-05-27 RX ORDER — ROCURONIUM BROMIDE 10 MG/ML
INJECTION, SOLUTION INTRAVENOUS PRN
Status: DISCONTINUED | OUTPATIENT
Start: 2020-05-27 | End: 2020-05-27 | Stop reason: SDUPTHER

## 2020-05-27 RX ORDER — METOCLOPRAMIDE HYDROCHLORIDE 5 MG/ML
10 INJECTION INTRAMUSCULAR; INTRAVENOUS EVERY 6 HOURS
Status: DISCONTINUED | OUTPATIENT
Start: 2020-05-27 | End: 2020-05-29

## 2020-05-27 RX ORDER — ONDANSETRON 2 MG/ML
4 INJECTION INTRAMUSCULAR; INTRAVENOUS
Status: DISCONTINUED | OUTPATIENT
Start: 2020-05-27 | End: 2020-05-27 | Stop reason: HOSPADM

## 2020-05-27 RX ORDER — PHENYLEPHRINE HCL IN 0.9% NACL 1 MG/10 ML
SYRINGE (ML) INTRAVENOUS PRN
Status: DISCONTINUED | OUTPATIENT
Start: 2020-05-27 | End: 2020-05-27 | Stop reason: SDUPTHER

## 2020-05-27 RX ORDER — GLYCOPYRROLATE 1 MG/5 ML
SYRINGE (ML) INTRAVENOUS PRN
Status: DISCONTINUED | OUTPATIENT
Start: 2020-05-27 | End: 2020-05-27 | Stop reason: SDUPTHER

## 2020-05-27 RX ORDER — PROPOFOL 10 MG/ML
INJECTION, EMULSION INTRAVENOUS PRN
Status: DISCONTINUED | OUTPATIENT
Start: 2020-05-27 | End: 2020-05-27 | Stop reason: SDUPTHER

## 2020-05-27 RX ORDER — SODIUM CHLORIDE 9 MG/ML
INJECTION, SOLUTION INTRAVENOUS CONTINUOUS
Status: DISCONTINUED | OUTPATIENT
Start: 2020-05-27 | End: 2020-05-28

## 2020-05-27 RX ORDER — SODIUM CHLORIDE, SODIUM LACTATE, POTASSIUM CHLORIDE, CALCIUM CHLORIDE 600; 310; 30; 20 MG/100ML; MG/100ML; MG/100ML; MG/100ML
INJECTION, SOLUTION INTRAVENOUS CONTINUOUS PRN
Status: DISCONTINUED | OUTPATIENT
Start: 2020-05-27 | End: 2020-05-27 | Stop reason: SDUPTHER

## 2020-05-27 RX ORDER — BUPIVACAINE HYDROCHLORIDE AND EPINEPHRINE 5; 5 MG/ML; UG/ML
INJECTION, SOLUTION EPIDURAL; INTRACAUDAL; PERINEURAL
Status: COMPLETED | OUTPATIENT
Start: 2020-05-27 | End: 2020-05-27

## 2020-05-27 RX ORDER — HYDROMORPHONE HCL 110MG/55ML
0.25 PATIENT CONTROLLED ANALGESIA SYRINGE INTRAVENOUS EVERY 5 MIN PRN
Status: DISCONTINUED | OUTPATIENT
Start: 2020-05-27 | End: 2020-05-27 | Stop reason: HOSPADM

## 2020-05-27 RX ORDER — KETOROLAC TROMETHAMINE 30 MG/ML
15 INJECTION, SOLUTION INTRAMUSCULAR; INTRAVENOUS EVERY 6 HOURS PRN
Status: DISCONTINUED | OUTPATIENT
Start: 2020-05-27 | End: 2020-05-31 | Stop reason: HOSPADM

## 2020-05-27 RX ORDER — ACETAMINOPHEN 10 MG/ML
1000 INJECTION, SOLUTION INTRAVENOUS EVERY 6 HOURS
Status: COMPLETED | OUTPATIENT
Start: 2020-05-27 | End: 2020-05-29

## 2020-05-27 RX ORDER — SUCCINYLCHOLINE/SOD CL,ISO/PF 200MG/10ML
SYRINGE (ML) INTRAVENOUS PRN
Status: DISCONTINUED | OUTPATIENT
Start: 2020-05-27 | End: 2020-05-27 | Stop reason: SDUPTHER

## 2020-05-27 RX ORDER — SODIUM CHLORIDE 9 MG/ML
INJECTION, SOLUTION INTRAVENOUS CONTINUOUS PRN
Status: DISCONTINUED | OUTPATIENT
Start: 2020-05-27 | End: 2020-05-27 | Stop reason: SDUPTHER

## 2020-05-27 RX ADMIN — LAMOTRIGINE 175 MG: 25 TABLET ORAL at 14:20

## 2020-05-27 RX ADMIN — Medication 100 MCG: at 07:55

## 2020-05-27 RX ADMIN — ROCURONIUM BROMIDE 25 MG: 10 INJECTION, SOLUTION INTRAVENOUS at 08:00

## 2020-05-27 RX ADMIN — ONDANSETRON 4 MG: 2 INJECTION INTRAMUSCULAR; INTRAVENOUS at 00:29

## 2020-05-27 RX ADMIN — HYDROMORPHONE HYDROCHLORIDE 0.5 MG: 2 INJECTION, SOLUTION INTRAMUSCULAR; INTRAVENOUS; SUBCUTANEOUS at 10:44

## 2020-05-27 RX ADMIN — Medication 0.6 MG: at 09:38

## 2020-05-27 RX ADMIN — Medication 120 MG: at 07:39

## 2020-05-27 RX ADMIN — ROCURONIUM BROMIDE 10 MG: 10 INJECTION, SOLUTION INTRAVENOUS at 08:14

## 2020-05-27 RX ADMIN — HYDROMORPHONE HYDROCHLORIDE 0.5 MG: 2 INJECTION, SOLUTION INTRAMUSCULAR; INTRAVENOUS; SUBCUTANEOUS at 10:56

## 2020-05-27 RX ADMIN — PHENYTOIN SODIUM 100 MG: 100 CAPSULE ORAL at 14:21

## 2020-05-27 RX ADMIN — ONDANSETRON 4 MG: 2 INJECTION INTRAMUSCULAR; INTRAVENOUS at 14:24

## 2020-05-27 RX ADMIN — PROPOFOL 200 MG: 10 INJECTION, EMULSION INTRAVENOUS at 07:39

## 2020-05-27 RX ADMIN — ONDANSETRON 4 MG: 2 INJECTION INTRAMUSCULAR; INTRAVENOUS at 09:38

## 2020-05-27 RX ADMIN — Medication 100 MCG: at 07:51

## 2020-05-27 RX ADMIN — POTASSIUM CHLORIDE, DEXTROSE MONOHYDRATE AND SODIUM CHLORIDE: 150; 5; 450 INJECTION, SOLUTION INTRAVENOUS at 10:47

## 2020-05-27 RX ADMIN — KETOROLAC TROMETHAMINE 15 MG: 30 INJECTION, SOLUTION INTRAMUSCULAR at 11:42

## 2020-05-27 RX ADMIN — Medication 0.2 MG: at 09:55

## 2020-05-27 RX ADMIN — LEVETIRACETAM 1500 MG: 500 TABLET ORAL at 22:30

## 2020-05-27 RX ADMIN — LIDOCAINE HYDROCHLORIDE 80 MG: 20 INJECTION, SOLUTION EPIDURAL; INFILTRATION; INTRACAUDAL; PERINEURAL at 07:39

## 2020-05-27 RX ADMIN — CIPROFLOXACIN 400 MG: 2 INJECTION, SOLUTION INTRAVENOUS at 08:02

## 2020-05-27 RX ADMIN — Medication 1 MG: at 09:55

## 2020-05-27 RX ADMIN — LORAZEPAM 0.5 MG: 2 INJECTION INTRAMUSCULAR; INTRAVENOUS at 11:42

## 2020-05-27 RX ADMIN — LEVETIRACETAM 1500 MG: 500 TABLET ORAL at 14:20

## 2020-05-27 RX ADMIN — HYDROMORPHONE HYDROCHLORIDE 0.5 MG: 2 INJECTION, SOLUTION INTRAMUSCULAR; INTRAVENOUS; SUBCUTANEOUS at 10:51

## 2020-05-27 RX ADMIN — ACETAMINOPHEN 1000 MG: 10 INJECTION, SOLUTION INTRAVENOUS at 17:08

## 2020-05-27 RX ADMIN — Medication 100 MCG: at 08:03

## 2020-05-27 RX ADMIN — FENTANYL CITRATE 100 MCG: 50 INJECTION, SOLUTION INTRAMUSCULAR; INTRAVENOUS at 07:39

## 2020-05-27 RX ADMIN — DEXAMETHASONE SODIUM PHOSPHATE 4 MG: 4 INJECTION, SOLUTION INTRAMUSCULAR; INTRAVENOUS at 08:05

## 2020-05-27 RX ADMIN — ACETAMINOPHEN 1000 MG: 10 INJECTION, SOLUTION INTRAVENOUS at 10:26

## 2020-05-27 RX ADMIN — METOCLOPRAMIDE HYDROCHLORIDE 10 MG: 5 INJECTION INTRAMUSCULAR; INTRAVENOUS at 22:31

## 2020-05-27 RX ADMIN — SODIUM CHLORIDE: 9 INJECTION, SOLUTION INTRAVENOUS at 07:55

## 2020-05-27 RX ADMIN — PROMETHAZINE HYDROCHLORIDE 12.5 MG: 25 TABLET ORAL at 17:06

## 2020-05-27 RX ADMIN — MIDAZOLAM 2 MG: 1 INJECTION INTRAMUSCULAR; INTRAVENOUS at 07:30

## 2020-05-27 RX ADMIN — Medication 4 MG: at 09:38

## 2020-05-27 RX ADMIN — Medication 10 ML: at 22:32

## 2020-05-27 RX ADMIN — LAMOTRIGINE 175 MG: 25 TABLET ORAL at 22:31

## 2020-05-27 RX ADMIN — ROCURONIUM BROMIDE 15 MG: 10 INJECTION, SOLUTION INTRAVENOUS at 08:44

## 2020-05-27 RX ADMIN — PHENYTOIN SODIUM 200 MG: 100 CAPSULE ORAL at 22:31

## 2020-05-27 RX ADMIN — METRONIDAZOLE 500 MG: 500 INJECTION, SOLUTION INTRAVENOUS at 07:25

## 2020-05-27 RX ADMIN — METOCLOPRAMIDE HYDROCHLORIDE 10 MG: 5 INJECTION INTRAMUSCULAR; INTRAVENOUS at 14:27

## 2020-05-27 RX ADMIN — Medication 100 MCG: at 07:47

## 2020-05-27 RX ADMIN — SODIUM CHLORIDE, POTASSIUM CHLORIDE, SODIUM LACTATE AND CALCIUM CHLORIDE: 600; 310; 30; 20 INJECTION, SOLUTION INTRAVENOUS at 07:30

## 2020-05-27 RX ADMIN — MORPHINE SULFATE 2 MG: 2 INJECTION, SOLUTION INTRAMUSCULAR; INTRAVENOUS at 17:07

## 2020-05-27 RX ADMIN — LORAZEPAM 0.5 MG: 2 INJECTION INTRAMUSCULAR; INTRAVENOUS at 11:17

## 2020-05-27 RX ADMIN — ACETAMINOPHEN 1000 MG: 10 INJECTION, SOLUTION INTRAVENOUS at 22:30

## 2020-05-27 RX ADMIN — HYDROMORPHONE HYDROCHLORIDE 0.5 MG: 2 INJECTION, SOLUTION INTRAMUSCULAR; INTRAVENOUS; SUBCUTANEOUS at 10:31

## 2020-05-27 ASSESSMENT — PULMONARY FUNCTION TESTS
PIF_VALUE: 22
PIF_VALUE: 18
PIF_VALUE: 2
PIF_VALUE: 15
PIF_VALUE: 8
PIF_VALUE: 15
PIF_VALUE: 22
PIF_VALUE: 15
PIF_VALUE: 19
PIF_VALUE: 15
PIF_VALUE: 1
PIF_VALUE: 15
PIF_VALUE: 15
PIF_VALUE: 23
PIF_VALUE: 8
PIF_VALUE: 0
PIF_VALUE: 15
PIF_VALUE: 2
PIF_VALUE: 15
PIF_VALUE: 3
PIF_VALUE: 21
PIF_VALUE: 15
PIF_VALUE: 22
PIF_VALUE: 15
PIF_VALUE: 14
PIF_VALUE: 5
PIF_VALUE: 25
PIF_VALUE: 3
PIF_VALUE: 15
PIF_VALUE: 15
PIF_VALUE: 14
PIF_VALUE: 22
PIF_VALUE: 3
PIF_VALUE: 15
PIF_VALUE: 22
PIF_VALUE: 21
PIF_VALUE: 23
PIF_VALUE: 14
PIF_VALUE: 20
PIF_VALUE: 0
PIF_VALUE: 15
PIF_VALUE: 11
PIF_VALUE: 18
PIF_VALUE: 15
PIF_VALUE: 14
PIF_VALUE: 15
PIF_VALUE: 14
PIF_VALUE: 15
PIF_VALUE: 15
PIF_VALUE: 2
PIF_VALUE: 15
PIF_VALUE: 18
PIF_VALUE: 15
PIF_VALUE: 15
PIF_VALUE: 14
PIF_VALUE: 15
PIF_VALUE: 15
PIF_VALUE: 13
PIF_VALUE: 23
PIF_VALUE: 14
PIF_VALUE: 15
PIF_VALUE: 22
PIF_VALUE: 15
PIF_VALUE: 0
PIF_VALUE: 13
PIF_VALUE: 15
PIF_VALUE: 2
PIF_VALUE: 2
PIF_VALUE: 15
PIF_VALUE: 14
PIF_VALUE: 15
PIF_VALUE: 15
PIF_VALUE: 22
PIF_VALUE: 14
PIF_VALUE: 15
PIF_VALUE: 5
PIF_VALUE: 22
PIF_VALUE: 14
PIF_VALUE: 2
PIF_VALUE: 15
PIF_VALUE: 18
PIF_VALUE: 19
PIF_VALUE: 22
PIF_VALUE: 21
PIF_VALUE: 2
PIF_VALUE: 22
PIF_VALUE: 3
PIF_VALUE: 16
PIF_VALUE: 15
PIF_VALUE: 22
PIF_VALUE: 14
PIF_VALUE: 22
PIF_VALUE: 15
PIF_VALUE: 23
PIF_VALUE: 2
PIF_VALUE: 22
PIF_VALUE: 21
PIF_VALUE: 2
PIF_VALUE: 3
PIF_VALUE: 2
PIF_VALUE: 15
PIF_VALUE: 23
PIF_VALUE: 22
PIF_VALUE: 15
PIF_VALUE: 24
PIF_VALUE: 0
PIF_VALUE: 14
PIF_VALUE: 2
PIF_VALUE: 17
PIF_VALUE: 2
PIF_VALUE: 15
PIF_VALUE: 15
PIF_VALUE: 2
PIF_VALUE: 22
PIF_VALUE: 15
PIF_VALUE: 17
PIF_VALUE: 14
PIF_VALUE: 15
PIF_VALUE: 14
PIF_VALUE: 15
PIF_VALUE: 18
PIF_VALUE: 15
PIF_VALUE: 15
PIF_VALUE: 22
PIF_VALUE: 2
PIF_VALUE: 22
PIF_VALUE: 15
PIF_VALUE: 15
PIF_VALUE: 4
PIF_VALUE: 15
PIF_VALUE: 22
PIF_VALUE: 23
PIF_VALUE: 3
PIF_VALUE: 14
PIF_VALUE: 18
PIF_VALUE: 2
PIF_VALUE: 15
PIF_VALUE: 15
PIF_VALUE: 20
PIF_VALUE: 15
PIF_VALUE: 2
PIF_VALUE: 14
PIF_VALUE: 15
PIF_VALUE: 20
PIF_VALUE: 15

## 2020-05-27 ASSESSMENT — PAIN DESCRIPTION - PAIN TYPE
TYPE: SURGICAL PAIN
TYPE: SURGICAL PAIN

## 2020-05-27 ASSESSMENT — PAIN DESCRIPTION - DESCRIPTORS: DESCRIPTORS: PATIENT UNABLE TO DESCRIBE

## 2020-05-27 ASSESSMENT — PAIN SCALES - GENERAL
PAINLEVEL_OUTOF10: 6
PAINLEVEL_OUTOF10: 7
PAINLEVEL_OUTOF10: 0
PAINLEVEL_OUTOF10: 0
PAINLEVEL_OUTOF10: 7
PAINLEVEL_OUTOF10: 7
PAINLEVEL_OUTOF10: 10

## 2020-05-27 ASSESSMENT — PAIN DESCRIPTION - ORIENTATION
ORIENTATION: RIGHT
ORIENTATION: RIGHT

## 2020-05-27 ASSESSMENT — PAIN DESCRIPTION - LOCATION
LOCATION: ABDOMEN
LOCATION: ABDOMEN

## 2020-05-27 ASSESSMENT — PAIN - FUNCTIONAL ASSESSMENT: PAIN_FUNCTIONAL_ASSESSMENT: 0-10

## 2020-05-27 NOTE — PROGRESS NOTES
Shift assessment complete. Vital signs stable. Patient continues to pass watery stools. Denies n/v. Scheduled night meds administered. Pt understands he will be NPO at midnight for procedure tomorrow 5/27. The care plan and education has been reviewed and mutually agreed upon with the patient. All needs met. Will monitor.

## 2020-05-27 NOTE — PROGRESS NOTES
metroNIDAZOLE  500 mg Intravenous Once    pantoprazole  40 mg Oral QAM AC    levETIRAcetam  1,500 mg Oral BID    phenytoin  100 mg Oral Daily    phenytoin  200 mg Oral Nightly    naloxegol  25 mg Oral QAM    sodium chloride flush  10 mL Intravenous 2 times per day    enoxaparin  40 mg Subcutaneous Nightly    lamoTRIgine  175 mg Oral BID     Continuous Infusions:    PRN Meds:. HYDROcodone 5 mg - acetaminophen, ondansetron, HYDROmorphone, HYDROmorphone, benzocaine-menthol, sodium chloride flush, acetaminophen **OR** acetaminophen, ondansetron, promethazine      Assessment:  39 y.o. male admitted with   1. Acute colitis    2. Acute abdominal pain    3. Constipation, unspecified constipation type    4. Failure of outpatient treatment        Fecal impaction       Plan:  1. HE done and large stool concretion remains, will need surgery today. Extract or remove stool ball  2. NPO for surgery  3. Monitor and correct electrolytes  4. Activity as tolerated, ambulate TID  5. PRN analgesics and antiemetics--no narcotics  6. DVT prophylaxis with Lovenox  7. Management of medical comorbid etiologies per primary team and consulting services    EDUCATION:  Educated patient on plan of care and disease process--all questions answered. Plans discussed with patient and nursing.       Signed:      Hsosein Huffman

## 2020-05-27 NOTE — PLAN OF CARE
Problem:  Bowel/Gastric:  Goal: Bowel function will improve  Description: Bowel function will improve  Outcome: Ongoing  Goal: Occurrences of vomiting will decrease  Description: Occurrences of vomiting will decrease  Outcome: Ongoing  Goal: Control of bowel function will improve  Description: Control of bowel function will improve  Outcome: Ongoing  Goal: Ability to achieve a regular elimination pattern will improve  Description: Ability to achieve a regular elimination pattern will improve  Outcome: Ongoing     Problem: Pain:  Goal: Pain level will decrease  Description: Pain level will decrease  Outcome: Met This Shift  Goal: Control of acute pain  Description: Control of acute pain  Outcome: Met This Shift  Goal: Control of chronic pain  Description: Control of chronic pain  Outcome: Met This Shift     Problem: Safety:  Goal: Ability to remain free from injury will improve  Description: Ability to remain free from injury will improve  Outcome: Ongoing     Problem: Falls - Risk of:  Goal: Will remain free from falls  Description: Will remain free from falls  Outcome: Ongoing  Goal: Absence of physical injury  Description: Absence of physical injury  Outcome: Ongoing     Problem: Nutrition  Goal: Optimal nutrition therapy  5/26/2020 2157 by Carlos Byers RN  Outcome: Ongoing  5/26/2020 1033 by Chaparrita Cruz RD, LD  Outcome: Ongoing

## 2020-05-27 NOTE — ANESTHESIA PRE PROCEDURE
Department of Anesthesiology  Preprocedure Note       Name:  Foster Kelly   Age:  39 y.o.  :  1975                                          MRN:  [id number]         Date:  2020      Surgeon: Javi Justice):  Onelia Suarez MD    Procedure: Procedure(s):  DIAGNOSTIC LAPAROSCOPY,REMOVAL OF FECAL IMPACTION, POSSIBLE LAPAROTOMY, POSSIBLE BOWEL RESECTION    Medications prior to admission:   Prior to Admission medications    Medication Sig Start Date End Date Taking? Authorizing Provider   levETIRAcetam (KEPPRA) 500 MG tablet TAKE 3 TABLETS TWICE A DAY 20  Yes Sagar Morocho MD   lamoTRIgine (LAMICTAL) 25 MG tablet TAKE 1 TABLET TWICE A DAY 20  Yes Sagar Morocho MD   lamoTRIgine (LAMICTAL) 150 MG tablet TAKE 1 TABLET TWICE A DAY 20  Yes Sagar Morocho MD   DILANTIN 100 MG ER capsule Take one capsule in the morning and 2 at bedtime 20  Yes Sagar Morocho MD   polyethylene glycol [facility]) 17 GM/SCOOP powder Take 17 g by mouth daily 20  Yes Sagar Morocho MD   betamethasone valerate (VALISONE) 0.1 % cream Apply topically 1 times daily.  20   Sagar Morocho MD   tamsulosin [facility]) 0.4 MG capsule Take 1 capsule by mouth daily 20   Sagar Morocho MD   acetaminophen (TYLENOL) 500 MG tablet Take 2 tablets by mouth 3 times daily as needed for Pain 20   Sagar Morocho MD       Current medications:    Current Facility-Administered Medications   Medication Dose Route Frequency Provider Last Rate Last Dose    ciprofloxacin (CIPRO) IVPB 400 mg  400 mg Intravenous Once Onelia Suarez MD        metronidazole (FLAGYL) 500 mg in NaCl 100 mL IVPB premix  500 mg Intravenous Once Onelia Suarez MD        HYDROcodone-acetaminophen (NORCO) 5-325 MG per tablet 1 tablet  1 tablet Oral Once PRN Jennifer Taylor MD        [facility]ans[facility]ron [facility] injection 4 mg  4 mg Intravenous Once PRN Jennifer Taylor MD        HYDROmorphone (DILAUDID) injection 0.25 mg  0.25 mg Intravenous Q5 Min PRN Say Clark MD        HYDROmorphone (DILAUDID) injection 0.5 mg  0.5 mg Intravenous Q5 Min PRN Say Clark MD        benzocaine-menthol (CEPACOL SORE THROAT) lozenge 1 lozenge  1 lozenge Oral Q2H PRN Tad Carson MD   1 lozenge at 05/26/20 0503    pantoprazole (PROTONIX) tablet 40 mg  40 mg Oral QAM AC Caren Fabian MD   40 mg at 05/26/20 0500    levETIRAcetam (KEPPRA) tablet 1,500 mg  1,500 mg Oral BID Caren Fabian MD   1,500 mg at 05/26/20 2159    phenytoin (DILANTIN) ER capsule 100 mg  100 mg Oral Daily Caren Fabian MD   100 mg at 05/26/20 3973    phenytoin (DILANTIN) ER capsule 200 mg  200 mg Oral Nightly Caren Fabian MD   200 mg at 05/26/20 2159    naloxegol (MOVANTIK) tablet 25 mg  25 mg Oral QAM Caren Fabian MD   25 mg at 05/26/20 7000    sodium chloride flush 0.9 % injection 10 mL  10 mL Intravenous 2 times per day Caren Fabian MD   10 mL at 05/26/20 2201    sodium chloride flush 0.9 % injection 10 mL  10 mL Intravenous PRN Caren Fabian MD        acetaminophen (TYLENOL) tablet 650 mg  650 mg Oral Q6H PRN Caren Fabian MD   650 mg at 05/26/20 0316    Or    acetaminophen (TYLENOL) suppository 650 mg  650 mg Rectal Q6H PRN Caren Fabian MD        enoxaparin (LOVENOX) injection 40 mg  40 mg Subcutaneous Nightly Caren Fabian MD   40 mg at 05/15/20 2114    lamoTRIgine (LAMICTAL) tablet 175 mg  175 mg Oral BID Caren Fabian MD   175 mg at 05/26/20 2158    ondansetron (ZOFRAN) injection 4 mg  4 mg Intravenous Q6H PRN Caren Fabian MD   4 mg at 05/27/20 0029    promethazine (PHENERGAN) tablet 12.5 mg  12.5 mg Oral Q6H PRN Caren Fabian MD   12.5 mg at 05/26/20 0038       Allergies:  No Known Allergies    Problem List:    Patient Active Problem List   Diagnosis Code    [facility] G40.909    Adhesive capsulitis of shoulder M75.00    Seborrheic dermatitis L21.9    History of leukemia Z85.6    Balance disorder R26.89    Primary osteoarthritis involving multiple joints M15.0    Acute colitis K52.9       Past Medical History:        Diagnosis Date    Adhesive capsulitis of shoulder     Arthritis     Complex partial seizures with impaired consciousness at onset [facility])     [facility]     Seborrheic dermatitis, unspecified     Seizures ([facility]     Last seizure 3/2018       Past Surgical History:        Procedure Laterality Date    COLONOSCOPY N/A 5/13/2020    COLONOSCOPY FLEXIBLE W/ DECOMPRESSION performed by Mei Gardner MD at [address]  5/13/2020    COLONOSCOPY WITH BIOPSY performed by Mei Gardner MD at [address]  5/18/2020    COLONOSCOPY DIAGNOSTIC performed by Candice Bishop MD at [address] ARTHROSCOPY Right 04/27/2018    RIGHT SHOULDER ARTHROSCOPY SUBACROMIAL DECOMPRESSION, OPEN    SIGMOIDOSCOPY N/A 5/21/2020    SIGMOIDOSCOPY DIAGNOSTIC FLEXIBLE performed by Candice Bishop MD at [address] N/A 5/18/2020    EGD BIOPSY performed by Candice Bishop MD at [address] [facility] History:    Social History     Tobacco Use    Smoking status: Never Smoker    Smokeless tobacco: Never Used   Substance Use Topics    Alcohol use:  No                                Counseling given: Not Answered      Vital Signs (Current):   Vitals:    05/26/20 2243 05/27/20 0030 05/27/20 0410 05/27/20 0633   BP: (!) 151/91 119/80 126/86 (!) 151/84   Pulse: 112 107 97 94   Resp: 16 16 16 16   Temp: 98.2 °F (36.8 °C) 98 °F (36.7 °C) 98.1 °F (36.7 °C) 98.9 °F (37.2 °C)   TempSrc: Oral Oral Oral Temporal   SpO2: 97% 98% 96% 97%   Weight:       Height:                                                  BP Readings from Last 3 Encounters:   05/27/20 (!) 151/84   05/21/20 (!) 82/53   05/18/20 (!) 101/55       NPO Status: Time of last liquid consumption: 2355                        Time of last solid consumption: 2300                        Date of last liquid consumption: 05/26/20                        Date of last solid food consumption: 05/25/20    BMI:   Wt Readings from Last 3 Encounters:   05/23/20 159 lb 3.2 oz (72.2 kg)   10/28/19 157 lb 2 oz (71.3 kg)   04/12/19 150 lb (68 kg)     Body mass index is 21.59 kg/m². CBC:   Lab Results   Component Value Date    WBC 4.4 05/26/2020    RBC 4.52 05/26/2020    HGB 13.6 05/26/2020    HCT 40.8 05/26/2020    MCV 90.5 05/26/2020    RDW 13.7 05/26/2020     05/26/2020       CMP:   Lab Results   Component Value Date     05/26/2020    K 4.8 05/26/2020    K 4.1 05/21/2020    CL 99 05/26/2020    CO2 28 05/26/2020    BUN 6 05/26/2020    CREATININE 0.9 05/26/2020    GFRAA >60 05/26/2020    GFRAA >60 02/04/2013    AGRATIO 1.1 05/26/2020    LABGLOM >60 05/26/2020    GLUCOSE 91 05/26/2020    PROT 7.4 05/26/2020    PROT 7.6 02/04/2013    CALCIUM 9.2 05/26/2020    BILITOT 0.3 05/26/2020    ALKPHOS 117 05/26/2020    AST 23 05/26/2020    ALT 20 05/26/2020       POC Tests: No results for input(s): POCGLU, POCNA, POCK, POCCL, POCBUN, POCHEMO, POCHCT in the last 72 hours.     Coags: No results found for: PROTIME, INR, APTT    HCG (If Applicable): No results found for: PREGTESTUR, PREGSERUM, HCG, HCGQUANT     ABGs: No results found for: PHART, PO2ART, GQX8XPL, SQE7ISL, BEART, Y6HTNWQF     Type & Screen (If Applicable):  No results found for: LABABO, LABRH    Drug/Infectious Status (If Applicable):  No results found for: HIV, HEPCAB    COVID-19 Screening (If Applicable): No results found for: COVID19      Anesthesia Evaluation  Patient summary reviewed no history of anesthetic complications:   Airway: Mallampati: IV  TM distance: >3 FB   Neck ROM: full  Comment: Small mouth opening  Pt denies being aware of problems with intubation in the past  Mouth opening: < 3 FB Dental: normal exam Pulmonary:Negative Pulmonary ROS breath sounds clear to auscultation                             Cardiovascular:  Exercise tolerance: good (>4 METS),       (-) CABG/stent, dysrhythmias and  angina      Rhythm: regular  Rate: normal                    Neuro/Psych:   (+) seizures (complex partial):,    (-) TIA and CVA            ROS comment: Last seizure 2002 per pt. 2018 per chart GI/Hepatic/Renal:        (-) GERD       Endo/Other:    (+) : arthritis: rheumatoid. , malignancy/cancer (h/o leukemia). Abdominal:           Vascular:                                        Anesthesia Plan      general     ASA 3     (Gayle for intubation. OETT)  Induction: intravenous. MIPS: Postoperative opioids intended, Prophylactic antiemetics administered and Postoperative trial extubation. Anesthetic plan and risks discussed with patient. Use of blood products discussed with patient whom consented to blood products. Plan discussed with CRNA.                   Shaila Denson MD   5/27/2020

## 2020-05-28 LAB
ANION GAP SERPL CALCULATED.3IONS-SCNC: 9 MMOL/L (ref 3–16)
BASOPHILS ABSOLUTE: 0 K/UL (ref 0–0.2)
BASOPHILS RELATIVE PERCENT: 0.1 %
BUN BLDV-MCNC: 6 MG/DL (ref 7–20)
CALCIUM SERPL-MCNC: 8.9 MG/DL (ref 8.3–10.6)
CHLORIDE BLD-SCNC: 98 MMOL/L (ref 99–110)
CO2: 27 MMOL/L (ref 21–32)
CREAT SERPL-MCNC: 0.7 MG/DL (ref 0.9–1.3)
EOSINOPHILS ABSOLUTE: 0 K/UL (ref 0–0.6)
EOSINOPHILS RELATIVE PERCENT: 0 %
GFR AFRICAN AMERICAN: >60
GFR NON-AFRICAN AMERICAN: >60
GLUCOSE BLD-MCNC: 116 MG/DL (ref 70–99)
HCT VFR BLD CALC: 38.7 % (ref 40.5–52.5)
HEMOGLOBIN: 12.8 G/DL (ref 13.5–17.5)
LYMPHOCYTES ABSOLUTE: 1 K/UL (ref 1–5.1)
LYMPHOCYTES RELATIVE PERCENT: 14.6 %
MCH RBC QN AUTO: 29.7 PG (ref 26–34)
MCHC RBC AUTO-ENTMCNC: 33 G/DL (ref 31–36)
MCV RBC AUTO: 89.9 FL (ref 80–100)
MONOCYTES ABSOLUTE: 0.9 K/UL (ref 0–1.3)
MONOCYTES RELATIVE PERCENT: 13.6 %
NEUTROPHILS ABSOLUTE: 4.7 K/UL (ref 1.7–7.7)
NEUTROPHILS RELATIVE PERCENT: 71.7 %
PDW BLD-RTO: 14 % (ref 12.4–15.4)
PLATELET # BLD: 253 K/UL (ref 135–450)
PMV BLD AUTO: 8.9 FL (ref 5–10.5)
POTASSIUM SERPL-SCNC: 4.2 MMOL/L (ref 3.5–5.1)
RBC # BLD: 4.31 M/UL (ref 4.2–5.9)
SODIUM BLD-SCNC: 134 MMOL/L (ref 136–145)
WBC # BLD: 6.5 K/UL (ref 4–11)

## 2020-05-28 PROCEDURE — 6370000000 HC RX 637 (ALT 250 FOR IP): Performed by: SURGERY

## 2020-05-28 PROCEDURE — 94761 N-INVAS EAR/PLS OXIMETRY MLT: CPT

## 2020-05-28 PROCEDURE — 99024 POSTOP FOLLOW-UP VISIT: CPT | Performed by: SURGERY

## 2020-05-28 PROCEDURE — 1200000000 HC SEMI PRIVATE

## 2020-05-28 PROCEDURE — 85025 COMPLETE CBC W/AUTO DIFF WBC: CPT

## 2020-05-28 PROCEDURE — 6360000002 HC RX W HCPCS: Performed by: SURGERY

## 2020-05-28 PROCEDURE — 80048 BASIC METABOLIC PNL TOTAL CA: CPT

## 2020-05-28 PROCEDURE — 2500000003 HC RX 250 WO HCPCS: Performed by: SURGERY

## 2020-05-28 PROCEDURE — 36415 COLL VENOUS BLD VENIPUNCTURE: CPT

## 2020-05-28 PROCEDURE — 2580000003 HC RX 258: Performed by: SURGERY

## 2020-05-28 RX ORDER — MORPHINE SULFATE 2 MG/ML
2 INJECTION, SOLUTION INTRAMUSCULAR; INTRAVENOUS
Status: DISCONTINUED | OUTPATIENT
Start: 2020-05-28 | End: 2020-05-29

## 2020-05-28 RX ADMIN — POTASSIUM CHLORIDE, DEXTROSE MONOHYDRATE AND SODIUM CHLORIDE: 150; 5; 450 INJECTION, SOLUTION INTRAVENOUS at 05:23

## 2020-05-28 RX ADMIN — ACETAMINOPHEN 1000 MG: 10 INJECTION, SOLUTION INTRAVENOUS at 23:43

## 2020-05-28 RX ADMIN — ACETAMINOPHEN 1000 MG: 10 INJECTION, SOLUTION INTRAVENOUS at 11:17

## 2020-05-28 RX ADMIN — ACETAMINOPHEN 1000 MG: 10 INJECTION, SOLUTION INTRAVENOUS at 17:06

## 2020-05-28 RX ADMIN — LEVETIRACETAM 1500 MG: 500 TABLET ORAL at 08:30

## 2020-05-28 RX ADMIN — KETOROLAC TROMETHAMINE 15 MG: 30 INJECTION, SOLUTION INTRAMUSCULAR at 19:36

## 2020-05-28 RX ADMIN — ACETAMINOPHEN 1000 MG: 10 INJECTION, SOLUTION INTRAVENOUS at 05:20

## 2020-05-28 RX ADMIN — Medication 10 ML: at 21:20

## 2020-05-28 RX ADMIN — LEVETIRACETAM 1500 MG: 500 TABLET ORAL at 21:17

## 2020-05-28 RX ADMIN — DOCUSATE SODIUM 100 MG: 100 CAPSULE ORAL at 08:30

## 2020-05-28 RX ADMIN — METOCLOPRAMIDE HYDROCHLORIDE 10 MG: 5 INJECTION INTRAMUSCULAR; INTRAVENOUS at 05:20

## 2020-05-28 RX ADMIN — MORPHINE SULFATE 2 MG: 2 INJECTION, SOLUTION INTRAMUSCULAR; INTRAVENOUS at 21:20

## 2020-05-28 RX ADMIN — PHENYTOIN SODIUM 100 MG: 100 CAPSULE ORAL at 08:32

## 2020-05-28 RX ADMIN — POTASSIUM CHLORIDE, DEXTROSE MONOHYDRATE AND SODIUM CHLORIDE: 150; 5; 450 INJECTION, SOLUTION INTRAVENOUS at 23:43

## 2020-05-28 RX ADMIN — MORPHINE SULFATE 2 MG: 2 INJECTION, SOLUTION INTRAMUSCULAR; INTRAVENOUS at 08:31

## 2020-05-28 RX ADMIN — LAMOTRIGINE 175 MG: 25 TABLET ORAL at 08:30

## 2020-05-28 RX ADMIN — NALOXEGOL OXALATE 25 MG: 25 TABLET, FILM COATED ORAL at 08:30

## 2020-05-28 RX ADMIN — METOCLOPRAMIDE HYDROCHLORIDE 10 MG: 5 INJECTION INTRAMUSCULAR; INTRAVENOUS at 12:20

## 2020-05-28 RX ADMIN — METOCLOPRAMIDE HYDROCHLORIDE 10 MG: 5 INJECTION INTRAMUSCULAR; INTRAVENOUS at 23:43

## 2020-05-28 RX ADMIN — DOCUSATE SODIUM 100 MG: 100 CAPSULE ORAL at 21:21

## 2020-05-28 RX ADMIN — METOCLOPRAMIDE HYDROCHLORIDE 10 MG: 5 INJECTION INTRAMUSCULAR; INTRAVENOUS at 19:33

## 2020-05-28 RX ADMIN — PANTOPRAZOLE SODIUM 40 MG: 40 TABLET, DELAYED RELEASE ORAL at 05:20

## 2020-05-28 RX ADMIN — PHENYTOIN SODIUM 200 MG: 100 CAPSULE ORAL at 21:17

## 2020-05-28 RX ADMIN — LAMOTRIGINE 175 MG: 25 TABLET ORAL at 21:19

## 2020-05-28 ASSESSMENT — PAIN DESCRIPTION - PAIN TYPE
TYPE: SURGICAL PAIN

## 2020-05-28 ASSESSMENT — PAIN SCALES - GENERAL
PAINLEVEL_OUTOF10: 2
PAINLEVEL_OUTOF10: 9
PAINLEVEL_OUTOF10: 8
PAINLEVEL_OUTOF10: 10
PAINLEVEL_OUTOF10: 10
PAINLEVEL_OUTOF10: 9

## 2020-05-28 ASSESSMENT — PAIN DESCRIPTION - LOCATION
LOCATION: ABDOMEN

## 2020-05-28 ASSESSMENT — PAIN DESCRIPTION - ORIENTATION
ORIENTATION: RIGHT

## 2020-05-28 NOTE — PROGRESS NOTES
(1.829 m)   Wt 159 lb 3.2 oz (72.2 kg)   SpO2 96%   BMI 21.59 kg/m²     Intake/Output Summary (Last 24 hours) at 5/28/2020 1009  Last data filed at 5/27/2020 1827  Gross per 24 hour   Intake --   Output 450 ml   Net -450 ml      Wt Readings from Last 3 Encounters:   05/23/20 159 lb 3.2 oz (72.2 kg)   10/28/19 157 lb 2 oz (71.3 kg)   04/12/19 150 lb (68 kg)     General:  Awake, alert, oriented in NAD  Skin:  Warm and dry. No unusual bruising or rash  Neck:  Supple. No JVD   Chest:  Normal effort. Clear to auscultation  Cardiovascular:  RRR, normal S1/S2, no murmur/gallop/rub  Abdomen:  Soft, nontender, incisions open to air, edges closely approximated  Extremities:  No edema  Neurological: No focal deficits  Psychological: Normal mood and affect    Labs and Tests:  CBC:   Recent Labs     05/26/20  1252 05/28/20  0559   WBC 4.4 6.5   HGB 13.6 12.8*    253     BMP:    Recent Labs     05/26/20  1252 05/28/20  0559    134*   K 4.8 4.2   CL 99 98*   CO2 28 27   BUN 6* 6*   CREATININE 0.9 0.7*   GLUCOSE 91 116*     Hepatic:   Recent Labs     05/26/20  1252   AST 23   ALT 20   BILITOT 0.3   ALKPHOS 117       COLONOSCOPY 5/13/2020:  Fecal impaction, in the rectum, sigmoid and descending colon. Few scattered stercoral type ulcers, biopsied. Attempt at colonic decompression was made. The patient is behaving as colonic inertia/slow transit constipation    EGD 5/18/2020:  Mild patchy erythema, biopsies taken  Colonoscopy 5/18/2020:  Large stool impaction remains at 65 cm from anal verge, scant ulcers of mucosa around stool impaction but no signs ischemia. Attempt made to loosen but no success. Colonoscopy 5/21/2020:  Fecal impaction still present at 70 cm from anal verge. Attempt made to loosen and snare unsuccessful, scope unable to pass.      CT abdomen 5/10/2020:  Heavy stool burden within the colon.  Proximal to a focal area of extremely   dense stool possibly related to impaction is inflammatory [facility])  Resolved Problems:    * No resolved hospital problems. *       Assessment & Plan:   1. Constipation / fecal impaction. Severe fecal impaction suspicious for colonic inertia. S/p diagnostic laparoscopy, removal of fecal impaction, sigmoid colon resection 5/27. POD # 1. Tolerating liquid diet. 2. Hx seizure. Continue antiepileptics (dilantin, Keppra, and Lamictal). No seizure activity. 3. Skin lesion left side. Possible raised mole/cherry angioma. No further bleeding. Recommend excision as outpatient.        Diet: DIET CLEAR LIQUID;  Code:Full Code  DVT PPX: on enoxaparin      JOHN Chance CNP   5/28/2020 10:09 AM

## 2020-05-28 NOTE — PROGRESS NOTES
[facility] Laparoscopic [facility]    Surgery Progress Note           POD # 1    PATIENT NAME: Umer Burt     TODAY'S DATE: 5/28/2020    SUBJECTIVE:    Pt  Alert, in good spirits, no concerns. No emesis, no flatus yet     OBJECTIVE:   VITALS:  BP (!) 151/83   Pulse 107   Temp 97.9 °F (36.6 °C) (Oral)   Resp 16   Ht 6' (1.829 m)   Wt 159 lb 3.2 oz (72.2 kg)   SpO2 96%   BMI 21.59 kg/m²     INTAKE/OUTPUT:    I/O last 3 completed shifts: In: 1600 [I.V.:1600]  Out: 770 [Urine:750; Blood:20]  No intake/output data recorded. CONSTITUTIONAL:  awake and alert  LUNGS:  clear to auscultation  ABDOMEN:   normal bowel sounds, soft, non-distended, tenderness noted around port incisions   INCISIONS: clean, dry, no drainage    Data:  CBC:   Recent Labs     05/26/20  1252 05/28/20  0559   WBC 4.4 6.5   HGB 13.6 12.8*   HCT 40.8 38.7*    253     BMP:    Recent Labs     05/26/20  1252 05/28/20  0559    134*   K 4.8 4.2   CL 99 98*   CO2 28 27   BUN 6* 6*   CREATININE 0.9 0.7*   GLUCOSE 91 116*     Hepatic:   Recent Labs     05/26/20  1252   AST 23   ALT 20   BILITOT 0.3   ALKPHOS 117     Mag:    No results for input(s): MG in the last 72 hours. Phos:   No results for input(s): PHOS in the last 72 hours. INR: No results for input(s): INR in the last 72 hours. Radiology Review:        Pathology  FINAL DIAGNOSIS:    Sigmoid colon, resection:     - Segment of colon with mild reactive change- See Comment.     COMMENT: Gross examination reveals the lumen filled with fecal material,  compatible with reported history of fecal impaction.    SAMREEN/SAMREEN    ASSESSMENT AND PLAN:  39 y.o. male status post lap assisted sigmoid colectomy    Doing well, fulls po, continue prn meds  OOB, Voiding, hydration adequate  IS     Tiney Lies

## 2020-05-28 NOTE — PROGRESS NOTES
[facility]  Gastroenterology Progress Note    Cheo Jarrell is a 39 y.o. male patient. 1. Acute colitis    2. Acute abdominal pain    3. Constipation, unspecified constipation type    4. Failure of outpatient treatment        SUBJECTIVE:  Has some pain near incisions. ROS:  No fever, chills  No chest pain, palpitations  No SOB, cough  Gastrointestinal ROS: no N/V      Physical    VITALS:  BP (!) 151/83   Pulse 107   Temp 97.9 °F (36.6 °C) (Oral)   Resp 16   Ht 6' (1.829 m)   Wt 159 lb 3.2 oz (72.2 kg)   SpO2 96%   BMI 21.59 kg/m²   TEMPERATURE:  Current - Temp: 97.9 °F (36.6 °C); Max - Temp  Av.7 °F (36.5 °C)  Min: 96.9 °F (36.1 °C)  Max: 98.5 °F (36.9 °C)    NAD  RRR, Nl s1s2  Lungs CTA Bilaterally, normal effort  Abdomen stable exam, tenderness near incisions. AAOx3    Data      CBC:   Recent Labs     20  1252 20  0559   WBC 4.4 6.5   RBC 4.52 4.31   HGB 13.6 12.8*   HCT 40.8 38.7*    253   MCV 90.5 89.9   MCH 30.1 29.7   MCHC 33.2 33.0   RDW 13.7 14.0        BMP:  Recent Labs     20  1252 20  0559    134*   K 4.8 4.2   CL 99 98*   CO2 28 27   BUN 6* 6*   CREATININE 0.9 0.7*   CALCIUM 9.2 8.9   GLUCOSE 91 116*        Hepatic Function Panel:   Recent Labs     20  1252   AST 23   ALT 20   BILITOT 0.3   ALKPHOS 117       No results for input(s): LIPASE, AMYLASE in the last 72 hours. No results for input(s): PROTIME, INR in the last 72 hours. No results for input(s): PTT in the last 72 hours. No results for input(s): OCCULTBLD in the last 72 hours. Radiology Review:    [facility] BARIUM ENEMA   Final Result   The large laminated stool ball is redemonstrated and grossly stable position   compared to recent CT scan, 2020, as described above. CT ABDOMEN PELVIS WO CONTRAST Additional Contrast? None   Final Result   1. The laminated stool ball at the proximal sigmoid colon has passed distally   somewhat since prior study.   Note that though this

## 2020-05-28 NOTE — CARE COORDINATION
Aware that pt had pleurix cath placed today. Order form for supplies on pt's chart for pulm MD to sign-needs faxed upon completion. Pt will d/c to ARU likely tomorrow. If any home care needs on d/c from ARU, [facility] can provide services (see prev note or JUSTIN for ph # and fax).   Electronically signed by JACQUE Roger on 5/28/2020 at 4:40 PM

## 2020-05-29 LAB
ANION GAP SERPL CALCULATED.3IONS-SCNC: 8 MMOL/L (ref 3–16)
BUN BLDV-MCNC: 5 MG/DL (ref 7–20)
CALCIUM SERPL-MCNC: 8.6 MG/DL (ref 8.3–10.6)
CHLORIDE BLD-SCNC: 100 MMOL/L (ref 99–110)
CO2: 26 MMOL/L (ref 21–32)
CREAT SERPL-MCNC: 0.6 MG/DL (ref 0.9–1.3)
GFR AFRICAN AMERICAN: >60
GFR NON-AFRICAN AMERICAN: >60
GLUCOSE BLD-MCNC: 105 MG/DL (ref 70–99)
HCT VFR BLD CALC: 39.3 % (ref 40.5–52.5)
HEMOGLOBIN: 12.9 G/DL (ref 13.5–17.5)
MCH RBC QN AUTO: 30.4 PG (ref 26–34)
MCHC RBC AUTO-ENTMCNC: 32.9 G/DL (ref 31–36)
MCV RBC AUTO: 92.2 FL (ref 80–100)
PDW BLD-RTO: 13.9 % (ref 12.4–15.4)
PLATELET # BLD: 215 K/UL (ref 135–450)
PMV BLD AUTO: 9.2 FL (ref 5–10.5)
POTASSIUM SERPL-SCNC: 4 MMOL/L (ref 3.5–5.1)
RBC # BLD: 4.26 M/UL (ref 4.2–5.9)
SODIUM BLD-SCNC: 134 MMOL/L (ref 136–145)
WBC # BLD: 4.4 K/UL (ref 4–11)

## 2020-05-29 PROCEDURE — 99024 POSTOP FOLLOW-UP VISIT: CPT | Performed by: SURGERY

## 2020-05-29 PROCEDURE — 6370000000 HC RX 637 (ALT 250 FOR IP): Performed by: NURSE PRACTITIONER

## 2020-05-29 PROCEDURE — APPNB30 APP NON BILLABLE TIME 0-30 MINS: Performed by: NURSE PRACTITIONER

## 2020-05-29 PROCEDURE — 6370000000 HC RX 637 (ALT 250 FOR IP): Performed by: SURGERY

## 2020-05-29 PROCEDURE — 36415 COLL VENOUS BLD VENIPUNCTURE: CPT

## 2020-05-29 PROCEDURE — 6360000002 HC RX W HCPCS: Performed by: SURGERY

## 2020-05-29 PROCEDURE — APPSS15 APP SPLIT SHARED TIME 0-15 MINUTES: Performed by: NURSE PRACTITIONER

## 2020-05-29 PROCEDURE — 1200000000 HC SEMI PRIVATE

## 2020-05-29 PROCEDURE — 2580000003 HC RX 258: Performed by: SURGERY

## 2020-05-29 PROCEDURE — 85027 COMPLETE CBC AUTOMATED: CPT

## 2020-05-29 PROCEDURE — 80048 BASIC METABOLIC PNL TOTAL CA: CPT

## 2020-05-29 PROCEDURE — 94760 N-INVAS EAR/PLS OXIMETRY 1: CPT

## 2020-05-29 RX ORDER — TRAMADOL HYDROCHLORIDE 50 MG/1
50 TABLET ORAL EVERY 6 HOURS PRN
Status: DISCONTINUED | OUTPATIENT
Start: 2020-05-29 | End: 2020-05-31 | Stop reason: HOSPADM

## 2020-05-29 RX ADMIN — PHENYTOIN SODIUM 200 MG: 100 CAPSULE ORAL at 20:32

## 2020-05-29 RX ADMIN — LAMOTRIGINE 175 MG: 25 TABLET ORAL at 20:32

## 2020-05-29 RX ADMIN — METOCLOPRAMIDE HYDROCHLORIDE 10 MG: 5 INJECTION INTRAMUSCULAR; INTRAVENOUS at 05:38

## 2020-05-29 RX ADMIN — KETOROLAC TROMETHAMINE 15 MG: 30 INJECTION, SOLUTION INTRAMUSCULAR at 05:09

## 2020-05-29 RX ADMIN — DOCUSATE SODIUM 100 MG: 100 CAPSULE ORAL at 20:31

## 2020-05-29 RX ADMIN — NALOXEGOL OXALATE 25 MG: 25 TABLET, FILM COATED ORAL at 08:34

## 2020-05-29 RX ADMIN — DOCUSATE SODIUM 100 MG: 100 CAPSULE ORAL at 08:34

## 2020-05-29 RX ADMIN — TRAMADOL HYDROCHLORIDE 50 MG: 50 TABLET, FILM COATED ORAL at 18:16

## 2020-05-29 RX ADMIN — ACETAMINOPHEN 1000 MG: 10 INJECTION, SOLUTION INTRAVENOUS at 05:38

## 2020-05-29 RX ADMIN — PANTOPRAZOLE SODIUM 40 MG: 40 TABLET, DELAYED RELEASE ORAL at 05:38

## 2020-05-29 RX ADMIN — LEVETIRACETAM 1500 MG: 500 TABLET ORAL at 08:33

## 2020-05-29 RX ADMIN — ONDANSETRON 4 MG: 2 INJECTION INTRAMUSCULAR; INTRAVENOUS at 09:42

## 2020-05-29 RX ADMIN — LEVETIRACETAM 1500 MG: 500 TABLET ORAL at 20:32

## 2020-05-29 RX ADMIN — KETOROLAC TROMETHAMINE 15 MG: 30 INJECTION, SOLUTION INTRAMUSCULAR at 20:32

## 2020-05-29 RX ADMIN — LAMOTRIGINE 175 MG: 25 TABLET ORAL at 08:33

## 2020-05-29 RX ADMIN — Medication 10 ML: at 20:32

## 2020-05-29 RX ADMIN — PHENYTOIN SODIUM 100 MG: 100 CAPSULE ORAL at 08:34

## 2020-05-29 ASSESSMENT — PAIN SCALES - GENERAL
PAINLEVEL_OUTOF10: 8
PAINLEVEL_OUTOF10: 7
PAINLEVEL_OUTOF10: 7
PAINLEVEL_OUTOF10: 3
PAINLEVEL_OUTOF10: 9

## 2020-05-29 ASSESSMENT — PAIN DESCRIPTION - LOCATION
LOCATION: ABDOMEN

## 2020-05-29 ASSESSMENT — PAIN DESCRIPTION - ORIENTATION
ORIENTATION: RIGHT
ORIENTATION: RIGHT

## 2020-05-29 ASSESSMENT — PAIN DESCRIPTION - PAIN TYPE
TYPE: SURGICAL PAIN

## 2020-05-29 NOTE — PROGRESS NOTES
[facility] Lapar[facility]        Progress Note    Patient Name: Lane Domínguez  MRN: [id number]  YOB: 1975  Date of Evaluation: 2020    Chief Complaint: Abdominal pain      Subjective:  No acute events overnight  Pain controlled  Reports feeling dizzy and slightly nauseous after ambulating but feels like may be related to medications, reports similar by more mild symptoms in the past related to his home medication regimen  Denies vomiting, tolerated full liquid breakfast this morning  Passing flatus and stool, blood noted  Resting in bed at this time    Post-Operative Day #2      Vital Signs:  Patient Vitals for the past 24 hrs:   BP Temp Temp src Pulse Resp SpO2   20 1147 135/81 97.6 °F (36.4 °C) Oral 99 16 96 %   20 1015 -- -- -- -- -- 96 %   20 0746 129/75 97.7 °F (36.5 °C) Oral 93 16 96 %   20 0543 135/78 98.1 °F (36.7 °C) Oral 94 16 97 %   20 0035 124/77 98 °F (36.7 °C) Oral 92 16 99 %   20 2111 (!) 148/83 98.1 °F (36.7 °C) Oral 98 16 97 %   20 1652 135/88 98.1 °F (36.7 °C) Oral 105 16 99 %   20 1258 -- -- -- -- 16 97 %   20 1256 (!) 144/82 97.9 °F (36.6 °C) Oral 101 16 98 %      TEMPERATURE HISTORY 24H: Temp (24hrs), Av.9 °F (36.6 °C), Min:97.6 °F (36.4 °C), Max:98.1 °F (36.7 °C)    BLOOD PRESSURE HISTORY: Systolic (97FPV), JDE:309 , Min:124 , TPY:300    Diastolic (53ZHW), NNT:68, Min:75, Max:96      Intake/Output:  I/O last 3 completed shifts: In: 830 [I.V.:830]  Out: -   No intake/output data recorded.   Drain/tube Output:       Physical Exam:  General: awake, alert, oriented to  person, place, time  Lungs: unlabored respirations  Abdomen: soft, non-distended, incisional tenderness only, bowel sounds present   Skin/Wound: healing well, no drainage, no erythema, well approximated    Labs:  CBC:    Recent Labs     20  1252 20  0559   WBC 4.4 6.5   HGB 13.6 12.8*   HCT 40.8 38.7*    253 25 mg Oral QAM    sodium chloride flush  10 mL Intravenous 2 times per day    enoxaparin  40 mg Subcutaneous Nightly    lamoTRIgine  175 mg Oral BID     Continuous Infusions:   dextrose 5% and 0.45% NaCl with KCl 20 mEq 50 mL/hr at 05/28/20 2343     PRN Meds:.morphine, ketorolac, benzocaine-menthol, sodium chloride flush, [DISCONTINUED] acetaminophen **OR** acetaminophen, ondansetron, promethazine      Assessment:  39 y.o. male admitted with   1. Acute colitis    2. Acute abdominal pain    3. Constipation, unspecified constipation type    4. Failure of outpatient treatment        Status-post laparoscopic assisted sigmoid colon resection on 5/27/2020 for fecal impaction, colonic dysmotility and sigmoid colon obstruction      Plan:  1. Stop Reglan, passing stool and possible that Reglan could interact with patient's home medication, continue with supportive care and monitoring  2. Advance to low fiber diet as tolerated; continue to monitor bowel function--passing stool  3. IV hydration; monitor and correct electrolytes  4. Activity as tolerated, ambulate TID, up to chair for all meals  5. Pulmonary toilet, incentive spirometry  6. PRN analgesics and antiemetics--minimizing narcotics as tolerated, continue PRN Toradol otherwise transition to PO  7. DVT prophylaxis with Lovenox  8. Management of medical comorbid etiologies per primary team and consulting services  9. Disposition: Discharge planning, anticipate discharge home in the next 24-48 hours    EDUCATION:  Educated patient on plan of care and disease process--all questions answered. Plans discussed with patient and nursing. Reviewed and discussed with Dr. Natalee Galo.       Signed:  JOHN Cano - CNP  5/29/2020 12:47 PM     Surg Staff:   Pt seen and examined with NP  See full note above  Pt with some dizziness, o/w feeling well, no sig abd pain  Will try to adv diet today  Continue medical care    Sujatha Vega PAIN

## 2020-05-29 NOTE — PROGRESS NOTES
[address] [place] [address] PROGRESS NOTE    5/29/2020 12:19 PM        Name: Danial Benjamin . Admitted: 5/10/2020  Primary Care Provider: Dev Loya MD (Tel: [phone])      Subjective:  Patient is a 38 yo male with hx seizure disorder, childhood leukemia in remission, RA. He presented to hospital with n/v, abdominal pain. Found to have evidence of colitis and constipation on CT scan. Procedure(s): 5/27  DIAGNOSTIC LAPAROSCOPY,REMOVAL OF FECAL IMPACTION, SIGMOID COLON RESECTION    Presently resting in bed. Had been up ambulatory in zhou with nurse and noticed some dizziness, vss. He has been tolerating liquid diet, passing flatus, some stool. Operative pain controlled.      Reviewed interval ancillary notes    Current Medications  morphine (PF) injection 2 mg, Q3H PRN  metoclopramide (REGLAN) injection 10 mg, Q6H  ketorolac (TORADOL) injection 15 mg, Q6H PRN  dextrose 5 % and 0.45 % NaCl with KCl 20 mEq infusion, Continuous  docusate sodium (COLACE) capsule 100 mg, BID  benzocaine-menthol (CEPACOL SORE THROAT) lozenge 1 lozenge, Q2H PRN  pantoprazole (PROTONIX) tablet 40 mg, QAM AC  levETIRAcetam (KEPPRA) tablet 1,500 mg, BID  phenytoin (DILANTIN) ER capsule 100 mg, Daily  phenytoin (DILANTIN) ER capsule 200 mg, Nightly  naloxegol (MOVANTIK) tablet 25 mg, QAM  sodium chloride flush 0.9 % injection 10 mL, 2 times per day  sodium chloride flush 0.9 % injection 10 mL, PRN  acetaminophen (TYLENOL) suppository 650 mg, Q6H PRN  enoxaparin (LOVENOX) injection 40 mg, Nightly  lamoTRIgine (LAMICTAL) tablet 175 mg, BID  ondansetron (ZOFRAN) injection 4 mg, Q6H PRN  promethazine (PHENERGAN) tablet 12.5 mg, Q6H PRN      Objective:  /81   Pulse 99   Temp 97.6 °F (36.4 °C) (Oral)   Resp 16   Ht 6' (1.829 m)   Wt 159 lb 3.2 oz (72.2 kg)   SpO2 96%   BMI 21.59 kg/m²     Intake/Output Summary (Last 24 hours) at 5/29/2020 nonobstructing renal calculi.         CT abdomen 5/21/2020:  Large concretion of stool in the proximal sigmoid colon.  This has   progressed distally, having previously been located in the mid descending   colon.  There is mild colonic wall thickening from the mid descending through   the proximal sigmoid compatible with colitis, presumably secondary to   compression or irritation of the colonic wall by the stool concretion.  The   remainder of the colon is devoid of stool and contains fluid likely related   to bowel cleansing preparation   2. Bilateral nephrolithiasis     CT abdomen 5/24/2020:  1. The laminated stool ball at the proximal sigmoid colon has passed distally   somewhat since prior study.  Note that though this projects deep within the   pelvis, adjacent to the urinary bladder, this is at the proximal sigmoid   colon. This has at least 36 cm of colon remaining to pass through to be at   the rectum. 2. Bilateral nephrolithiasis without hydronephrosis or ureter calculus. 3. Bilateral medullary calcinosis can be seen with medullary sponge kidney. KUB for NG placement 5/10/2020:  FINDINGS:   The nasogastric tube, including distal side port, is in the gastric fundus. Nonspecific bowel gas pattern is noted. Rosetta Peoples is no free air.  Lung bases   are clear. Xray abd 5/14/2020: Moderate stool persists in the distal colon and rectum indicating   constipation.  Fecal impaction cannot be excluded with mild air-filled   distention of the more proximal colon. Barium enema 5/15/2020:  Unremarkable single contrast barium enema. KUB abdomen 5/17/2020:  Decreased stool load.  Retained water-soluble contrast is seen throughout the   colon.  Colon remains distended       Problem List  Active Problems:    Acute colitis    Fecal impaction ([address]    Colonic dysmotility    Colon obstruction (HCC)  Resolved Problems:    * No resolved hospital problems. *       Assessment & Plan:   1.  Constipation / fecal impaction. Severe fecal impaction suspicious for colonic inertia. S/p diagnostic laparoscopy, removal of fecal impaction, sigmoid colon resection 5/27. POD # 1. Tolerating liquid diet. 2. Hx seizure. Continue antiepileptics (dilantin, Keppra, and Lamictal). No seizure activity. 3. Skin lesion left side. Possible raised mole/cherry angioma. No further bleeding. Recommend excision as outpatient. 4. Dizziness. Suspect secondary to meds. BP is stable. Continue to monitor.      Diet: DIET FULL LIQUID;  Dietary Nutrition Supplements: Standard High Calorie Oral Supplement  Code:Full Code  DVT PPX: on enoxaparin      Haven Duenas, APRN - CNP   5/29/2020 12:19 PM

## 2020-05-29 NOTE — PROGRESS NOTES
Shift assessment completed. Medications given per MAR. Toradol, morphine and scheduled IV acetaminophen for pain. Patient up to bathroom independently after setup. Denies other needs. The care plan and education has been reviewed and mutually agreed upon with the patient.

## 2020-05-30 LAB
ANION GAP SERPL CALCULATED.3IONS-SCNC: 9 MMOL/L (ref 3–16)
BUN BLDV-MCNC: 6 MG/DL (ref 7–20)
CALCIUM SERPL-MCNC: 8.9 MG/DL (ref 8.3–10.6)
CHLORIDE BLD-SCNC: 99 MMOL/L (ref 99–110)
CO2: 26 MMOL/L (ref 21–32)
CREAT SERPL-MCNC: 0.6 MG/DL (ref 0.9–1.3)
GFR AFRICAN AMERICAN: >60
GFR NON-AFRICAN AMERICAN: >60
GLUCOSE BLD-MCNC: 88 MG/DL (ref 70–99)
HCT VFR BLD CALC: 36.9 % (ref 40.5–52.5)
HEMOGLOBIN: 12.2 G/DL (ref 13.5–17.5)
MCH RBC QN AUTO: 30.1 PG (ref 26–34)
MCHC RBC AUTO-ENTMCNC: 32.9 G/DL (ref 31–36)
MCV RBC AUTO: 91.5 FL (ref 80–100)
PDW BLD-RTO: 13.5 % (ref 12.4–15.4)
PLATELET # BLD: 228 K/UL (ref 135–450)
PMV BLD AUTO: 9.4 FL (ref 5–10.5)
POTASSIUM SERPL-SCNC: 4.3 MMOL/L (ref 3.5–5.1)
RBC # BLD: 4.03 M/UL (ref 4.2–5.9)
SODIUM BLD-SCNC: 134 MMOL/L (ref 136–145)
WBC # BLD: 4.5 K/UL (ref 4–11)

## 2020-05-30 PROCEDURE — 99024 POSTOP FOLLOW-UP VISIT: CPT | Performed by: SURGERY

## 2020-05-30 PROCEDURE — 6370000000 HC RX 637 (ALT 250 FOR IP): Performed by: SURGERY

## 2020-05-30 PROCEDURE — 80048 BASIC METABOLIC PNL TOTAL CA: CPT

## 2020-05-30 PROCEDURE — 6370000000 HC RX 637 (ALT 250 FOR IP): Performed by: NURSE PRACTITIONER

## 2020-05-30 PROCEDURE — 85027 COMPLETE CBC AUTOMATED: CPT

## 2020-05-30 PROCEDURE — 1200000000 HC SEMI PRIVATE

## 2020-05-30 PROCEDURE — 36415 COLL VENOUS BLD VENIPUNCTURE: CPT

## 2020-05-30 PROCEDURE — 6360000002 HC RX W HCPCS: Performed by: SURGERY

## 2020-05-30 PROCEDURE — 94760 N-INVAS EAR/PLS OXIMETRY 1: CPT

## 2020-05-30 PROCEDURE — 2580000003 HC RX 258: Performed by: SURGERY

## 2020-05-30 RX ADMIN — LAMOTRIGINE 175 MG: 25 TABLET ORAL at 20:47

## 2020-05-30 RX ADMIN — PHENYTOIN SODIUM 200 MG: 100 CAPSULE ORAL at 20:47

## 2020-05-30 RX ADMIN — TRAMADOL HYDROCHLORIDE 50 MG: 50 TABLET, FILM COATED ORAL at 21:46

## 2020-05-30 RX ADMIN — TRAMADOL HYDROCHLORIDE 50 MG: 50 TABLET, FILM COATED ORAL at 14:47

## 2020-05-30 RX ADMIN — DOCUSATE SODIUM 100 MG: 100 CAPSULE ORAL at 09:32

## 2020-05-30 RX ADMIN — PHENYTOIN SODIUM 100 MG: 100 CAPSULE ORAL at 09:32

## 2020-05-30 RX ADMIN — LAMOTRIGINE 175 MG: 25 TABLET ORAL at 09:32

## 2020-05-30 RX ADMIN — NALOXEGOL OXALATE 25 MG: 25 TABLET, FILM COATED ORAL at 09:32

## 2020-05-30 RX ADMIN — DOCUSATE SODIUM 100 MG: 100 CAPSULE ORAL at 20:47

## 2020-05-30 RX ADMIN — PANTOPRAZOLE SODIUM 40 MG: 40 TABLET, DELAYED RELEASE ORAL at 05:11

## 2020-05-30 RX ADMIN — Medication 10 ML: at 20:48

## 2020-05-30 RX ADMIN — LEVETIRACETAM 1500 MG: 500 TABLET ORAL at 20:47

## 2020-05-30 RX ADMIN — LEVETIRACETAM 1500 MG: 500 TABLET ORAL at 09:32

## 2020-05-30 RX ADMIN — KETOROLAC TROMETHAMINE 15 MG: 30 INJECTION, SOLUTION INTRAMUSCULAR at 19:29

## 2020-05-30 RX ADMIN — TRAMADOL HYDROCHLORIDE 50 MG: 50 TABLET, FILM COATED ORAL at 05:11

## 2020-05-30 ASSESSMENT — PAIN DESCRIPTION - ORIENTATION
ORIENTATION: RIGHT
ORIENTATION: RIGHT
ORIENTATION: OTHER (COMMENT)

## 2020-05-30 ASSESSMENT — PAIN DESCRIPTION - PAIN TYPE
TYPE: SURGICAL PAIN

## 2020-05-30 ASSESSMENT — PAIN DESCRIPTION - LOCATION
LOCATION: ABDOMEN

## 2020-05-30 ASSESSMENT — PAIN SCALES - GENERAL
PAINLEVEL_OUTOF10: 7
PAINLEVEL_OUTOF10: 10
PAINLEVEL_OUTOF10: 9
PAINLEVEL_OUTOF10: 6
PAINLEVEL_OUTOF10: 8
PAINLEVEL_OUTOF10: 7
PAINLEVEL_OUTOF10: 10

## 2020-05-30 ASSESSMENT — PAIN DESCRIPTION - DESCRIPTORS: DESCRIPTORS: SORE

## 2020-05-30 NOTE — PLAN OF CARE
Problem: Pain:  Goal: Pain level will decrease  Description: Pain level will decrease  5/30/2020 1904 by Gorge Aguirre RN  Outcome: Ongoing  5/30/2020 0831 by Marciano Aase, RN  Outcome: Ongoing  Goal: Control of acute pain  Description: Control of acute pain  5/30/2020 1904 by Gorge Aguirre RN  Outcome: Ongoing  5/30/2020 0831 by Marciano Aase, RN  Outcome: Ongoing  Goal: Control of chronic pain  Description: Control of chronic pain  5/30/2020 1904 by Gorge Aguirre RN  Outcome: Ongoing  5/30/2020 0831 by Marciano Aase, RN  Outcome: Ongoing     Problem:  Bowel/Gastric:  Goal: Bowel function will improve  Description: Bowel function will improve  5/30/2020 1904 by Gorge Aguirre RN  Outcome: Ongoing  5/30/2020 0831 by Marciano Aase, RN  Outcome: Ongoing  Goal: Occurrences of vomiting will decrease  Description: Occurrences of vomiting will decrease  5/30/2020 1904 by Gorge Aguirre RN  Outcome: Ongoing  5/30/2020 0831 by Marciano Aase, RN  Outcome: Ongoing  Goal: Control of bowel function will improve  Description: Control of bowel function will improve  5/30/2020 1904 by Gorge Aguirre RN  Outcome: Ongoing  5/30/2020 0831 by Marciano Aase, RN  Outcome: Ongoing  Goal: Ability to achieve a regular elimination pattern will improve  Description: Ability to achieve a regular elimination pattern will improve  5/30/2020 1904 by Gorge Aguirre RN  Outcome: Ongoing  5/30/2020 0831 by Marciano Aase, RN  Outcome: Ongoing     Problem: Fluid Volume:  Goal: Maintenance of adequate hydration will improve  Description: Maintenance of adequate hydration will improve  5/30/2020 1904 by Gorge Aguirre RN  Outcome: Ongoing  5/30/2020 0831 by Marciano Aase, RN  Outcome: Ongoing     Problem: Safety:  Goal: Ability to remain free from injury will improve  Description: Ability to remain free from injury will improve  5/30/2020 1904 by Gorge Aguirre RN  Outcome: Ongoing  5/30/2020 0831 by Marciano Aase, RN  Outcome: Ongoing     Problem: Falls - Risk of:  Goal: Will remain free from falls  Description: Will remain free from falls  5/30/2020 1904 by Araceli Cooley RN  Outcome: Ongoing  5/30/2020 0831 by Sherryle Lowenstein, RN  Outcome: Ongoing  Goal: Absence of physical injury  Description: Absence of physical injury  5/30/2020 1904 by Araceli Cooley RN  Outcome: Ongoing  5/30/2020 0831 by Sherryle Lowenstein, RN  Outcome: Ongoing     Problem: Nutrition  Goal: Optimal nutrition therapy  5/30/2020 1904 by Araceli Cooley RN  Outcome: Ongoing  5/30/2020 0831 by Sherryle Lowenstein, RN  Outcome: Ongoing

## 2020-05-30 NOTE — PLAN OF CARE
remain free from falls  Description: Will remain free from falls  5/30/2020 0831 by Lynnette Pepper RN  Outcome: Ongoing  5/29/2020 1914 by Trixie Centeno RN  Outcome: Ongoing  Goal: Absence of physical injury  Description: Absence of physical injury  5/30/2020 0831 by Lynnette Pepper RN  Outcome: Ongoing  5/29/2020 1914 by Trixie Centeno RN  Outcome: Ongoing     Problem: Nutrition  Goal: Optimal nutrition therapy  5/30/2020 0831 by Lynnette Pepper RN  Outcome: Ongoing  5/29/2020 1914 by Trixie Centeno RN  Outcome: Ongoing

## 2020-05-30 NOTE — PROGRESS NOTES
[facility]        Progress Note    Patient Name: Gladys Au  MRN: [id number]  YOB: 1975  Date of Evaluation: 2020    Chief Complaint: Abdominal pain      Subjective:  No acute events overnight  Tolerating diet  Passing only small amounts of stool  Pain controlled  Ambulating some    Post-Operative Day #3      Vital Signs:  Patient Vitals for the past 24 hrs:   BP Temp Temp src Pulse Resp SpO2   20 0921 135/81 97.4 °F (36.3 °C) Oral 100 14 98 %   20 0508 133/83 98.1 °F (36.7 °C) Oral 95 16 97 %   20 0036 (!) 143/86 98 °F (36.7 °C) Oral 97 16 95 %   20 2349 -- -- -- -- 16 97 %   20 2028 (!) 150/80 98.1 °F (36.7 °C) Oral 92 16 92 %   20 1934 -- -- -- -- -- 96 %   20 1614 -- -- -- -- -- 96 %   20 1147 135/81 97.6 °F (36.4 °C) Oral 99 16 96 %   20 1015 -- -- -- -- -- 96 %      TEMPERATURE HISTORY 24H: Temp (24hrs), Av.8 °F (36.6 °C), Min:97.4 °F (36.3 °C), Max:98.1 °F (36.7 °C)    BLOOD PRESSURE HISTORY: Systolic (55BCY), KTA:900 , Min:124 , YRS:321    Diastolic (59GZW), HWF:18, Min:75, Max:86      Intake/Output:  No intake/output data recorded. No intake/output data recorded. Drain/tube Output:       Physical Exam:  General: awake, alert, oriented to  person, place, time  Abdomen: soft, non-distended, appropriate incisional tenderness, incisions clean dry and intact    Labs:  CBC:    Recent Labs     20  0559 20  1326 20  0622   WBC 6.5 4.4 4.5   HGB 12.8* 12.9* 12.2*   HCT 38.7* 39.3* 36.9*    215 228     BMP:    Recent Labs     20  0559 20  1326 20  0622   * 134* 134*   K 4.2 4.0 4.3   CL 98* 100 99   CO2 27 26 26   BUN 6* 5* 6*   CREATININE 0.7* 0.6* 0.6*   GLUCOSE 116* 105* 88     Hepatic:    No results for input(s): AST, ALT, ALB, BILITOT, ALKPHOS in the last 72 hours.   Amylase:  No results found for: AMYLASE  Lipase:    Lab Results promethazine      Assessment:  39 y.o. male admitted with   1. Acute colitis    2. Acute abdominal pain    3. Constipation, unspecified constipation type    4. Failure of outpatient treatment        Status-post laparoscopic assisted sigmoid colon resection on 5/27/2020 for fecal impaction, colonic dysmotility and sigmoid colon obstruction    Plan:  1. Tolerating diet  2. Passing small amount of stool  3. Pain controlled, minimize narcotics  4. Ambulating but expresses concern for being able to take care of self at home, will consult PT/OT to assess needs at discharge  5. Defer management of remainder of medical comorbidities to primary and consulting teams  6. Discharge planning, patient seems resistant to discharge, will consult social work as well to help assess needs and impediments to discharge    [address] [facility] [address] [place].  Lady Savana SALCEDO, FACS  5/30/2020  1:07 PM

## 2020-05-31 VITALS
DIASTOLIC BLOOD PRESSURE: 89 MMHG | HEIGHT: 72 IN | HEART RATE: 100 BPM | TEMPERATURE: 98.2 F | SYSTOLIC BLOOD PRESSURE: 146 MMHG | OXYGEN SATURATION: 97 % | RESPIRATION RATE: 16 BRPM | WEIGHT: 159.2 LBS | BODY MASS INDEX: 21.56 KG/M2

## 2020-05-31 LAB
ANION GAP SERPL CALCULATED.3IONS-SCNC: 8 MMOL/L (ref 3–16)
BASOPHILS ABSOLUTE: 0 K/UL (ref 0–0.2)
BASOPHILS RELATIVE PERCENT: 0.6 %
BUN BLDV-MCNC: 12 MG/DL (ref 7–20)
CALCIUM SERPL-MCNC: 9.1 MG/DL (ref 8.3–10.6)
CHLORIDE BLD-SCNC: 100 MMOL/L (ref 99–110)
CO2: 29 MMOL/L (ref 21–32)
CREAT SERPL-MCNC: 0.8 MG/DL (ref 0.9–1.3)
EOSINOPHILS ABSOLUTE: 0.3 K/UL (ref 0–0.6)
EOSINOPHILS RELATIVE PERCENT: 5.4 %
GFR AFRICAN AMERICAN: >60
GFR NON-AFRICAN AMERICAN: >60
GLUCOSE BLD-MCNC: 117 MG/DL (ref 70–99)
HCT VFR BLD CALC: 35.1 % (ref 40.5–52.5)
HEMOGLOBIN: 11.7 G/DL (ref 13.5–17.5)
LYMPHOCYTES ABSOLUTE: 1.2 K/UL (ref 1–5.1)
LYMPHOCYTES RELATIVE PERCENT: 24.4 %
MAGNESIUM: 1.9 MG/DL (ref 1.8–2.4)
MCH RBC QN AUTO: 30.2 PG (ref 26–34)
MCHC RBC AUTO-ENTMCNC: 33.3 G/DL (ref 31–36)
MCV RBC AUTO: 90.5 FL (ref 80–100)
MONOCYTES ABSOLUTE: 0.5 K/UL (ref 0–1.3)
MONOCYTES RELATIVE PERCENT: 11.4 %
NEUTROPHILS ABSOLUTE: 2.8 K/UL (ref 1.7–7.7)
NEUTROPHILS RELATIVE PERCENT: 58.2 %
PDW BLD-RTO: 13.7 % (ref 12.4–15.4)
PHOSPHORUS: 3.4 MG/DL (ref 2.5–4.9)
PLATELET # BLD: 238 K/UL (ref 135–450)
PMV BLD AUTO: 9 FL (ref 5–10.5)
POTASSIUM SERPL-SCNC: 4.2 MMOL/L (ref 3.5–5.1)
RBC # BLD: 3.88 M/UL (ref 4.2–5.9)
SODIUM BLD-SCNC: 137 MMOL/L (ref 136–145)
WBC # BLD: 4.7 K/UL (ref 4–11)

## 2020-05-31 PROCEDURE — 85025 COMPLETE CBC W/AUTO DIFF WBC: CPT

## 2020-05-31 PROCEDURE — 97535 SELF CARE MNGMENT TRAINING: CPT

## 2020-05-31 PROCEDURE — 80048 BASIC METABOLIC PNL TOTAL CA: CPT

## 2020-05-31 PROCEDURE — 6370000000 HC RX 637 (ALT 250 FOR IP): Performed by: SURGERY

## 2020-05-31 PROCEDURE — 97166 OT EVAL MOD COMPLEX 45 MIN: CPT

## 2020-05-31 PROCEDURE — 36415 COLL VENOUS BLD VENIPUNCTURE: CPT

## 2020-05-31 PROCEDURE — 83735 ASSAY OF MAGNESIUM: CPT

## 2020-05-31 PROCEDURE — 97530 THERAPEUTIC ACTIVITIES: CPT

## 2020-05-31 PROCEDURE — 84100 ASSAY OF PHOSPHORUS: CPT

## 2020-05-31 PROCEDURE — 97116 GAIT TRAINING THERAPY: CPT

## 2020-05-31 PROCEDURE — 99024 POSTOP FOLLOW-UP VISIT: CPT | Performed by: SURGERY

## 2020-05-31 PROCEDURE — 6370000000 HC RX 637 (ALT 250 FOR IP): Performed by: NURSE PRACTITIONER

## 2020-05-31 PROCEDURE — 97162 PT EVAL MOD COMPLEX 30 MIN: CPT

## 2020-05-31 RX ORDER — PSEUDOEPHEDRINE HCL 30 MG
100 TABLET ORAL 2 TIMES DAILY
COMMUNITY
Start: 2020-05-31 | End: 2020-07-24 | Stop reason: ALTCHOICE

## 2020-05-31 RX ORDER — PHENYTOIN SODIUM 200 MG/1
200 CAPSULE, EXTENDED RELEASE ORAL NIGHTLY
Qty: 60 CAPSULE | Refills: 3 | Status: SHIPPED | OUTPATIENT
Start: 2020-05-31 | End: 2020-06-12 | Stop reason: ALTCHOICE

## 2020-05-31 RX ORDER — TRAMADOL HYDROCHLORIDE 50 MG/1
50 TABLET ORAL EVERY 6 HOURS PRN
Qty: 30 TABLET | Refills: 0 | Status: SHIPPED | OUTPATIENT
Start: 2020-05-31 | End: 2020-06-07

## 2020-05-31 RX ORDER — LAMOTRIGINE 25 MG/1
175 TABLET ORAL 2 TIMES DAILY
Qty: 30 TABLET | Refills: 3 | Status: SHIPPED | OUTPATIENT
Start: 2020-05-31 | End: 2020-06-12 | Stop reason: ALTCHOICE

## 2020-05-31 RX ORDER — LEVETIRACETAM 750 MG/1
1500 TABLET ORAL 2 TIMES DAILY
Qty: 60 TABLET | Refills: 3 | Status: SHIPPED | OUTPATIENT
Start: 2020-05-31 | End: 2020-06-12 | Stop reason: ALTCHOICE

## 2020-05-31 RX ORDER — PHENYTOIN SODIUM 100 MG/1
100 CAPSULE, EXTENDED RELEASE ORAL DAILY
Qty: 60 CAPSULE | Refills: 3 | Status: SHIPPED | OUTPATIENT
Start: 2020-06-01 | End: 2020-06-12 | Stop reason: ALTCHOICE

## 2020-05-31 RX ADMIN — PHENYTOIN SODIUM 100 MG: 100 CAPSULE ORAL at 08:31

## 2020-05-31 RX ADMIN — LEVETIRACETAM 1500 MG: 500 TABLET ORAL at 08:31

## 2020-05-31 RX ADMIN — PANTOPRAZOLE SODIUM 40 MG: 40 TABLET, DELAYED RELEASE ORAL at 05:02

## 2020-05-31 RX ADMIN — TRAMADOL HYDROCHLORIDE 50 MG: 50 TABLET, FILM COATED ORAL at 05:11

## 2020-05-31 RX ADMIN — LAMOTRIGINE 175 MG: 25 TABLET ORAL at 08:31

## 2020-05-31 RX ADMIN — DOCUSATE SODIUM 100 MG: 100 CAPSULE ORAL at 08:31

## 2020-05-31 RX ADMIN — NALOXEGOL OXALATE 25 MG: 25 TABLET, FILM COATED ORAL at 08:31

## 2020-05-31 RX ADMIN — TRAMADOL HYDROCHLORIDE 50 MG: 50 TABLET, FILM COATED ORAL at 12:36

## 2020-05-31 ASSESSMENT — PAIN DESCRIPTION - DESCRIPTORS: DESCRIPTORS: SORE

## 2020-05-31 ASSESSMENT — PAIN SCALES - GENERAL
PAINLEVEL_OUTOF10: 8
PAINLEVEL_OUTOF10: 8
PAINLEVEL_OUTOF10: 7
PAINLEVEL_OUTOF10: 4
PAINLEVEL_OUTOF10: 4

## 2020-05-31 ASSESSMENT — PAIN DESCRIPTION - PAIN TYPE
TYPE: SURGICAL PAIN

## 2020-05-31 ASSESSMENT — PAIN DESCRIPTION - ONSET: ONSET: ON-GOING

## 2020-05-31 ASSESSMENT — PAIN DESCRIPTION - LOCATION
LOCATION: ABDOMEN

## 2020-05-31 ASSESSMENT — PAIN DESCRIPTION - PROGRESSION: CLINICAL_PROGRESSION: GRADUALLY IMPROVING

## 2020-05-31 ASSESSMENT — PAIN SCALES - WONG BAKER: WONGBAKER_NUMERICALRESPONSE: 0

## 2020-05-31 ASSESSMENT — PAIN DESCRIPTION - FREQUENCY: FREQUENCY: CONTINUOUS

## 2020-05-31 ASSESSMENT — PAIN - FUNCTIONAL ASSESSMENT: PAIN_FUNCTIONAL_ASSESSMENT: ACTIVITIES ARE NOT PREVENTED

## 2020-05-31 NOTE — PROGRESS NOTES
LIPASE 9.0 05/10/2020    LIPASE 20 09/09/2011      Mag:    Lab Results   Component Value Date    MG 1.90 05/31/2020    MG 1.90 05/18/2020     Phos:     Lab Results   Component Value Date    PHOS 3.4 05/31/2020      Coags: No results found for: PROTIME, INR, APTT    Cultures:  Anaerobic culture  No results found for: LABANAE  Fungus stain  No results found for requested labs within last 30 days. Gram stain  No results found for requested labs within last 30 days. Organism  No results found for: [facility]  Surgical culture  No results found for: CXSURG  Blood culture 1  No results found for requested labs within last 30 days. Blood culture 2  No results found for requested labs within last 30 days. Fecal occult  No results found for requested labs within last 30 days. GI bacterial pathogens by PCR  No results found for requested labs within last 30 days. C. difficile  No results found for requested labs within last 30 days. Urine culture  Lab Results   Component Value Date    LABURIN  04/12/2019     <50,000 CFU/ml mixed skin/urogenital ruth. No further workup       Pathology:  OR 5/27/2020--FINAL DIAGNOSIS:    Sigmoid colon, resection:     - Segment of colon with mild reactive change- See Comment. COMMENT: Gross examination reveals the lumen filled with fecal material,  compatible with reported history of fecal impaction. Imaging:  I have personally reviewed the following films:    No results found.     Scheduled Meds:   docusate sodium  100 mg Oral BID    pantoprazole  40 mg Oral QAM AC    levETIRAcetam  1,500 mg Oral BID    phenytoin  100 mg Oral Daily    phenytoin  200 mg Oral Nightly    naloxegol  25 mg Oral QAM    sodium chloride flush  10 mL Intravenous 2 times per day    enoxaparin  40 mg Subcutaneous Nightly    lamoTRIgine  175 mg Oral BID     Continuous Infusions:    PRN Meds:.traMADol, ketorolac, benzocaine-menthol, sodium chloride flush, [DISCONTINUED] acetaminophen

## 2020-05-31 NOTE — DISCHARGE INSTR - COC
Continuity of Care Form    Patient Name: Lane Domínguez   :  1975  MRN:  [id number]    Admit date:  5/10/2020  Discharge date:  20    Code Status Order: Full Code   Advance Directives:   Advance Care Flowsheet Documentation     Date/Time Healthcare Directive Type of Healthcare Directive Copy in [address] Agent's Name Healthcare Agent's Phone Number    20 [phone]  Yes, patient has an advance directive for healthcare treatment  --  No, copy requested from family  --  --  --    05/10/20 1620  Yes, patient has an advance directive for healthcare treatment  Durable power of  for health care  No, copy requested from family  Patient's parents  Basil Ric and Brendan Roedemetris  --          Admitting Physician:  Bess Love MD  PCP: Colby Abebe MD    Discharging Nurse:  [facility] Unit/Room#: 5GA-7191/7384-17  Discharging Unit Phone Number: [phone]    Emergency Contact:   Extended Emergency Contact Information  Primary Emergency Contact: Brent Huffman   [address] Phone: [phone]  Relation: Parent    Past Surgical History:  Past Surgical History:   Procedure Laterality Date    COLONOSCOPY N/A 2020    COLONOSCOPY FLEXIBLE W/ DECOMPRESSION performed by Johny Stephens MD at [address] [facility]  2020    COLONOSCOPY WITH BIOPSY performed by Johny Stephens MD at [address] [facility]  2020    COLONOSCOPY DIAGNOSTIC performed by Uriel Mccormick MD at [place] [address] 2020    DIAGNOSTIC LAPAROSCOPY,REMOVAL OF FECAL IMPACTION, SIGMOID COLON RESECTION performed by Bess Love MD at [address] ARTHROSCOPY Right 2018    RIGHT SHOULDER ARTHROSCOPY SUBACROMIAL DECOMPRESSION, OPEN    SIGMOIDOSCOPY N/A 2020    SIGMOIDOSCOPY DIAGNOSTIC FLEXIBLE performed by Uriel Mccormick MD at [address] [facility] ENDOSCOPY    UPPER GASTROINTESTINAL ENDOSCOPY N/A 5/18/2020    EGD BIOPSY performed by Cr Isaac MD at [address] [facility]       Immunization History:   Immunization History   Administered Date(s) Administered    Hepatitis B 01/01/1995    Influenza Vaccine, unspecified formulation 11/04/2016    Influenza Virus Vaccine 10/24/2003    Influenza, Tegan Cutter, IM, PF (6 mo and older Fluzone, Flulaval, Fluarix, and 3 yrs and older Afluria) 11/06/2018, 10/28/2019    MMR 01/01/1992    Pneumococcal Polysaccharide (Omaxyttan40) 02/25/2009    Td, unspecified formulation 01/01/1992    Tdap (Boostrix, Adacel) 05/19/2016       Active Problems:  Patient Active Problem List   Diagnosis Code    Seizure disorder ([facility] G40.909    Adhesive capsulitis of shoulder M75.00    Seborrheic dermatitis L21.9    History of leukemia Z85.6    Balance disorder R26.89    Primary osteoarthritis involving multiple joints M15.0    Acute colitis K52.9    Fecal impaction ([facility] K56.41    Colonic dysmotility K59.9    Colon obstruction ([facility] K56.609       Isolation/Infection:   Isolation          No Isolation        Patient Infection Status     None to display          Nurse Assessment:  Last Vital Signs: BP (!) 146/89   Pulse 100   Temp 98.2 °F (36.8 °C) (Oral)   Resp 16   Ht 6' (1.829 m)   Wt 159 lb 3.2 oz (72.2 kg)   SpO2 97%   BMI 21.59 kg/m²     Last documented pain score (0-10 scale): Pain Level: 8  Last Weight:   Wt Readings from Last 1 Encounters:   05/23/20 159 lb 3.2 oz (72.2 kg)     Mental Status:  oriented and alert    IV Access:  - None    Nursing Mobility/ADLs:  Walking    Transfer  Assisted  Bathing  Assisted  Dressing  Independent  Toileting  Assisted  Feeding  Independent  Med Admin  Independent  Med Delivery   whole    Wound Care Documentation and Therapy:  Incision 04/27/18 Shoulder Right (Active)   Number of days: 765        Elimination:  Continence:   · Bowel:  Yes  · Bladder: Yes  Urinary Catheter: None

## 2020-05-31 NOTE — PROGRESS NOTES
Normal Limits     Social/Functional History  Social/Functional History  Lives With: Alone  Type of Home: Apartment([facility])  Home Layout: One level  Home Access: Stairs to enter without rails  Entrance Stairs - Number of Steps: 1 DANIELA  Bathroom Shower/Tub: Tub/Shower unit  Bathroom Toilet: Standard  Bathroom Equipment: Hand-held shower, Tub transfer bench, Grab bars in shower, Grab bars around toilet  ADL Assistance: [address]: Independent(Neighbor occassionally brings food and checks on him.)  Homemaking Responsibilities: Yes  Ambulation Assistance: Independent  Transfer Assistance: Independent  Active : No  Patient's  Info: neighbor drives him to the store  Occupation: On disability  Leisure & Hobbies: tv, internet  Additional Comments: Pt reports previous falls, but none in the past 6 months. Objective  AROM RLE (degrees)  RLE AROM: WNL  AROM LLE (degrees)  LLE AROM : WNL  Strength RLE  Strength RLE: WFL  Strength LLE  Strength LLE: WFL     Sensation  Overall Sensation Status: WNL  Bed mobility  Rolling to Left: Supervision  Supine to Sit: Moderate assistance  Transfers  Sit to Stand: Moderate Assistance  Stand to sit: Contact guard assistance  Bed to Chair: Minimal assistance  Stand Pivot Transfers: Minimal Assistance  Ambulation  Ambulation?: Yes  More Ambulation?: Yes  Ambulation 1  Surface: level tile  Device: No Device  Assistance:  Moderate assistance  Quality of Gait: narrow ALEX, short reciprocal steps  Gait Deviations: Slow Donna;Decreased step length;Decreased step height;Decreased arm swing;Shuffles;Decreased head and trunk rotation  Distance: 100'  Ambulation 2  Surface - 2: level tile  Device 2: [address] [facility] 2: Contact guard assistance  Gait Deviations: Slow Donna;Decreased step length;Decreased step height  Distance: 100'  Comments: Increased step length and improved balance w/ walker  Stairs/Curb  Stairs?: No     Balance  Posture: Good  Sitting - Static: Good  Sitting - Dynamic: Fair  Standing - Static: Good  Standing - Dynamic: Poor      Plan   Plan  Times per week: 3-5  Times per day: Daily  Current Treatment Recommendations: Strengthening, Gait Training, Patient/Caregiver Education & Training, Equipment Evaluation, Education, & procurement, Stair training, Balance Training, Functional Mobility Training, Transfer Training, Safety Education & Training  Safety Devices  Type of devices: Call light within reach, Gait belt, Left in chair, Nurse notified  Restraints  Initially in place: No    AM-PAC Score  AM-PAC Inpatient Mobility Raw Score : 16 (05/31/20 0923)  AM-PAC Inpatient T-Scale Score : 40.78 (05/31/20 0923)  Mobility Inpatient CMS 0-100% Score: 54.16 (05/31/20 0923)  Mobility Inpatient CMS G-Code Modifier : CK (05/31/20 9563)     Goals  Short term goals  Time Frame for Short term goals: Discharge. Short term goal 1: Patient will perform bed mobility MOD I. Short term goal 2: Patient will perform bed-chair transfer MOD I w/ LRAD. Short term goal 3: Patient will ambulate 150' MOD I w/ LRAD. Short term goal 4: Patient will negotiate up single stair to enter home. Patient Goals   Patient goals : Return home.      Therapy Time   Individual Concurrent Group Co-treatment   Time In       9769   Time Out       0931   Minutes       53   Timed Code Treatment Minutes: Claudia Barajas, HARI, ATC-R [id number]

## 2020-05-31 NOTE — DISCHARGE SUMMARY
compatible with colitis, presumably secondary to   compression or irritation of the colonic wall by the stool concretion. The   remainder of the colon is devoid of stool and contains fluid likely related   to bowel cleansing preparation   2. Bilateral nephrolithiasis         XR ABDOMEN (KUB) (SINGLE AP VIEW)   Final Result   Decreased stool load. Retained water-soluble contrast is seen throughout the   colon. Colon remains distended         FL BARIUM ENEMA   Final Result   Unremarkable single contrast barium enema. XR ABDOMEN (2 VIEWS)   Final Result   Moderate stool persists in the distal colon and rectum indicating   constipation. Fecal impaction cannot be excluded with mild air-filled   distention of the more proximal colon. XR ABDOMEN (2 VIEWS)   Final Result   Increased gaseous distention of the transverse colon. Moderate stool in descending colon         XR ABDOMEN (2 VIEWS)   Final Result   1. Nonspecific, though favored to be nonobstructive bowel gas pattern,   without evidence of free air. There is a large fecal load within the left   hemicolon. 2. NG tube within the stomach. 3. Bilateral nephrolithiasis. XR ABDOMEN FOR NG/OG/NE TUBE PLACEMENT   Final Result   Nasogastric tube projects to the proximal stomach. XR ABDOMEN (KUB) (SINGLE AP VIEW)   Final Result   NG tube placement. CT ABDOMEN PELVIS W IV CONTRAST Additional Contrast? None   Final Result   Heavy stool burden within the colon. Proximal to a focal area of extremely   dense stool possibly related to impaction is inflammatory changes involving   the colon over a moderate length segment compatible with underlying colitis. Bilateral nonobstructing renal calculi. Invasive procedures and treatments. 1. None     Problem-based Hospital Course. 2. Constipation / fecal impaction. Severe fecal impaction suspicious for colonic inertia.  S/p diagnostic laparoscopy, removal of fecal [phone]   · lamoTRIgine 25 MG tablet  · levETIRAcetam 750 MG tablet  · naloxegol 25 MG Tabs tablet  · phenytoin 100 MG ER capsule  · phenytoin 200 MG ER capsule     You can get these medications from any pharmacy    Bring a paper prescription for each of these medications  · traMADol 50 MG tablet  You don't need a prescription for these medications  · docusate 100 MG Caps         Discharge recommendations given to patient. Follow Up. pcp  in 1 week   Disposition. home  Activity. activity as tolerated  Diet: Dietary Nutrition Supplements: Standard High Calorie Oral Supplement  DIET LOW FIBER;      Spent 27  minutes in discharge process.     Signed:  Nupur Tomlinson MD     5/31/2020 1:23 PM

## 2020-05-31 NOTE — PROGRESS NOTES
Occupational Therapy   Occupational Therapy Initial Assessment  Date: 2020   Patient Name: Beau Jerome  MRN: [id number]     : 1975    Date of Service: 2020    Discharge Recommendations: Beau Jerome scored a 17/24 on the AM-PAC ADL Inpatient form. Current research shows that an AM-PAC score of 18 or greater is typically associated with a discharge to the patient's home setting. It is anticipated that the patient's AM-PAC score will increase to 18 during hospitalization and patient reports that his father is able to assist at home. Based on the patient's AM-PAC score and their current ADL deficits, it is recommended that the patient have 2-3 sessions per week of Occupational Therapy at d/c to increase the patient's independence. At this time, this patient demonstrates the endurance and safety to discharge home with 24-hour supervision, home health services due to a lack of transportation, and a follow up treatment frequency of 2-3x/wk. Please see assessment section for further patient specific details. HOME HEALTH CARE: LEVEL 3 SAFETY     - Initial home health evaluation to occur within 24-48 hours, in patient home   - Therapy evaluations in home within 24-48 hours of discharge; including DME and home safety   - Frontload therapy 5 days, then 3x a week   - Therapy to evaluate if patient has [address] needs for personal care   -  evaluation within 24-48 hours, includes evaluation of resources and insurance to determine [place], [place], LTC, and Medicaid options     If patient discharges prior to next session this note will serve as a discharge summary. Please see below for the latest assessment towards goals. OT Equipment Recommendations  Equipment Needed: Yes  Mobility Devices: Vonita Fiorella; ADL Assistive Devices  Walker: Rolling  ADL Assistive Devices: Transfer Tub Bench;Grab Bars - tub;Hand-held Shower;Long-handled Sponge;Long-handled Shoe Horn;Emergency Alert System;Sock-Aid Hard;Grab Bars - toilet  Other: Pt reports he owns tub transfer bench, grab bars, and hand-held shower. Assessment   Performance deficits / Impairments: Decreased functional mobility ; Decreased balance;Decreased coordination;Decreased ADL status; Decreased cognition;Decreased vision/visual deficit; Decreased ROM; Decreased endurance;Decreased high-level IADLs;Decreased strength  Assessment: Pt is limited in the above areas impacting safety and independence in functional mobility and ADLs. Pt would benefit from skilled inpatient OT services to address the above deficits. Treatment Diagnosis: Decreased functional mobility and ADL status due to colitis and history of seizures  Prognosis: Good  Decision Making: Medium Complexity  OT Education: OT Role;Plan of Care;Home Exercise Program;Transfer Training;Equipment  Patient Education: ria frances/c- Pt verbalized understanding, but would benefit from reinforcement with exercises, functional mobility, and transfers. Barriers to Learning: cognition  REQUIRES OT FOLLOW UP: Yes  Activity Tolerance  Activity Tolerance: Patient Tolerated treatment well  Safety Devices  Safety Devices in place: Yes  Type of devices: Gait belt;Left in chair;Nurse notified;Call light within reach  Restraints  Initially in place: No           Patient Diagnosis(es): The primary encounter diagnosis was Acute colitis. Diagnoses of Acute abdominal pain, Constipation, unspecified constipation type, and Failure of outpatient treatment were also pertinent to this visit. has a past medical history of Adhesive capsulitis of shoulder, Arthritis, Complex partial seizures with impaired consciousness at onset [facility]), Leukemia in remission ([facility] [address] Seborrheic dermatitis, unspecified, and Seizures ([facility] [address] has a past surgical history that includes lobectomy for seizure focus; Shoulder arthroscopy (Right, 04/27/2018); Colonoscopy (N/A, 5/13/2020); Colonoscopy (5/13/2020);  Upper gastrointestinal endoscopy (N/A, 2020); Colonoscopy (2020); sigmoidoscopy (N/A, 2020); and hemicolectomy (N/A, 2020). Treatment Diagnosis: Decreased functional mobility and ADL status due to colitis and history of seizures      Restrictions  Restrictions/Precautions  Restrictions/Precautions: Fall Risk(medium fall risk)  Required Braces or Orthoses?: No  Position Activity Restriction  Other position/activity restrictions: Star Martínez is a 39 y.o. male with history of leukemia in remission, seizure disorder, arthritis who presents to the emergency department complaining of generalized abdominal pain, nausea and vomiting for 1 week. Patient denies diarrhea,  obstipation, bloody or black stool, documented fever or chills. He feels very fatigued and weak. He rates his generalized abdominal pain to be a 10 out of 10 on pain scale. He reports his emesis to appear bilious at times and sometimes coffee-ground. He has no history of peptic ulcer disease or bleeding ulcer. He does take a lot of ibuprofen. Denies ingestion of seafood,  food, spicy food, alcohol prior to the onset of his symptoms. He suffers from chronic constipation but unchanged from his baseline. Patient reports that he has been on Cipro and Flagyl since the first of this month. He addressed these issues with his primary care doctor. He is not feeling any better. Subjective   General  Chart Reviewed: Yes  Patient assessed for rehabilitation services?: Yes  Additional Pertinent Hx: Star Martínez is a 39 y.o. male with history of leukemia in remission, seizure disorder, arthritis who presents to the emergency department complaining of generalized abdominal pain, nausea and vomiting for 1 week. Patient denies diarrhea,  obstipation, bloody or black stool, documented fever or chills. He feels very fatigued and weak. He rates his generalized abdominal pain to be a 10 out of 10 on pain scale.   He reports his emesis to appear transfer bench, Grab bars in shower, Grab bars around toilet  ADL Assistance: [address]: Independent(Neighbor occassionally brings food and checks on him.)  Homemaking Responsibilities: Yes  Ambulation Assistance: Independent  Transfer Assistance: Independent  Active : No  Patient's  Info: neighbor drives him to the store  Occupation: On disability  Leisure & Hobbies: tv, internet  Additional Comments: Pt reports previous falls, but none in the past 6 months. Objective   Vision: Impaired  Vision Exceptions: Wears glasses at all times  Hearing: Within functional limits    Orientation  Overall Orientation Status: Within Functional Limits     Balance  Sitting Balance: Supervision  Standing Balance: Minimal assistance  Standing Balance  Time: ~10 min  Activity: functional mobility, static standing  Comment: no device initially with RW during second half of functional mobility  Functional Mobility  Functional - Mobility Device: Rolling Walker  Activity: Other  Assist Level: Moderate assistance  Functional Mobility Comments: ~100 ft with no device with 3 LOB during turns with mod A to correct; ~100 ft with RW with improved coordination fluidity and decreased LOB  ADL  UE Dressing: Minimal assistance  LE Dressing: Maximum assistance(managed pulling up and tying pants from knees when they fell; reports socks are very difficult due to RA with max A required)  Additional Comments: Pt declined participation in other ADLs; recommend encouraging sponge bath at next session.    Tone RUE  RUE Tone: Normotonic  Tone LUE  LUE Tone: Normotonic  Coordination  Movements Are Fluid And Coordinated: No  Coordination and Movement description: Apraxia  Quality of Movement Other  Comment: multiple verbal and visual cues required for hand and feet placement during bed mobility and transfers     Bed mobility  Rolling to Left: Supervision  Supine to Sit: Moderate assistance     Vision - Basic Assessment  Prior Vision: Wears glasses all the time  Patient Visual Report: Balance difficulty(Pt reported he felt as if he was walking downhill during portion of functional mobility after turning; lost balance primarily during turns; vestibular assessment may be beneficial)  Cognition  Overall Cognitive Status: Exceptions  Arousal/Alertness: Appropriate responses to stimuli  Following Commands: Inconsistently follows commands  Attention Span: Attends with cues to redirect; Difficulty dividing attention  Memory: Appears intact  Safety Judgement: Decreased awareness of need for safety  Problem Solving: Assistance required to implement solutions;Assistance required to generate solutions  Insights: Fully aware of deficits  Initiation: Requires cues for some  Sequencing: Requires cues for some  Perception  Overall Perceptual Status: Impaired(appears to have some difficulty with motor planning)     Sensation  Overall Sensation Status: WFL  Type of ROM/Therapeutic Exercise  Type of ROM/Therapeutic Exercise: AROM  Comment: scapular retraction/protraction x5 due to scapular winging and shoulder pain; seated in chair, pt completed marches and hip flexion/extension x10 each for strengthening     LUE AROM (degrees)  LUE AROM : Exceptions  LUE General AROM: deficits in shoulder flexion, external rotation, and abduction  Left Hand AROM (degrees)  Left Hand AROM: WFL  RUE AROM (degrees)  RUE AROM : Exceptions  RUE General AROM: deficits in shoulder flexion, external rotation, and abduction  Right Hand AROM (degrees)  Right Hand AROM: WFL                      Plan   Plan  Times per week: 3-5  Times per day: Daily  Current Treatment Recommendations: Strengthening, ROM, Endurance Training, Patient/Caregiver Education & Training, Self-Care / ADL, Balance Training, Home Management Training, Functional Mobility Training, Safety Education & Training    G-Code     OutComes Score                                                  AM-PAC Score        AM-PAC Inpatient Daily Activity Raw Score: 17 (05/31/20 0959)  AM-PAC Inpatient ADL T-Scale Score : 37.26 (05/31/20 0959)  ADL Inpatient CMS 0-100% Score: 50.11 (05/31/20 0959)  ADL Inpatient CMS G-Code Modifier : CK (05/31/20 0959)    Goals  Short term goals  Time Frame for Short term goals: d/c  Short term goal 1: Pt will complete functional mobility mod I. Short term goal 2: Pt will complete LB ADLs mod I with required AE.    Short term goal 3: Pt will complete bed mobility mod I.  Long term goals  Time Frame for Long term goals : STG=LTG  Patient Goals   Patient goals : return home       Therapy Time   Individual Concurrent Group Co-treatment   Time In [address] [facility]         Time Out 0931         Minutes 53         Timed Code Treatment Minutes: [address] [facility], [address]

## 2020-06-02 ENCOUNTER — NURSE TRIAGE (OUTPATIENT)
Dept: OTHER | Facility: CLINIC | Age: 45
End: 2020-06-02

## 2020-06-02 ENCOUNTER — APPOINTMENT (OUTPATIENT)
Dept: CT IMAGING | Age: 45
End: 2020-06-02
Payer: MEDICARE

## 2020-06-02 ENCOUNTER — HOSPITAL ENCOUNTER (OUTPATIENT)
Age: 45
Setting detail: OBSERVATION
Discharge: HOME | End: 2020-06-06
Attending: EMERGENCY MEDICINE | Admitting: HOSPITALIST
Payer: MEDICARE

## 2020-06-02 PROBLEM — K92.2 GI BLEED: Status: ACTIVE | Noted: 2020-06-02

## 2020-06-02 LAB
A/G RATIO: 1.2 (ref 1.1–2.2)
ABO/RH: NORMAL
ALBUMIN SERPL-MCNC: 3.9 G/DL (ref 3.4–5)
ALP BLD-CCNC: 119 U/L (ref 40–129)
ALT SERPL-CCNC: 16 U/L (ref 10–40)
ANION GAP SERPL CALCULATED.3IONS-SCNC: 10 MMOL/L (ref 3–16)
ANTIBODY SCREEN: NORMAL
AST SERPL-CCNC: 17 U/L (ref 15–37)
BASOPHILS ABSOLUTE: 0 K/UL (ref 0–0.2)
BASOPHILS RELATIVE PERCENT: 0.6 %
BILIRUB SERPL-MCNC: 0.3 MG/DL (ref 0–1)
BUN BLDV-MCNC: 15 MG/DL (ref 7–20)
CALCIUM SERPL-MCNC: 9.2 MG/DL (ref 8.3–10.6)
CHLORIDE BLD-SCNC: 98 MMOL/L (ref 99–110)
CO2: 29 MMOL/L (ref 21–32)
CREAT SERPL-MCNC: 0.7 MG/DL (ref 0.9–1.3)
EOSINOPHILS ABSOLUTE: 0.2 K/UL (ref 0–0.6)
EOSINOPHILS RELATIVE PERCENT: 3.6 %
GFR AFRICAN AMERICAN: >60
GFR NON-AFRICAN AMERICAN: >60
GLOBULIN: 3.3 G/DL
GLUCOSE BLD-MCNC: 105 MG/DL (ref 70–99)
HCT VFR BLD CALC: 34.6 % (ref 40.5–52.5)
HEMOGLOBIN: 11.4 G/DL (ref 13.5–17.5)
LACTIC ACID, SEPSIS: 0.9 MMOL/L (ref 0.4–1.9)
LYMPHOCYTES ABSOLUTE: 1.3 K/UL (ref 1–5.1)
LYMPHOCYTES RELATIVE PERCENT: 27.6 %
MCH RBC QN AUTO: 30 PG (ref 26–34)
MCHC RBC AUTO-ENTMCNC: 32.9 G/DL (ref 31–36)
MCV RBC AUTO: 91.2 FL (ref 80–100)
MONOCYTES ABSOLUTE: 0.7 K/UL (ref 0–1.3)
MONOCYTES RELATIVE PERCENT: 14.2 %
NEUTROPHILS ABSOLUTE: 2.5 K/UL (ref 1.7–7.7)
NEUTROPHILS RELATIVE PERCENT: 54 %
OCCULT BLOOD DIAGNOSTIC: ABNORMAL
PDW BLD-RTO: 13.7 % (ref 12.4–15.4)
PLATELET # BLD: 269 K/UL (ref 135–450)
PMV BLD AUTO: 8.4 FL (ref 5–10.5)
POTASSIUM SERPL-SCNC: 4.3 MMOL/L (ref 3.5–5.1)
RBC # BLD: 3.79 M/UL (ref 4.2–5.9)
SODIUM BLD-SCNC: 137 MMOL/L (ref 136–145)
TOTAL PROTEIN: 7.2 G/DL (ref 6.4–8.2)
WBC # BLD: 4.6 K/UL (ref 4–11)

## 2020-06-02 PROCEDURE — G0378 HOSPITAL OBSERVATION PER HR: HCPCS

## 2020-06-02 PROCEDURE — 6360000004 HC RX CONTRAST MEDICATION: Performed by: NURSE PRACTITIONER

## 2020-06-02 PROCEDURE — 6360000002 HC RX W HCPCS: Performed by: NURSE PRACTITIONER

## 2020-06-02 PROCEDURE — 83605 ASSAY OF LACTIC ACID: CPT

## 2020-06-02 PROCEDURE — 96365 THER/PROPH/DIAG IV INF INIT: CPT

## 2020-06-02 PROCEDURE — 86850 RBC ANTIBODY SCREEN: CPT

## 2020-06-02 PROCEDURE — G0328 FECAL BLOOD SCRN IMMUNOASSAY: HCPCS

## 2020-06-02 PROCEDURE — 99285 EMERGENCY DEPT VISIT HI MDM: CPT

## 2020-06-02 PROCEDURE — 36415 COLL VENOUS BLD VENIPUNCTURE: CPT

## 2020-06-02 PROCEDURE — 6370000000 HC RX 637 (ALT 250 FOR IP): Performed by: HOSPITALIST

## 2020-06-02 PROCEDURE — 80053 COMPREHEN METABOLIC PANEL: CPT

## 2020-06-02 PROCEDURE — 86901 BLOOD TYPING SEROLOGIC RH(D): CPT

## 2020-06-02 PROCEDURE — 2580000003 HC RX 258: Performed by: HOSPITALIST

## 2020-06-02 PROCEDURE — 74177 CT ABD & PELVIS W/CONTRAST: CPT

## 2020-06-02 PROCEDURE — 86900 BLOOD TYPING SEROLOGIC ABO: CPT

## 2020-06-02 PROCEDURE — 6370000000 HC RX 637 (ALT 250 FOR IP): Performed by: NURSE PRACTITIONER

## 2020-06-02 PROCEDURE — 85025 COMPLETE CBC W/AUTO DIFF WBC: CPT

## 2020-06-02 RX ORDER — ACETAMINOPHEN 325 MG/1
650 TABLET ORAL EVERY 6 HOURS PRN
Status: DISCONTINUED | OUTPATIENT
Start: 2020-06-02 | End: 2020-06-06 | Stop reason: HOSPADM

## 2020-06-02 RX ORDER — SODIUM CHLORIDE 9 MG/ML
INJECTION, SOLUTION INTRAVENOUS CONTINUOUS
Status: DISCONTINUED | OUTPATIENT
Start: 2020-06-02 | End: 2020-06-06

## 2020-06-02 RX ORDER — LEVETIRACETAM 500 MG/1
1500 TABLET ORAL 2 TIMES DAILY
Status: DISCONTINUED | OUTPATIENT
Start: 2020-06-03 | End: 2020-06-06 | Stop reason: HOSPADM

## 2020-06-02 RX ORDER — SODIUM CHLORIDE 0.9 % (FLUSH) 0.9 %
10 SYRINGE (ML) INJECTION EVERY 12 HOURS SCHEDULED
Status: DISCONTINUED | OUTPATIENT
Start: 2020-06-02 | End: 2020-06-05 | Stop reason: SDUPTHER

## 2020-06-02 RX ORDER — MAGNESIUM SULFATE IN WATER 40 MG/ML
2 INJECTION, SOLUTION INTRAVENOUS PRN
Status: DISCONTINUED | OUTPATIENT
Start: 2020-06-02 | End: 2020-06-06 | Stop reason: HOSPADM

## 2020-06-02 RX ORDER — PHENYTOIN SODIUM 100 MG/1
100 CAPSULE, EXTENDED RELEASE ORAL DAILY
Status: DISCONTINUED | OUTPATIENT
Start: 2020-06-03 | End: 2020-06-06 | Stop reason: HOSPADM

## 2020-06-02 RX ORDER — SODIUM CHLORIDE 0.9 % (FLUSH) 0.9 %
10 SYRINGE (ML) INJECTION PRN
Status: DISCONTINUED | OUTPATIENT
Start: 2020-06-02 | End: 2020-06-05 | Stop reason: SDUPTHER

## 2020-06-02 RX ORDER — METRONIDAZOLE 250 MG/1
500 TABLET ORAL ONCE
Status: COMPLETED | OUTPATIENT
Start: 2020-06-02 | End: 2020-06-02

## 2020-06-02 RX ORDER — ACETAMINOPHEN 650 MG/1
650 SUPPOSITORY RECTAL EVERY 6 HOURS PRN
Status: DISCONTINUED | OUTPATIENT
Start: 2020-06-02 | End: 2020-06-06 | Stop reason: HOSPADM

## 2020-06-02 RX ORDER — PHENYTOIN SODIUM 100 MG/1
200 CAPSULE, EXTENDED RELEASE ORAL NIGHTLY
Status: DISCONTINUED | OUTPATIENT
Start: 2020-06-03 | End: 2020-06-06 | Stop reason: HOSPADM

## 2020-06-02 RX ORDER — ONDANSETRON 2 MG/ML
4 INJECTION INTRAMUSCULAR; INTRAVENOUS EVERY 6 HOURS PRN
Status: DISCONTINUED | OUTPATIENT
Start: 2020-06-02 | End: 2020-06-06 | Stop reason: HOSPADM

## 2020-06-02 RX ORDER — CIPROFLOXACIN 2 MG/ML
400 INJECTION, SOLUTION INTRAVENOUS ONCE
Status: COMPLETED | OUTPATIENT
Start: 2020-06-02 | End: 2020-06-02

## 2020-06-02 RX ORDER — TRAMADOL HYDROCHLORIDE 50 MG/1
50 TABLET ORAL EVERY 6 HOURS PRN
Status: DISCONTINUED | OUTPATIENT
Start: 2020-06-02 | End: 2020-06-06 | Stop reason: HOSPADM

## 2020-06-02 RX ORDER — PROMETHAZINE HYDROCHLORIDE 25 MG/1
12.5 TABLET ORAL EVERY 6 HOURS PRN
Status: DISCONTINUED | OUTPATIENT
Start: 2020-06-02 | End: 2020-06-06 | Stop reason: HOSPADM

## 2020-06-02 RX ORDER — CIPROFLOXACIN 2 MG/ML
400 INJECTION, SOLUTION INTRAVENOUS EVERY 12 HOURS
Status: DISCONTINUED | OUTPATIENT
Start: 2020-06-03 | End: 2020-06-06 | Stop reason: HOSPADM

## 2020-06-02 RX ORDER — POTASSIUM CHLORIDE 20 MEQ/1
40 TABLET, EXTENDED RELEASE ORAL PRN
Status: DISCONTINUED | OUTPATIENT
Start: 2020-06-02 | End: 2020-06-06 | Stop reason: HOSPADM

## 2020-06-02 RX ORDER — TAMSULOSIN HYDROCHLORIDE 0.4 MG/1
0.4 CAPSULE ORAL DAILY
Status: DISCONTINUED | OUTPATIENT
Start: 2020-06-03 | End: 2020-06-06 | Stop reason: HOSPADM

## 2020-06-02 RX ORDER — POTASSIUM CHLORIDE 7.45 MG/ML
10 INJECTION INTRAVENOUS PRN
Status: DISCONTINUED | OUTPATIENT
Start: 2020-06-02 | End: 2020-06-06 | Stop reason: HOSPADM

## 2020-06-02 RX ADMIN — CIPROFLOXACIN 400 MG: 2 INJECTION, SOLUTION INTRAVENOUS at 22:46

## 2020-06-02 RX ADMIN — LAMOTRIGINE 175 MG: 25 TABLET ORAL at 23:45

## 2020-06-02 RX ADMIN — PHENYTOIN SODIUM 200 MG: 100 CAPSULE ORAL at 23:45

## 2020-06-02 RX ADMIN — SODIUM CHLORIDE: 9 INJECTION, SOLUTION INTRAVENOUS at 23:46

## 2020-06-02 RX ADMIN — METRONIDAZOLE 500 MG: 250 TABLET, FILM COATED ORAL at 22:46

## 2020-06-02 RX ADMIN — LEVETIRACETAM 1500 MG: 500 TABLET ORAL at 23:45

## 2020-06-02 RX ADMIN — IOPAMIDOL 75 ML: 755 INJECTION, SOLUTION INTRAVENOUS at 20:28

## 2020-06-02 ASSESSMENT — PAIN DESCRIPTION - PAIN TYPE
TYPE: ACUTE PAIN
TYPE: ACUTE PAIN

## 2020-06-02 ASSESSMENT — PAIN SCALES - GENERAL
PAINLEVEL_OUTOF10: 4
PAINLEVEL_OUTOF10: 2

## 2020-06-02 ASSESSMENT — ENCOUNTER SYMPTOMS
BLOOD IN STOOL: 1
VOMITING: 0
SHORTNESS OF BREATH: 0
CHEST TIGHTNESS: 0
NAUSEA: 0
ABDOMINAL PAIN: 1
DIARRHEA: 1

## 2020-06-02 ASSESSMENT — PAIN DESCRIPTION - LOCATION
LOCATION: ABDOMEN
LOCATION: ABDOMEN

## 2020-06-02 NOTE — TELEPHONE ENCOUNTER
Reason for Disposition   Rectal bleeding, bloody stools, or blood in stool (bowel movement)   Sounds like a life-threatening emergency to the triager    Answer Assessment - Initial Assessment Questions  1. APPEARANCE of BLOOD: \"What color is it? \" \"Is it passed separately, on the surface of the stool, or mixed in with the stool? \"       Red and black blood   2. AMOUNT: \"How much blood was passed? \"       The entire stool   3. FREQUENCY: \"How many times has blood been passed with the stools? \"       1 time   4. ONSET: \"When was the blood first seen in the stools? \" (Days or weeks)       Today   5. DIARRHEA: \"Is there also some diarrhea? \" If so, ask: \"How many diarrhea stools were passed in past 24 hours? \"       Have not had a BM   6. CONSTIPATION: \"Do you have constipation? \" If so, \"How bad is it? \"      Constipation is bad   7. RECURRENT SYMPTOMS: \"Have you had blood in your stools before? \" If so, ask: \"When was the last time? \" and \"What happened that time? \"       no  8. BLOOD THINNERS: \"Do you take any blood thinners? \" (e.g., Coumadin/warfarin, Pradaxa/dabigatran, aspirin)      No   9. OTHER SYMPTOMS: \"Do you have any other symptoms? \"  (e.g., abdominal pain, vomiting, dizziness, fever)      Denies   10. PREGNANCY: \"Is there any chance you are pregnant? \" \"When was your last menstrual period? \"        NA    Protocols used: STOOLS - UNUSUAL COLOR-ADULT-AH, RECTAL BLEEDING-ADULT-AH    Received call from [address] Recent surgery patient is currently passing red blood and no stool. Patient triaged using appropriate protocol. Care advice given per protocol. Patient/Caregiver verbalized understanding of care advice and disposition. Please do not reply to the triage nurse through this encounter. Any subsequent communication should be directly with the patient.

## 2020-06-02 NOTE — ADT AUTH CERT
surgery   - will need home bowel regimen after recovers s/p surgery to prevent constipation and impaction. Miralax BID and discussed with pt how to titrate.        General Surgery-   39 y.o. male status post lap assisted sigmoid colectomy       Doing well, fulls po, continue prn meds   OOB, Voiding, hydration adequate   IS

## 2020-06-02 NOTE — ED NOTES
Received report from Matthew sigala, [address] [facility]. Pt. States he had surgery over a week ago due to a bowel impaction. Hx of leukemia. Pt. States he came to ED today due to right sided lower abdominal pain he rates 5/10. Pain is not in area of abdomen surgical incision. Incision looks unremarkable. No signs of inflammation of infection at incision site. Pt. States right before he came to the ED he started bleeding from rectum. This nurse witnessed Juana Borges obtain a stool sample. Pt. On monitor with BP cycling and call light within reach.      Michael Murphy RN  06/02/20 1330

## 2020-06-03 LAB
A/G RATIO: 1.3 (ref 1.1–2.2)
ALBUMIN SERPL-MCNC: 3.4 G/DL (ref 3.4–5)
ALP BLD-CCNC: 92 U/L (ref 40–129)
ALT SERPL-CCNC: 12 U/L (ref 10–40)
ANION GAP SERPL CALCULATED.3IONS-SCNC: 7 MMOL/L (ref 3–16)
AST SERPL-CCNC: 13 U/L (ref 15–37)
BASOPHILS ABSOLUTE: 0 K/UL (ref 0–0.2)
BASOPHILS RELATIVE PERCENT: 0.7 %
BILIRUB SERPL-MCNC: 0.3 MG/DL (ref 0–1)
BUN BLDV-MCNC: 14 MG/DL (ref 7–20)
C DIFF TOXIN/ANTIGEN: NORMAL
CALCIUM SERPL-MCNC: 8.6 MG/DL (ref 8.3–10.6)
CHLORIDE BLD-SCNC: 103 MMOL/L (ref 99–110)
CO2: 28 MMOL/L (ref 21–32)
CREAT SERPL-MCNC: 0.7 MG/DL (ref 0.9–1.3)
EOSINOPHILS ABSOLUTE: 0.1 K/UL (ref 0–0.6)
EOSINOPHILS RELATIVE PERCENT: 2.9 %
GFR AFRICAN AMERICAN: >60
GFR NON-AFRICAN AMERICAN: >60
GLOBULIN: 2.7 G/DL
GLUCOSE BLD-MCNC: 110 MG/DL (ref 70–99)
HCT VFR BLD CALC: 28.8 % (ref 40.5–52.5)
HCT VFR BLD CALC: 28.9 % (ref 40.5–52.5)
HCT VFR BLD CALC: 31.6 % (ref 40.5–52.5)
HEMOGLOBIN: 10.4 G/DL (ref 13.5–17.5)
HEMOGLOBIN: 9.7 G/DL (ref 13.5–17.5)
HEMOGLOBIN: 9.7 G/DL (ref 13.5–17.5)
LYMPHOCYTES ABSOLUTE: 1.2 K/UL (ref 1–5.1)
LYMPHOCYTES RELATIVE PERCENT: 25.5 %
MCH RBC QN AUTO: 30.3 PG (ref 26–34)
MCHC RBC AUTO-ENTMCNC: 33.4 G/DL (ref 31–36)
MCV RBC AUTO: 90.8 FL (ref 80–100)
MONOCYTES ABSOLUTE: 0.7 K/UL (ref 0–1.3)
MONOCYTES RELATIVE PERCENT: 14.9 %
NEUTROPHILS ABSOLUTE: 2.6 K/UL (ref 1.7–7.7)
NEUTROPHILS RELATIVE PERCENT: 56 %
PDW BLD-RTO: 13.5 % (ref 12.4–15.4)
PLATELET # BLD: 236 K/UL (ref 135–450)
PMV BLD AUTO: 8.6 FL (ref 5–10.5)
POTASSIUM REFLEX MAGNESIUM: 4.2 MMOL/L (ref 3.5–5.1)
RBC # BLD: 3.18 M/UL (ref 4.2–5.9)
SODIUM BLD-SCNC: 138 MMOL/L (ref 136–145)
TOTAL PROTEIN: 6.1 G/DL (ref 6.4–8.2)
WBC # BLD: 4.6 K/UL (ref 4–11)

## 2020-06-03 PROCEDURE — 99214 OFFICE O/P EST MOD 30 MIN: CPT | Performed by: SURGERY

## 2020-06-03 PROCEDURE — 87324 CLOSTRIDIUM AG IA: CPT

## 2020-06-03 PROCEDURE — 80053 COMPREHEN METABOLIC PANEL: CPT

## 2020-06-03 PROCEDURE — 87505 NFCT AGENT DETECTION GI: CPT

## 2020-06-03 PROCEDURE — 85014 HEMATOCRIT: CPT

## 2020-06-03 PROCEDURE — 36415 COLL VENOUS BLD VENIPUNCTURE: CPT

## 2020-06-03 PROCEDURE — 85025 COMPLETE CBC W/AUTO DIFF WBC: CPT

## 2020-06-03 PROCEDURE — 6360000002 HC RX W HCPCS: Performed by: HOSPITALIST

## 2020-06-03 PROCEDURE — 87449 NOS EACH ORGANISM AG IA: CPT

## 2020-06-03 PROCEDURE — APPNB30 APP NON BILLABLE TIME 0-30 MINS: Performed by: NURSE PRACTITIONER

## 2020-06-03 PROCEDURE — 96366 THER/PROPH/DIAG IV INF ADDON: CPT

## 2020-06-03 PROCEDURE — 6370000000 HC RX 637 (ALT 250 FOR IP): Performed by: HOSPITALIST

## 2020-06-03 PROCEDURE — 6370000000 HC RX 637 (ALT 250 FOR IP): Performed by: PHYSICIAN ASSISTANT

## 2020-06-03 PROCEDURE — 96375 TX/PRO/DX INJ NEW DRUG ADDON: CPT

## 2020-06-03 PROCEDURE — 85018 HEMOGLOBIN: CPT

## 2020-06-03 PROCEDURE — 2580000003 HC RX 258: Performed by: HOSPITALIST

## 2020-06-03 PROCEDURE — 96367 TX/PROPH/DG ADDL SEQ IV INF: CPT

## 2020-06-03 PROCEDURE — 2500000003 HC RX 250 WO HCPCS: Performed by: HOSPITALIST

## 2020-06-03 PROCEDURE — 6370000000 HC RX 637 (ALT 250 FOR IP): Performed by: NURSE PRACTITIONER

## 2020-06-03 PROCEDURE — 83630 LACTOFERRIN FECAL (QUAL): CPT

## 2020-06-03 PROCEDURE — G0378 HOSPITAL OBSERVATION PER HR: HCPCS

## 2020-06-03 RX ORDER — POLYETHYLENE GLYCOL 3350 17 G/17G
17 POWDER, FOR SOLUTION ORAL 2 TIMES DAILY
Status: DISCONTINUED | OUTPATIENT
Start: 2020-06-03 | End: 2020-06-06 | Stop reason: HOSPADM

## 2020-06-03 RX ORDER — BISACODYL 10 MG
10 SUPPOSITORY, RECTAL RECTAL ONCE
Status: COMPLETED | OUTPATIENT
Start: 2020-06-03 | End: 2020-06-03

## 2020-06-03 RX ADMIN — TRAMADOL HYDROCHLORIDE 50 MG: 50 TABLET, FILM COATED ORAL at 15:12

## 2020-06-03 RX ADMIN — LAMOTRIGINE 175 MG: 25 TABLET ORAL at 21:03

## 2020-06-03 RX ADMIN — BISACODYL 10 MG: 10 SUPPOSITORY RECTAL at 10:42

## 2020-06-03 RX ADMIN — METRONIDAZOLE 500 MG: 500 INJECTION, SOLUTION INTRAVENOUS at 05:30

## 2020-06-03 RX ADMIN — POLYETHYLENE GLYCOL 3350 17 G: 17 POWDER, FOR SOLUTION ORAL at 21:04

## 2020-06-03 RX ADMIN — TRAMADOL HYDROCHLORIDE 50 MG: 50 TABLET, FILM COATED ORAL at 23:24

## 2020-06-03 RX ADMIN — CIPROFLOXACIN 400 MG: 2 INJECTION, SOLUTION INTRAVENOUS at 10:42

## 2020-06-03 RX ADMIN — CIPROFLOXACIN 400 MG: 2 INJECTION, SOLUTION INTRAVENOUS at 23:21

## 2020-06-03 RX ADMIN — ONDANSETRON 4 MG: 2 INJECTION INTRAMUSCULAR; INTRAVENOUS at 14:32

## 2020-06-03 RX ADMIN — PHENYTOIN SODIUM 200 MG: 100 CAPSULE ORAL at 21:04

## 2020-06-03 RX ADMIN — Medication 10 ML: at 10:42

## 2020-06-03 RX ADMIN — LAMOTRIGINE 175 MG: 25 TABLET ORAL at 10:41

## 2020-06-03 RX ADMIN — SODIUM CHLORIDE: 9 INJECTION, SOLUTION INTRAVENOUS at 21:11

## 2020-06-03 RX ADMIN — TAMSULOSIN HYDROCHLORIDE 0.4 MG: 0.4 CAPSULE ORAL at 10:42

## 2020-06-03 RX ADMIN — METRONIDAZOLE 500 MG: 500 INJECTION, SOLUTION INTRAVENOUS at 21:08

## 2020-06-03 RX ADMIN — LEVETIRACETAM 1500 MG: 500 TABLET ORAL at 10:52

## 2020-06-03 RX ADMIN — METRONIDAZOLE 500 MG: 500 INJECTION, SOLUTION INTRAVENOUS at 13:35

## 2020-06-03 RX ADMIN — LEVETIRACETAM 1500 MG: 500 TABLET ORAL at 21:04

## 2020-06-03 RX ADMIN — POLYETHYLENE GLYCOL 3350 17 G: 17 POWDER, FOR SOLUTION ORAL at 12:00

## 2020-06-03 RX ADMIN — PHENYTOIN SODIUM 100 MG: 100 CAPSULE ORAL at 10:41

## 2020-06-03 ASSESSMENT — PAIN DESCRIPTION - DESCRIPTORS: DESCRIPTORS: SORE

## 2020-06-03 ASSESSMENT — PAIN DESCRIPTION - PAIN TYPE
TYPE: ACUTE PAIN
TYPE: SURGICAL PAIN

## 2020-06-03 ASSESSMENT — PAIN SCALES - GENERAL
PAINLEVEL_OUTOF10: 4
PAINLEVEL_OUTOF10: 2
PAINLEVEL_OUTOF10: 5
PAINLEVEL_OUTOF10: 2
PAINLEVEL_OUTOF10: 1
PAINLEVEL_OUTOF10: 2
PAINLEVEL_OUTOF10: 0

## 2020-06-03 ASSESSMENT — PAIN DESCRIPTION - LOCATION
LOCATION: ABDOMEN
LOCATION: ABDOMEN;INCISION

## 2020-06-03 ASSESSMENT — PAIN DESCRIPTION - FREQUENCY: FREQUENCY: CONTINUOUS

## 2020-06-03 NOTE — PROGRESS NOTES
[facility]    6/3/2020 2:02 PM        Name: Chito Tilley . Admitted: 6/2/2020  Primary Care Provider: Khushi Cole MD (Tel: [phone])      Subjective:  . Patient feeling okay. He is frustrating. He states that no one is telling him anything although I find that hard to believe is Dr. Yuly Pink was just in there for some period of time. He states that he would rather go home with hospice then to drink Miralax twice a day.      Reviewed interval ancillary notes    Current Medications  polyethylene glycol (GLYCOLAX) packet 17 g, BID  lamoTRIgine (LAMICTAL) tablet 175 mg, BID  levETIRAcetam (KEPPRA) tablet 1,500 mg, BID  phenytoin (DILANTIN) ER capsule 100 mg, Daily  phenytoin (DILANTIN) ER capsule 200 mg, Nightly  tamsulosin (FLOMAX) capsule 0.4 mg, Daily  traMADol (ULTRAM) tablet 50 mg, Q6H PRN  sodium chloride flush 0.9 % injection 10 mL, 2 times per day  sodium chloride flush 0.9 % injection 10 mL, PRN  acetaminophen (TYLENOL) tablet 650 mg, Q6H PRN    Or  acetaminophen (TYLENOL) suppository 650 mg, Q6H PRN  promethazine (PHENERGAN) tablet 12.5 mg, Q6H PRN    Or  ondansetron (ZOFRAN) injection 4 mg, Q6H PRN  0.9 % sodium chloride infusion, Continuous  potassium chloride (KLOR-CON M) extended release tablet 40 mEq, PRN    Or  potassium bicarb-citric acid (EFFER-K) effervescent tablet 40 mEq, PRN    Or  potassium chloride 10 mEq/100 mL IVPB (Peripheral Line), PRN  magnesium sulfate 2 g in 50 mL IVPB premix, PRN  ciprofloxacin (CIPRO) IVPB 400 mg, Q12H  metronidazole (FLAGYL) 500 mg in NaCl 100 mL IVPB premix, Q8H        Objective:  /70   Pulse 96   Temp 97.7 °F (36.5 °C) (Oral)   Resp 16   Ht 6' 1\" (1.854 m)   Wt 157 lb 14.4 oz (71.6 kg)   SpO2 98%   BMI 20.83 kg/m²     Intake/Output Summary (Last 24 hours) at 6/3/2020 1407  Last data filed at 6/3/2020 0333  Gross per 24 hour Intake 855 ml   Output --   Net 855 ml      Wt Readings from Last 3 Encounters:   06/02/20 157 lb 14.4 oz (71.6 kg)   05/23/20 159 lb 3.2 oz (72.2 kg)   10/28/19 157 lb 2 oz (71.3 kg)       General appearance:  Appears comfortable  Eyes: Sclera clear. Pupils equal.  ENT: Moist oral mucosa. Trachea midline, no adenopathy. Cardiovascular: Regular rhythm, normal S1, S2. No murmur. No edema in lower extremities  Respiratory: Not using accessory muscles. Good inspiratory effort. Clear to auscultation bilaterally, no wheeze or crackles. GI: Abdomen soft, no tenderness, not distended, normal bowel sounds  Musculoskeletal: No cyanosis in digits, neck supple  Neurology: CN 2-12 grossly intact. No speech or motor deficits  Psych: Normal affect. Alert and oriented in time, place and person  Skin: Warm, dry, normal turgor    Labs and Tests:  CBC:   Recent Labs     06/02/20 2003 06/03/20  0031 06/03/20  0605 06/03/20  1211   WBC 4.6  --  4.6  --    HGB 11.4* 10.4* 9.7* 9.7*     --  236  --      BMP:    Recent Labs     06/02/20 2003 06/03/20  0605    138   K 4.3 4.2   CL 98* 103   CO2 29 28   BUN 15 14   CREATININE 0.7* 0.7*   GLUCOSE 105* 110*     Hepatic:   Recent Labs     06/02/20 2003 06/03/20  0605   AST 17 13*   ALT 16 12   BILITOT 0.3 0.3   ALKPHOS 119 92     CT abd/pelvis  Postsurgical changes of the sigmoid colon.  Anastomotic sutures are   associated with mild wall thickening and adjacent stranding, likely   postsurgical.       Mild wall thickening of the descending colon suspected.  Correlation for   infectious or inflammatory colitis is recommended. Problem List  Active Problems:    Seizure disorder ([address]    History of leukemia    Colitis    Colonic dysmotility    Colon obstruction (HCC)    GI bleed  Resolved Problems:    * No resolved hospital problems. *       Assessment & Plan:   1.  Rectal bleeding-likely from recent trauma of multiple colonic procedures, enemas, bowel resection etc.

## 2020-06-03 NOTE — PROGRESS NOTES
Patient admitted from ED to 4470. Reports minimal pain to ABD. VSS. Assessment performed and documented. Oriented to unit and procedures. Fall risk interventions initiated. C.diff isolation initiated for C. Diff rule out. Patient reports understanding. Will continue to monitor.

## 2020-06-03 NOTE — CONSULTS
[facility] GENERAL AND LAPAROSCOPIC SURGERY                       PATIENT NAME: Chito Tilley     ADMISSION DATE: 6/2/2020  6:06 PM      TODAY'S DATE: 6/3/2020    Reason for Consult:  Bleeding    Requesting Physician:  Mahnaz Borges MD    HISTORY OF PRESENT ILLNESS:              The patient is a 39 y.o. male who presents with hematochezia. Pt S/P lap asst sigmoid resection. Has had colonic dysmotility, and fecal impaction. Had initial uncomplicated recovery post colectomy. Had BM with dark stool and blood yesterday and came to ER. No BM since here at hospital. No N/V. No F/C. No abd pain. Pt had CT done in ER and admitted.  Pt had 3-4 flex sig's prior to surgery andf many ememas     Past Medical History:        Diagnosis Date    Adhesive capsulitis of shoulder     Arthritis     Complex partial seizures with impaired consciousness at onset [facility])     Leuk[facility] in remission [facility] [address]     Seborrheic dermatitis, unspecified     Seizures ([facility] [address]     Last seizure 3/2018       Past Surgical History:        Procedure Laterality Date    COLONOSCOPY N/A 5/13/2020    COLONOSCOPY FLEXIBLE W/ DECOMPRESSION performed by Gael Guerra MD at [address] [facility]  5/13/2020    COLONOSCOPY WITH BIOPSY performed by Gael Guerra MD at [address] [facility]  5/18/2020    COLONOSCOPY DIAGNOSTIC performed by Duane Sorrow, MD at [address] N/A 5/27/2020    DIAGNOSTIC LAPAROSCOPY,REMOVAL OF FECAL IMPACTION, SIGMOID COLON RESECTION performed by Cameron Castillo MD at [address] ARTHROSCOPY Right 04/27/2018    RIGHT SHOULDER ARTHROSCOPY SUBACROMIAL DECOMPRESSION, OPEN    SIGMOIDOSCOPY N/A 5/21/2020    SIGMOIDOSCOPY DIAGNOSTIC FLEXIBLE performed by Duane Sorrow, MD at [address] N/A 5/18/2020    EGD BIOPSY performed by Duane Sorrow, MD at [address] [place] [address]       Current Medications: Current Facility-Administered Medications: bisacodyl (DULCOLAX) suppository 10 mg, 10 mg, Rectal, Once  lamoTRIgine (LAMICTAL) tablet 175 mg, 175 mg, Oral, BID  levETIRAcetam (KEPPRA) tablet 1,500 mg, 1,500 mg, Oral, BID  phenytoin (DILANTIN) ER capsule 100 mg, 100 mg, Oral, Daily  phenytoin (DILANTIN) ER capsule 200 mg, 200 mg, Oral, Nightly  tamsulosin (FLOMAX) capsule 0.4 mg, 0.4 mg, Oral, Daily  traMADol (ULTRAM) tablet 50 mg, 50 mg, Oral, Q6H PRN  sodium chloride flush 0.9 % injection 10 mL, 10 mL, Intravenous, 2 times per day  sodium chloride flush 0.9 % injection 10 mL, 10 mL, Intravenous, PRN  acetaminophen (TYLENOL) tablet 650 mg, 650 mg, Oral, Q6H PRN **OR** acetaminophen (TYLENOL) suppository 650 mg, 650 mg, Rectal, Q6H PRN  promethazine (PHENERGAN) tablet 12.5 mg, 12.5 mg, Oral, Q6H PRN **OR** ondansetron (ZOFRAN) injection 4 mg, 4 mg, Intravenous, Q6H PRN  0.9 % sodium chloride infusion, , Intravenous, Continuous  potassium chloride (KLOR-CON M) extended release tablet 40 mEq, 40 mEq, Oral, PRN **OR** potassium bicarb-citric acid (EFFER-K) effervescent tablet 40 mEq, 40 mEq, Oral, PRN **OR** potassium chloride 10 mEq/100 mL IVPB (Peripheral Line), 10 mEq, Intravenous, PRN  magnesium sulfate 2 g in 50 mL IVPB premix, 2 g, Intravenous, PRN  ciprofloxacin (CIPRO) IVPB 400 mg, 400 mg, Intravenous, Q12H  metronidazole (FLAGYL) 500 mg in NaCl 100 mL IVPB premix, 500 mg, Intravenous, Q8H  Prior to Admission medications    Medication Sig Start Date End Date Taking? Authorizing Provider   traMADol (ULTRAM) 50 MG tablet Take 1 tablet by mouth every 6 hours as needed for Pain for up to 30 doses.  5/31/20 6/7/20  Arnaud Montaño MD   phenytoin (DILANTIN) 100 MG ER capsule Take 1 capsule by mouth daily 6/1/20   Hansa Guillermo MD   lamoTRIgine (LAMICTAL) 25 MG tablet Take 7 tablets by mouth 2 times daily 5/31/20   Hansa Guillermo MD   levETIRAcetam (KEPPRA) 750 MG tablet Take 2 tablets by mouth 2 times daily 5/31/20   Chavez Garvey MD   phenytoin (PHENYTEK) 200 MG ER capsule Take 1 capsule by mouth nightly 5/31/20   Chavez Garvey MD   docusate sodium (COLACE, DULCOLAX) 100 MG CAPS Take 100 mg by mouth 2 times daily 5/31/20   Chavez Garvey MD   naloxegol (MOVANTIK) 25 MG TABS tablet Take 1 tablet by mouth every morning 6/1/20   Chavez Garvey MD   betamethasone valerate (VALISONE) 0.1 % cream Apply topically 1 times daily. 5/11/20   Arcaelis Arrington MD   tamsulosin [facility]) 0.4 MG capsule Take 1 capsule by mouth daily 5/11/20   Aracelis Arrington MD   acetaminophen (TYLENOL) 500 MG tablet Take 2 tablets by mouth 3 times daily as needed for Pain 5/6/20   Aracelis Arrington MD   polyethylene glycol [facility]) 17 GM/SCOOP powder Take 17 g by mouth daily 5/1/20   Aracelis Arrington MD        Allergies:  Patient has no known allergies. Social History:    reports that he has never smoked. He has never used smokeless tobacco. He reports that he does not drink alcohol or use drugs.     Family History:        Problem Relation Age of Onset    Diabetes Father        REVIEW OF SYSTEMS:  CONSTITUTIONAL:  negative  HEENT:  negative  RESPIRATORY:  negative  CARDIOVASCULAR:  negative  GASTROINTESTINAL:  negative except for hemtochezia  GENITOURINARY:  negative  HEMATOLOGIC/LYMPHATIC:  negative  NEUROLOGICAL:  negative  SKIN: negative    PHYSICAL EXAM:  VITALS:  /76   Pulse 94   Temp 98.1 °F (36.7 °C) (Oral)   Resp 18   Ht 6' 1\" (1.854 m)   Wt 157 lb 14.4 oz (71.6 kg)   SpO2 96%   BMI 20.83 kg/m²   24HR INTAKE/OUTPUT:      Intake/Output Summary (Last 24 hours) at 6/3/2020 8571  Last data filed at 6/3/2020 2710  Gross per 24 hour   Intake 855 ml   Output --   Net 855 ml     DRAIN/TUBE OUTPUT:     CONSTITUTIONAL:  alert, no apparent distress and normal weight  EYES:  sclera clear  ENT:  normocepalic, without obvious abnormality  NECK:  supple, symmetrical, trachea midline  LUNGS:  clear to auscultation  CARDIOVASCULAR:  regular rate and rhythm   ABDOMEN:  scars noted are healing well, normal bowel sounds, soft, non-distended, non-tender, voluntary guarding absent, no masses palpated, no hepatosplenomegally and hernia absent  MUSCULOSKELETAL:  0+ pitting edema lower extremities  NEUROLOGIC:  Mental Status Exam:  Level of Alertness:   awake  Orientation:   person, place, time  SKIN:  no bruising or bleeding, normal skin color, texture, turgor and no redness, warmth, or swelling    DATA:    CBC:   Recent Labs     06/02/20 2003 06/03/20  0031 06/03/20  0605   WBC 4.6  --  4.6   HGB 11.4* 10.4* 9.7*   HCT 34.6* 31.6* 28.9*     --  236     BMP:    Recent Labs     06/02/20 2003 06/03/20  0605    138   K 4.3 4.2   CL 98* 103   CO2 29 28   BUN 15 14   CREATININE 0.7* 0.7*   GLUCOSE 105* 110*     Hepatic:   Recent Labs     06/02/20 2003 06/03/20  0605   AST 17 13*   ALT 16 12   BILITOT 0.3 0.3   ALKPHOS 119 92     Mag:    No results for input(s): MG in the last 72 hours. Phos:   No results for input(s): PHOS in the last 72 hours. INR: No results for input(s): INR in the last 72 hours. LIPASE: No results for input(s): LIPASE in the last 72 hours. AMYLASE: No results for input(s): AMYLASE in the last 72 hours. Radiology Review:     Ct Abdomen Pelvis W Iv Contrast Additional Contrast? None    Result Date: 6/2/2020  EXAMINATION: CT OF THE ABDOMEN AND PELVIS WITH CONTRAST 6/2/2020 8:28 pm TECHNIQUE: CT of the abdomen and pelvis was performed with the administration of intravenous contrast. Multiplanar reformatted images are provided for review. Dose modulation, iterative reconstruction, and/or weight based adjustment of the mA/kV was utilized to reduce the radiation dose to as low as reasonably achievable.  COMPARISON: Prior studies including 05/24/2020 HISTORY: ORDERING SYSTEM PROVIDED HISTORY: bright red rectal bleeding, recent bowel resection TECHNOLOGIST PROVIDED HISTORY: If patient is on cardiac monitor and/or pulse ox, they may be taken off cardiac monitor and pulse ox, left on O2 if currently on. All monitors reattached when patient returns to room. Additional Contrast?->None Reason for exam:->bright red rectal bleeding, recent bowel resection Reason for Exam: bright red rectal bleeding, recent bowel resection Acuity: Acute Type of Exam: Initial FINDINGS: Lower Chest: Clear lung bases. Organs: Liver, spleen, pancreas, adrenal glands, gallbladder, and kidneys show no acute abnormality. Again identified are nonobstructing bilateral renal pelvic stones measuring up to 7 mm on the right. GI/Bowel: Postsurgical changes of the sigmoid colon are present with anastomotic sutures now visualized. There is mild associated sigmoid colonic wall thickening and adjacent stranding, likely postsurgical.  There is also mild wall thickening of the descending colon with associated increased vascularity suspicious for inflammation. No dilated loops of bowel. The appendix is not visualized. Pelvis: The urinary bladder is mildly distended but otherwise unremarkable. The prostate gland shows no acute abnormality. Peritoneum/Retroperitoneum: The aorta is normal in caliber and course. Bones/Soft Tissues: No acute or focal bony abnormality. No free or focal fluid collection. No intra-abdominal intraluminal gas. There is thickening lower left rectus muscle the with associated small foci of intramuscular gas; there is overlying stranding in the left lower quadrant anterior abdominal wall fat; the findings are likely postsurgical.  No fluid collection     Postsurgical changes of the sigmoid colon. Anastomotic sutures are associated with mild wall thickening and adjacent stranding, likely postsurgical. Mild wall thickening of the descending colon suspected. Correlation for infectious or inflammatory colitis is recommended.        IMPRESSION/RECOMMENDATIONS:    Hematochezia  Rectal blood noted with stool at home yesterday, no further BM since admit  VSS, no pain  CT with colitis, bowel wall thickening, or most likely post op change  No acute bleeding at this time or I think we'd see a catharsis of blood.  Bleed at anastomosis unlilely, could have had some ulcer or tissue trauma with enemas or pressure from stool with erosion and ooze  Will give a dulcolax, follow BM  See if any bleeding today    Thank you,    Veronica Brown

## 2020-06-03 NOTE — PROGRESS NOTES
4 Eyes Skin Assessment     The patient is being assess for  Admission    I agree that 2 RN's have performed a thorough Head to Toe Skin Assessment on the patient. ALL assessment sites listed below have been assessed. Areas assessed by both nurses:   [x]   Head, Face, and Ears   [x]   Shoulders, Back, and Chest  [x]   Arms, Elbows, and Hands   [x]   Coccyx, Sacrum, and IschIum  [x]   Legs, Feet, and Heels        Does the Patient have Skin Breakdown?   No         Quentin Prevention initiated:  Yes   Wound Care Orders initiated:  NA      [facility] nurse consulted for Pressure Injury (Stage 3,4, Unstageable, DTI, NWPT, and Complex wounds), New and Established Ostomies:  NA      Nurse 1 eSignature: Electronically signed by Jewel Brian RN on 6/2/20 at 11:35 PM EDT    **SHARE this note so that the co-signing nurse is able to place an eSignature**    Nurse 2 eSignature: Electronically signed by Tony Cotter RN on 6/2/20 at 11:38 PM EDT

## 2020-06-03 NOTE — H&P
_    [facility] HOSPITALIST HISTORY AND PHYSICAL    6/2/2020 10:07 PM    Patient Information:  Antoine Cortes is a 39 y.o. male [id number]    PCP:  Jony Escalante MD (Tel: [phone] )    Date of Service:   Pt seen/examined on 6/2/2020   Placed in Observation. Chief complaint:    Chief Complaint   Patient presents with    Rectal Bleeding     Pt brought in per [place] [facility] pt was just a inpatient s/p bowel resection for the last 20 days, was discharged on Sunday, pt reports one episode of bright red bleeding in toilet. History of Present Illness:  Sarita Roberts is a 39 y.o. male who presented with   · Recently d/suzanne on 5/31 . Admitted recently and is s/p laparoscopic assisted sigmoid colon resection on 5/27/2020 for fecal impaction, colonic dysmotility and sigmoid colon obstruction     Noted GI w/u last admission as follows   Severe constipation with fecal impaction, suspicious for colonic inertia. Digitally disimpacted 5/13 during colonoscopy. Colonoscopy revealed stercoral type ulcer (bx nonspecific ulcer) and left sided solid stools. EGD 5/18 mild patchy gastric erythema. Repeat colonoscopy 5/18 with stool impaction in descending colon. · Now returns for LLQ Abd pain and bleeding Per rectum noted today X 1 . States that bleeding was mixture of red and Black color . Also reports no BM since he was d/suzanne home though he is on Laxatives at home . · No F/C   · No N/V  · No chest pain   · No Heartburn       History and pertinent information obtained from   · ED provider   · Prior Chart    · Patient         So far, Notable/significant ED Findings :   · HTN        While in ED  · Patient care Given  · Monitoring done   · Pertinent Labs done and acute issues noted. · Imaging  CT, one in ED . Acute issues noted as below. · Pertinent Medications given as below       · IV Flagyl   · IV Ciprofloxacin       · Admission Labs noted. Acute issues and mgt as below.        · Imaging done in ED as below. And is/are s/o ? Colitis in descending colon vs post surgical changes ? · Pertinent Abnormal Labs as mentioned below. Currently, on my evaluation, patient is :   · Since arrival, post above Rx, patient is AO X 3   · No signs of acute distress s/o hemodynamic instability , reported/noted currently. ·       REVIEW OF SYSTEMS:     Constitutional:  Negative for fever, chills or night sweats  ENT:   Negative for rhinorrhea, epistaxis, hoarseness, sore throat. Respiratory:   Negative for shortness of breath, wheezing  Cardiovascular:   Negative for  chest pain, palpitations   Gastrointestinal:  As above   Genitourinary:   Negative for polyuria, dysuria   Hematologic/Lymphatic:   Negative for  bleeding tendency, easy bruising  Musculoskeletal:   Negative for myalgias and arthralgias  Neurologic:   Negative for  Confusion, dysarthria. Skin:   Negative for itching,rash  Psychiatric:  Negative for depression, anxiety, agitation. Endocrine:  Negative for polydipsia, polyuria,heat /cold intolerance. Past Medical History:         has a past medical history of Adhesive capsulitis of shoulder, Arthritis, Complex partial seizures with impaired consciousness at onset [facility]), Leukemia in remission ([facility] [address] Seborrheic dermatitis, unspecified, and Seizures ([facility] [address] Past Surgical History:         has a past surgical history that includes lobectomy for seizure focus; Shoulder arthroscopy (Right, 04/27/2018); Colonoscopy (N/A, 5/13/2020); Colonoscopy (5/13/2020); Upper gastrointestinal endoscopy (N/A, 5/18/2020); Colonoscopy (5/18/2020); sigmoidoscopy (N/A, 5/21/2020); and hemicolectomy (N/A, 5/27/2020). Medications:          Current Outpatient Medications on File Prior to Encounter   Medication Sig Dispense Refill    traMADol (ULTRAM) 50 MG tablet Take 1 tablet by mouth every 6 hours as needed for Pain for up to 30 doses.  30 tablet 0    phenytoin (DILANTIN) 100 MG ER capsule Take 1 capsule by mouth Appropriate affect. Not agitated  Skin:  Warm, dry, normal turgor, no rash       Labs:     Recent Labs     05/31/20 0558 06/02/20 2003   WBC 4.7 4.6   HGB 11.7* 11.4*   HCT 35.1* 34.6*    269     Recent Labs     05/31/20  0558 06/02/20 2003    137   K 4.2 4.3    98*   CO2 29 29   BUN 12 15   CREATININE 0.8* 0.7*   CALCIUM 9.1 9.2   PHOS 3.4  --      Recent Labs     06/02/20 2003   AST 17   ALT 16   BILITOT 0.3   ALKPHOS 119     No results for input(s): INR in the last 72 hours. No results for input(s): Huyen Ina in the last 72 hours. Urinalysis:      Lab Results   Component Value Date    NITRU Negative 05/10/2020    WBCUA 3 05/10/2020    BACTERIA 1+ 09/09/2011    RBCUA 4 05/10/2020    BLOODU Negative 05/10/2020    SPECGRAV >1.030 05/10/2020    GLUCOSEU Negative 05/10/2020    GLUCOSEU NEGATIVE 09/09/2011         Radiology:         IMAGING :     CT ABDOMEN PELVIS W IV CONTRAST Additional Contrast? None   Final Result   Postsurgical changes of the sigmoid colon. Anastomotic sutures are   associated with mild wall thickening and adjacent stranding, likely   postsurgical.      Mild wall thickening of the descending colon suspected. Correlation for   infectious or inflammatory colitis is recommended. ASSESSMENT/ACTIVE ISSUES & PROBLEMS FOR THIS HOSPITALIZATION     Active Hospital Problems    Diagnosis Date Noted    GI bleed [K92.2] 06/02/2020    Colonic dysmotility [K59.9]     Colon obstruction ([facility] [address] [K56.609]     Acute colitis [K52.9] 05/10/2020    History of leukemia [Z85.6] 02/21/2013    Seizure disorder ([facility] [address] [G40.909] 09/09/2011       PLAN/ORDERS FOR THIS ADMISSION/HOSPITALIZATION     · GI Bleed , suspected 2/2 descending Colitis vs other causes ? ? . Abnormal CT @ Admission noted . Clears started @ admission . NPO PM . GI C/sed in AM for ? Scope . Trend Hb . F/u Hb trend while in house .  Follow restrictive transfusion protocol, PRBC Transfusion prn for Hb< 7/symptomatic anemia/active Blood loss. · Suspected acute colitis ? Infectious vs Ischemic . Abnormal CT @ admission noted . Empiric Abx started . Clears for now . NPO PM   · Recent Colon Sx . Is s/p Status-post laparoscopic assisted sigmoid colon resection on 5/27/2020 for fecal impaction, colonic dysmotility and sigmoid colon obstruction . => Sx called from ED . Empiric Abx started for above . Send stool studies as able . Keep NPO PM   · Sz d/o . Chronic condition. Monitor/Manage/Adjust medications, while in house prn. For now, resumed home medications as per orders . · H/o leukemia as a child ,, now in remission     · Home medications Held/Resumed, and Pertinent changes made in home medications on admission, as needed, according to current medical status, as mentioned above. · DVT Prophylaxis :  + SCDs   · Nutrition/Diet: DIET CLEAR LIQUID;  · Diet NPO, After Midnight  · Code Status: Full Code  · PT/OT and ambulatory Eval Status: Ambulate with Assist   · Probable LOS & future Disposition planned post discharge  - Home in 1-2 days     Please see EPIC orders for detailed orders/recommendations of plan and medications. CONSULTS ORDERED @ ADMISSION   IP CONSULT TO GENERAL SURGERY   IP CONSULT TO HOSPITALIST   IP CONSULT TO GI   IP CONSULT TO GI      Medical Decision Making : HIGH     Total patient Care time spent in evaluating the patient an discussing plan with appropriate staff/patient/family members is 48 min       Pam Garvey MD    Hospitalist, [facility].    6/2/2020 10:07 PM

## 2020-06-03 NOTE — ED NOTES
This RN gave report to RN on 4T. Cipro infusing upon transfer. VSS. No symptoms of distress noted. Pt placed on tele. All belongings with pt to the floor.       Serena Fernandez RN  06/02/20 6664

## 2020-06-03 NOTE — CONSULTS
Gastroenterology Consult Note      Patient: Milagros Feng  : 1975  Acct#:      Date:  6/3/2020    Subjective:       History of Present Illness  Patient is a 39 y.o.  male admitted with GI bleed [K92.2]  GI bleed [K92.2] who is seen in consult for BRBPR. 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 Was found to have stercoral colitis on colonoscopy. He ultimately required sigmoid colon resection on 20. He did well after. No BM since surgery. Yesterday he had a BM that felt like diarrhea. Didn't look in the toilet but noted bright red blood with wiping. Called 911 and was brought in. CT as below. He hasn't passed any BMs or blood here. Has minimal discomfort near incisions. Otherwise not abdominal pain. No N/V.        Past Medical History:   Diagnosis Date    Adhesive capsulitis of shoulder     Arthritis     Complex partial seizures with impaired consciousness at onset [facility])     [facility] ([facility]     Seborrheic dermatitis, unspecified     Seizures ([facility]     Last seizure 3/2018      Past Surgical History:   Procedure Laterality Date    COLONOSCOPY N/A 2020    COLONOSCOPY FLEXIBLE W/ DECOMPRESSION performed by Rebecca Lugo MD at [address] [facility]  2020    COLONOSCOPY WITH BIOPSY performed by Rebecca Lugo MD at [address] [facility]  2020    COLONOSCOPY DIAGNOSTIC performed by Tere Chaidez MD at [place] [address] 2020    DIAGNOSTIC LAPAROSCOPY,REMOVAL OF FECAL IMPACTION, SIGMOID COLON RESECTION performed by Wilner Pope MD at [address] ARTHROSCOPY Right 2018    RIGHT SHOULDER ARTHROSCOPY SUBACROMIAL DECOMPRESSION, OPEN    SIGMOIDOSCOPY N/A 2020    SIGMOIDOSCOPY DIAGNOSTIC FLEXIBLE performed by Tere Chaidez MD at [address] [facility] 5/18/2020    EGD BIOPSY performed by Aj Heart MD at [address] [facility]      Past Endoscopic History:  Colonoscopy 5/13/20 with Dr Gi Jaeger for constipation  FINDINGS  Anal Canal - Mild rectal prolapse and hemorrhoids  Rectum - Large hard solid stool  Sigmoid Colon - Solid stool  Descending Colon - Large solid stool with colonic ulcer, where the stool rubs  against the colon wall. This is c/w stercoral ulcers, biopsied. We tried to disimpact the proximal stool balls with snare and  grasper but it was ineffective. The patient is behaving as  colonic inertia/slow transit constipation  COMMENTS : The stool in the rectum was digitally removed  IMPRESSION : Fecal impaction, in the rectum, sigmoid and descending  colon. Few scattered stercoral type ulcers, biopsied. EGD 5/18/20 with Dr Roxie Avila for epigastric discomfort and reflux  IMPRESSION : mild patchy erythema, biopsies throughout stomach taken. Admission Meds  No current facility-administered medications on file prior to encounter. Current Outpatient Medications on File Prior to Encounter   Medication Sig Dispense Refill    traMADol (ULTRAM) 50 MG tablet Take 1 tablet by mouth every 6 hours as needed for Pain for up to 30 doses. 30 tablet 0    phenytoin (DILANTIN) 100 MG ER capsule Take 1 capsule by mouth daily 60 capsule 3    lamoTRIgine (LAMICTAL) 25 MG tablet Take 7 tablets by mouth 2 times daily 30 tablet 3    levETIRAcetam (KEPPRA) 750 MG tablet Take 2 tablets by mouth 2 times daily 60 tablet 3    phenytoin (PHENYTEK) 200 MG ER capsule Take 1 capsule by mouth nightly 60 capsule 3    docusate sodium (COLACE, DULCOLAX) 100 MG CAPS Take 100 mg by mouth 2 times daily      naloxegol (MOVANTIK) 25 MG TABS tablet Take 1 tablet by mouth every morning 30 tablet 0    betamethasone valerate (VALISONE) 0.1 % cream Apply topically 1 times daily.  30 g 5    tamsulosin (FLOMAX) 0.4 MG capsule Take 1 capsule by mouth daily 14 capsule 0    acetaminophen (TYLENOL) 500 MG tablet Take 2 tablets by mouth 3 times daily as needed for Pain 180 tablet 0    polyethylene glycol (GLYCOLAX) 17 GM/SCOOP powder Take 17 g by mouth daily 1530 g 1         Allergies  No Known Allergies   Social   Social History     Tobacco Use    Smoking status: Never Smoker    Smokeless tobacco: Never Used   Substance Use Topics    Alcohol use: No        Family History   Problem Relation Age of Onset    Diabetes Father          Review of Systems  Constitutional: negative for fevers, chills, sweats    Ears, nose, mouth, throat, and face: negative for nasal congestion and sore throat   Respiratory: negative for cough and shortness of breath   Cardiovascular: negative for chest pain and dyspnea   Gastrointestinal: see hpi   Genitourinary:negative for dysuria and frequency   Integument/breast: negative for pruritus and rash   Hematologic/lymphatic: negative for lymphadenopathy and easy bruising   Musculoskeletal:negative for arthralgias and myalgias   Neurological: negative for dizziness and weakness         Physical Exam  Blood pressure 122/76, pulse 94, temperature 98.1 °F (36.7 °C), temperature source Oral, resp. rate 18, height 6' 1\" (1.854 m), weight 157 lb 14.4 oz (71.6 kg), SpO2 96 %. General appearance: alert, cooperative, no distress, appears stated age  Eyes: Anicteric  Head: Normocephalic, without obvious abnormality  Lungs: clear to auscultation bilaterally, Normal Effort  Heart: regular rate and rhythm, normal S1 and S2, no murmurs or rubs  Abdomen: soft, mild tenderness near incision in LLQ. Bowel sounds normal. No masses,  no organomegaly.    Extremities: atraumatic, no cyanosis or edema  Skin: warm and dry, no jaundice  Neuro: Grossly intact, A&OX3  Musculoskeletal: 5/5  strength BUE      Data Review:    Recent Labs     06/02/20 2003 06/03/20  0031 06/03/20  0605   WBC 4.6  --  4.6   HGB 11.4* 10.4* 9.7*   HCT 34.6* 31.6* 28.9*   MCV 91.2  --  90.8     --  236 Recent Labs     06/02/20 2003 06/03/20  0605    138   K 4.3 4.2   CL 98* 103   CO2 29 28   BUN 15 14   CREATININE 0.7* 0.7*     Recent Labs     06/02/20 2003 06/03/20  0605   AST 17 13*   ALT 16 12   BILITOT 0.3 0.3   ALKPHOS 119 92     No results for input(s): LIPASE, AMYLASE in the last 72 hours. No results for input(s): PROTIME, INR in the last 72 hours. No results for input(s): PTT in the last 72 hours. No results for input(s): OCCULTBLD in the last 72 hours. Imaging Studies:                 CT-scan of abdomen and pelvis W IV contrast 6/2/20  Impression   Postsurgical changes of the sigmoid colon.  Anastomotic sutures are   associated with mild wall thickening and adjacent stranding, likely   postsurgical.       Mild wall thickening of the descending colon suspected.  Correlation for   infectious or inflammatory colitis is recommended. Assessment:     Active Problems:    Seizure disorder (HCC)    History of leukemia    Colitis    Colonic dysmotility    Colon obstruction (HCC)    GI bleed  Resolved Problems:    * No resolved hospital problems. *    Hematochezia - h/o fecal impaction, s/p sigmoid resection 5/27. Did have stercoral colitis noted on colonoscopy that admission. Passed BRBPR yesterday x1. None since. CT with mild wall thickening in the descending colon colitis and bleeding could be from stercoral colitis. ABLA - hgb was 11-12 at last admission. hgb 11.4 initially in the ED and is 9.7 today. Due to above. Recommendations:   - serial h/h  - monitor for rebleeding  - miralax BID  - clear liquids    Discussed with Dr. Fernando Almaraz, ROBIN  [address]  I have personally performed a face to face diagnostic evaluation on this patient. I have interviewed and examined the patient and I agree with the findings and recommended plan of care.   In summary, my findings and plan are the following: As above, 38 yo man known to our group s/p sigmoid resection for stool impaction in setting of chronic constipation. Had rectal bleeding x 1 at home yesterday with first BM after discharge post op last week. No further bleeding. Denies abd pain now. On exam some tenderness over LLQ incision, but normal BS. Suspect stercoral ulcer/colitis vs outlet bleeding given quick resolution. Will increase Miralax to BID in hopes of avoiding constipation/obstipation. If bleeding recurs/persists, we can proceed with sigmoidoscopy. Agree with empiric ABx's for colitis and clear liquid diet for now.     Adeel Chavez MD  [address]  6/3/2020

## 2020-06-03 NOTE — ED PROVIDER NOTES
I independently performed a history and physical on [facility]. All diagnostic, treatment, and disposition decisions were made by myself in conjunction with the advanced practice provider. Briefly, this is a 39 y.o. male here for BRBPR s/p hemicolectomy after stercoral colitis from fecal impaction. On exam, WDWNM, NAD, BRBPR,  Mild diffuse abdominal TTP. Heart RRR, Lungs CTAB, no r/r/w. Screenings     [place] Coma Scale  Eye Opening: Spontaneous  Best Verbal Response: Oriented  Best Motor Response: Obeys commands  [place] Coma Scale Score: 15             MDM  Reviewed CT: given recent surgery, will admit for Obs. CS Dr. Jf Price    Patient Referrals:  Vivienne Gunnh, [address] [place] [address]  [phone]            Discharge Medications:  Current Discharge Medication List          FINAL IMPRESSION  1. Colitis    2. Hemorrhage of rectum and anus        Blood pressure (!) 153/85, pulse 92, temperature 97.7 °F (36.5 °C), temperature source Oral, resp. rate 18, height 6' 1\" (1.854 m), weight 157 lb 14.4 oz (71.6 kg), SpO2 96 %. For further details of [facility] emergency department encounter, please see documentation by advanced practice provider, Uriel El.         Tod Ramsey MD  06/03/20 0228

## 2020-06-03 NOTE — PLAN OF CARE
Problem: Pain:  Description: Pain management should include both nonpharmacologic and pharmacologic interventions. Goal: Pain level will decrease  Description: Pain level will decrease  Outcome: Ongoing  Note:   Patient will verbalize decrease in pain to comfortable level with the use of pharmacologic and non-pharmacologic methods. 0-10 pain rating scale reviewed with patient. Pain reductions strategies discussed. Patient verbalized understanding, demonstrates knowledge of plan of care. Problem: Falls - Risk of:  Goal: Will remain free from falls  Description: Will remain free from falls  Outcome: Ongoing  Note: Patient is a high fall risk. Fall risk interventions are in place including armband, socks, bed alarms, hourly rounding. Plan of care explained to patient, verbalized understanding.

## 2020-06-03 NOTE — ED PROVIDER NOTES
[address]        Pt Name: Abdul Homans  MRN: [id number]  [place] 1975  Date of evaluation: 6/2/2020  Provider: JOHN Sanon - CNP  PCP: Kasandra Roy MD     I have seen and evaluated this patient with my supervising physician tila chávez [facility]       Chief Complaint   Patient presents with    Rectal Bleeding     Pt brought in per [facility] pt was just a inpatient s/p bowel resection for the last 20 days, was discharged on Sunday, pt reports one episode of bright red bleeding in toilet. HISTORY OF PRESENT ILLNESS   (Location, Timing/Onset, Context/Setting, Quality, Duration, Modifying Factors, Severity, Associated Signs and Symptoms)  Note limiting factors. Abdul Homans is a 39 y.o. male presents to the emergency department with complaint of large episode of bright red bleeding per rectum. The patient reports the bleeding started just prior to calling EMS. He did recently have a hemicolectomy and is recovering at home. States that he does have home nursing. Also complaining of right sided abdominal pain that started just prior to rectal bleeding. He did have the sigmoid colon resection on 527 for removal of severe fecal impaction suspicious for colonic inertia. Denies any headache, fever, lightheadedness, dizziness, visual disturbances. No chest pain or pressure. No neck or back pain. No shortness of breath, cough, or congestion. No  nausea, vomiting, constipation, or dysuria. No rash. Nursing Notes were all reviewed and agreed with or any disagreements were addressed in the HPI. REVIEW OF SYSTEMS    (2-9 systems for level 4, 10 or more for level 5)     Review of Systems   Constitutional: Negative for activity change, chills and fever. Respiratory: Negative for chest tightness and shortness of breath. Cardiovascular: Negative for chest pain.    Gastrointestinal: Positive for abdominal pain, blood in stool and diarrhea. Negative for nausea and vomiting. Genitourinary: Negative for dysuria. All other systems reviewed and are negative. Positives and Pertinent negatives as per HPI. Except as noted above in the ROS, all other systems were reviewed and negative. PAST MEDICAL HISTORY     Past Medical History:   Diagnosis Date    Adhesive capsulitis of shoulder     Arthritis     Complex partial seizures with impaired consciousness at onset [facility])     [facility]     Seborrheic dermatitis, unspecified     Seizures ([facility]     Last seizure 3/2018         SURGICAL HISTORY     Past Surgical History:   Procedure Laterality Date    COLONOSCOPY N/A 5/13/2020    COLONOSCOPY FLEXIBLE W/ DECOMPRESSION performed by Preeti Jacob MD at [address] [facility]  5/13/2020    COLONOSCOPY WITH BIOPSY performed by Preeti Jacob MD at [address]  5/18/2020    COLONOSCOPY DIAGNOSTIC performed by Jennifer Jordan MD at [place] [address] 5/27/2020    DIAGNOSTIC LAPAROSCOPY,REMOVAL OF FECAL IMPACTION, SIGMOID COLON RESECTION performed by David Virk MD at [address] [place] [address] ARTHROSCOPY Right 04/27/2018    RIGHT SHOULDER ARTHROSCOPY SUBACROMIAL DECOMPRESSION, OPEN    SIGMOIDOSCOPY N/A 5/21/2020    SIGMOIDOSCOPY DIAGNOSTIC FLEXIBLE performed by Jennifer Jordan MD at [place] [address] 5/18/2020    EGD BIOPSY performed by Jennifer Jordan MD at [address]       Current Discharge Medication List      CONTINUE these medications which have NOT CHANGED    Details   traMADol (ULTRAM) 50 MG tablet Take 1 tablet by mouth every 6 hours as needed for Pain for up to 30 doses.   Qty: 30 tablet, Refills: 0    Comments: Reduce doses taken as pain becomes manageable  Associated Diagnoses: Acute colitis      !! phenytoin (DILANTIN) 100 MG ER capsule Take 1 capsule by mouth daily  Qty: 60 capsule, Refills: 3      lamoTRIgine (LAMICTAL) 25 MG tablet Take 7 tablets by mouth 2 times daily  Qty: 30 tablet, Refills: 3      levETIRAcetam (KEPPRA) 750 MG tablet Take 2 tablets by mouth 2 times daily  Qty: 60 tablet, Refills: 3      !! phenytoin (PHENYTEK) 200 MG ER capsule Take 1 capsule by mouth nightly  Qty: 60 capsule, Refills: 3      docusate sodium (COLACE, DULCOLAX) 100 MG CAPS Take 100 mg by mouth 2 times daily      naloxegol (MOVANTIK) 25 MG TABS tablet Take 1 tablet by mouth every morning  Qty: 30 tablet, Refills: 0      betamethasone valerate (VALISONE) 0.1 % cream Apply topically 1 times daily. Qty: 30 g, Refills: 5      tamsulosin (FLOMAX) 0.4 MG capsule Take 1 capsule by mouth daily  Qty: 14 capsule, Refills: 0      acetaminophen (TYLENOL) 500 MG tablet Take 2 tablets by mouth 3 times daily as needed for Pain  Qty: 180 tablet, Refills: 0      polyethylene glycol (GLYCOLAX) 17 GM/SCOOP powder Take 17 g by mouth daily  Qty: 1530 g, Refills: 1       !! - Potential duplicate medications found. Please discuss with provider. ALLERGIES     Patient has no known allergies.     FAMILYHISTORY       Family History   Problem Relation Age of Onset    Diabetes Father           SOCIAL HISTORY       Social History     Tobacco Use    Smoking status: Never Smoker    Smokeless tobacco: Never Used   Substance Use Topics    Alcohol use: No    Drug use: No       SCREENINGS    [place] Coma Scale  Eye Opening: Spontaneous  Best Verbal Response: Oriented  Best Motor Response: Obeys commands  [place] Coma Scale Score: 15        PHYSICAL EXAM    (up to 7 for level 4, 8 or more for level 5)     ED Triage Vitals   BP Temp Temp Source Pulse Resp SpO2 Height Weight   06/02/20 1814 06/02/20 1814 06/02/20 1814 06/02/20 1814 06/02/20 1814 06/02/20 1814 06/02/20 1812 06/02/20 1812   (!) 152/85 97.9 °F (36.6 °C) Oral 90 15 99 % 6' 1\" (1.854 m) 157 lb (71.2 kg)       Physical Exam  Vitals signs and nursing note reviewed. Constitutional:       Appearance: He is well-developed. He is not diaphoretic. HENT:      Head: Normocephalic and atraumatic. Right Ear: External ear normal.      Left Ear: External ear normal.   Eyes:      General:         Right eye: No discharge. Left eye: No discharge. Neck:      Musculoskeletal: Normal range of motion and neck supple. Vascular: No JVD. Cardiovascular:      Rate and Rhythm: Normal rate and regular rhythm. Pulses: Normal pulses. Heart sounds: Normal heart sounds. Pulmonary:      Effort: Pulmonary effort is normal. No respiratory distress. Breath sounds: Normal breath sounds. Abdominal:      General: There is distension. Palpations: Abdomen is soft. Tenderness: There is abdominal tenderness. Genitourinary:     Rectum: Guaiac result positive. Musculoskeletal: Normal range of motion. Skin:     General: Skin is warm and dry. Coloration: Skin is pale. Neurological:      Mental Status: He is alert and oriented to person, place, and time.    Psychiatric:         Behavior: Behavior normal.         DIAGNOSTIC RESULTS   LABS:    Labs Reviewed   CBC WITH AUTO DIFFERENTIAL - Abnormal; Notable for the following components:       Result Value    RBC 3.79 (*)     Hemoglobin 11.4 (*)     Hematocrit 34.6 (*)     All other components within normal limits    Narrative:     Performed at:  [facility] [address], [facility], [address]   Phone [phone]   COMPREHENSIVE METABOLIC PANEL - Abnormal; Notable for the following components:    Chloride 98 (*)     Glucose 105 (*)     CREATININE 0.7 (*)     All other components within normal limits    Narrative:     Performed at:  [facility]  [address] [facility], [address]   Phone [phone]   BLOOD OCCULT STOOL DIAGNOSTIC - Abnormal; Notable for the following components:    Occult Blood Diagnostic   (*)     Value: Result: POSITIVE  Normal range: Negative      All other components within normal limits    Narrative:     ORDER#: [id number]                          ORDERED BY: Sameer Jordan  SOURCE: Stool rectum                       COLLECTED:  06/02/20 19:17  ANTIBIOTICS AT [place].:                      RECEIVED :  06/02/20 19:46  Performed at:  [facility], [address]   Phone [phone]   C DIFF TOXIN/ANTIGEN   FECAL LEUKOCYTES   GASTROINTESTINAL PANEL, MOLECULAR   LACTATE, SEPSIS    Narrative:     Performed at:  [facility] [address], [place], [address]   Phone [phone]   COMPREHENSIVE METABOLIC PANEL W/ REFLEX TO MG FOR LOW K   CBC WITH AUTO DIFFERENTIAL   HEMOGLOBIN AND HEMATOCRIT, BLOOD   TYPE AND SCREEN    Narrative:     Performed at:  [facility] [address], [place], [address]   Phone [phone]       All other labs were within normal range or not returned as of this dictation. EKG: All EKG's are interpreted by the Emergency Department Physician in the absence of a cardiologist.  Please see their note for interpretation of EKG. RADIOLOGY:   Non-plain film images such as CT, Ultrasound and MRI are read by the radiologist. Plain radiographic images are visualized and preliminarily interpreted by the ED Provider with the below findings:        Interpretation per the Radiologist below, if available at the time of this note:    CT ABDOMEN PELVIS W IV CONTRAST Additional Contrast? None   Final Result   Postsurgical changes of the sigmoid colon. Anastomotic sutures are   associated with mild wall thickening and adjacent stranding, likely   postsurgical.      Mild wall thickening of the descending colon suspected. Correlation for   infectious or inflammatory colitis is recommended. Or   acetaminophen (TYLENOL) suppository 650 mg (has no administration in time range)   promethazine (PHENERGAN) tablet 12.5 mg (has no administration in time range)     Or   ondansetron (ZOFRAN) injection 4 mg (has no administration in time range)   0.9 % sodium chloride infusion (has no administration in time range)   potassium chloride (KLOR-CON M) extended release tablet 40 mEq (has no administration in time range)     Or   potassium bicarb-citric acid (EFFER-K) effervescent tablet 40 mEq (has no administration in time range)     Or   potassium chloride 10 mEq/100 mL IVPB (Peripheral Line) (has no administration in time range)   magnesium sulfate 2 g in 50 mL IVPB premix (has no administration in time range)   ciprofloxacin (CIPRO) IVPB 400 mg (has no administration in time range)   metronidazole (FLAGYL) 500 mg in NaCl 100 mL IVPB premix (has no administration in time range)   iopamidol (ISOVUE-370) 76 % injection 75 mL (75 mLs Intravenous Given 6/2/20 2028)   metroNIDAZOLE (FLAGYL) tablet 500 mg (500 mg Oral Given 6/2/20 2246)       Briefly, this is a 39year old male that presents to the emergency department with complaint of large episode of bright red bleeding per rectum. The patient reports the bleeding started just prior to calling EMS. He did recently have a hemicolectomy and is recovering at home. States that he does have home nursing. Also complaining of right sided abdominal pain that started just prior to rectal bleeding. He did have the sigmoid colon resection on 527 for removal of severe fecal impaction suspicious for colonic inertia. Metronidazole and Cipro given for concern of colitis. Hemoccult is positive.        CT ABDOMEN PELVIS W IV CONTRAST Additional Contrast? None (Final result)   Result time 06/02/20 21:02:03   Final result by Cecile Tompkins MD (06/02/20 21:02:03)                Impression:    Postsurgical changes of the sigmoid colon.  Anastomotic sutures are  associated with mild wall thickening and adjacent stranding, likely  postsurgical.    Mild wall thickening of the descending colon suspected.  Correlation for  infectious or inflammatory colitis is recommended. Dr. Andres Cuevas, general surgery, consulted for this patient. Patient be admitted via hospitalist services. All questions were answered the patient is agreeable regarding plan of care. FINAL IMPRESSION      1. Colitis    2.  Hemorrhage of rectum and anus          DISPOSITION/PLAN   DISPOSITION Admitted 06/02/2020 10:04:47 PM      PATIENT REFERREDTO:  Mirta Bobby MD  [facility]  [phone]            DISCHARGE MEDICATIONS:  Current Discharge Medication List          DISCONTINUED MEDICATIONS:  Current Discharge Medication List                 (Please note that portions of this note were completed with a voice recognition program.  Efforts were made to edit the dictations but occasionally words are mis-transcribed.)    JOHN Kennedy CNP (electronically signed)           JOHN Kennedy CNP  06/02/20 0850

## 2020-06-04 LAB
HCT VFR BLD CALC: 27.8 % (ref 40.5–52.5)
HCT VFR BLD CALC: 29.3 % (ref 40.5–52.5)
HEMOGLOBIN: 9.2 G/DL (ref 13.5–17.5)
HEMOGLOBIN: 9.5 G/DL (ref 13.5–17.5)
WHITE BLOOD CELLS (WBC), STOOL: ABNORMAL

## 2020-06-04 PROCEDURE — 96375 TX/PRO/DX INJ NEW DRUG ADDON: CPT

## 2020-06-04 PROCEDURE — APPSS15 APP SPLIT SHARED TIME 0-15 MINUTES: Performed by: NURSE PRACTITIONER

## 2020-06-04 PROCEDURE — 85014 HEMATOCRIT: CPT

## 2020-06-04 PROCEDURE — APPNB30 APP NON BILLABLE TIME 0-30 MINS: Performed by: NURSE PRACTITIONER

## 2020-06-04 PROCEDURE — G0378 HOSPITAL OBSERVATION PER HR: HCPCS

## 2020-06-04 PROCEDURE — 6360000002 HC RX W HCPCS: Performed by: INTERNAL MEDICINE

## 2020-06-04 PROCEDURE — 2580000003 HC RX 258: Performed by: HOSPITALIST

## 2020-06-04 PROCEDURE — 94760 N-INVAS EAR/PLS OXIMETRY 1: CPT

## 2020-06-04 PROCEDURE — 2500000003 HC RX 250 WO HCPCS: Performed by: HOSPITALIST

## 2020-06-04 PROCEDURE — 96376 TX/PRO/DX INJ SAME DRUG ADON: CPT

## 2020-06-04 PROCEDURE — 99224 PR SBSQ OBSERVATION CARE/DAY 15 MINUTES: CPT | Performed by: SURGERY

## 2020-06-04 PROCEDURE — 36415 COLL VENOUS BLD VENIPUNCTURE: CPT

## 2020-06-04 PROCEDURE — 6360000002 HC RX W HCPCS: Performed by: HOSPITALIST

## 2020-06-04 PROCEDURE — 6370000000 HC RX 637 (ALT 250 FOR IP): Performed by: HOSPITALIST

## 2020-06-04 PROCEDURE — 6370000000 HC RX 637 (ALT 250 FOR IP): Performed by: PHYSICIAN ASSISTANT

## 2020-06-04 PROCEDURE — C9113 INJ PANTOPRAZOLE SODIUM, VIA: HCPCS | Performed by: INTERNAL MEDICINE

## 2020-06-04 PROCEDURE — 96366 THER/PROPH/DIAG IV INF ADDON: CPT

## 2020-06-04 PROCEDURE — 85018 HEMOGLOBIN: CPT

## 2020-06-04 RX ORDER — PANTOPRAZOLE SODIUM 40 MG/10ML
40 INJECTION, POWDER, LYOPHILIZED, FOR SOLUTION INTRAVENOUS DAILY
Status: DISCONTINUED | OUTPATIENT
Start: 2020-06-04 | End: 2020-06-06 | Stop reason: HOSPADM

## 2020-06-04 RX ADMIN — ONDANSETRON 4 MG: 2 INJECTION INTRAMUSCULAR; INTRAVENOUS at 00:34

## 2020-06-04 RX ADMIN — LAMOTRIGINE 175 MG: 25 TABLET ORAL at 09:31

## 2020-06-04 RX ADMIN — POLYETHYLENE GLYCOL 3350 17 G: 17 POWDER, FOR SOLUTION ORAL at 21:39

## 2020-06-04 RX ADMIN — PHENYTOIN SODIUM 200 MG: 100 CAPSULE ORAL at 21:39

## 2020-06-04 RX ADMIN — TAMSULOSIN HYDROCHLORIDE 0.4 MG: 0.4 CAPSULE ORAL at 09:31

## 2020-06-04 RX ADMIN — LEVETIRACETAM 1500 MG: 500 TABLET ORAL at 21:39

## 2020-06-04 RX ADMIN — TRAMADOL HYDROCHLORIDE 50 MG: 50 TABLET, FILM COATED ORAL at 05:24

## 2020-06-04 RX ADMIN — CIPROFLOXACIN 400 MG: 2 INJECTION, SOLUTION INTRAVENOUS at 21:44

## 2020-06-04 RX ADMIN — Medication 10 ML: at 21:44

## 2020-06-04 RX ADMIN — PHENYTOIN SODIUM 100 MG: 100 CAPSULE ORAL at 09:32

## 2020-06-04 RX ADMIN — TRAMADOL HYDROCHLORIDE 50 MG: 50 TABLET, FILM COATED ORAL at 21:48

## 2020-06-04 RX ADMIN — POLYETHYLENE GLYCOL 3350 17 G: 17 POWDER, FOR SOLUTION ORAL at 09:32

## 2020-06-04 RX ADMIN — CIPROFLOXACIN 400 MG: 2 INJECTION, SOLUTION INTRAVENOUS at 09:32

## 2020-06-04 RX ADMIN — LAMOTRIGINE 175 MG: 25 TABLET ORAL at 21:39

## 2020-06-04 RX ADMIN — LEVETIRACETAM 1500 MG: 500 TABLET ORAL at 09:50

## 2020-06-04 RX ADMIN — METRONIDAZOLE 500 MG: 500 INJECTION, SOLUTION INTRAVENOUS at 05:23

## 2020-06-04 RX ADMIN — Medication 10 ML: at 09:32

## 2020-06-04 RX ADMIN — SODIUM CHLORIDE: 9 INJECTION, SOLUTION INTRAVENOUS at 19:43

## 2020-06-04 RX ADMIN — PANTOPRAZOLE SODIUM 40 MG: 40 INJECTION, POWDER, FOR SOLUTION INTRAVENOUS at 09:33

## 2020-06-04 ASSESSMENT — PAIN SCALES - GENERAL
PAINLEVEL_OUTOF10: 0
PAINLEVEL_OUTOF10: 0
PAINLEVEL_OUTOF10: 1
PAINLEVEL_OUTOF10: 4
PAINLEVEL_OUTOF10: 1
PAINLEVEL_OUTOF10: 6

## 2020-06-04 NOTE — PROGRESS NOTES
[facility] PROGRESS NOTE    6/4/2020 5:36 PM        Name: Martha Grimm . Admitted: 6/2/2020  Primary Care Provider: Michell Theodore MD (Tel: [phone])      Subjective:  . Patient feeling okay. He is frustrating. He states that no one is telling him anything although I find that hard to believe is Dr. Elsa Esqueda was just in there for some period of time. He states that he would rather go home with hospice then to drink Miralax twice a day.      Reviewed interval ancillary notes    Current Medications  pantoprazole (PROTONIX) injection 40 mg, Daily  polyethylene glycol (GLYCOLAX) packet 17 g, BID  lamoTRIgine (LAMICTAL) tablet 175 mg, BID  levETIRAcetam (KEPPRA) tablet 1,500 mg, BID  phenytoin (DILANTIN) ER capsule 100 mg, Daily  phenytoin (DILANTIN) ER capsule 200 mg, Nightly  tamsulosin (FLOMAX) capsule 0.4 mg, Daily  traMADol (ULTRAM) tablet 50 mg, Q6H PRN  sodium chloride flush 0.9 % injection 10 mL, 2 times per day  sodium chloride flush 0.9 % injection 10 mL, PRN  acetaminophen (TYLENOL) tablet 650 mg, Q6H PRN    Or  acetaminophen (TYLENOL) suppository 650 mg, Q6H PRN  promethazine (PHENERGAN) tablet 12.5 mg, Q6H PRN    Or  ondansetron (ZOFRAN) injection 4 mg, Q6H PRN  0.9 % sodium chloride infusion, Continuous  potassium chloride (KLOR-CON M) extended release tablet 40 mEq, PRN    Or  potassium bicarb-citric acid (EFFER-K) effervescent tablet 40 mEq, PRN    Or  potassium chloride 10 mEq/100 mL IVPB (Peripheral Line), PRN  magnesium sulfate 2 g in 50 mL IVPB premix, PRN  ciprofloxacin (CIPRO) IVPB 400 mg, Q12H        Objective:  /71   Pulse 89   Temp 97.7 °F (36.5 °C) (Oral)   Resp 16   Ht 6' 1\" (1.854 m)   Wt 157 lb 14.4 oz (71.6 kg)   SpO2 98%   BMI 20.83 kg/m²     Intake/Output Summary (Last 24 hours) at 6/4/2020 9589  Last data filed at 6/3/2020 7187  Gross per 24 hour   Intake --   Output 50 ml   Net -50 ml      Wt Readings from Last 3 Encounters:   06/02/20 157 lb 14.4 oz (71.6 kg)   05/23/20 159 lb 3.2 oz (72.2 kg)   10/28/19 157 lb 2 oz (71.3 kg)       General appearance:  Appears comfortable  Eyes: Sclera clear. Pupils equal.  ENT: Moist oral mucosa. Trachea midline, no adenopathy. Cardiovascular: Regular rhythm, normal S1, S2. No murmur. No edema in lower extremities  Respiratory: Not using accessory muscles. Good inspiratory effort. Clear to auscultation bilaterally, no wheeze or crackles. GI: Abdomen soft, no tenderness, not distended, normal bowel sounds  Musculoskeletal: No cyanosis in digits, neck supple  Neurology: CN 2-12 grossly intact. No speech or motor deficits  Psych: Normal affect. Alert and oriented in time, place and person  Skin: Warm, dry, normal turgor    Labs and Tests:  CBC:   Recent Labs     06/02/20 2003 06/03/20  0605 06/03/20  1211 06/04/20  0014 06/04/20  1205   WBC 4.6  --  4.6  --   --   --    HGB 11.4*   < > 9.7* 9.7* 9.2* 9.5*     --  236  --   --   --     < > = values in this interval not displayed. BMP:    Recent Labs     06/02/20 2003 06/03/20  0605    138   K 4.3 4.2   CL 98* 103   CO2 29 28   BUN 15 14   CREATININE 0.7* 0.7*   GLUCOSE 105* 110*     Hepatic:   Recent Labs     06/02/20 2003 06/03/20  0605   AST 17 13*   ALT 16 12   BILITOT 0.3 0.3   ALKPHOS 119 92     CT abd/pelvis  Postsurgical changes of the sigmoid colon.  Anastomotic sutures are   associated with mild wall thickening and adjacent stranding, likely   postsurgical.       Mild wall thickening of the descending colon suspected.  Correlation for   infectious or inflammatory colitis is recommended. Problem List  Active Problems:    Seizure disorder ([address]    History of leukemia    Colitis    Colonic dysmotility    Colon obstruction (HCC)    GI bleed  Resolved Problems:    * No resolved hospital problems. *       Assessment & Plan:   1.  Rectal bleeding-likely from

## 2020-06-04 NOTE — PROGRESS NOTES
[facility]  Gastroenterology Progress Note    Gema Clayton is a 39 y.o. male patient. 1. Colitis    2. Hemorrhage of rectum and anus        SUBJECTIVE:  Still having red blood in BM several times yesterday per RN and patient. He sees blood with wiping as well. Physical    VITALS:  /78   Pulse 94   Temp 98 °F (36.7 °C) (Oral)   Resp 16   Ht 6' 1\" (1.854 m)   Wt 157 lb 14.4 oz (71.6 kg)   SpO2 97%   BMI 20.83 kg/m²   TEMPERATURE:  Current - Temp: 98 °F (36.7 °C); Max - Temp  Av.8 °F (36.6 °C)  Min: 97.7 °F (36.5 °C)  Max: 98 °F (36.7 °C)    Abdomen soft, ND, mildly tender over incisions, no HSM, Bowel sounds normal     Data      Recent Labs     20  0605 20  1211 20  0014   WBC 4.6  --  4.6  --   --    HGB 11.4*   < > 9.7* 9.7* 9.2*   HCT 34.6*   < > 28.9* 28.8* 27.8*   MCV 91.2  --  90.8  --   --      --  236  --   --     < > = values in this interval not displayed. Recent Labs     20  0605    138   K 4.3 4.2   CL 98* 103   CO2 29 28   BUN 15 14   CREATININE 0.7* 0.7*     Recent Labs     20  0605   AST 17 13*   ALT 16 12   BILITOT 0.3 0.3   ALKPHOS 119 92     No results for input(s): LIPASE, AMYLASE in the last 72 hours. ASSESSMENT :    Hematochezia - h/o fecal impaction, s/p sigmoid resection . Did have stercoral colitis noted on colonoscopy that admission. Passed BRBPR yesterday x1. None since. CT with mild wall thickening in the descending colon colitis and bleeding could be from stercoral colitis. Still some bleeding and now some nausea with vomiting of clear fluid since admission -- query caused by Flagyl. Acute blood loss anemia (ABLA) - hgb was 11-12 at last admission. hgb 11.4 initially in the ED and is stable at 9.2 today. Due to above. PLAN   :  1) D/C Flagyl. 2) Add Protonix once daily. 3) Monitor today. 4) Full liquid diet today.   5) Flex sig in AM if continues to bleed today/overnight.     MD Caitlin Cadet and [address]  6/4/2020

## 2020-06-04 NOTE — DISCHARGE INSTR - COC
Continuity of Care Form    Patient Name: Marguerite Siddiqi   :  1975  MRN:  [id number]    Admit date:  2020  Discharge date:  ***    Code Status Order: Full Code   Advance Directives:   Advance Care Flowsheet Documentation     Date/Time Healthcare Directive Type of Healthcare Directive Copy in [address] Agent's Name Healthcare Agent's Phone Number    20 [phone]  Yes, patient has an advance directive for healthcare treatment  Durable power of  for health care  No, copy requested from family  Patient's parents  Brandon Caldera and Britney Other  [phone]          Admitting Physician:  Gin Ruiz MD  PCP: Michael Hubbard MD    Discharging Nurse: [facility]/Room#: [id number]/[id number]  Discharging Unit Phone Number: ***    Emergency Contact:   Extended Emergency Contact Information  Primary Emergency Contact: Dolores [address] [facility] Phone: [phone]  Relation: Parent    Past Surgical History:  Past Surgical History:   Procedure Laterality Date    COLONOSCOPY N/A 2020    COLONOSCOPY FLEXIBLE W/ DECOMPRESSION performed by Siobhan Jesus MD at [address] [facility]  2020    COLONOSCOPY WITH BIOPSY performed by Siobhan Jesus MD at [address] [facility]  2020    COLONOSCOPY DIAGNOSTIC performed by Tracey Almaguer MD at [place] [address] 2020    [address] [place] [address], SIGMOID COLON RESECTION performed by Jacqueline Suarez MD at [address] ARTHROSCOPY Right 2018    RIGHT SHOULDER ARTHROSCOPY SUBACROMIAL DECOMPRESSION, OPEN    SIGMOIDOSCOPY N/A 2020    SIGMOIDOSCOPY DIAGNOSTIC FLEXIBLE performed by Tracey Almaguer MD at [facility] 2020    EGD BIOPSY performed by Tracey Almaguer MD at [facility] History:   Immunization History   Administered Date(s) Administered    Hepatitis B 01/01/1995    Influenza Vaccine, unspecified formulation 11/04/2016    Influenza Virus Vaccine 10/24/2003    Influenza, [place] Favor, IM, PF (6 mo and older Fluzone, Flulaval, Fluarix, and 3 yrs and older Afluria) 11/06/2018, 10/28/2019    MMR 01/01/1992    Pneumococcal Polysaccharide ([id number]) 02/25/2009    Td, unspecified formulation 01/01/1992    Tdap (Boostrix, Adacel) 05/19/2016       Active Problems:  Patient Active Problem List   Diagnosis Code    Seizure disorder ([facility] G40.909    Adhesive capsulitis of shoulder M75.00    Seborrheic dermatitis L21.9    History of leukemia Z85.6    Balance disorder R26.89    Primary osteoarthritis involving multiple joints M15.0    Colitis K52.9    Fecal impaction ([facility] K56.41    Colonic dysmotility K59.9    Colon obstruction ([facility] K56.609    GI bleed K92.2       Isolation/Infection:   Isolation          No Isolation        Patient Infection Status     Infection Onset Added Last Indicated Last Indicated By Review Planned Expiration Resolved Resolved By    None active    Resolved    C-diff Rule Out 06/02/20 06/02/20 06/03/20 Gastrointestinal Panel, Molecular (Ordered)   06/03/20 Rule-Out Test Resulted          Nurse Assessment:  Last Vital Signs: /71   Pulse 89   Temp 97.7 °F (36.5 °C) (Oral)   Resp 16   Ht 6' 1\" (1.854 m)   Wt 157 lb 14.4 oz (71.6 kg)   SpO2 98%   BMI 20.83 kg/m²     Last documented pain score (0-10 scale): Pain Level: 0  Last Weight:   Wt Readings from Last 1 Encounters:   06/02/20 157 lb 14.4 oz (71.6 kg)     Mental Status:  {IP PT MENTAL STATUS:20030}    IV Access:  {[facility] IV ACCESS:015847180}    Nursing Mobility/ADLs:  Walking   {[facility] DME PAOU:075291297}  Transfer  {[facility] DME POCF:325494924}  Bathing  {[facility] DME LYMN:075662031}  Dressing  {[facility] DME XWFQ:606341480}  Toileting  {HARJIT FRANCO LMCV:451648029}  Feeding  {HARJIT FRANCO FBJY:004772125}  Med Admin  {P DME NELLY:173513725}  Med Delivery   [address] [facility] [employer] JUSTIN MED Delivery:760677582}    Wound Care Documentation and Therapy:  Incision 18 Shoulder Right (Active)   Number of days: 768        Elimination:  Continence:   · Bowel: {YES / YADIRA:35308}  · Bladder: {YES / HL:24052}  Urinary Catheter: {Urinary Catheter:099612670}   Colostomy/Ileostomy/Ileal Conduit: {YES / HZ:94930}       Date of Last BM: ***    Intake/Output Summary (Last 24 hours) at 2020 1005  Last data filed at 6/3/2020 2250  Gross per 24 hour   Intake --   Output 50 ml   Net -50 ml     I/O last 3 completed shifts:  In: -   Out: 50 [Stool:50]    Safety Concerns:     [address] [employer] Safety Concerns:040951980}    Impairments/Disabilities:      [address] [employer] Impairments/Disabilities:729936595}    Nutrition Therapy:  Current Nutrition Therapy:   [address] [employer] Diet List:923330261}    Routes of Feeding: {P DME Other Feedings:930776285}  Liquids: {Slp liquid thickness:66605}  Daily Fluid Restriction: {CHP DME Yes amt example:080764523}  Last Modified Barium Swallow with Video (Video Swallowing Test): {Done Not Done [facility]:997291375}    Treatments at the Time of Hospital Discharge:   Respiratory Treatments: ***  Oxygen Therapy:  {Therapy; copd oxygen:50523}  Ventilator:    { CC Vent CIET:089370638}    Rehab Therapies: PT  Weight Bearing Status/Restrictions: { CC Weight Bearin}  Other Medical Equipment (for information only, NOT a DME order):  {EQUIPMENT:009320124}  Other Treatments: ***    Patient's personal belongings (please select all that are sent with patient):  {P DME Belongings:319076339}    RN SIGNATURE:  {Esignature:554845064}    CASE MANAGEMENT/SOCIAL WORK SECTION    Inpatient Status Date: OBS    Readmission Risk Assessment Score:  Readmission Risk              Risk of Unplanned Readmission:        0         Discharging to Facility/ Agency   Name: Loida Carey will call for Appointment  Phone: [phone]  Fax: [phone]    /Social Worker signature: Electronically signed by JACQUE Lovelace on 6/6/2020 at 3:52 PM      PHYSICIAN SECTION    Prognosis: {Prognosis:1280795669}    Condition at Discharge: [address] Patient Condition:306867946}    Rehab Potential (if transferring to Rehab): {Prognosis:2799323153}    Recommended Labs or Other Treatments After Discharge: ***    Physician Certification: I certify the above information and transfer of Hari Sanchez  is necessary for the continuing treatment of the diagnosis listed and that he requires {Admit to Appropriate Level of Care:22593} for {GREATER/LESS:086237123} 30 days.      Update Admission H&P: {CHP DME Changes in BMVHD:240323030}    PHYSICIAN SIGNATURE:  {Esignature:970944939}

## 2020-06-04 NOTE — PROGRESS NOTES
[address] [facility] and Laparoscopic [facility]        Progress Note    Patient Name: Hari Sanchez  MRN: [id number]  YOB: 1975  Date of Evaluation: 2020    Chief Complaint: Blood per rectum    Subjective:  No acute events overnight  Denies significant abdominal pain  Reports nausea and vomiting yesterday, tolerating some clear liquids and feels hungry  Continue to pass bloody stools  Resting in bed at this time      Vital Signs:  Patient Vitals for the past 24 hrs:   BP Temp Temp src Pulse Resp SpO2   20 0915 124/71 97.7 °F (36.5 °C) Oral 89 16 98 %   20 0342 138/78 98 °F (36.7 °C) Oral 94 16 97 %   20 2326 134/82 97.8 °F (36.6 °C) Oral 92 16 97 %   20 1953 137/79 97.7 °F (36.5 °C) Oral 99 16 98 %   20 1030 122/70 97.7 °F (36.5 °C) Oral 96 16 98 %      TEMPERATURE HISTORY 24H: Temp (24hrs), Av.8 °F (36.6 °C), Min:97.7 °F (36.5 °C), Max:98 °F (36.7 °C)    BLOOD PRESSURE HISTORY: Systolic (27YPF), YIQ:275 , Min:122 , RTU:994    Diastolic (82CBW), TMH:11, Min:51, Max:85      Intake/Output:  I/O last 3 completed shifts:  In: -   Out: 50 [Stool:50]  No intake/output data recorded. Drain/tube Output:       Physical Exam:  General: awake, alert, oriented to  person, place, time  Cardiovascular:  regular rate and rhythm and no murmur noted  Lungs: clear to auscultation  Abdomen: soft, mildly distended, incisional tenderness only, bowel sounds present   Skin/Wound: healing well, no drainage, no erythema, well approximated    Labs:  CBC:    Recent Labs     20  0605 20  1211 20  0014   WBC 4.6  --  4.6  --   --    HGB 11.4*   < > 9.7* 9.7* 9.2*   HCT 34.6*   < > 28.9* 28.8* 27.8*     --  236  --   --     < > = values in this interval not displayed.      BMP:    Recent Labs     20  0605    138   K 4.3 4.2   CL 98* 103   CO2 29 28   BUN 15 14   CREATININE 0.7* 0.7*   GLUCOSE 105* 110*     Hepatic: Recent Labs     06/02/20 2003 06/03/20  0605   AST 17 13*   ALT 16 12   BILITOT 0.3 0.3   ALKPHOS 119 92     Amylase:  No results found for: AMYLASE  Lipase:    Lab Results   Component Value Date    LIPASE 9.0 05/10/2020    LIPASE 20 09/09/2011      Mag:    Lab Results   Component Value Date    MG 1.90 05/31/2020    MG 1.90 05/18/2020     Phos:     Lab Results   Component Value Date    PHOS 3.4 05/31/2020      Coags: No results found for: PROTIME, INR, APTT    Cultures:  Anaerobic culture  No results found for: LABANAE  Fungus stain  No results found for requested labs within last 30 days. Gram stain  No results found for requested labs within last 30 days. Organism  No results found for: [facility]  Surgical culture  No results found for: CXSURG  Blood culture 1  No results found for requested labs within last 30 days. Blood culture 2  No results found for requested labs within last 30 days. Fecal occult  Results in Past 30 Days  Result Component Current Result Ref Range Previous Result Ref Range   Occult Blood Diagnostic Result: POSITIVE  Normal range: Negative   (A) (6/2/2020)  Not in Time Range      GI bacterial pathogens by PCR  No results found for requested labs within last 30 days. C. difficile  No results found for requested labs within last 30 days. Urine culture  Lab Results   Component Value Date    LABURIN  04/12/2019     <50,000 CFU/ml mixed skin/urogenital ruth. No further workup       Pathology:  OR 5/27/2020--FINAL DIAGNOSIS:    Sigmoid colon, resection:     - Segment of colon with mild reactive change- See Comment. COMMENT: Gross examination reveals the lumen filled with fecal material,  compatible with reported history of fecal impaction.     Imaging:  I have personally reviewed the following films:    Ct Abdomen Pelvis W Iv Contrast Additional Contrast? None    Result Date: 6/2/2020  EXAMINATION: CT OF THE ABDOMEN AND PELVIS WITH CONTRAST 6/2/2020 8:28 pm TECHNIQUE: CT of the abdomen and pelvis was performed with the administration of intravenous contrast. Multiplanar reformatted images are provided for review. Dose modulation, iterative reconstruction, and/or weight based adjustment of the mA/kV was utilized to reduce the radiation dose to as low as reasonably achievable. COMPARISON: Prior studies including 05/24/2020 HISTORY: ORDERING SYSTEM PROVIDED HISTORY: bright red rectal bleeding, recent bowel resection TECHNOLOGIST PROVIDED HISTORY: If patient is on cardiac monitor and/or pulse ox, they may be taken off cardiac monitor and pulse ox, left on O2 if currently on. All monitors reattached when patient returns to room. Additional Contrast?->None Reason for exam:->bright red rectal bleeding, recent bowel resection Reason for Exam: bright red rectal bleeding, recent bowel resection Acuity: Acute Type of Exam: Initial FINDINGS: Lower Chest: Clear lung bases. Organs: Liver, spleen, pancreas, adrenal glands, gallbladder, and kidneys show no acute abnormality. Again identified are nonobstructing bilateral renal pelvic stones measuring up to 7 mm on the right. GI/Bowel: Postsurgical changes of the sigmoid colon are present with anastomotic sutures now visualized. There is mild associated sigmoid colonic wall thickening and adjacent stranding, likely postsurgical.  There is also mild wall thickening of the descending colon with associated increased vascularity suspicious for inflammation. No dilated loops of bowel. The appendix is not visualized. Pelvis: The urinary bladder is mildly distended but otherwise unremarkable. The prostate gland shows no acute abnormality. Peritoneum/Retroperitoneum: The aorta is normal in caliber and course. Bones/Soft Tissues: No acute or focal bony abnormality. No free or focal fluid collection. No intra-abdominal intraluminal gas.  There is thickening lower left rectus muscle the with associated small foci of intramuscular gas; there is overlying

## 2020-06-04 NOTE — PROGRESS NOTES
Shift assessment completed, pt is alert and oriented, vss, fall precautions in place, call light within reach, see flowsheets. Will monitor pt. The care plan and education has been reviewed and mutually agreed upon with the patient.

## 2020-06-04 NOTE — PLAN OF CARE
Problem: Pain:  Goal: Pain level will decrease  Description: Pain level will decrease  Outcome: Ongoing  Goal: Control of acute pain  Description: Control of acute pain  Outcome: Ongoing  Goal: Control of chronic pain  Description: Control of chronic pain  Outcome: Ongoing     Problem: Falls - Risk of:  Goal: Will remain free from falls  Description: Will remain free from falls  Outcome: Ongoing  Goal: Absence of physical injury  Description: Absence of physical injury  Outcome: Ongoing     Problem: Bleeding:  Goal: Will show no signs and symptoms of excessive bleeding  Description: Will show no signs and symptoms of excessive bleeding  Outcome: Ongoing

## 2020-06-04 NOTE — CARE COORDINATION
Patient admitted as Observation/OPIB with an anticipated short hospitalization length of stay. Chart reviewed and it appears that patient has minimal needs for discharge at this time. Discussed with patients nurse and requested that case management be notified if discharge needs are identified. *Case management will continue to follow progress and update discharge plan as needed.     Electronically signed by JACQUE Moss on 6/4/2020 at 9:28 AM

## 2020-06-05 ENCOUNTER — ANESTHESIA (OUTPATIENT)
Dept: ENDOSCOPY | Age: 45
End: 2020-06-05
Payer: MEDICARE

## 2020-06-05 ENCOUNTER — ANESTHESIA EVENT (OUTPATIENT)
Dept: ENDOSCOPY | Age: 45
End: 2020-06-05
Payer: MEDICARE

## 2020-06-05 VITALS — OXYGEN SATURATION: 99 % | SYSTOLIC BLOOD PRESSURE: 92 MMHG | DIASTOLIC BLOOD PRESSURE: 53 MMHG

## 2020-06-05 LAB
GI BACTERIAL PATHOGENS BY PCR: NORMAL
HCT VFR BLD CALC: 29.1 % (ref 40.5–52.5)
HCT VFR BLD CALC: 32.4 % (ref 40.5–52.5)
HEMOGLOBIN: 10.7 G/DL (ref 13.5–17.5)
HEMOGLOBIN: 9.7 G/DL (ref 13.5–17.5)

## 2020-06-05 PROCEDURE — 2500000003 HC RX 250 WO HCPCS: Performed by: NURSE ANESTHETIST, CERTIFIED REGISTERED

## 2020-06-05 PROCEDURE — 85018 HEMOGLOBIN: CPT

## 2020-06-05 PROCEDURE — 99224 PR SBSQ OBSERVATION CARE/DAY 15 MINUTES: CPT | Performed by: SURGERY

## 2020-06-05 PROCEDURE — G0378 HOSPITAL OBSERVATION PER HR: HCPCS

## 2020-06-05 PROCEDURE — APPSS15 APP SPLIT SHARED TIME 0-15 MINUTES: Performed by: NURSE PRACTITIONER

## 2020-06-05 PROCEDURE — 6360000002 HC RX W HCPCS: Performed by: HOSPITALIST

## 2020-06-05 PROCEDURE — 2709999900 HC NON-CHARGEABLE SUPPLY: Performed by: INTERNAL MEDICINE

## 2020-06-05 PROCEDURE — 2580000003 HC RX 258: Performed by: NURSE ANESTHETIST, CERTIFIED REGISTERED

## 2020-06-05 PROCEDURE — APPNB30 APP NON BILLABLE TIME 0-30 MINS: Performed by: NURSE PRACTITIONER

## 2020-06-05 PROCEDURE — 3700000001 HC ADD 15 MINUTES (ANESTHESIA): Performed by: INTERNAL MEDICINE

## 2020-06-05 PROCEDURE — 7100000000 HC PACU RECOVERY - FIRST 15 MIN: Performed by: INTERNAL MEDICINE

## 2020-06-05 PROCEDURE — 3700000000 HC ANESTHESIA ATTENDED CARE: Performed by: INTERNAL MEDICINE

## 2020-06-05 PROCEDURE — 2720000010 HC SURG SUPPLY STERILE: Performed by: INTERNAL MEDICINE

## 2020-06-05 PROCEDURE — 85014 HEMATOCRIT: CPT

## 2020-06-05 PROCEDURE — 6360000002 HC RX W HCPCS: Performed by: INTERNAL MEDICINE

## 2020-06-05 PROCEDURE — C9113 INJ PANTOPRAZOLE SODIUM, VIA: HCPCS | Performed by: INTERNAL MEDICINE

## 2020-06-05 PROCEDURE — 36415 COLL VENOUS BLD VENIPUNCTURE: CPT

## 2020-06-05 PROCEDURE — 6370000000 HC RX 637 (ALT 250 FOR IP): Performed by: PHYSICIAN ASSISTANT

## 2020-06-05 PROCEDURE — 3609009900 HC COLONOSCOPY W/CONTROL BLEEDING ANY METHOD: Performed by: INTERNAL MEDICINE

## 2020-06-05 PROCEDURE — 6370000000 HC RX 637 (ALT 250 FOR IP): Performed by: HOSPITALIST

## 2020-06-05 PROCEDURE — 7100000001 HC PACU RECOVERY - ADDTL 15 MIN: Performed by: INTERNAL MEDICINE

## 2020-06-05 PROCEDURE — 6360000002 HC RX W HCPCS: Performed by: NURSE ANESTHETIST, CERTIFIED REGISTERED

## 2020-06-05 RX ORDER — SODIUM CHLORIDE 0.9 % (FLUSH) 0.9 %
10 SYRINGE (ML) INJECTION EVERY 12 HOURS SCHEDULED
Status: DISCONTINUED | OUTPATIENT
Start: 2020-06-05 | End: 2020-06-06 | Stop reason: HOSPADM

## 2020-06-05 RX ORDER — SODIUM CHLORIDE 0.9 % (FLUSH) 0.9 %
10 SYRINGE (ML) INJECTION PRN
Status: DISCONTINUED | OUTPATIENT
Start: 2020-06-05 | End: 2020-06-06 | Stop reason: HOSPADM

## 2020-06-05 RX ORDER — PROPOFOL 10 MG/ML
INJECTION, EMULSION INTRAVENOUS CONTINUOUS PRN
Status: DISCONTINUED | OUTPATIENT
Start: 2020-06-05 | End: 2020-06-05 | Stop reason: SDUPTHER

## 2020-06-05 RX ORDER — SODIUM CHLORIDE 9 MG/ML
INJECTION, SOLUTION INTRAVENOUS CONTINUOUS PRN
Status: DISCONTINUED | OUTPATIENT
Start: 2020-06-05 | End: 2020-06-05 | Stop reason: SDUPTHER

## 2020-06-05 RX ORDER — PROPOFOL 10 MG/ML
INJECTION, EMULSION INTRAVENOUS PRN
Status: DISCONTINUED | OUTPATIENT
Start: 2020-06-05 | End: 2020-06-05 | Stop reason: SDUPTHER

## 2020-06-05 RX ORDER — LIDOCAINE HYDROCHLORIDE 20 MG/ML
INJECTION, SOLUTION INFILTRATION; PERINEURAL PRN
Status: DISCONTINUED | OUTPATIENT
Start: 2020-06-05 | End: 2020-06-05 | Stop reason: SDUPTHER

## 2020-06-05 RX ADMIN — PANTOPRAZOLE SODIUM 40 MG: 40 INJECTION, POWDER, FOR SOLUTION INTRAVENOUS at 13:08

## 2020-06-05 RX ADMIN — LEVETIRACETAM 1500 MG: 500 TABLET ORAL at 20:16

## 2020-06-05 RX ADMIN — CIPROFLOXACIN 400 MG: 2 INJECTION, SOLUTION INTRAVENOUS at 13:08

## 2020-06-05 RX ADMIN — POLYETHYLENE GLYCOL 3350 17 G: 17 POWDER, FOR SOLUTION ORAL at 20:18

## 2020-06-05 RX ADMIN — PHENYLEPHRINE HYDROCHLORIDE 100 MCG: 10 INJECTION INTRAVENOUS at 10:55

## 2020-06-05 RX ADMIN — TAMSULOSIN HYDROCHLORIDE 0.4 MG: 0.4 CAPSULE ORAL at 13:07

## 2020-06-05 RX ADMIN — TRAMADOL HYDROCHLORIDE 50 MG: 50 TABLET, FILM COATED ORAL at 20:17

## 2020-06-05 RX ADMIN — PROPOFOL 60 MG: 10 INJECTION, EMULSION INTRAVENOUS at 10:38

## 2020-06-05 RX ADMIN — PHENYLEPHRINE HYDROCHLORIDE 100 MCG: 10 INJECTION INTRAVENOUS at 10:44

## 2020-06-05 RX ADMIN — LAMOTRIGINE 175 MG: 25 TABLET ORAL at 20:17

## 2020-06-05 RX ADMIN — PROPOFOL 140 MCG/KG/MIN: 10 INJECTION, EMULSION INTRAVENOUS at 10:39

## 2020-06-05 RX ADMIN — LIDOCAINE HYDROCHLORIDE 100 MG: 20 INJECTION, SOLUTION INFILTRATION; PERINEURAL at 11:15

## 2020-06-05 RX ADMIN — TRAMADOL HYDROCHLORIDE 50 MG: 50 TABLET, FILM COATED ORAL at 13:08

## 2020-06-05 RX ADMIN — LIDOCAINE HYDROCHLORIDE 60 MG: 20 INJECTION, SOLUTION INFILTRATION; PERINEURAL at 10:38

## 2020-06-05 RX ADMIN — SODIUM CHLORIDE: 9 INJECTION, SOLUTION INTRAVENOUS at 10:33

## 2020-06-05 RX ADMIN — CIPROFLOXACIN 400 MG: 2 INJECTION, SOLUTION INTRAVENOUS at 22:15

## 2020-06-05 RX ADMIN — PHENYLEPHRINE HYDROCHLORIDE 100 MCG: 10 INJECTION INTRAVENOUS at 11:07

## 2020-06-05 RX ADMIN — POLYETHYLENE GLYCOL 3350 17 G: 17 POWDER, FOR SOLUTION ORAL at 13:07

## 2020-06-05 RX ADMIN — PHENYTOIN SODIUM 100 MG: 100 CAPSULE ORAL at 13:08

## 2020-06-05 RX ADMIN — LEVETIRACETAM 1500 MG: 500 TABLET ORAL at 13:08

## 2020-06-05 RX ADMIN — PHENYTOIN SODIUM 200 MG: 100 CAPSULE ORAL at 20:17

## 2020-06-05 RX ADMIN — SODIUM CHLORIDE: 9 INJECTION, SOLUTION INTRAVENOUS at 10:45

## 2020-06-05 ASSESSMENT — PAIN DESCRIPTION - PROGRESSION: CLINICAL_PROGRESSION: NOT CHANGED

## 2020-06-05 ASSESSMENT — PAIN SCALES - GENERAL
PAINLEVEL_OUTOF10: 0
PAINLEVEL_OUTOF10: 5
PAINLEVEL_OUTOF10: 3
PAINLEVEL_OUTOF10: 8
PAINLEVEL_OUTOF10: 0

## 2020-06-05 ASSESSMENT — PAIN DESCRIPTION - PAIN TYPE: TYPE: SURGICAL PAIN

## 2020-06-05 ASSESSMENT — PAIN DESCRIPTION - FREQUENCY: FREQUENCY: CONTINUOUS

## 2020-06-05 ASSESSMENT — PAIN DESCRIPTION - ONSET: ONSET: ON-GOING

## 2020-06-05 ASSESSMENT — PAIN DESCRIPTION - DESCRIPTORS: DESCRIPTORS: SORE

## 2020-06-05 ASSESSMENT — PAIN DESCRIPTION - LOCATION: LOCATION: ABDOMEN;INCISION

## 2020-06-05 NOTE — BRIEF OP NOTE
Brief Postoperative Note      Patient: Bin Nassar  YOB: 1975  MRN: [id number]    Date of Procedure: 6/5/2020    Pre-Op Diagnosis: Rectal Bleeding    Post-Op Diagnosis: Bleeding from anastomosis       Procedure(s):  COLONOSCOPY CONTROL HEMORRHAGE    Surgeon(s):  Mariama Milain MD    Assistant:  * No surgical staff found *    Anesthesia: Monitor Anesthesia Care    Estimated Blood Loss (mL): Minimal    Complications: None    Specimens:   * No specimens in log *    Implants:  * No implants in log *      Drains:   [REMOVED] NG/OG/NJ/NE Tube Nasogastric 16 fr Right nostril (Removed)   Surrounding Skin Dry; Intact 5/11/2020  8:42 PM   Securement device Yes 5/11/2020  8:42 PM   Status Clamped 5/11/2020  8:42 PM   Placement Verified by X-Ray (repeat) 5/11/2020  8:15 PM   NG/OG/NJ/NE External Measurement (cm) 65 cm 5/11/2020  8:42 PM   Drainage Appearance Clear;Brown 5/11/2020  8:42 PM   Free Water Flush (mL) 500 mL 5/12/2020  5:54 AM       [REMOVED] NG/OG/NJ/NE Tube Orogastric 18 fr Center mouth (Removed)       Findings:  1) Friable sigmoid colon anastomosis 35 cm from anus with active blood oozing at several places - cauterized with argon plasma coagulation (APC) set at 15 butler, effect 2.      2) Poor prep with bloody effluent throughout colon -- Left>right. 3) Terminal ileum normal    Rec:  1) Clear liquid diet today and continue catharsis with Miralax. 2) Consider advancing diet in AM if no bleeding. 3) Repeat CBC in AM.  4) Consider prep and full colonoscopy if bleeding persists.     Electronically signed by Mariama Milian MD on 6/5/2020 at 11:49 AM

## 2020-06-05 NOTE — PROGRESS NOTES
Pt received into bay 6 from Endo. Pt drowsy ,yet arousable. O2 at 2L/NC. Resp easy, unlabored. Vss. Pt stable. Report obtained. Pt denies any pain. Will monitor.

## 2020-06-05 NOTE — PROGRESS NOTES
Shift assessment completed, pt is alert and oriented, VSS, fall precautions in place, call light within reach, see flowsheets and MAR. Will monitor pt. The care plan and education has been reviewed and mutually agreed upon with the patient.

## 2020-06-05 NOTE — PROGRESS NOTES
Pt transferred back to room 470 via bed. Handoff given to [facility] RN. Pt stable.  Vss.

## 2020-06-05 NOTE — PROGRESS NOTES
9:33 AM EDT: Morning assessment completed, patient denies needs at this time, call light in reach, will continue to monitor. The care plan and education has been reviewed and mutually agreed upon with the patient. Educated patient on the potential for falls during their hospital stay. Reviewed current fall safety protocol. Reviewed indication for use of bed alarm. Patient verbalized understanding. Tap water enema completed, consent signed. Bloody output. Okay to remove tele per Meryle Sat PA    1:26 PM EDT: Patient back from [facility].

## 2020-06-05 NOTE — PROGRESS NOTES
[facility]        Progress Note    Patient Name: Severo Lopez  MRN: [id number]  YOB: 1975  Date of Evaluation: 2020    Chief Complaint: Blood per rectum    Subjective:  No acute events overnight  Denies significant abdominal pain  Reports nausea but no further vomiting, tolerating full liquids  Continue to pass bloody stools  Resting in bed at this time      Vital Signs:  Patient Vitals for the past 24 hrs:   BP Temp Temp src Pulse Resp SpO2   20 1205 (!) 147/88 97.8 °F (36.6 °C) -- 84 12 100 %   20 1200 131/83 -- -- 80 12 100 %   20 1155 131/84 97.9 °F (36.6 °C) Temporal 79 12 100 %   20 0956 (!) 134/92 98.4 °F (36.9 °C) Temporal 93 16 98 %   20 0930 -- 98.2 °F (36.8 °C) Oral 92 14 --   20 0436 (!) 145/83 98.1 °F (36.7 °C) Oral 94 16 98 %   20 2359 139/76 97.7 °F (36.5 °C) Oral 88 16 98 %   20 1938 115/78 97.6 °F (36.4 °C) Oral 83 16 100 %      TEMPERATURE HISTORY 24H: Temp (24hrs), Av °F (36.7 °C), Min:97.6 °F (36.4 °C), Max:98.4 °F (36.9 °C)    BLOOD PRESSURE HISTORY: Systolic (89BDC), SUNSHINE:375 , Min:78 , ZPC:084    Diastolic (28YTN), ANGÉLICA:63, Min:44, Max:92      Intake/Output:  No intake/output data recorded. I/O this shift:   In: 525 [I.V.:525]  Out: -   Drain/tube Output:       Physical Exam:  General: awake, alert, oriented to  person, place, time  Cardiovascular:  regular rate and rhythm and no murmur noted  Lungs: clear to auscultation  Abdomen: soft, mildly distended, incisional tenderness only, bowel sounds present   Skin/Wound: healing well, no drainage, no erythema, well approximated    Labs:  CBC:    Recent Labs     20  0605  20  0014 20  1205 20  0013   WBC 4.6  --  4.6  --   --   --   --    HGB 11.4*   < > 9.7*   < > 9.2* 9.5* 9.7*   HCT 34.6*   < > 28.9*   < > 27.8* 29.3* 29.1*     --  236  --   --   --   --     < > = values in this interval not displayed. BMP:    Recent Labs     06/02/20 2003 06/03/20  0605    138   K 4.3 4.2   CL 98* 103   CO2 29 28   BUN 15 14   CREATININE 0.7* 0.7*   GLUCOSE 105* 110*     Hepatic:    Recent Labs     06/02/20 2003 06/03/20 0605   AST 17 13*   ALT 16 12   BILITOT 0.3 0.3   ALKPHOS 119 92     Amylase:  No results found for: AMYLASE  Lipase:    Lab Results   Component Value Date    LIPASE 9.0 05/10/2020    LIPASE 20 09/09/2011      Mag:    Lab Results   Component Value Date    MG 1.90 05/31/2020    MG 1.90 05/18/2020     Phos:     Lab Results   Component Value Date    PHOS 3.4 05/31/2020      Coags: No results found for: PROTIME, INR, APTT    Cultures:  Anaerobic culture  No results found for: LABANAE  Fungus stain  No results found for requested labs within last 30 days. Gram stain  No results found for requested labs within last 30 days. Organism  No results found for: [facility]  Surgical culture  No results found for: CXSURG  Blood culture 1  No results found for requested labs within last 30 days. Blood culture 2  No results found for requested labs within last 30 days. Fecal occult  Results in Past 30 Days  Result Component Current Result Ref Range Previous Result Ref Range   Occult Blood Diagnostic Result: POSITIVE  Normal range: Negative   (A) (6/2/2020)  Not in Time Range      GI bacterial pathogens by PCR  Results in Past 30 Days  Result Component Current Result Ref Range Previous Result Ref Range   GI Bacterial Pathogens By PCR No Shigella spp/EIEC DNA detected  No Shiga toxin-producing gene(s) detected  No Campylobacter spp. (jejuni and coli)DNA detected  No Salmonella spp. DNA detected  Normal Range:  None detected   (6/3/2020)  Not in Time Range      C. difficile  No results found for requested labs within last 30 days. Urine culture  Lab Results   Component Value Date    LABURIN  04/12/2019     <50,000 CFU/ml mixed skin/urogenital ruth.  No further workup Pathology:  OR 5/27/2020--FINAL DIAGNOSIS:    Sigmoid colon, resection:     - Segment of colon with mild reactive change- See Comment. COMMENT: Gross examination reveals the lumen filled with fecal material,  compatible with reported history of fecal impaction. Imaging:  I have personally reviewed the following films:    No results found. Scheduled Meds:   sodium chloride flush  10 mL Intravenous 2 times per day    pantoprazole  40 mg Intravenous Daily    polyethylene glycol  17 g Oral BID    lamoTRIgine  175 mg Oral BID    levETIRAcetam  1,500 mg Oral BID    phenytoin  100 mg Oral Daily    phenytoin  200 mg Oral Nightly    tamsulosin  0.4 mg Oral Daily    ciprofloxacin  400 mg Intravenous Q12H     Continuous Infusions:   sodium chloride 75 mL/hr at 06/04/20 1943     PRN Meds:.sodium chloride flush, traMADol, acetaminophen **OR** acetaminophen, promethazine **OR** ondansetron, potassium chloride **OR** potassium alternative oral replacement **OR** potassium chloride, magnesium sulfate      Assessment:  39 y.o. male admitted with   1. Colitis    2. Hemorrhage of rectum and anus        Hematochezia  Acute blood loss anemia  Status-post laparoscopic assisted sigmoid colon resection on 5/27/2020 for fecal impaction, colonic dysmotility and sigmoid colon obstruction       Plan:  1. No significant abdominal pain, stable Hgb but continues to pass bloody stools, no plans for surgical intervention at this time; flexible sigmoidoscopy per GI today revealed bleeding associated with sigmoid colon anastomosis that was cauterized, continue to trend Hgb, if continued bleeding possible full colonoscopy  2. Diet per GI  3. IV hydration; monitor and correct electrolytes  4. Antibiotics  5. Activity as tolerated, ambulate TID, up to chair for all meals  6. PRN analgesics and antiemetics--minimizing narcotics as tolerated  7. DVT prophylaxis with SCD's  8.  Management of medical comorbid etiologies per primary team and consulting services    EDUCATION:  Educated patient on plan of care and disease process--all questions answered. Plans discussed with patient and nursing. Reviewed and discussed with Dr. Ronda Murcia. Signed:  JOHN Frank CNP  6/5/2020 12:09 PM     Surg Staff:  Pt seen and examined with NP  See full note above  Pt's exam remains the same, has noted blood / stool passage. Good appetite, no pain.   Flex sig today to eval issues with ongoing slow blood loss    Yanira Benedict

## 2020-06-05 NOTE — PROGRESS NOTES
[facility]    6/5/2020 12:29 PM        Name: Gema Clayton . Admitted: 6/2/2020  Primary Care Provider: Huan Caldera MD (Tel: [phone])      Subjective:  . Patient feeling okay. Seen this am. No Bm today. Though he was passing some blood yesterday. Hgb remains stable.      Reviewed interval ancillary notes    Current Medications  sodium chloride flush 0.9 % injection 10 mL, 2 times per day  sodium chloride flush 0.9 % injection 10 mL, PRN  pantoprazole (PROTONIX) injection 40 mg, Daily  polyethylene glycol (GLYCOLAX) packet 17 g, BID  lamoTRIgine (LAMICTAL) tablet 175 mg, BID  levETIRAcetam (KEPPRA) tablet 1,500 mg, BID  phenytoin (DILANTIN) ER capsule 100 mg, Daily  phenytoin (DILANTIN) ER capsule 200 mg, Nightly  tamsulosin (FLOMAX) capsule 0.4 mg, Daily  traMADol (ULTRAM) tablet 50 mg, Q6H PRN  acetaminophen (TYLENOL) tablet 650 mg, Q6H PRN    Or  acetaminophen (TYLENOL) suppository 650 mg, Q6H PRN  promethazine (PHENERGAN) tablet 12.5 mg, Q6H PRN    Or  ondansetron (ZOFRAN) injection 4 mg, Q6H PRN  0.9 % sodium chloride infusion, Continuous  potassium chloride (KLOR-CON M) extended release tablet 40 mEq, PRN    Or  potassium bicarb-citric acid (EFFER-K) effervescent tablet 40 mEq, PRN    Or  potassium chloride 10 mEq/100 mL IVPB (Peripheral Line), PRN  magnesium sulfate 2 g in 50 mL IVPB premix, PRN  ciprofloxacin (CIPRO) IVPB 400 mg, Q12H        Objective:  /86   Pulse 83   Temp 97.6 °F (36.4 °C) (Temporal)   Resp 12   Ht 6' 1\" (1.854 m)   Wt 157 lb 14.4 oz (71.6 kg)   SpO2 99%   BMI 20.83 kg/m²     Intake/Output Summary (Last 24 hours) at 6/5/2020 1229  Last data filed at 6/5/2020 1205  Gross per 24 hour   Intake 525 ml   Output --   Net 525 ml      Wt Readings from Last 3 Encounters:   06/02/20 157 lb 14.4 oz (71.6 kg)   05/23/20 159 lb 3.2 oz (72.2 kg)   10/28/19 157 lb 2 oz (71.3 kg)       General appearance:  Appears comfortable  Eyes: Sclera clear. Pupils equal.  ENT: Moist oral mucosa. Trachea midline, no adenopathy. Cardiovascular: Regular rhythm, normal S1, S2. No murmur. No edema in lower extremities  Respiratory: Not using accessory muscles. Good inspiratory effort. Clear to auscultation bilaterally, no wheeze or crackles. GI: Abdomen soft, no tenderness, not distended, normal bowel sounds  Musculoskeletal: No cyanosis in digits, neck supple  Neurology: CN 2-12 grossly intact. No speech or motor deficits  Psych: Normal affect. Alert and oriented in time, place and person  Skin: Warm, dry, normal turgor    Labs and Tests:  CBC:   Recent Labs     06/02/20 2003 06/03/20  0605  06/04/20  0014 06/04/20  1205 06/05/20  0013   WBC 4.6  --  4.6  --   --   --   --    HGB 11.4*   < > 9.7*   < > 9.2* 9.5* 9.7*     --  236  --   --   --   --     < > = values in this interval not displayed. BMP:    Recent Labs     06/02/20 2003 06/03/20  0605    138   K 4.3 4.2   CL 98* 103   CO2 29 28   BUN 15 14   CREATININE 0.7* 0.7*   GLUCOSE 105* 110*     Hepatic:   Recent Labs     06/02/20 2003 06/03/20  0605   AST 17 13*   ALT 16 12   BILITOT 0.3 0.3   ALKPHOS 119 92     CT abd/pelvis  Postsurgical changes of the sigmoid colon.  Anastomotic sutures are   associated with mild wall thickening and adjacent stranding, likely   postsurgical.       Mild wall thickening of the descending colon suspected.  Correlation for   infectious or inflammatory colitis is recommended. Problem List  Active Problems:    Seizure disorder ([address]    History of leukemia    Colitis    Colonic dysmotility    Colon obstruction (HCC)    GI bleed  Resolved Problems:    * No resolved hospital problems. *       Assessment & Plan:   1. Rectal bleeding-likely from recent trauma of multiple colonic procedures, enemas, bowel resection etc. Hemoglobin stable. Discussed with Dr Anay Estevez.  Plan for flex sig today. Possible dc later if unremarkable. 2. Acute blood loss anemia-currently stable at 9.7. Down 2 grams since admission but stable for past two days. Continue to monitor bid. 3. Chronic constipation-possibly colonic inertia.     4. Seizure disorder-on Keppra, dilantin, Lamictal      Diet: Diet NPO Effective Now  Code:Full Code        Kendy Fermin PA-C   6/5/2020 12:29 PM

## 2020-06-06 VITALS
TEMPERATURE: 97.6 F | BODY MASS INDEX: 19.87 KG/M2 | DIASTOLIC BLOOD PRESSURE: 58 MMHG | RESPIRATION RATE: 16 BRPM | WEIGHT: 149.9 LBS | HEIGHT: 73 IN | OXYGEN SATURATION: 98 % | HEART RATE: 102 BPM | SYSTOLIC BLOOD PRESSURE: 103 MMHG

## 2020-06-06 LAB
HCT VFR BLD CALC: 28.1 % (ref 40.5–52.5)
HCT VFR BLD CALC: 29.7 % (ref 40.5–52.5)
HEMOGLOBIN: 10 G/DL (ref 13.5–17.5)
HEMOGLOBIN: 9.4 G/DL (ref 13.5–17.5)

## 2020-06-06 PROCEDURE — 99224 PR SBSQ OBSERVATION CARE/DAY 15 MINUTES: CPT | Performed by: SURGERY

## 2020-06-06 PROCEDURE — 85018 HEMOGLOBIN: CPT

## 2020-06-06 PROCEDURE — 6360000002 HC RX W HCPCS: Performed by: INTERNAL MEDICINE

## 2020-06-06 PROCEDURE — 85014 HEMATOCRIT: CPT

## 2020-06-06 PROCEDURE — G0378 HOSPITAL OBSERVATION PER HR: HCPCS

## 2020-06-06 PROCEDURE — 96375 TX/PRO/DX INJ NEW DRUG ADDON: CPT

## 2020-06-06 PROCEDURE — C9113 INJ PANTOPRAZOLE SODIUM, VIA: HCPCS | Performed by: INTERNAL MEDICINE

## 2020-06-06 PROCEDURE — 6370000000 HC RX 637 (ALT 250 FOR IP): Performed by: HOSPITALIST

## 2020-06-06 PROCEDURE — 36415 COLL VENOUS BLD VENIPUNCTURE: CPT

## 2020-06-06 PROCEDURE — 96366 THER/PROPH/DIAG IV INF ADDON: CPT

## 2020-06-06 PROCEDURE — 6370000000 HC RX 637 (ALT 250 FOR IP): Performed by: PHYSICIAN ASSISTANT

## 2020-06-06 PROCEDURE — 6360000002 HC RX W HCPCS: Performed by: HOSPITALIST

## 2020-06-06 RX ORDER — POLYETHYLENE GLYCOL 3350 17 G/17G
17 POWDER, FOR SOLUTION ORAL 2 TIMES DAILY
Qty: 527 G | Refills: 1 | Status: SHIPPED | OUTPATIENT
Start: 2020-06-06 | End: 2020-06-12 | Stop reason: SDUPTHER

## 2020-06-06 RX ADMIN — TRAMADOL HYDROCHLORIDE 50 MG: 50 TABLET, FILM COATED ORAL at 03:51

## 2020-06-06 RX ADMIN — LAMOTRIGINE 175 MG: 25 TABLET ORAL at 09:20

## 2020-06-06 RX ADMIN — POLYETHYLENE GLYCOL 3350 17 G: 17 POWDER, FOR SOLUTION ORAL at 09:21

## 2020-06-06 RX ADMIN — PHENYTOIN SODIUM 100 MG: 100 CAPSULE ORAL at 09:21

## 2020-06-06 RX ADMIN — TAMSULOSIN HYDROCHLORIDE 0.4 MG: 0.4 CAPSULE ORAL at 09:21

## 2020-06-06 RX ADMIN — PANTOPRAZOLE SODIUM 40 MG: 40 INJECTION, POWDER, FOR SOLUTION INTRAVENOUS at 09:20

## 2020-06-06 RX ADMIN — TRAMADOL HYDROCHLORIDE 50 MG: 50 TABLET, FILM COATED ORAL at 09:21

## 2020-06-06 RX ADMIN — CIPROFLOXACIN 400 MG: 2 INJECTION, SOLUTION INTRAVENOUS at 12:37

## 2020-06-06 RX ADMIN — LEVETIRACETAM 1500 MG: 500 TABLET ORAL at 09:21

## 2020-06-06 ASSESSMENT — PAIN SCALES - GENERAL
PAINLEVEL_OUTOF10: 5
PAINLEVEL_OUTOF10: 6
PAINLEVEL_OUTOF10: 0

## 2020-06-06 ASSESSMENT — PAIN DESCRIPTION - LOCATION: LOCATION: ABDOMEN;INCISION

## 2020-06-06 ASSESSMENT — PAIN DESCRIPTION - PROGRESSION: CLINICAL_PROGRESSION: NOT CHANGED

## 2020-06-06 ASSESSMENT — PAIN DESCRIPTION - FREQUENCY: FREQUENCY: CONTINUOUS

## 2020-06-06 ASSESSMENT — PAIN DESCRIPTION - DESCRIPTORS: DESCRIPTORS: SORE

## 2020-06-06 ASSESSMENT — PAIN DESCRIPTION - PAIN TYPE: TYPE: SURGICAL PAIN

## 2020-06-06 ASSESSMENT — PAIN DESCRIPTION - ONSET: ONSET: ON-GOING

## 2020-06-06 NOTE — PROGRESS NOTES
PM assessment completed, see flow sheet. Pt resting well in bed with c/o abdominal pain. PRN tramadol administered. Pt remains independent in room. Tolerating clear liquid diet and activity. Fall precautions in place, hourly rounding, call light and belongings in reach, bed in lowest position, wheels locked in place, side rails up x 2, walkways free of clutter.

## 2020-06-06 NOTE — PLAN OF CARE
Problem: Pain:  Goal: Pain level will decrease  Description: Pain level will decrease  Outcome: Ongoing  Pt has PRN pain medication available upon request. Pt aware to let nursing know when pain medication is needed.     Goal: Control of acute pain  Description: Control of acute pain  Outcome: Ongoing  Goal: Control of chronic pain  Description: Control of chronic pain  Outcome: Ongoing     Problem: Falls - Risk of:  Goal: Will remain free from falls  Description: Will remain free from falls  Outcome: Ongoing  Fall precautions in place, hourly rounding, call light and belongings in reach, bed in lowest position, wheels locked in place, side rails up x 2, walkways free of clutter    Goal: Absence of physical injury  Description: Absence of physical injury  Outcome: Ongoing     Problem: Bleeding:  Goal: Will show no signs and symptoms of excessive bleeding  Description: Will show no signs and symptoms of excessive bleeding  Outcome: Ongoing

## 2020-06-06 NOTE — DISCHARGE SUMMARY
[facility]    Patient Demographics    Patient. Lali Najera  Date of Birth. 1975  MRN. [id number]     Primary care provider. Alexanrda Harvey MD  (Tel: [phone])    Admit date: 6/2/2020    Discharge date (blank if same as Note Date): Note Date: 6/6/2020     Reason for Hospitalization. Chief Complaint   Patient presents with    Rectal Bleeding     [facility] s/p bowel resection for the last 20 days, was discharged on Sunday, pt reports one episode of bright red bleeding in toilet. Significant Findings. Active Problems:  Lower GI bleed  Acute blood loss anemia    Seizure disorder (HCC)    Colonic dysmotility       Problems and results from this hospitalization that need follow up. 1. Post op follow up     Significant test results and incidental findings. CT abd/pelvis  Postsurgical changes of the sigmoid colon.  Anastomotic sutures are   associated with mild wall thickening and adjacent stranding, likely   postsurgical.       Mild wall thickening of the descending colon suspected.  Correlation for   infectious or inflammatory colitis is recommended.      Flex Sig  Findings:           1) Friable sigmoid colon anastomosis 35 cm from anus with active blood oozing at several places - cauterized with argon plasma coagulation (APC) set at 15 butler, effect 2.                                2) Poor prep with bloody effluent throughout colon -- Left>right. 3) Terminal ileum normal     Rec:  1) Clear liquid diet today and continue catharsis with Miralax. 2) Consider advancing diet in AM if no bleeding. 3) Repeat CBC in AM.  4) Consider prep and full colonoscopy if bleeding persists. Invasive procedures and treatments. 1. Flex Sig by Dr Yvonne Cuevas.   Patient is a 66-year-old male who was recently discharged after having nearly a 4-week stay for fecal impaction. During that time he had multiple colonoscopies numerous bowel preps and enemas. He ultimately underwent diagnostic laparoscopy with removal of fecal impaction and sigmoid colon resection on May 27. He tolerated procedure well and was discharged home. Prior to current admission he began passing bright red blood per rectum. He came to emergency room. CT scan suggested possible colitis of the descending colon as well as some postsurgical changes. Patient was monitored and serial hemoglobins were obtained. His hemoglobin dropped 2 g within the first day and then stabilized. He was seen by GI as well as surgery. He continued to pass small amount of blood per rectum. It was decided that he would undergo a flexible sigmoidoscopy. This was performed on 6/5 by Dr. Gala Hazel and revealed oozing at the surgical anastomosis. This area was cauterized. His bleeding stopped, diet was advanced, and hemoglobin improved. He was discharged home in stable condition. He will need MiraLAX twice daily. Consults. IP CONSULT TO GENERAL SURGERY  IP CONSULT TO HOSPITALIST  IP CONSULT TO GI  IP CONSULT TO GI    Physical examination on discharge day. /65   Pulse 98   Temp 98.2 °F (36.8 °C) (Oral)   Resp 16   Ht 6' 1\" (1.854 m)   Wt 149 lb 14.4 oz (68 kg)   SpO2 98%   BMI 19.78 kg/m²   General appearance. Alert. Looks comfortable. HEENT. Sclera clear. Moist mucus membranes. Cardiovascular. Regular rate and rhythm, normal S1, S2. No murmur. Respiratory. Not using accessory muscles. Clear to auscultation bilaterally, no wheeze. Gastrointestinal. Abdomen soft, non-tender, not distended, normal bowel sounds  Neurology. Facial symmetry. No speech deficits. Moving all extremities equally. Extremities. No edema in lower extremities. Skin.  Warm, dry, normal turgor    Condition at time of discharge stable    Medication instructions provided to patient at discharge. Medication List      START taking these medications    polyethylene glycol 17 g packet  Commonly known as:  GLYCOLAX  Take 17 g by mouth 2 times daily  Replaces:  polyethylene glycol 17 GM/SCOOP powder        CONTINUE taking these medications    acetaminophen 500 MG tablet  Commonly known as:  TYLENOL  Take 2 tablets by mouth 3 times daily as needed for Pain     betamethasone valerate 0.1 % cream  Commonly known as:  VALISONE  Apply topically 1 times daily. docusate 100 MG Caps  Commonly known as:  COLACE, DULCOLAX  Take 100 mg by mouth 2 times daily     lamoTRIgine 25 MG tablet  Commonly known as:  LAMICTAL  Take 7 tablets by mouth 2 times daily     levETIRAcetam 750 MG tablet  Commonly known as:  KEPPRA  Take 2 tablets by mouth 2 times daily     naloxegol 25 MG Tabs tablet  Commonly known as:  MOVANTIK  Take 1 tablet by mouth every morning     * phenytoin 200 MG ER capsule  Commonly known as:  PHENYTEK  Take 1 capsule by mouth nightly     * phenytoin 100 MG ER capsule  Commonly known as:  DILANTIN  Take 1 capsule by mouth daily     tamsulosin 0.4 MG capsule  Commonly known as:  Flomax  Take 1 capsule by mouth daily     traMADol 50 MG tablet  Commonly known as:  Ultram  Take 1 tablet by mouth every 6 hours as needed for Pain for up to 30 doses. * This list has 2 medication(s) that are the same as other medications prescribed for you. Read the directions carefully, and ask your doctor or other care provider to review them with you.             STOP taking these medications    polyethylene glycol 17 GM/SCOOP powder  Commonly known as:  GLYCOLAX  Replaced by:  polyethylene glycol 17 g packet           Where to Get Your Medications      [facility] [address] [place] [address] [id number] [place] [address],[address] [id number]    Phone:  [phone] · polyethylene glycol 17 g packet         Discharge recommendations given to patient. Follow Up. Surgeon in 2 weeks   Disposition. home  Activity. activity as tolerated  Diet: DIET GENERAL;      Spent 33 minutes in discharge process. Signed:   King Lee PA-C     6/6/2020 2:48 PM

## 2020-06-06 NOTE — PROGRESS NOTES
[facility]        Progress Note    Patient Name: Vivi Austin  MRN: [id number]  YOB: 1975  Date of Evaluation: 2020    Chief Complaint: Blood per rectum    Subjective:  No acute events overnight  Denies abdominal pain  Feels hungry, no new concerns  No additional bloody stools  Resting in bed at this time      Vital Signs:  Patient Vitals for the past 24 hrs:   BP Temp Temp src Pulse Resp SpO2 Weight   20 0351 -- -- -- -- -- -- 149 lb 14.4 oz (68 kg)   20 0043 132/76 98 °F (36.7 °C) Oral 90 16 100 % --   20 (!) 142/78 97.8 °F (36.6 °C) Oral 91 16 100 % --   20 1611 125/75 98.7 °F (37.1 °C) Temporal 85 16 95 % --   20 1315 (!) 152/80 98.2 °F (36.8 °C) Oral 82 14 98 % --   20 1224 -- -- -- 83 -- -- --   20 1220 139/86 -- -- 82 12 99 % --   20 1215 139/86 97.6 °F (36.4 °C) Temporal 80 12 99 % --   20 1210 (!) 140/89 -- -- 82 12 99 % --   20 1205 (!) 147/88 97.8 °F (36.6 °C) -- 84 12 100 % --   20 1200 131/83 -- -- 80 12 100 % --   20 1155 131/84 97.9 °F (36.6 °C) Temporal 79 12 100 % --      TEMPERATURE HISTORY 24H: Temp (24hrs), Av °F (36.7 °C), Min:97.6 °F (36.4 °C), Max:98.7 °F (37.1 °C)    BLOOD PRESSURE HISTORY: Systolic (18SSE), NH , Min:78 , KSE:533    Diastolic (45WLK), ETN:57, Min:44, Max:92      Intake/Output:  I/O last 3 completed shifts: In: 5312 [P.O.:840; I.V.:2865]  Out: -   No intake/output data recorded.   Drain/tube Output:       Physical Exam:  General: awake, alert, oriented to  person, place, time  Cardiovascular:  regular rate and rhythm and no murmur noted  Lungs: clear to auscultation  Abdomen: soft, non distended, incisional tenderness only, bowel sounds present   Skin/Wound: healing well, no drainage, no erythema, well approximated    Labs:  CBC:    Recent Labs     20  0013 20  1330 20  2351   HGB 9.7* 10.7* 9.4*   HCT 29.1* change- See Comment. COMMENT: Gross examination reveals the lumen filled with fecal material,  compatible with reported history of fecal impaction. Imaging:  I have personally reviewed the following films:    No results found. Scheduled Meds:   sodium chloride flush  10 mL Intravenous 2 times per day    pantoprazole  40 mg Intravenous Daily    polyethylene glycol  17 g Oral BID    lamoTRIgine  175 mg Oral BID    levETIRAcetam  1,500 mg Oral BID    phenytoin  100 mg Oral Daily    phenytoin  200 mg Oral Nightly    tamsulosin  0.4 mg Oral Daily    ciprofloxacin  400 mg Intravenous Q12H     Continuous Infusions:    PRN Meds:.sodium chloride flush, traMADol, acetaminophen **OR** acetaminophen, promethazine **OR** ondansetron, potassium chloride **OR** potassium alternative oral replacement **OR** potassium chloride, magnesium sulfate      Assessment:  39 y.o. male admitted with   1. Colitis    2.  Hemorrhage of rectum and anus        Hematochezia  Acute blood loss anemia  Status-post laparoscopic assisted sigmoid colon resection on 5/27/2020 for fecal impaction, colonic dysmotility and sigmoid colon obstruction       Plan:  Doing better with bleeding after flex sig / APC  Friable oozing mucosa noted, perhaps mucosal damage present from prior pressure / stool balls  Will adv diet this am      Yanira Benedict

## 2020-06-08 ENCOUNTER — TELEPHONE (OUTPATIENT)
Dept: FAMILY MEDICINE CLINIC | Age: 45
End: 2020-06-08

## 2020-06-11 ENCOUNTER — TELEPHONE (OUTPATIENT)
Dept: FAMILY MEDICINE CLINIC | Age: 45
End: 2020-06-11

## 2020-06-12 ENCOUNTER — OFFICE VISIT (OUTPATIENT)
Dept: FAMILY MEDICINE CLINIC | Age: 45
End: 2020-06-12
Payer: MEDICARE

## 2020-06-12 VITALS
SYSTOLIC BLOOD PRESSURE: 102 MMHG | HEART RATE: 89 BPM | DIASTOLIC BLOOD PRESSURE: 70 MMHG | RESPIRATION RATE: 12 BRPM | TEMPERATURE: 99.1 F | OXYGEN SATURATION: 97 %

## 2020-06-12 PROCEDURE — 99214 OFFICE O/P EST MOD 30 MIN: CPT | Performed by: FAMILY MEDICINE

## 2020-06-12 RX ORDER — LAMOTRIGINE 150 MG/1
TABLET ORAL
Qty: 180 TABLET | Refills: 1 | Status: SHIPPED | OUTPATIENT
Start: 2020-06-12 | End: 2020-12-29

## 2020-06-12 RX ORDER — LAMOTRIGINE 25 MG/1
TABLET ORAL
Qty: 180 TABLET | Refills: 1 | Status: SHIPPED | OUTPATIENT
Start: 2020-06-12 | End: 2020-12-29

## 2020-06-12 RX ORDER — PHENYTOIN SODIUM 100 MG/1
CAPSULE, EXTENDED RELEASE ORAL
Qty: 270 CAPSULE | Refills: 1 | Status: SHIPPED | OUTPATIENT
Start: 2020-06-12 | End: 2020-07-24

## 2020-06-12 RX ORDER — LEVETIRACETAM 500 MG/1
TABLET ORAL
Qty: 180 TABLET | Refills: 1 | Status: SHIPPED | OUTPATIENT
Start: 2020-06-12 | End: 2020-07-14 | Stop reason: DRUGHIGH

## 2020-06-12 RX ORDER — TRAMADOL HYDROCHLORIDE 50 MG/1
50 TABLET ORAL EVERY 6 HOURS PRN
COMMUNITY
End: 2020-06-12 | Stop reason: ALTCHOICE

## 2020-06-12 RX ORDER — TAMSULOSIN HYDROCHLORIDE 0.4 MG/1
0.4 CAPSULE ORAL DAILY
Qty: 90 CAPSULE | Refills: 1 | Status: SHIPPED | OUTPATIENT
Start: 2020-06-12 | End: 2020-06-30 | Stop reason: SINTOL

## 2020-06-12 RX ORDER — POLYETHYLENE GLYCOL 3350 17 G/17G
17 POWDER, FOR SOLUTION ORAL 2 TIMES DAILY
Qty: 3 EACH | Refills: 1 | Status: SHIPPED | OUTPATIENT
Start: 2020-06-12 | End: 2020-07-13 | Stop reason: SDUPTHER

## 2020-06-12 NOTE — PROGRESS NOTES
Subjective:      Patient ID: Faraz Yun is a 39 y.o. male. CC: Patient presents for hospital follow-up. HPI Pt is here for a hospital follow up. Pt was kylee at [facility] 6/2/20 due to colitis and complications from a procedure. pt states he is a little better but still feels dizzy and nauseased a lot. Patient was initially admitted to the hospital May 10 through May 31, 2020 with colitis and fecal impaction and colonic dysmotility. He unfortunately continued to have large amount of stool in the lower sigmoid colon area. Ultimately underwent surgical intervention of the sigmoid colon area. Postoperatively he did well and was discharged home. Patient was then readmitted to hospital June 2 through June 6 with rectal bleeding secondary to colitis. He underwent a sigmoid colon evaluation and laser therapy to several bleeding sites. After that he had no further bleeding. Patient states since his been home he feels somewhat dizzy and lightheaded. He thinks is maybe the tramadol pain medication as he was not taking his medication during hospitalization. Patient would also like to change his medication back to what he was normally taking as far as his seizure disorder. The differences he was taking larger doses and he feels larger pills are more difficult to swallow. Review of Systems  Patient Active Problem List   Diagnosis    Seizure disorder ([address]    Adhesive capsulitis of shoulder    Seborrheic dermatitis    History of leukemia    Balance disorder    Primary osteoarthritis involving multiple joints    Colitis    Fecal impaction ([address]    Colonic dysmotility    Colon obstruction ([address]    GI bleed       Outpatient Medications Marked as Taking for the 6/12/20 encounter (Office Visit) with To Garza MD   Medication Sig Dispense Refill    traMADol (ULTRAM) 50 MG tablet Take 50 mg by mouth every 6 hours as needed for Pain.       polyethylene glycol (GLYCOLAX) 17 g packet Take 17 g by mouth 2 times daily 527 g 1    phenytoin (DILANTIN) 100 MG ER capsule Take 1 capsule by mouth daily 60 capsule 3    lamoTRIgine (LAMICTAL) 25 MG tablet Take 7 tablets by mouth 2 times daily 30 tablet 3    levETIRAcetam (KEPPRA) 750 MG tablet Take 2 tablets by mouth 2 times daily 60 tablet 3    phenytoin (PHENYTEK) 200 MG ER capsule Take 1 capsule by mouth nightly 60 capsule 3    docusate sodium (COLACE, DULCOLAX) 100 MG CAPS Take 100 mg by mouth 2 times daily      naloxegol (MOVANTIK) 25 MG TABS tablet Take 1 tablet by mouth every morning 30 tablet 0    betamethasone valerate (VALISONE) 0.1 % cream Apply topically 1 times daily. 30 g 5    tamsulosin (FLOMAX) 0.4 MG capsule Take 1 capsule by mouth daily 14 capsule 0    acetaminophen (TYLENOL) 500 MG tablet Take 2 tablets by mouth 3 times daily as needed for Pain 180 tablet 0       No Known Allergies    Social History     Tobacco Use    Smoking status: Never Smoker    Smokeless tobacco: Never Used   Substance Use Topics    Alcohol use: No       /70 (Site: Left Upper Arm, Position: Sitting, Cuff Size: Medium Adult)   Pulse 89   Temp 99.1 °F (37.3 °C) (Tympanic)   Resp 12   SpO2 97%     Objective:   Physical Exam  Constitutional:       General: He is not in acute distress. Appearance: Normal appearance. He is well-developed. Neck:      Vascular: No carotid bruit. Cardiovascular:      Rate and Rhythm: Normal rate and regular rhythm. Pulses:           Dorsalis pedis pulses are 2+ on the right side and 2+ on the left side. Posterior tibial pulses are 2+ on the right side and 2+ on the left side. Heart sounds: Normal heart sounds. No murmur. No friction rub. No gallop. Pulmonary:      Effort: Pulmonary effort is normal.      Breath sounds: Normal breath sounds. Abdominal:      General: Bowel sounds are normal. There is no distension. Palpations: Abdomen is soft. Abdomen is not rigid.  There is no hepatomegaly, splenomegaly

## 2020-06-13 ENCOUNTER — NURSE TRIAGE (OUTPATIENT)
Dept: OTHER | Facility: CLINIC | Age: 45
End: 2020-06-13

## 2020-06-13 NOTE — TELEPHONE ENCOUNTER
think it is my medications  8. RECURRENT SYMPTOM: \"Have you had dizziness before? \" If so, ask: \"When was the last time? \" \"What happened that time? \"      Yes, ongiong  9. OTHER SYMPTOMS: \"Do you have any other symptoms? \" (e.g., headache, weakness, numbness, vomiting, earache)     Vomiting, 1 time this past week  10. PREGNANCY: \"Is there any chance you are pregnant? \" \"When was your last menstrual period? \"        NA    Protocols used: DIZZINESS - VERTIGO-ADULT-AH

## 2020-06-17 ENCOUNTER — TELEPHONE (OUTPATIENT)
Dept: FAMILY MEDICINE CLINIC | Age: 45
End: 2020-06-17

## 2020-06-17 NOTE — TELEPHONE ENCOUNTER
Patient would like a call back to be scheduled for his 1 month follow up around 07/12/20. Please call patient back at [phone] and he would also like to get blood work done for anemia.

## 2020-06-23 ENCOUNTER — OFFICE VISIT (OUTPATIENT)
Dept: SURGERY | Age: 45
End: 2020-06-23

## 2020-06-23 VITALS
WEIGHT: 146 LBS | SYSTOLIC BLOOD PRESSURE: 119 MMHG | DIASTOLIC BLOOD PRESSURE: 62 MMHG | BODY MASS INDEX: 19.26 KG/M2 | TEMPERATURE: 97.9 F

## 2020-06-23 PROCEDURE — 99024 POSTOP FOLLOW-UP VISIT: CPT | Performed by: SURGERY

## 2020-06-29 ENCOUNTER — NURSE TRIAGE (OUTPATIENT)
Dept: OTHER | Facility: CLINIC | Age: 45
End: 2020-06-29

## 2020-06-30 ENCOUNTER — OFFICE VISIT (OUTPATIENT)
Dept: FAMILY MEDICINE CLINIC | Age: 45
End: 2020-06-30
Payer: MEDICARE

## 2020-06-30 VITALS
WEIGHT: 145 LBS | DIASTOLIC BLOOD PRESSURE: 81 MMHG | TEMPERATURE: 97.8 F | HEART RATE: 86 BPM | BODY MASS INDEX: 19.13 KG/M2 | SYSTOLIC BLOOD PRESSURE: 126 MMHG

## 2020-06-30 LAB — HCT VFR BLD CALC: 36 % (ref 41–53)

## 2020-06-30 PROCEDURE — 85014 HEMATOCRIT: CPT | Performed by: NURSE PRACTITIONER

## 2020-06-30 PROCEDURE — 99213 OFFICE O/P EST LOW 20 MIN: CPT | Performed by: NURSE PRACTITIONER

## 2020-06-30 RX ORDER — LANOLIN ALCOHOL/MO/W.PET/CERES
325 CREAM (GRAM) TOPICAL
COMMUNITY
End: 2021-11-12

## 2020-06-30 NOTE — PROGRESS NOTES
ASA     Past Medical History:   Diagnosis Date    Adhesive capsulitis of shoulder     Arthritis     Complex partial seizures with impaired consciousness at onset [facility])     Leukemia in remission ([address]     Seborrheic dermatitis, unspecified     Seizures ([address]     Last seizure 3/2018      Past Surgical History:   Procedure Laterality Date    COLONOSCOPY N/A 5/13/2020    COLONOSCOPY FLEXIBLE W/ DECOMPRESSION performed by Sukhjinder Boss MD at [address] [facility]  5/13/2020    COLONOSCOPY WITH BIOPSY performed by Sukhjinder Boss MD at [address]  5/18/2020    COLONOSCOPY DIAGNOSTIC performed by Ifeoma Montano MD at [address] COLONOSCOPY N/A 6/5/2020    COLONOSCOPY CONTROL HEMORRHAGE performed by Leticia Boykin MD at [address] N/A 5/27/2020    DIAGNOSTIC LAPAROSCOPY,REMOVAL OF FECAL IMPACTION, SIGMOID COLON RESECTION performed by Varun Sotomayor MD at [address] ARTHROSCOPY Right 04/27/2018    RIGHT SHOULDER ARTHROSCOPY SUBACROMIAL DECOMPRESSION, OPEN    SIGMOIDOSCOPY N/A 5/21/2020    SIGMOIDOSCOPY DIAGNOSTIC FLEXIBLE performed by Ifeoma Montano MD at [facility] ENDOSCOPY N/A 5/18/2020    EGD BIOPSY performed by Ifeoma Montano MD at [facility]      Family History   Problem Relation Age of Onset    Diabetes Father       Social History     Tobacco Use    Smoking status: Never Smoker    Smokeless tobacco: Never Used   Substance Use Topics    Alcohol use: No        Review of Systems   Constitutional: Positive for activity change. Negative for appetite change, chills, fatigue, fever and unexpected weight change. HENT: Negative for congestion, ear pain and tinnitus. Eyes: Positive for visual disturbance. Respiratory: Negative for chest tightness and shortness of breath.     Cardiovascular: Negative for chest pain, palpitations and leg swelling. Gastrointestinal: Positive for constipation (chronic). Negative for blood in stool, nausea and vomiting. Musculoskeletal: Positive for gait problem. Neurological: Positive for dizziness and light-headedness. Negative for tremors, weakness and headaches. Psychiatric/Behavioral: Negative for behavioral problems and decreased concentration. Objective:    /81   Pulse 86   Temp 97.8 °F (36.6 °C)   Wt 145 lb (65.8 kg)   BMI 19.13 kg/m²   Weight: 145 lb (65.8 kg)     BP Readings from Last 3 Encounters:   06/30/20 126/81   06/23/20 119/62   06/12/20 102/70     Wt Readings from Last 3 Encounters:   06/30/20 145 lb (65.8 kg)   06/23/20 146 lb (66.2 kg)   06/06/20 149 lb 14.4 oz (68 kg)     BMI Readings from Last 3 Encounters:   06/30/20 19.13 kg/m²   06/23/20 19.26 kg/m²   06/06/20 19.78 kg/m²       Physical Exam  Vitals signs reviewed. Constitutional:       Appearance: Normal appearance. He is well-developed. HENT:      Right Ear: Tympanic membrane normal.      Left Ear: Tympanic membrane normal.      Nose: Nose normal.      Mouth/Throat:      Mouth: Mucous membranes are moist.      Pharynx: Oropharynx is clear. Eyes:      Extraocular Movements: Extraocular movements intact. Pupils: Pupils are equal, round, and reactive to light. Neck:      Musculoskeletal: Neck supple. No muscular tenderness. Cardiovascular:      Rate and Rhythm: Normal rate and regular rhythm. Heart sounds: Normal heart sounds. No murmur. Pulmonary:      Effort: Pulmonary effort is normal.      Breath sounds: Normal breath sounds. Musculoskeletal:      Right lower leg: No edema. Left lower leg: No edema. Lymphadenopathy:      Cervical: No cervical adenopathy. Skin:     General: Skin is warm and dry. Neurological:      Mental Status: He is alert and oriented to person, place, and time. Sensory: No sensory deficit.       Coordination: Coordination abnormal.      Gait: Gait abnormal. Comments: Denies spinning but reports equilibrium is off. Intermittent visual blurring. Assessment/Plan    1. Dizziness  Advised safety measures  Discussed referrals to ENT, Neuro, Opthamology- Pt refuses at this time due to hospital bills  - POCT hematocrit- 36  Discussed possible change in dosage dependent upon drug levels  - LAMOTRIGINE LEVEL; Future  - PHENYTOIN LEVEL, TOTAL AND FREE; Future    2. Hypothyroidism, unspecified type    - TSH with Reflex; Future      Return if symptoms worsen or fail to improve, for unresolved symptoms. This dictation was generated by voice recognition computer software. Although all attempts are made to edit the dictation for accuracy, there may be errors in the transcription that are not intended.

## 2020-07-01 ASSESSMENT — ENCOUNTER SYMPTOMS
CHEST TIGHTNESS: 0
NAUSEA: 0
VOMITING: 0
CONSTIPATION: 1
SHORTNESS OF BREATH: 0
BLOOD IN STOOL: 0

## 2020-07-08 ENCOUNTER — HOSPITAL ENCOUNTER (OUTPATIENT)
Age: 45
Discharge: HOME | End: 2020-07-08
Payer: MEDICARE

## 2020-07-08 LAB
A/G RATIO: 1.1 (ref 1.1–2.2)
ALBUMIN SERPL-MCNC: 4.4 G/DL (ref 3.4–5)
ALP BLD-CCNC: 114 U/L (ref 40–129)
ALT SERPL-CCNC: 7 U/L (ref 10–40)
ANION GAP SERPL CALCULATED.3IONS-SCNC: 13 MMOL/L (ref 3–16)
AST SERPL-CCNC: 16 U/L (ref 15–37)
BILIRUB SERPL-MCNC: <0.2 MG/DL (ref 0–1)
BUN BLDV-MCNC: 11 MG/DL (ref 7–20)
CALCIUM SERPL-MCNC: 9.8 MG/DL (ref 8.3–10.6)
CHLORIDE BLD-SCNC: 97 MMOL/L (ref 99–110)
CO2: 27 MMOL/L (ref 21–32)
CREAT SERPL-MCNC: 0.8 MG/DL (ref 0.9–1.3)
GFR AFRICAN AMERICAN: >60
GFR NON-AFRICAN AMERICAN: >60
GLOBULIN: 4 G/DL
GLUCOSE BLD-MCNC: 105 MG/DL (ref 70–99)
HCT VFR BLD CALC: 38.4 % (ref 40.5–52.5)
HEMOGLOBIN: 12.6 G/DL (ref 13.5–17.5)
KEPPRA DOSE AMT: ABNORMAL
KEPPRA: 71.5 UG/ML (ref 6–46)
MCH RBC QN AUTO: 30.2 PG (ref 26–34)
MCHC RBC AUTO-ENTMCNC: 32.8 G/DL (ref 31–36)
MCV RBC AUTO: 92 FL (ref 80–100)
PDW BLD-RTO: 13.8 % (ref 12.4–15.4)
PLATELET # BLD: 255 K/UL (ref 135–450)
PMV BLD AUTO: 8.5 FL (ref 5–10.5)
POTASSIUM SERPL-SCNC: 4 MMOL/L (ref 3.5–5.1)
RBC # BLD: 4.18 M/UL (ref 4.2–5.9)
SODIUM BLD-SCNC: 137 MMOL/L (ref 136–145)
TOTAL PROTEIN: 8.4 G/DL (ref 6.4–8.2)
TSH REFLEX: 2.38 UIU/ML (ref 0.27–4.2)
WBC # BLD: 3.3 K/UL (ref 4–11)

## 2020-07-08 PROCEDURE — 80177 DRUG SCRN QUAN LEVETIRACETAM: CPT

## 2020-07-08 PROCEDURE — 80186 ASSAY OF PHENYTOIN FREE: CPT

## 2020-07-08 PROCEDURE — 84443 ASSAY THYROID STIM HORMONE: CPT

## 2020-07-08 PROCEDURE — 80053 COMPREHEN METABOLIC PANEL: CPT

## 2020-07-08 PROCEDURE — 85027 COMPLETE CBC AUTOMATED: CPT

## 2020-07-08 PROCEDURE — 80185 ASSAY OF PHENYTOIN TOTAL: CPT

## 2020-07-08 PROCEDURE — 36415 COLL VENOUS BLD VENIPUNCTURE: CPT

## 2020-07-10 ENCOUNTER — TELEPHONE (OUTPATIENT)
Dept: FAMILY MEDICINE CLINIC | Age: 45
End: 2020-07-10

## 2020-07-10 LAB
PHENYTOIN % FREE: 7.3 % (ref 8–14)
PHENYTOIN FREE: 2.2 UG/ML (ref 1–2.5)
PHENYTOIN LEVEL: 30.3 UG/ML (ref 10–20)

## 2020-07-10 NOTE — TELEPHONE ENCOUNTER
----- Message from Jesus Prasad MD sent at 7/10/2020  7:33 AM EDT -----  Very high Keppra level.   Would recommend taking 1 pill in the morning and 1/2 pill in the afternoon and recheck level in 2 weeks

## 2020-07-10 NOTE — TELEPHONE ENCOUNTER
lmtc- needs to adjust to 2 tablets twice daily, Dr. Mario Greenfield wants him to do this instead of the message below. Lab order put in Epic.

## 2020-07-13 ENCOUNTER — TELEPHONE (OUTPATIENT)
Dept: FAMILY MEDICINE CLINIC | Age: 45
End: 2020-07-13

## 2020-07-13 RX ORDER — POLYETHYLENE GLYCOL 3350 17 G/17G
17 POWDER, FOR SOLUTION ORAL 2 TIMES DAILY
Qty: 200 EACH | Refills: 0 | Status: SHIPPED | OUTPATIENT
Start: 2020-07-13 | End: 2020-07-24 | Stop reason: SDUPTHER

## 2020-07-13 NOTE — TELEPHONE ENCOUNTER
Patient requesting a medication refill. Medication: polyethylene glycol (GLYCOLAX) 17 g packet    Pharmacy:  [facility] [address] [place] [address] [place] [phone] Juli Pham [phone]     Last office visit: 06/30/2020  Next office visit: Visit date not found    90 days if possible.

## 2020-07-14 ENCOUNTER — TELEPHONE (OUTPATIENT)
Dept: FAMILY MEDICINE CLINIC | Age: 45
End: 2020-07-14

## 2020-07-14 RX ORDER — LEVETIRACETAM 500 MG/1
TABLET ORAL
Qty: 120 TABLET | Refills: 0 | Status: SHIPPED
Start: 2020-07-14 | End: 2020-08-05

## 2020-07-14 NOTE — TELEPHONE ENCOUNTER
E[facility] received a call from:    Name of Caller: Patient    Relationship to patient:N/A    Organization name: N/A    Best contact number: [phone]    Reason for call: Request call back concerning Lab work for upcoming appointment 7/24 9:45 am

## 2020-07-24 ENCOUNTER — OFFICE VISIT (OUTPATIENT)
Dept: FAMILY MEDICINE CLINIC | Age: 45
End: 2020-07-24
Payer: MEDICARE

## 2020-07-24 VITALS
SYSTOLIC BLOOD PRESSURE: 110 MMHG | BODY MASS INDEX: 19.18 KG/M2 | DIASTOLIC BLOOD PRESSURE: 68 MMHG | OXYGEN SATURATION: 96 % | HEART RATE: 91 BPM | WEIGHT: 145.38 LBS | TEMPERATURE: 97.5 F | RESPIRATION RATE: 12 BRPM

## 2020-07-24 PROCEDURE — 99214 OFFICE O/P EST MOD 30 MIN: CPT | Performed by: FAMILY MEDICINE

## 2020-07-24 RX ORDER — PHENYTOIN SODIUM 100 MG/1
100 CAPSULE, EXTENDED RELEASE ORAL 2 TIMES DAILY
Qty: 270 CAPSULE | Refills: 1
Start: 2020-07-24 | End: 2020-12-29

## 2020-07-24 ASSESSMENT — ENCOUNTER SYMPTOMS
NAUSEA: 1
VISUAL CHANGE: 1

## 2020-07-24 NOTE — PROGRESS NOTES
Subjective:      Patient ID: Lesly Kaba is a 39 y.o. male. CC: Patient presents for acute medical problem-vertigo and balance disorder. Medical assistant notes reviewed. Dizziness   This is a chronic problem. Episode onset: months  The problem occurs constantly. The problem has been gradually worsening. Associated symptoms include nausea, a visual change and weakness. The symptoms are aggravated by standing, twisting and bending. He has tried position changes, lying down, eating and drinking for the symptoms. The treatment provided no relief. Patient is always has some issues with balance and coordination when he feels significantly worse. He is having issues he will and he is at rest.  He just feels very off balance and drunk most of the time. He was found on his laboratory testing to have elevated levels of Keppra and his dosage of this medication was adjusted. This only occurred in the last several days. He denies any issues with vomiting or diarrhea. Does use of medications have been unchanged for some time. Review of Systems   Gastrointestinal: Positive for nausea. Neurological: Positive for dizziness and weakness. Allergies   Allergen Reactions    Aspirin      H/O leukemia aand was told not take ASA     Objective:   Physical Exam  Constitutional:       General: He is not in acute distress. Appearance: He is well-developed. HENT:      Right Ear: Tympanic membrane and ear canal normal.      Left Ear: Tympanic membrane and ear canal normal.   Neck:      Musculoskeletal: Neck supple. Vascular: No carotid bruit. Cardiovascular:      Rate and Rhythm: Normal rate and regular rhythm. Pulses:           Dorsalis pedis pulses are 2+ on the right side and 2+ on the left side. Posterior tibial pulses are 2+ on the right side and 2+ on the left side. Heart sounds: Normal heart sounds. No murmur. No friction rub. No gallop.     Pulmonary:      Effort: Pulmonary effort is normal.      Breath sounds: Normal breath sounds. Musculoskeletal: Normal range of motion. General: No tenderness. Right lower leg: No edema. Left lower leg: No edema. Lymphadenopathy:      Cervical: No cervical adenopathy. Neurological:      General: No focal deficit present. Mental Status: He is alert and oriented to person, place, and time. Mental status is at baseline. Cranial Nerves: Cranial nerves are intact. No cranial nerve deficit. Sensory: Sensation is intact. Motor: Motor function is intact. No weakness. Gait: Tandem walk abnormal.   Psychiatric:         Behavior: Behavior is cooperative. Assessment:      Josefina Pugh was seen today for dizziness. Diagnoses and all orders for this visit:    Dilantin toxicity, undetermined intent, initial encounter    Seizure disorder ([facility]  -     PHENYTOIN LEVEL, TOTAL; Future    Drug toxicity    Vertigo    Other orders  -     DILANTIN 100 MG ER capsule; Take 1 capsule by mouth 2 times daily  -     polyethylene glycol (GLYCOLAX) 17 g packet; Take 17 g by mouth daily            Plan:      Laboratory profile reviewed with patient demonstrating both Keppra and Dilantin toxicity. The Keppra was already decreased down and today I advised him to decrease his Dilantin dosage down as well. In the meantime he should continue using his walker for balance and coordination. The blood levels need to be retested and the next 2 to 3 weeks. Unsure why his levels are elevated as the dosage of these medications have been unchanged now for some time. RTC in 1 month    Please note that this chart was generated using Dragon dictation software. Although every effort was made to ensure the accuracy of this automated transcription, some errors in transcription may have occurred.

## 2020-07-24 NOTE — LETTER
[address] [id number]  Phone: [phone]  Fax: [phone]    Kenyatta Hills MD        July 24, 2020     Patient: Bin Nassar   YOB: 1975   Date of Visit: 7/24/2020       To Whom It May Concern: It is my medical opinion that Ayla Cheung requires a disability parking placard for the following reasons:  He has limited walking ability due to a neurologic and an orthopedic condition. Duration of need: permanent    If you have any questions or concerns, please don't hesitate to call.     Sincerely,            Kenyatta Hills MD

## 2020-07-25 RX ORDER — POLYETHYLENE GLYCOL 3350 17 G/17G
17 POWDER, FOR SOLUTION ORAL DAILY
Qty: 100 EACH | Refills: 5 | Status: SHIPPED | OUTPATIENT
Start: 2020-07-25 | End: 2020-08-11 | Stop reason: DRUGHIGH

## 2020-07-29 ENCOUNTER — TELEPHONE (OUTPATIENT)
Dept: FAMILY MEDICINE CLINIC | Age: 45
End: 2020-07-29

## 2020-07-29 NOTE — TELEPHONE ENCOUNTER
Patient is wanting to know if he can start taking his miralax twice a day instead of once a day. He feels like he is getting stopped up again. Ju Yip     Please advise

## 2020-08-05 RX ORDER — LEVETIRACETAM 500 MG/1
TABLET ORAL
Qty: 360 TABLET | Refills: 1 | Status: SHIPPED | OUTPATIENT
Start: 2020-08-05 | End: 2021-01-19

## 2020-08-10 ENCOUNTER — NURSE TRIAGE (OUTPATIENT)
Dept: OTHER | Facility: CLINIC | Age: 45
End: 2020-08-10

## 2020-08-10 NOTE — TELEPHONE ENCOUNTER
Received call from CESAR in [facility]. Patient is c/o of abdominal pain that is intermittent and he reports as a 3/10. No issues with constipation at this point. He has been having the pain for one week. The pain is below his umbilicus to the center of the abdomen. Protocol recommendation shared to be seen today and care advice shared. Call soft transferred to Payton Escoto  in [facility] to schedule appointment. Please do not reply to the triage nurse through this encounter. Any subsequent communication should be directly with the patient.     Reason for Disposition   MILD pain that comes and goes (cramps) lasts > 24 hours    Protocols used: ABDOMINAL PAIN - MALE-ADULT-[place]

## 2020-08-11 ENCOUNTER — OFFICE VISIT (OUTPATIENT)
Dept: FAMILY MEDICINE CLINIC | Age: 45
End: 2020-08-11
Payer: MEDICARE

## 2020-08-11 VITALS
RESPIRATION RATE: 12 BRPM | SYSTOLIC BLOOD PRESSURE: 118 MMHG | WEIGHT: 146 LBS | OXYGEN SATURATION: 97 % | DIASTOLIC BLOOD PRESSURE: 80 MMHG | BODY MASS INDEX: 19.26 KG/M2 | TEMPERATURE: 99.3 F | HEART RATE: 82 BPM

## 2020-08-11 PROCEDURE — 99213 OFFICE O/P EST LOW 20 MIN: CPT | Performed by: FAMILY MEDICINE

## 2020-08-11 RX ORDER — POLYETHYLENE GLYCOL 3350 17 G/17G
17 POWDER, FOR SOLUTION ORAL 2 TIMES DAILY
Qty: 100 EACH | Refills: 5 | Status: SHIPPED | OUTPATIENT
Start: 2020-08-11

## 2020-08-11 ASSESSMENT — ENCOUNTER SYMPTOMS
CONSTIPATION: 1
ABDOMINAL PAIN: 1

## 2020-08-11 NOTE — PROGRESS NOTES
Subjective:      Patient ID: Juan M Shields is a 39 y.o. male. CC: Patient presents for acute medical problem-mid to left lower quad abdominal pain. Medical assistant notes reviewed. Abdominal Pain   This is a new problem. Episode onset:  a week  The onset quality is sudden. The problem occurs intermittently. The problem has been unchanged. The pain is located in the generalized abdominal region. The pain is at a severity of 2/10. The pain is mild. The quality of the pain is aching and cramping. The abdominal pain does not radiate. Associated symptoms include constipation. Nothing aggravates the pain. The pain is relieved by nothing. He has tried acetaminophen for the symptoms. The treatment provided mild relief. Patient states he has been decreasing his MiraLAX down as he thought he was having too many bowel movements a day. He has been taking  One and 1 half dose per day of the MiraLAX medication. He has noted fullness sensation that comes and goes. Patient states he does not feel he has constipation he thought possibly had a colitis. I reviewed the CT findings From the last hospitalization. Patient's weight is stable. He is feeling better since we decreased down his seizure medications and he is due to have laboratory testing done soon    Review of Systems   Gastrointestinal: Positive for abdominal pain and constipation. Allergies   Allergen Reactions    Aspirin      H/O leukemia aand was told not take ASA       Objective:   Physical Exam  Constitutional:       General: He is not in acute distress. Appearance: Normal appearance. He is well-developed. Abdominal:      General: Abdomen is flat. Bowel sounds are normal. There is no distension. Palpations: Abdomen is soft. Abdomen is not rigid. There is no hepatomegaly, splenomegaly or mass. Tenderness: There is abdominal tenderness in the suprapubic area and left lower quadrant.  There is no right CVA tenderness, left CVA tenderness,

## 2020-08-12 ENCOUNTER — HOSPITAL ENCOUNTER (OUTPATIENT)
Age: 45
Discharge: HOME | End: 2020-08-12
Payer: MEDICARE

## 2020-08-12 ENCOUNTER — HOSPITAL ENCOUNTER (OUTPATIENT)
Dept: GENERAL RADIOLOGY | Age: 45
Discharge: HOME | End: 2020-08-12
Payer: MEDICARE

## 2020-08-12 ENCOUNTER — TELEPHONE (OUTPATIENT)
Dept: FAMILY MEDICINE CLINIC | Age: 45
End: 2020-08-12

## 2020-08-12 LAB
KEPPRA DOSE AMT: NORMAL
KEPPRA: 36.3 UG/ML (ref 6–46)
PHENYTOIN DOSE AMOUNT: NORMAL
PHENYTOIN LEVEL: 15.2 UG/ML (ref 10–20)

## 2020-08-12 PROCEDURE — 74019 RADEX ABDOMEN 2 VIEWS: CPT

## 2020-08-12 PROCEDURE — 80177 DRUG SCRN QUAN LEVETIRACETAM: CPT

## 2020-08-12 PROCEDURE — 80185 ASSAY OF PHENYTOIN TOTAL: CPT

## 2020-08-12 PROCEDURE — 36415 COLL VENOUS BLD VENIPUNCTURE: CPT

## 2020-08-12 NOTE — TELEPHONE ENCOUNTER
----- Message from [facility] [place] sent at 8/12/2020  7:30 AM EDT -----  Subject: Message to Provider    QUESTIONS  Information for Provider? pt calling to let Dr. Argentina Sunshine know the pain is   getting worst and more frequently. States he did have a bm this morning   however   please advise.  ---------------------------------------------------------------------------  --------------  CALL BACK INFO  What is the best way for the office to contact you? OK to leave message on   voicemail  Preferred Call Back Phone Number? [phone]  ---------------------------------------------------------------------------  --------------  SCRIPT ANSWERS  Relationship to Patient?  Self

## 2020-08-12 NOTE — TELEPHONE ENCOUNTER
Patient states his pain went from a 10 to a 1 now. He had a few bowel movements this morning. He will go get the xray done today. He states he feels a lot better.

## 2020-09-08 ENCOUNTER — TELEPHONE (OUTPATIENT)
Dept: FAMILY MEDICINE CLINIC | Age: 45
End: 2020-09-08

## 2020-09-24 NOTE — TELEPHONE ENCOUNTER
----- Message from [address] sent at 9/24/2020 12:38 PM EDT -----  Subject: Message to Provider    QUESTIONS  Information for Provider? Patient is requesting to be prescribed Flomax   again    and also has questions regarding Iron medication and if should continue   taking the iron  ---------------------------------------------------------------------------  --------------  CALL BACK INFO  What is the best way for the office to contact you? OK to leave message on   voicemail  Preferred Call Back Phone Number? [phone]  ---------------------------------------------------------------------------  --------------  SCRIPT ANSWERS  Relationship to Patient?  Self

## 2020-09-25 RX ORDER — TAMSULOSIN HYDROCHLORIDE 0.4 MG/1
0.4 CAPSULE ORAL DAILY
Qty: 90 CAPSULE | Refills: 1 | Status: SHIPPED | OUTPATIENT
Start: 2020-09-25 | End: 2021-03-23 | Stop reason: SDUPTHER

## 2020-10-01 ENCOUNTER — TELEPHONE (OUTPATIENT)
Dept: FAMILY MEDICINE CLINIC | Age: 45
End: 2020-10-01

## 2020-10-02 ENCOUNTER — HOSPITAL ENCOUNTER (OUTPATIENT)
Dept: GENERAL RADIOLOGY | Age: 45
Discharge: HOME | End: 2020-10-02
Payer: MEDICARE

## 2020-10-02 ENCOUNTER — HOSPITAL ENCOUNTER (OUTPATIENT)
Age: 45
Discharge: HOME | End: 2020-10-02
Payer: MEDICARE

## 2020-10-02 PROCEDURE — 74019 RADEX ABDOMEN 2 VIEWS: CPT

## 2020-10-02 RX ORDER — TRAMADOL HYDROCHLORIDE 50 MG/1
50 TABLET ORAL EVERY 6 HOURS PRN
Qty: 20 TABLET | Refills: 0 | Status: SHIPPED | OUTPATIENT
Start: 2020-10-02 | End: 2020-10-07

## 2020-10-02 NOTE — TELEPHONE ENCOUNTER
Patient went to the ED yesterday and he has 7 stitches in his thumb. He has a thumb laceration. Patient is requesting pain medication for this because they did not give him anything.      Milton Zeng 373

## 2020-10-07 ENCOUNTER — TELEPHONE (OUTPATIENT)
Dept: FAMILY MEDICINE CLINIC | Age: 45
End: 2020-10-07

## 2020-10-07 NOTE — TELEPHONE ENCOUNTER
Patient advised and he states he is currently taking Miralax twice daily.  Please send new medication to [facility]

## 2020-10-07 NOTE — TELEPHONE ENCOUNTER
----- Message from Abner Matt MD sent at 10/2/2020  4:22 PM EDT -----  X-rays demonstrate more constipation than last time. Is he taking his MiraLAX medication twice daily?   If he is we will need to add additional medication

## 2020-10-12 ENCOUNTER — TELEPHONE (OUTPATIENT)
Dept: FAMILY MEDICINE CLINIC | Age: 45
End: 2020-10-12

## 2020-10-12 NOTE — TELEPHONE ENCOUNTER
----- Message from Lidya Navarrete sent at 10/12/2020 12:51 PM EDT -----  Subject: Appointment Request    Reason for Call: Routine ED Follow Up Visit    QUESTIONS  Type of Appointment? Established Patient  Reason for appointment request? No appointments available during search  Additional Information for Provider? pt is needing to have an earlier   appointment to get stiches removed from ER visit on 10/1 at [facility]. please call pt to schedule.  ---------------------------------------------------------------------------  --------------  CALL BACK INFO  What is the best way for the office to contact you? OK to leave message on   voicemail  Preferred Call Back Phone Number? [phone]  ---------------------------------------------------------------------------  --------------  SCRIPT ANSWERS  Relationship to Patient? Self  Appointment reason? Well Care/Follow Ups  Select a Well Care/Follow Ups appointment reason? Adult ED Follow Up   [Emergency Room   Emergency Department]  (Patient requests to see provider urgently. )? No  Do you have any questions for your primary care provider that need to be   answered prior to your appointment? No  Have you been diagnosed with   tested for   or told that you are suspected of having COVID-19 (Coronavirus)? No  Have you had a fever or taken medication to treat a fever within the past   3 days? No  Have you had a cough   shortness of breath or flu-like symptoms within the past 3 days? No  Do you currently have flu-like symptoms including fever or chills   cough   shortness of breath   or difficulty breathing   or new loss of taste or smell? No  (Service Expert  click yes below to proceed with [employer] As Usual   Scheduling)?  Yes

## 2020-10-14 ENCOUNTER — TELEPHONE (OUTPATIENT)
Dept: FAMILY MEDICINE CLINIC | Age: 45
End: 2020-10-14

## 2020-10-14 NOTE — TELEPHONE ENCOUNTER
Pt states he is having abdominal pain again and no bowel movements. He is also concerned about medication he is taking movantik 25mg tabs. He states he read that he should not be taking if having bowel issues.

## 2020-10-14 NOTE — TELEPHONE ENCOUNTER
Please advise. Pt is on Miralax and this medication. He has had xrays done with showing moderate constipation.     Provider out of office this week

## 2020-10-14 NOTE — TELEPHONE ENCOUNTER
Give 1 bottle of Magnesium Citrate. The Movantik is to prevent Opiate induced constipation. If no relief from the magnesium citrate he should go to the emergency room.

## 2020-10-15 ENCOUNTER — TELEPHONE (OUTPATIENT)
Dept: FAMILY MEDICINE CLINIC | Age: 45
End: 2020-10-15

## 2020-10-15 NOTE — TELEPHONE ENCOUNTER
----- Message from [facility] sent at 10/15/2020 10:04 AM EDT -----  Subject: Message to Provider    QUESTIONS  Information for Provider? patient needs to have xrays scheduled  ---------------------------------------------------------------------------  --------------  CALL BACK INFO  What is the best way for the office to contact you? OK to leave message on   voicemail  Preferred Call Back Phone Number? [phone]  ---------------------------------------------------------------------------  --------------  SCRIPT ANSWERS  Relationship to Patient?  Self

## 2020-10-22 ENCOUNTER — HOSPITAL ENCOUNTER (OUTPATIENT)
Dept: GENERAL RADIOLOGY | Age: 45
Discharge: HOME | End: 2020-10-22
Payer: MEDICARE

## 2020-10-22 ENCOUNTER — HOSPITAL ENCOUNTER (OUTPATIENT)
Age: 45
Discharge: HOME | End: 2020-10-22
Payer: MEDICARE

## 2020-10-22 PROCEDURE — 74019 RADEX ABDOMEN 2 VIEWS: CPT

## 2020-10-26 ENCOUNTER — OFFICE VISIT (OUTPATIENT)
Dept: FAMILY MEDICINE CLINIC | Age: 45
End: 2020-10-26
Payer: MEDICARE

## 2020-10-26 VITALS
BODY MASS INDEX: 19.54 KG/M2 | TEMPERATURE: 97.5 F | WEIGHT: 148.13 LBS | SYSTOLIC BLOOD PRESSURE: 114 MMHG | HEART RATE: 86 BPM | RESPIRATION RATE: 12 BRPM | OXYGEN SATURATION: 96 % | DIASTOLIC BLOOD PRESSURE: 80 MMHG

## 2020-10-26 PROBLEM — K92.2 GI BLEED: Status: RESOLVED | Noted: 2020-06-02 | Resolved: 2020-10-26

## 2020-10-26 PROCEDURE — 99213 OFFICE O/P EST LOW 20 MIN: CPT | Performed by: FAMILY MEDICINE

## 2020-10-26 NOTE — PROGRESS NOTES
Subjective:      Patient ID: Aidee Loya is a 39 y.o. male. CC: Patient presents for hospital follow-up. HPI Pt is here for a hospital follow up. Pt was evaluated at [facility] on 10/1/20 due to a laceration to his left thumb. Patient states he still has a dressing in place and simply could not get in any sooner. He still having some soreness in his finger. Patient is also concerned about his: Dysmotility problems. He had an abdominal x-ray done weekly for the last several weeks which still demonstrated fecal impaction and constipation problems. He has been taken MiraLAX twice daily and now he states he has some loose bowels. He is occasionally still having some right-sided abdominal pain.     Review of Systems  Patient Active Problem List   Diagnosis    Seizure disorder ([address]    Adhesive capsulitis of shoulder    Seborrheic dermatitis    History of leukemia    Balance disorder    Primary osteoarthritis involving multiple joints    Colitis    Fecal impaction ([address]    Colonic dysmotility    Colon obstruction ([address]    GI bleed       Outpatient Medications Marked as Taking for the 10/26/20 encounter (Office Visit) with Ann Cummins MD   Medication Sig Dispense Refill    naloxegol (MOVANTIK) 25 MG TABS tablet Take 1 tablet by mouth every morning 30 tablet 0    tamsulosin (FLOMAX) 0.4 MG capsule Take 1 capsule by mouth daily 90 capsule 1    polyethylene glycol (GLYCOLAX) 17 g packet Take 17 g by mouth 2 times daily 100 each 5    levETIRAcetam (KEPPRA) 500 MG tablet TAKE 2 TABLETS TWICE A  tablet 1    DILANTIN 100 MG ER capsule Take 1 capsule by mouth 2 times daily 270 capsule 1    ferrous sulfate (FE TABS 325) 325 (65 Fe) MG EC tablet Take 325 mg by mouth daily (with breakfast)      lamoTRIgine (LAMICTAL) 25 MG tablet TAKE 1 TABLET TWICE A  tablet 1    lamoTRIgine (LAMICTAL) 150 MG tablet TAKE 1 TABLET TWICE A  tablet 1    betamethasone valerate (VALISONE) 0.1 % cream Apply topically 1 times daily. 30 g 5    acetaminophen (TYLENOL) 500 MG tablet Take 2 tablets by mouth 3 times daily as needed for Pain 180 tablet 0       Allergies   Allergen Reactions    Aspirin      H/O leukemia aand was told not take ASA       Social History     Tobacco Use    Smoking status: Never Smoker    Smokeless tobacco: Never Used   Substance Use Topics    Alcohol use: No       /80 (Site: Right Upper Arm, Position: Sitting, Cuff Size: Medium Adult)   Pulse 86   Temp 97.5 °F (36.4 °C) (Infrared)   Resp 12   Wt 148 lb 2 oz (67.2 kg)   SpO2 96%   BMI 19.54 kg/m²     Objective:   Physical Exam  Constitutional:       General: He is not in acute distress. Appearance: Normal appearance. He is well-developed. Abdominal:      General: Bowel sounds are normal. There is no distension. Palpations: Abdomen is soft. Abdomen is not rigid. There is no hepatomegaly, splenomegaly or mass. Tenderness: There is no abdominal tenderness. There is no right CVA tenderness, left CVA tenderness, guarding or rebound. Hernia: No hernia is present. Skin:     General: Skin is warm. Findings: Laceration present. No rash. Comments: Left thumb laceration appears to be healing well and all the sutures removed with some difficulty. He has good range of motion of thumb. Nail is not affected. Neurological:      Mental Status: He is alert. Mental status is at baseline. Psychiatric:         Behavior: Behavior is cooperative. Assessment:      Mame was seen today for follow-up from hospital.    Diagnoses and all orders for this visit:    Laceration of left thumb without foreign body without damage to nail, initial encounter    Colonic dysmotility    Fecal impaction ([address]            Plan:      In regards to the thumb laceration advised to continue watching for signs of infection.     X-ray examinations were reviewed with patient and advised that he needs to

## 2021-01-19 DIAGNOSIS — G40.909 SEIZURE DISORDER (HCC): ICD-10-CM

## 2021-01-19 RX ORDER — LEVETIRACETAM 500 MG/1
TABLET ORAL
Qty: 360 TABLET | Refills: 1 | Status: SHIPPED | OUTPATIENT
Start: 2021-01-19 | End: 2021-07-19

## 2021-01-19 NOTE — TELEPHONE ENCOUNTER
Medication:   Requested Prescriptions     Pending Prescriptions Disp Refills    levETIRAcetam (KEPPRA) 500 MG tablet [Pharmacy Med Name: Marisa Barth TAB 500MG] 360 tablet 1     Sig: TAKE 2 TABLETS TWICE A DAY      Last Filled:      Patient Phone Number: [phone] (home) [phone] (work)    Last appt: 10/26/2020   Next appt: Visit date not found    Last OARRS: No flowsheet data found.   PDMP Monitoring:    Last PDMP Jaswinder Field as Reviewed [facility]):  Review User Review Instant Review Result   Antoine Tafoya 5/11/2020  5:08 AM Reviewed PDMP [1]     Preferred Pharmacy:   [facility] [address], [address] [phone] Mingo Strong [phone]  [facility] [place] [address]  Phone: [phone] Fax: [phone]    [facility] [address] [place] [address] [phone] Mingo Strong [phone] [address] [facility]. [address] [address]  Phone: [phone] Fax: [phone]

## 2021-03-23 ENCOUNTER — TELEPHONE (OUTPATIENT)
Dept: FAMILY MEDICINE CLINIC | Age: 46
End: 2021-03-23

## 2021-03-23 RX ORDER — TAMSULOSIN HYDROCHLORIDE 0.4 MG/1
0.4 CAPSULE ORAL DAILY
Qty: 90 CAPSULE | Refills: 0 | Status: SHIPPED | OUTPATIENT
Start: 2021-03-23 | End: 2021-05-12 | Stop reason: SDUPTHER

## 2021-03-23 NOTE — TELEPHONE ENCOUNTER
tamsulosin (FLOMAX) 0.4 MG capsule [[id number]]    [address]  [address]  [phone]

## 2021-05-10 ENCOUNTER — TELEPHONE (OUTPATIENT)
Dept: FAMILY MEDICINE CLINIC | Age: 46
End: 2021-05-10

## 2021-05-10 DIAGNOSIS — G40.909 SEIZURE DISORDER (HCC): Primary | ICD-10-CM

## 2021-05-10 NOTE — TELEPHONE ENCOUNTER
Patient has appt coming up on 5/12, he states he is suppose to have labs done prior to visit. He would like the labs placed and to call him once the are ready.     He wants to have them done @ [facility]      Please advise

## 2021-05-12 ENCOUNTER — OFFICE VISIT (OUTPATIENT)
Dept: FAMILY MEDICINE CLINIC | Age: 46
End: 2021-05-12
Payer: MEDICARE

## 2021-05-12 VITALS
BODY MASS INDEX: 20.22 KG/M2 | TEMPERATURE: 97.3 F | WEIGHT: 153.25 LBS | OXYGEN SATURATION: 96 % | SYSTOLIC BLOOD PRESSURE: 124 MMHG | RESPIRATION RATE: 12 BRPM | HEART RATE: 94 BPM | DIASTOLIC BLOOD PRESSURE: 80 MMHG

## 2021-05-12 DIAGNOSIS — M75.00 ADHESIVE CAPSULITIS OF SHOULDER, UNSPECIFIED LATERALITY: ICD-10-CM

## 2021-05-12 DIAGNOSIS — G40.909 SEIZURE DISORDER (HCC): Primary | ICD-10-CM

## 2021-05-12 DIAGNOSIS — K59.9 COLONIC DYSMOTILITY: ICD-10-CM

## 2021-05-12 DIAGNOSIS — M15.9 PRIMARY OSTEOARTHRITIS INVOLVING MULTIPLE JOINTS: ICD-10-CM

## 2021-05-12 PROCEDURE — 99214 OFFICE O/P EST MOD 30 MIN: CPT | Performed by: FAMILY MEDICINE

## 2021-05-12 RX ORDER — LAMOTRIGINE 150 MG/1
TABLET ORAL
Qty: 180 TABLET | Refills: 1 | Status: SHIPPED | OUTPATIENT
Start: 2021-05-12 | End: 2021-10-04

## 2021-05-12 RX ORDER — PHENYTOIN SODIUM 100 MG/1
CAPSULE, EXTENDED RELEASE ORAL
Qty: 270 CAPSULE | Refills: 1 | Status: SHIPPED | OUTPATIENT
Start: 2021-05-12 | End: 2021-10-04

## 2021-05-12 RX ORDER — LAMOTRIGINE 25 MG/1
TABLET ORAL
Qty: 180 TABLET | Refills: 1 | Status: SHIPPED | OUTPATIENT
Start: 2021-05-12 | End: 2021-10-04

## 2021-05-12 RX ORDER — TAMSULOSIN HYDROCHLORIDE 0.4 MG/1
0.4 CAPSULE ORAL DAILY
Qty: 90 CAPSULE | Refills: 1 | Status: SHIPPED | OUTPATIENT
Start: 2021-05-12 | End: 2021-10-14 | Stop reason: SDUPTHER

## 2021-05-12 ASSESSMENT — PATIENT HEALTH QUESTIONNAIRE - PHQ9
1. LITTLE INTEREST OR PLEASURE IN DOING THINGS: 0
2. FEELING DOWN, DEPRESSED OR HOPELESS: 0
SUM OF ALL RESPONSES TO PHQ QUESTIONS 1-9: 0

## 2021-05-12 NOTE — PROGRESS NOTES
Subjective:      Patient ID: Simeon Moreau is a 55 y.o. male. CC: Patient presents for hospital follow-up. HPI pt is here for a follow up, med refill, and hospital follow-up. Patient was admitted to [facility] May 6 discharge May 7. He presented at that point of time with difficulty with his vision and ambulation. During the hospitalization he was evaluated with head CTs and MRI scans and neurology along with neurosurgical consultation. MRI scans demonstrated old scarring from his prior surgeries and one possible new nodule in the left frontal lobe. Recommendation from radiologist to repeat a CT scan in the next 6 months. Patient does have appoint with neurology in future. No changes of medication were made during hospitalization he was discharged on the same medication. The discharge summary thought this was a postictal state and did not require any additional changes. Patient is not sure if you should have called the [employer] or not. He seems very anxious about not being a frequent flyer in the emergency room which she has not been. He also is question whether he should have home care. At this point of time he is living by himself with assistance and has had no difficulty taking care of himself at this point of time. His father checks on him periodically and there has been no calls to our office. Patient has not been seen for follow-up from his last ER visit in October. There he had a finger laceration but we also were reviewing his chronic constipation problems. Patient states his bowels are working daily and he takes his medications every day. Patient does have a history in the past of needing colon resection for atonic colon.       Review of Systems  Patient Active Problem List   Diagnosis    Seizure disorder ([facility]    Adhesive capsulitis of shoulder    Seborrheic dermatitis    History of leukemia    Balance disorder    Primary osteoarthritis involving multiple joints    left side. Heart sounds: Normal heart sounds. No murmur. No friction rub. No gallop. Pulmonary:      Effort: Pulmonary effort is normal.      Breath sounds: Normal breath sounds. Abdominal:      General: Bowel sounds are normal. There is no distension. Palpations: Abdomen is soft. Abdomen is not rigid. There is no hepatomegaly, splenomegaly or mass. Tenderness: There is no abdominal tenderness. There is no right CVA tenderness, left CVA tenderness, guarding or rebound. Hernia: No hernia is present. Musculoskeletal: Normal range of motion. General: No tenderness. Right lower leg: No edema. Left lower leg: No edema. Skin:     General: Skin is warm. Findings: No rash. Neurological:      General: No focal deficit present. Mental Status: He is alert and oriented to person, place, and time. Mental status is at baseline. Sensory: Sensation is intact. Motor: Motor function is intact. Psychiatric:         Mood and Affect: Mood is anxious. Mood is not depressed. Behavior: Behavior is cooperative. Assessment:      Mariana Land was seen today for follow-up and seizures. Diagnoses and all orders for this visit:    Seizure disorder ([facility]    Colonic dysmotility    Adhesive capsulitis of shoulder, unspecified laterality    Primary osteoarthritis involving multiple joints    Other orders  -     lamoTRIgine (LAMICTAL) 25 MG tablet; TAKE 1 TABLET TWICE A DAY  -     lamoTRIgine (LAMICTAL) 150 MG tablet; TAKE 1 TABLET TWICE A DAY  -     DILANTIN 100 MG ER capsule; TAKE 1 CAPSULE IN THE      MORNING AND 2 CAPSULES AT  BEDTIME  -     tamsulosin (FLOMAX) 0.4 MG capsule; Take 1 capsule by mouth daily            Plan:      Hospital information reviewed with patient and clinically recommend that he continue with his current medications and follow-up with neurology.     For the colonic dysmotility maintain MiraLAX on a daily basis and continue with a high-fiber diet    His other orthopedic health problems are stable except for the anxiety. I recommend at this point time that he would call the life squad if he has a significant change in his health such as his most recent problem. Also at this point time he would not benefit from home care since he does have his father helped him out. If he would continue to need further assistance we did discuss group homes or nursing homes will be the next step. Right now is able to complete his ADLs    Medical decision making of moderate complexiTy. RTC 4 months        Please note that this chart was generated using Dragon dictation software. Although every effort was made to ensure the accuracy of this automated transcription, some errors in transcription may have occurred.

## 2021-05-28 ENCOUNTER — TELEPHONE (OUTPATIENT)
Dept: FAMILY MEDICINE CLINIC | Age: 46
End: 2021-05-28

## 2021-05-28 NOTE — TELEPHONE ENCOUNTER
Patient called stating that his neurosurgeon prescribed him dexamethasone but that medication may cause seizures. Patient wants to know if provider can prescribe him a different medication due to the fact that he has a seizure disorder. Patient will like for the medication to not be a blood thinner.     Please advise

## 2021-06-14 ENCOUNTER — TELEPHONE (OUTPATIENT)
Dept: FAMILY MEDICINE CLINIC | Age: 46
End: 2021-06-14

## 2021-06-14 NOTE — TELEPHONE ENCOUNTER
Katy Marrufo from [employer] [facility] [phone]     Called stating that she faxed over a plan of care order for the patient last week and wanted verify that provider received it.     Please advise

## 2021-06-17 ENCOUNTER — TELEPHONE (OUTPATIENT)
Dept: FAMILY MEDICINE CLINIC | Age: 46
End: 2021-06-17

## 2021-07-18 DIAGNOSIS — G40.909 SEIZURE DISORDER (HCC): ICD-10-CM

## 2021-07-19 RX ORDER — LEVETIRACETAM 500 MG/1
TABLET ORAL
Qty: 360 TABLET | Refills: 1 | Status: SHIPPED | OUTPATIENT
Start: 2021-07-19 | End: 2021-12-27

## 2021-07-19 NOTE — TELEPHONE ENCOUNTER
Medication:   Requested Prescriptions     Pending Prescriptions Disp Refills    levETIRAcetam (KEPPRA) 500 MG tablet [Pharmacy Med Name: LEVETIRACETM TAB 500MG] 360 tablet 1     Sig: TAKE 2 TABLETS TWICE A DAY          Patient Phone Number: [phone] (home) [phone] (work)    Last appt: 5/12/2021   Next appt: Visit date not found    Last OARRS: No flowsheet data found.   PDMP Monitoring:    Last PDMP Laura Willett as Reviewed [facility]):  Review User Review Instant Review Result   Sergei Maker 5/11/2020  5:08 AM Reviewed PDMP [1]     Preferred Pharmacy:   [facility] [phone] [facility] [phone]  [address] [place] [address] [place] [address]  Phone: [phone] Fax: [phone]    [facility] [address] [place] [address] [phone] [facility] [phone]  [address] [facility]. [address] [address]  Phone: [phone] Fax: [phone]

## 2021-07-26 ENCOUNTER — TELEPHONE (OUTPATIENT)
Dept: FAMILY MEDICINE CLINIC | Age: 46
End: 2021-07-26

## 2021-07-26 NOTE — TELEPHONE ENCOUNTER
Palma Weber from [facility] Wanted to inform Dr Reji Cleaning that the PT was discharged today from [facility] because, he met his goals and the services are no longer needed. . If any question please call [phone]

## 2021-10-04 RX ORDER — LAMOTRIGINE 25 MG/1
TABLET ORAL
Qty: 180 TABLET | Refills: 1 | Status: SHIPPED | OUTPATIENT
Start: 2021-10-04 | End: 2022-04-25

## 2021-10-04 RX ORDER — PHENYTOIN SODIUM 100 MG/1
CAPSULE, EXTENDED RELEASE ORAL
Qty: 270 CAPSULE | Refills: 1 | Status: SHIPPED | OUTPATIENT
Start: 2021-10-04 | End: 2021-11-03 | Stop reason: SDUPTHER

## 2021-10-04 RX ORDER — LAMOTRIGINE 150 MG/1
TABLET ORAL
Qty: 180 TABLET | Refills: 1 | Status: SHIPPED | OUTPATIENT
Start: 2021-10-04 | End: 2022-04-25

## 2021-10-04 NOTE — TELEPHONE ENCOUNTER
Medication:   Requested Prescriptions     Pending Prescriptions Disp Refills    lamoTRIgine (LAMICTAL) 150 MG tablet [Pharmacy Med Name: LAMOTRIGINE  TAB 150MG] 180 tablet 1     Sig: TAKE 1 TABLET TWICE A DAY    DILANTIN 100 MG ER capsule [Pharmacy Med Name: Agustina Smart CAP 100MG] 270 capsule 1     Sig: TAKE 1 CAPSULE IN THE      MORNING AND 2 CAPSULES AT  BEDTIME    lamoTRIgine (LAMICTAL) 25 MG tablet [Pharmacy Med Name: LAMOTRIGINE  TAB 25MG] 180 tablet 1     Sig: TAKE 1 TABLET TWICE A DAY      Last Filled:     Patient Phone Number: [phone] (home) [phone] (work)    Last appt: 5/12/2021   Next appt: Visit date not found    Last OARRS: No flowsheet data found.   PDMP Monitoring:    Last PDMP Marylee Liter as Reviewed [facility]):  Review User Review Instant Review Result   Dom Wood 5/11/2020  5:08 AM Reviewed PDMP [1]     Preferred Pharmacy:   [address] [place] [address] [address], [address] [phone] Donnice Frankel [phone]  [facility] [place] [address]  Phone: [phone] Fax: [phone]    [facility] [address] [place] [address] [phone] Donnice Frankel [phone] [address] [facility] [address]  Phone: [phone] Fax: [phone]

## 2021-10-14 ENCOUNTER — OFFICE VISIT (OUTPATIENT)
Dept: FAMILY MEDICINE CLINIC | Age: 46
End: 2021-10-14
Payer: MEDICARE

## 2021-10-14 VITALS
HEIGHT: 71 IN | TEMPERATURE: 97.2 F | SYSTOLIC BLOOD PRESSURE: 130 MMHG | HEART RATE: 81 BPM | OXYGEN SATURATION: 99 % | BODY MASS INDEX: 21.65 KG/M2 | DIASTOLIC BLOOD PRESSURE: 76 MMHG | WEIGHT: 154.6 LBS

## 2021-10-14 DIAGNOSIS — B35.1 ONYCHOMYCOSIS: ICD-10-CM

## 2021-10-14 DIAGNOSIS — B35.3 TINEA PEDIS OF BOTH FEET: ICD-10-CM

## 2021-10-14 DIAGNOSIS — L98.9 LESION OF SKIN OF SCALP: Primary | ICD-10-CM

## 2021-10-14 DIAGNOSIS — L98.9 SKIN LESION OF BACK: ICD-10-CM

## 2021-10-14 PROCEDURE — 99213 OFFICE O/P EST LOW 20 MIN: CPT | Performed by: FAMILY MEDICINE

## 2021-10-14 RX ORDER — TAMSULOSIN HYDROCHLORIDE 0.4 MG/1
0.4 CAPSULE ORAL DAILY
Qty: 90 CAPSULE | Refills: 1 | Status: SHIPPED | OUTPATIENT
Start: 2021-10-14 | End: 2021-12-04 | Stop reason: SDUPTHER

## 2021-10-14 NOTE — PROGRESS NOTES
Patient is here today due to some spots on his back and head. Said that the lesion on his head isn't bothersome, but the one on his back will bleed off and on. Noticed it about 2 wk's ago on his head. Has place on back that has a spot that bleeds. Toenails on feet have fungus infection. Vitals:    10/14/21 1606   BP: 130/76   Site: Left Upper Arm   Position: Sitting   Cuff Size: Medium Adult   Pulse: 81   Temp: 97.2 °F (36.2 °C)   TempSrc: Infrared   SpO2: 99%   Weight: 154 lb 9.6 oz (70.1 kg)   Height: 5' 11\" (1.803 m)     Wt Readings from Last 3 Encounters:   10/14/21 154 lb 9.6 oz (70.1 kg)   05/12/21 153 lb 4 oz (69.5 kg)   10/26/20 148 lb 2 oz (67.2 kg)     Body mass index is 21.56 kg/m². PHQ Scores 5/12/2021 5/1/2020 4/8/2019 6/14/2017   PHQ2 Score 0 0 2 1   PHQ9 Score 0 0 2 1           GEN: Alert and oriented x 4 NAD, affect appropriate and normal appearing weight, well hydrated, well developed. Wart like lesion top of head  Thick yellow nails and signs of fungus on feet        ASSESSMENT AND PLAN:       Luiz Arita was seen today for other.     Diagnoses and all orders for this visit:    Lesion of skin of scalp  Skin lesion of back  See plastics or derm to have removed    Tinea pedis of both feet  Onychomycosis  Trial topical creams  Consider f/u with podiatry if needed            Portions of Note per  Lola Valentino CMA AAMA with corrections and edits per Makeda Moore MD.  I agree with entirety of note and was present and performed history and physical.  I also confirm that the note above accurately reflects all work, treatment, procedures, and medical decision making performed by me, Makeda Moore MD

## 2021-10-14 NOTE — PATIENT INSTRUCTIONS
Plastic Surgeons    Dr. Viet Goetz   [phone]  [address], [address]    Dr. Lindo 43 and Reconstructive Surgery  [address]  [address] [facility], [address]  Phone:   [phone]       Dr. Winsome Miguel   [phone]  [address] [facility], [address]  [place], [address] [facility] and [facility]  [address] [facility], [address] [facility], [address]  Phone: [phone]    Podiatrists      Dr. Marcus Mccloud  [address] [facility] , [address]  [phone]    [facility]  Dr. Ysabel Grant  [address] [facility], [address]  [phone]      Center for [address]  [facility].  [address]   [phone]

## 2021-10-22 ENCOUNTER — TELEPHONE (OUTPATIENT)
Dept: FAMILY MEDICINE CLINIC | Age: 46
End: 2021-10-22

## 2021-10-22 NOTE — TELEPHONE ENCOUNTER
Patient states that the Lesion on his head is cancer and also wants to know if he should get the Covid Vaccine. He seen  on 10/14 about his lesion.      Please advise

## 2021-11-02 ENCOUNTER — TELEPHONE (OUTPATIENT)
Dept: FAMILY MEDICINE CLINIC | Age: 46
End: 2021-11-02

## 2021-11-02 NOTE — TELEPHONE ENCOUNTER
Patient called and states that he fell last night and feels like there is something wrong with his vision. He is in a recliner and can't get up due to fear of falling.     He also wants labs and initially stated he wanted to make an appointment then changed his mind      Please advise

## 2021-11-02 NOTE — TELEPHONE ENCOUNTER
If he is having that much issues with navigation and cannula have a chair he probably needs a call [employer] and be evaluated in the emergency room

## 2021-11-02 NOTE — TELEPHONE ENCOUNTER
----- Message from Aruna Iglesias MA sent at 11/2/2021 11:03 AM EDT -----  Subject: Message to Provider    QUESTIONS  Information for Provider? Pt is calling to let PCP know that he went to Dr Bel Hillman and he advised pt that it is not his brain it is his eye. Pt can be   reached back at the number below with any additional questions. Thank you   so much!   ---------------------------------------------------------------------------  --------------  CALL BACK INFO  What is the best way for the office to contact you? OK to leave message on   voicemail  Preferred Call Back Phone Number? [phone]  ---------------------------------------------------------------------------  --------------  SCRIPT ANSWERS  Relationship to Patient?  Self

## 2021-11-03 ENCOUNTER — OFFICE VISIT (OUTPATIENT)
Dept: FAMILY MEDICINE CLINIC | Age: 46
End: 2021-11-03
Payer: MEDICARE

## 2021-11-03 ENCOUNTER — TELEPHONE (OUTPATIENT)
Dept: FAMILY MEDICINE CLINIC | Age: 46
End: 2021-11-03

## 2021-11-03 VITALS
HEIGHT: 71 IN | WEIGHT: 155 LBS | SYSTOLIC BLOOD PRESSURE: 128 MMHG | TEMPERATURE: 97.2 F | OXYGEN SATURATION: 98 % | HEART RATE: 88 BPM | BODY MASS INDEX: 21.7 KG/M2 | DIASTOLIC BLOOD PRESSURE: 78 MMHG

## 2021-11-03 DIAGNOSIS — G40.909 SEIZURE DISORDER (HCC): ICD-10-CM

## 2021-11-03 DIAGNOSIS — Z86.2 HISTORY OF ANEMIA: ICD-10-CM

## 2021-11-03 DIAGNOSIS — R42 DIZZINESS: Primary | ICD-10-CM

## 2021-11-03 DIAGNOSIS — R42 DIZZINESS: ICD-10-CM

## 2021-11-03 LAB
ALBUMIN SERPL-MCNC: 4.7 G/DL (ref 3.5–5.7)
ALP BLD-CCNC: 101 IU/L (ref 35–135)
ALT SERPL-CCNC: 7 IU/L (ref 10–60)
ANION GAP SERPL CALCULATED.3IONS-SCNC: 7 MMOL/L (ref 6–18)
AST SERPL-CCNC: 11 IU/L (ref 10–40)
BASOPHILS ABSOLUTE: 0 %
BASOPHILS ABSOLUTE: 0 THOU/MCL (ref 0–0.2)
BILIRUB SERPL-MCNC: 0.4 MG/DL (ref 0–1.2)
BUN BLDV-MCNC: 10 MG/DL (ref 8–26)
CALCIUM SERPL-MCNC: 9.7 MG/DL (ref 8.5–10.4)
CHLORIDE BLD-SCNC: 98 MEQ/L (ref 98–111)
CO2: 32 MMOL/L (ref 21–31)
CREAT SERPL-MCNC: 0.92 MG/DL (ref 0.7–1.3)
EOSINOPHILS ABSOLUTE: 0.1 THOU/MCL (ref 0.03–0.45)
EOSINOPHILS RELATIVE PERCENT: 1 %
FERRITIN: 100 NG/ML (ref 30–400)
GFR AFRICAN AMERICAN: 113 ML/MIN/1.73 M2
GFR NON-AFRICAN AMERICAN: 98 ML/MIN/1.73 M2
GLUCOSE BLD-MCNC: 104 MG/DL (ref 70–99)
HCT VFR BLD CALC: 42.1 % (ref 40–50)
HEMOGLOBIN: 14.3 G/DL (ref 13.5–16.5)
IRON SATURATION: 29 % (ref 20–50)
IRON: 82 MCG/DL (ref 50–170)
LYMPHOCYTES ABSOLUTE: 1 THOU/MCL (ref 1–4)
LYMPHOCYTES RELATIVE PERCENT: 21 %
MCH RBC QN AUTO: 30.6 PG (ref 27–33)
MCHC RBC AUTO-ENTMCNC: 34 G/DL (ref 32–36)
MCV RBC AUTO: 90.2 FL (ref 82–97)
MONOCYTES # BLD: 11 %
MONOCYTES ABSOLUTE: 0.5 THOU/MCL (ref 0.2–0.9)
NEUTROPHILS ABSOLUTE: 3.1 THOU/MCL (ref 1.8–7.7)
PDW BLD-RTO: 13.7 % (ref 12.3–17)
PHENYTOIN LEVEL: 39.4 MCG/ML (ref 10–20)
PLATELET # BLD: 262 THOU/MCL (ref 140–375)
PMV BLD AUTO: 8 FL (ref 7.4–11.5)
POTASSIUM SERPL-SCNC: 4.1 MEQ/L (ref 3.6–5.1)
RBC # BLD: 4.67 MIL/MCL (ref 4.4–5.8)
SEG NEUTROPHILS: 67 %
SODIUM BLD-SCNC: 137 MEQ/L (ref 135–145)
TOTAL IRON BINDING CAPACITY: 285 MCG/DL (ref 250–400)
TOTAL PROTEIN: 8.3 G/DL (ref 6–8)
TSH ULTRASENSITIVE: 1.96 MCIU/ML (ref 0.27–4.2)
WBC # BLD: 4.7 THOU/MCL (ref 3.6–10.5)

## 2021-11-03 PROCEDURE — 99214 OFFICE O/P EST MOD 30 MIN: CPT | Performed by: PHYSICIAN ASSISTANT

## 2021-11-03 RX ORDER — PHENYTOIN SODIUM 100 MG/1
CAPSULE, EXTENDED RELEASE ORAL
Qty: 180 CAPSULE | Refills: 1 | Status: SHIPPED | OUTPATIENT
Start: 2021-11-03 | End: 2021-11-03 | Stop reason: SDUPTHER

## 2021-11-03 RX ORDER — MECLIZINE HYDROCHLORIDE 25 MG/1
25 TABLET ORAL 3 TIMES DAILY PRN
Qty: 45 TABLET | Refills: 2 | Status: SHIPPED | OUTPATIENT
Start: 2021-11-03 | End: 2021-11-03 | Stop reason: SDUPTHER

## 2021-11-03 RX ORDER — PHENYTOIN SODIUM 100 MG/1
CAPSULE, EXTENDED RELEASE ORAL
Qty: 180 CAPSULE | Refills: 1 | Status: SHIPPED | OUTPATIENT
Start: 2021-11-03 | End: 2022-06-03

## 2021-11-03 RX ORDER — MECLIZINE HYDROCHLORIDE 25 MG/1
25 TABLET ORAL 3 TIMES DAILY PRN
Qty: 45 TABLET | Refills: 2 | Status: SHIPPED | OUTPATIENT
Start: 2021-11-03 | End: 2021-11-04 | Stop reason: SDUPTHER

## 2021-11-03 SDOH — ECONOMIC STABILITY: FOOD INSECURITY: WITHIN THE PAST 12 MONTHS, YOU WORRIED THAT YOUR FOOD WOULD RUN OUT BEFORE YOU GOT MONEY TO BUY MORE.: NEVER TRUE

## 2021-11-03 SDOH — ECONOMIC STABILITY: FOOD INSECURITY: WITHIN THE PAST 12 MONTHS, THE FOOD YOU BOUGHT JUST DIDN'T LAST AND YOU DIDN'T HAVE MONEY TO GET MORE.: NEVER TRUE

## 2021-11-03 ASSESSMENT — ENCOUNTER SYMPTOMS
CONSTIPATION: 0
COUGH: 0
EYE PAIN: 0
SHORTNESS OF BREATH: 0
BACK PAIN: 0
CHEST TIGHTNESS: 0
TROUBLE SWALLOWING: 0
SORE THROAT: 0
ABDOMINAL PAIN: 0
VOICE CHANGE: 0
DIARRHEA: 0

## 2021-11-03 ASSESSMENT — SOCIAL DETERMINANTS OF HEALTH (SDOH): HOW HARD IS IT FOR YOU TO PAY FOR THE VERY BASICS LIKE FOOD, HOUSING, MEDICAL CARE, AND HEATING?: NOT HARD AT ALL

## 2021-11-03 NOTE — TELEPHONE ENCOUNTER
Talked to patients father about information.  He needs the Dilantin to go to the mail order - Hi [place] please

## 2021-11-03 NOTE — PROGRESS NOTES
Subjective:      Patient ID: Edu Amin is a 55 y.o. male. HPI Patient is here today to discuss dizziness for the last few days. The first night, he called his father and was really unsteady on his feet and and off balance. This happened a month ago. He called his dad and he said he couldn't see and couldn't get up out of his chair. No dizziness at that point. He couldn't get him to the car so his dad called squad. Went to ER and never got resolution on what happened. He used to see neurology but Dr. Fernanda Dias took over his seizure meds years ago. He did have one brain surgery a long time ago. He was referred to see neurosurgeon after ER visit a month ago. They didn't really give him any answers. Review of Systems   Constitutional: Negative for appetite change, fatigue and unexpected weight change. HENT: Negative for congestion, dental problem, ear pain, hearing loss, sore throat, trouble swallowing and voice change. Eyes: Negative for pain and visual disturbance. Respiratory: Negative for cough, chest tightness and shortness of breath. Cardiovascular: Negative for chest pain and palpitations. Gastrointestinal: Negative for abdominal pain, constipation and diarrhea. Genitourinary: Negative for difficulty urinating. Musculoskeletal: Negative for arthralgias, back pain, myalgias and neck pain. Skin: Negative for rash. Neurological: Positive for dizziness. Negative for speech difficulty, weakness, numbness and headaches. Hematological: Negative for adenopathy. Psychiatric/Behavioral: Negative for confusion and sleep disturbance. The patient is not nervous/anxious. Objective:   Physical Exam  Vitals reviewed. Constitutional:       Appearance: Normal appearance. He is well-developed and well-groomed. HENT:      Head: Normocephalic.       Right Ear: Tympanic membrane and ear canal normal.      Left Ear: Tympanic membrane and ear canal normal.      Nose: Nose normal. Mouth/Throat:      Pharynx: Oropharynx is clear. Uvula midline. Eyes:      Conjunctiva/sclera: Conjunctivae normal.      Pupils: Pupils are equal, round, and reactive to light. Neck:      Thyroid: No thyromegaly. Trachea: Trachea normal.   Cardiovascular:      Rate and Rhythm: Normal rate and regular rhythm. Chest Wall: PMI is not displaced. Pulses: Normal pulses. Heart sounds: Normal heart sounds. Pulmonary:      Effort: Pulmonary effort is normal.      Breath sounds: Normal breath sounds. Abdominal:      General: Bowel sounds are normal.      Palpations: Abdomen is soft. Tenderness: There is no abdominal tenderness. There is no guarding or rebound. Hernia: No hernia is present. Musculoskeletal:      Cervical back: Normal range of motion and neck supple. No muscular tenderness. Thoracic back: Normal.      Lumbar back: Normal.      Comments: Full ROM and strength of torso and extremities, gait slow using walker   Lymphadenopathy:      Cervical: No cervical adenopathy. Skin:     General: Skin is warm and dry. Findings: No rash. Neurological:      Mental Status: He is alert and oriented to person, place, and time. Cranial Nerves: No cranial nerve deficit. Sensory: Sensation is intact. No sensory deficit. Motor: Motor function is intact. Coordination: Coordination is intact. Gait: Gait is intact. Gait normal.      Comments: Using walker   Psychiatric:         Attention and Perception: Attention normal.         Mood and Affect: Mood is anxious. Speech: Speech normal.         Behavior: Behavior normal. Behavior is cooperative. Assessment:      Luiz Arita was seen today for dizziness. Diagnoses and all orders for this visit:    Dizziness  -     Comprehensive Metabolic Panel  -     TSH without Reflex; Future  -     meclizine (ANTIVERT) 25 MG tablet;  Take 1 tablet by mouth 3 times daily as needed for Dizziness    Seizure disorder ([facility]  -     PHENYTOIN LEVEL, TOTAL; Future  -     LEVETIRACETAM LEVEL; Future  -     LAMOTRIGINE LEVEL; Future    History of anemia  -     CBC Auto Differential  -     IRON AND TIBC;  Future  -     FERRITIN; Future               Plan:      Get routine labs, take meclizine TID, will refer to neurology after we get labs back and if they are normal.         RASHARD Gooden

## 2021-11-03 NOTE — TELEPHONE ENCOUNTER
Please call his father and let him know what is going on. Dilantin level was way too high. We are having him hold it for 2 days, then resume with 1 capsule twice daily. Still waiting on other labs.

## 2021-11-03 NOTE — TELEPHONE ENCOUNTER
[facility] from [facility] lab called and advised a scritical lab of Dilantin level is 39.4 OB/GYN

## 2021-11-03 NOTE — TELEPHONE ENCOUNTER
PT's father Danilo Arthur would like to speak with the Dr on what was talked about in the appt. PT forgot what was talked about father is concerned.  Please call Danilo Arthur [phone]

## 2021-11-03 NOTE — PATIENT INSTRUCTIONS
Davidosiel Oliva was seen today for dizziness. Diagnoses and all orders for this visit:    Dizziness  -     Comprehensive Metabolic Panel  -     TSH without Reflex; Future  -     meclizine (ANTIVERT) 25 MG tablet; Take 1 tablet by mouth 3 times daily as needed for Dizziness    Seizure disorder ([facility])  -     PHENYTOIN LEVEL, TOTAL; Future  -     LEVETIRACETAM LEVEL; Future  -     LAMOTRIGINE LEVEL; Future    History of anemia  -     CBC Auto Differential  -     IRON AND TIBC; Future  -     FERRITIN; Future       Take medicine 3 times daily, get labs done, call next week with how symptoms are going.

## 2021-11-04 ENCOUNTER — TELEPHONE (OUTPATIENT)
Dept: FAMILY MEDICINE CLINIC | Age: 46
End: 2021-11-04

## 2021-11-04 DIAGNOSIS — R42 DIZZINESS: ICD-10-CM

## 2021-11-04 RX ORDER — MECLIZINE HYDROCHLORIDE 25 MG/1
25 TABLET ORAL 3 TIMES DAILY PRN
Qty: 45 TABLET | Refills: 2 | Status: SHIPPED | OUTPATIENT
Start: 2021-11-04 | End: 2021-12-21 | Stop reason: SDUPTHER

## 2021-11-04 NOTE — TELEPHONE ENCOUNTER
Evelia is requesting a refill on the meclizine (ANTIVERT) 25 MG tablet to please be called [facility] [address], [address]

## 2021-11-05 LAB
KEPPRA: 48 UG/ML (ref 12–46)
LAMOTRIGINE LEVEL: 14.2 UG/ML (ref 2.5–15)

## 2021-11-08 ENCOUNTER — TELEPHONE (OUTPATIENT)
Dept: FAMILY MEDICINE CLINIC | Age: 46
End: 2021-11-08

## 2021-11-08 DIAGNOSIS — G40.909 SEIZURE DISORDER (HCC): Primary | ICD-10-CM

## 2021-11-08 NOTE — TELEPHONE ENCOUNTER
----- Message from RASHARD Munson sent at 11/7/2021  5:34 PM EST -----  Keppra level elevated as well, decrease that to 1 tab in AM, 2 in PM, repeat keppra level in 3 weeks

## 2021-11-12 NOTE — PROGRESS NOTES
Name_______________________________________Printed:____________________  Date and time of surgery__11/17/21__Masc___0715_________________Arrival Time:___0615_____________   1. The instructions given regarding when and if a patient needs to stop oral intake prior to surgery varies. Follow the specific instructions you were given                  _X__Nothing to eat or to drink after Midnight the night before.                   ____Carbo loading or ERAS instructions will be given to select patients-if you have been given those instructions -please do the following                           The evening before your surgery after dinner before midnight drink 40 ounces of gatorade. If you are diabetic use sugar free. The morning of surgery drink 40 ounces of water. This needs to be finished 3 hours prior to your surgery start time. 2. Take the following pills with a small sip of water on the morning of surgery___Dilantin, Lamictal, Keppra ________________________________________________                  Do not take blood pressure medications ending in pril or sartan the sharee prior to surgery or the morning of surgery_   3. Aspirin, Ibuprofen, Advil, Naproxen, Vitamin E and other Anti-inflammatory products and supplements should be stopped for 5 -7days before surgery or as directed by your physician. 4. Check with your Doctor regarding stopping Plavix, Coumadin,Eliquis, Lovenox,Effient,Pradaxa,Xarelto, Fragmin or other blood thinners and follow their instructions. 5. Do not smoke, and do not drink any alcoholic beverages 24 hours prior to surgery. This includes NA Beer. Refrain from the usage of any recreational drugs. 6. You may brush your teeth and gargle the morning of surgery. DO NOT SWALLOW WATER   7. You MUST make arrangements for a responsible adult to stay on site while you are here and take you home after your surgery. You will not be allowed to leave alone or drive yourself home.   It is strongly suggested someone stay with you the first 24 hrs. Your surgery will be cancelled if you do not have a ride home. 8. A parent/legal guardian must accompany a child scheduled for surgery and plan to stay at the hospital until the child is discharged. Please do not bring other children with you. 9. Please wear simple, loose fitting clothing to the hospital.  Marigene Pel not bring valuables (money, credit cards, checkbooks, etc.) Do not wear any makeup (including no eye makeup) or nail polish on your fingers or toes. 10. DO NOT wear any jewelry or piercings on day of surgery. All body piercing jewelry must be removed. 11. If you have ___dentures, they will be removed before going to the OR; we will provide you a container. If you wear ___contact lenses or ___glasses, they will be removed; please bring a case for them. 12. Please see your family doctor/pediatrician for a history & physical and/or concerning medications. Bring any test results/reports from your physician's office. PCP______surgeon____________Phone___________H&P Appt. Date________             13 If you  have a Living Will and Durable Power of  for Healthcare, please bring in a copy. 15. Notify your Surgeon if you develop any illness between now and surgery  time, cough, cold, fever, sore throat, nausea, vomiting, etc.  Please notify your surgeon if you experience dizziness, shortness of breath or blurred vision between now & the time of your surgery             15. DO NOT shave your operative site 96 hours prior to surgery. For face & neck surgery, men may use an electric razor 48 hours prior to surgery. 16. Shower the night before or morning of surgery using an antibacterial soap or as you have been instructed. 17. To provide excellent care visitors will be limited to one in the room at any given time. 18.  Please bring picture ID and insurance card.              19.  Visit our web site for additional information:  [email or web address]              20.During flu season no children under the age of 15 are permitted in the hospital for the safety of all patients. 21. If you take a long acting insulin in the evening only  take half of your usual  dose the night  before your procedure              22. If you use a c-pap please bring DOS if staying overnight,             23.For your convenience [facility] has a pharmacy on site to fill your prescriptions. 24. If you use oxygen and have a portable tank please bring it  with you the DOS             25. Bring a complete list of all your medications with name and dose include any supplements. 26. Other__________________________________________   *Please call pre admission testing if you any further questions   [address]    [address]  [phone]   [facility] ([address] JANNY HEWITT   [phone]           COVID TESTING    __X_ Covid test to be done 3-5 days prior to scheduled surgery -patient aware they are REQUIRED to bring a copy of the negative result DOS-if they receive a positive result to notify their surgeon         If known - indicate where patient is getting covid test done __________11/12/21__[facility]_______________________________________________    ___ Rapid - DOS    ___ Other___________________________________      Nancy Tinoco POLICY(subject to change)    There is a one visitor policy at [facility] for all surgeries and endoscopies. Whether the visitor can stay or will be asked to wait in the car will depend on the current policy and if social distancing can be maintained. The policy is subject to change at any time. Please make sure the visitor has a cell phone that is on,charged and able to accept calls, as this may be the way that the staff communicates with them. Pain management is NO VISITOR policyThe patients ride is expected to remain in the car with a cell phone for communication. If the ride is leaving the hospital grounds please make sure they are back in time for pickup. Have the patient inform the staff on arrival what their rides plans are while the patient is in the facility. At the MAIN there is one visitor allowed. Please note that the visitor policy is subject to change. All above information reviewed with patient in person or by phone. Patient verbalizes understanding. All questions and concerns addressed.                                                                                                  Patient/Rep____________________                                                                                                                                    PRE OP INSTRUCTIONS

## 2021-11-15 NOTE — H&P
[facility]                     [facility], [address]                       PREOPERATIVE HISTORY AND PHYSICAL    PATIENT NAME: Brad Amor                    :        1975  MED REC NO:   [id number]                          ROOM:  ACCOUNT NO:   [de-identified]                           ADMIT DATE: 2021  PROVIDER:     Kemi Guillermo MD    DIAGNOSIS:  Squamous cell carcinoma of the left scalp. PLANNED PROCEDURE:  Wide excision 3 cm squamous cell carcinoma of the  left scalp, frozen section and repair using a full-thickness skin graft. INDICATION:  A 55-year-old male presents with an obvious squamous cell  carcinoma of the scalp. After discussing further options, it was  elected to proceed with definitive wide excision, frozen section and  repair using a full-thickness skin graft. The issue of bleeding,  infection, wound dehiscence, scarring as well as the issue of recurrence  and total or partial loss of graft itself was discussed and consent was  obtained. PAST MEDICAL HISTORY:  Significant for some kind of blood disease. ALLERGIES:  Allergic to ANTICOAGULATION. MEDICATIONS:  Currently he is on Valisone and Dilantin. SOCIAL HISTORY:  He is a nonsmoker. PHYSICAL EXAMINATION:  GENERAL:  Examination reveals an alert male in no apparent distress. VITAL SIGNS:  Stable vital signs. LUNGS:  Clear. HEART:  Regular rate and rhythm. Examination again reveals a 3 cm ulcerated lesion in the left scalp. IMPRESSION AND PLAN:  The patient is to undergo definitive excision.         Ave Coleman MD    D: 11/15/2021 11:39:33       T: 11/15/2021 14:10:44     [id number]  Job#: [id number]     Doc#: [id number]    CC:

## 2021-11-17 ENCOUNTER — HOSPITAL ENCOUNTER (OUTPATIENT)
Age: 46
Setting detail: OUTPATIENT SURGERY
Discharge: HOME | End: 2021-11-17
Attending: PLASTIC SURGERY | Admitting: PLASTIC SURGERY
Payer: MEDICARE

## 2021-11-17 ENCOUNTER — ANESTHESIA EVENT (OUTPATIENT)
Dept: OPERATING ROOM | Age: 46
End: 2021-11-17
Payer: MEDICARE

## 2021-11-17 ENCOUNTER — TELEPHONE (OUTPATIENT)
Dept: FAMILY MEDICINE CLINIC | Age: 46
End: 2021-11-17

## 2021-11-17 ENCOUNTER — ANESTHESIA (OUTPATIENT)
Dept: OPERATING ROOM | Age: 46
End: 2021-11-17
Payer: MEDICARE

## 2021-11-17 VITALS
HEIGHT: 73 IN | DIASTOLIC BLOOD PRESSURE: 84 MMHG | OXYGEN SATURATION: 98 % | WEIGHT: 150 LBS | SYSTOLIC BLOOD PRESSURE: 140 MMHG | RESPIRATION RATE: 18 BRPM | BODY MASS INDEX: 19.88 KG/M2 | HEART RATE: 77 BPM | TEMPERATURE: 97.9 F

## 2021-11-17 VITALS
OXYGEN SATURATION: 100 % | SYSTOLIC BLOOD PRESSURE: 109 MMHG | RESPIRATION RATE: 16 BRPM | DIASTOLIC BLOOD PRESSURE: 65 MMHG

## 2021-11-17 DIAGNOSIS — C44.42 SQUAMOUS CELL CARCINOMA, SCALP/NECK: Primary | ICD-10-CM

## 2021-11-17 PROCEDURE — 2709999900 HC NON-CHARGEABLE SUPPLY: Performed by: PLASTIC SURGERY

## 2021-11-17 PROCEDURE — 3700000001 HC ADD 15 MINUTES (ANESTHESIA): Performed by: PLASTIC SURGERY

## 2021-11-17 PROCEDURE — 2580000003 HC RX 258: Performed by: PLASTIC SURGERY

## 2021-11-17 PROCEDURE — 7100000011 HC PHASE II RECOVERY - ADDTL 15 MIN: Performed by: PLASTIC SURGERY

## 2021-11-17 PROCEDURE — 7100000001 HC PACU RECOVERY - ADDTL 15 MIN: Performed by: PLASTIC SURGERY

## 2021-11-17 PROCEDURE — 7100000000 HC PACU RECOVERY - FIRST 15 MIN: Performed by: PLASTIC SURGERY

## 2021-11-17 PROCEDURE — 7100000010 HC PHASE II RECOVERY - FIRST 15 MIN: Performed by: PLASTIC SURGERY

## 2021-11-17 PROCEDURE — 2500000003 HC RX 250 WO HCPCS: Performed by: PLASTIC SURGERY

## 2021-11-17 PROCEDURE — 88331 PATH CONSLTJ SURG 1 BLK 1SPC: CPT

## 2021-11-17 PROCEDURE — 6360000002 HC RX W HCPCS: Performed by: NURSE ANESTHETIST, CERTIFIED REGISTERED

## 2021-11-17 PROCEDURE — 3700000000 HC ANESTHESIA ATTENDED CARE: Performed by: PLASTIC SURGERY

## 2021-11-17 PROCEDURE — 2500000003 HC RX 250 WO HCPCS: Performed by: NURSE ANESTHETIST, CERTIFIED REGISTERED

## 2021-11-17 PROCEDURE — 6360000002 HC RX W HCPCS: Performed by: PLASTIC SURGERY

## 2021-11-17 PROCEDURE — 88305 TISSUE EXAM BY PATHOLOGIST: CPT

## 2021-11-17 PROCEDURE — 3600000012 HC SURGERY LEVEL 2 ADDTL 15MIN: Performed by: PLASTIC SURGERY

## 2021-11-17 PROCEDURE — 3600000002 HC SURGERY LEVEL 2 BASE: Performed by: PLASTIC SURGERY

## 2021-11-17 PROCEDURE — 6370000000 HC RX 637 (ALT 250 FOR IP): Performed by: PLASTIC SURGERY

## 2021-11-17 PROCEDURE — 88332 PATH CONSLTJ SURG EA ADD BLK: CPT

## 2021-11-17 RX ORDER — PROMETHAZINE HYDROCHLORIDE 25 MG/ML
6.25 INJECTION, SOLUTION INTRAMUSCULAR; INTRAVENOUS PRN
Status: DISCONTINUED | OUTPATIENT
Start: 2021-11-17 | End: 2021-11-17 | Stop reason: HOSPADM

## 2021-11-17 RX ORDER — DIPHENHYDRAMINE HYDROCHLORIDE 50 MG/ML
12.5 INJECTION INTRAMUSCULAR; INTRAVENOUS
Status: DISCONTINUED | OUTPATIENT
Start: 2021-11-17 | End: 2021-11-17 | Stop reason: HOSPADM

## 2021-11-17 RX ORDER — LABETALOL HYDROCHLORIDE 5 MG/ML
5 INJECTION, SOLUTION INTRAVENOUS EVERY 10 MIN PRN
Status: DISCONTINUED | OUTPATIENT
Start: 2021-11-17 | End: 2021-11-17 | Stop reason: HOSPADM

## 2021-11-17 RX ORDER — PROPOFOL 10 MG/ML
INJECTION, EMULSION INTRAVENOUS PRN
Status: DISCONTINUED | OUTPATIENT
Start: 2021-11-17 | End: 2021-11-17 | Stop reason: SDUPTHER

## 2021-11-17 RX ORDER — MIDAZOLAM HYDROCHLORIDE 1 MG/ML
INJECTION INTRAMUSCULAR; INTRAVENOUS PRN
Status: DISCONTINUED | OUTPATIENT
Start: 2021-11-17 | End: 2021-11-17 | Stop reason: SDUPTHER

## 2021-11-17 RX ORDER — LIDOCAINE HYDROCHLORIDE 10 MG/ML
0.5 INJECTION, SOLUTION EPIDURAL; INFILTRATION; INTRACAUDAL; PERINEURAL ONCE
Status: DISCONTINUED | OUTPATIENT
Start: 2021-11-17 | End: 2021-11-17 | Stop reason: HOSPADM

## 2021-11-17 RX ORDER — OXYCODONE HYDROCHLORIDE 5 MG/1
5 TABLET ORAL PRN
Status: DISCONTINUED | OUTPATIENT
Start: 2021-11-17 | End: 2021-11-17 | Stop reason: HOSPADM

## 2021-11-17 RX ORDER — MEPERIDINE HYDROCHLORIDE 25 MG/ML
12.5 INJECTION INTRAMUSCULAR; INTRAVENOUS; SUBCUTANEOUS EVERY 5 MIN PRN
Status: DISCONTINUED | OUTPATIENT
Start: 2021-11-17 | End: 2021-11-17 | Stop reason: HOSPADM

## 2021-11-17 RX ORDER — FENTANYL CITRATE 50 UG/ML
50 INJECTION, SOLUTION INTRAMUSCULAR; INTRAVENOUS EVERY 5 MIN PRN
Status: DISCONTINUED | OUTPATIENT
Start: 2021-11-17 | End: 2021-11-17 | Stop reason: HOSPADM

## 2021-11-17 RX ORDER — LIDOCAINE HYDROCHLORIDE AND EPINEPHRINE 10; 10 MG/ML; UG/ML
INJECTION, SOLUTION INFILTRATION; PERINEURAL
Status: COMPLETED | OUTPATIENT
Start: 2021-11-17 | End: 2021-11-17

## 2021-11-17 RX ORDER — HYDROMORPHONE HCL 110MG/55ML
0.25 PATIENT CONTROLLED ANALGESIA SYRINGE INTRAVENOUS EVERY 5 MIN PRN
Status: DISCONTINUED | OUTPATIENT
Start: 2021-11-17 | End: 2021-11-17 | Stop reason: HOSPADM

## 2021-11-17 RX ORDER — SODIUM CHLORIDE, SODIUM LACTATE, POTASSIUM CHLORIDE, CALCIUM CHLORIDE 600; 310; 30; 20 MG/100ML; MG/100ML; MG/100ML; MG/100ML
INJECTION, SOLUTION INTRAVENOUS CONTINUOUS
Status: DISCONTINUED | OUTPATIENT
Start: 2021-11-17 | End: 2021-11-17 | Stop reason: HOSPADM

## 2021-11-17 RX ORDER — LIDOCAINE HYDROCHLORIDE 20 MG/ML
INJECTION, SOLUTION EPIDURAL; INFILTRATION; INTRACAUDAL; PERINEURAL PRN
Status: DISCONTINUED | OUTPATIENT
Start: 2021-11-17 | End: 2021-11-17 | Stop reason: SDUPTHER

## 2021-11-17 RX ORDER — HYDROMORPHONE HCL 110MG/55ML
0.5 PATIENT CONTROLLED ANALGESIA SYRINGE INTRAVENOUS EVERY 5 MIN PRN
Status: DISCONTINUED | OUTPATIENT
Start: 2021-11-17 | End: 2021-11-17 | Stop reason: HOSPADM

## 2021-11-17 RX ORDER — CEPHALEXIN 500 MG/1
500 CAPSULE ORAL 4 TIMES DAILY
Qty: 28 CAPSULE | Refills: 0 | Status: SHIPPED | OUTPATIENT
Start: 2021-11-17 | End: 2021-11-24

## 2021-11-17 RX ORDER — FENTANYL CITRATE 50 UG/ML
INJECTION, SOLUTION INTRAMUSCULAR; INTRAVENOUS PRN
Status: DISCONTINUED | OUTPATIENT
Start: 2021-11-17 | End: 2021-11-17 | Stop reason: SDUPTHER

## 2021-11-17 RX ORDER — ONDANSETRON 2 MG/ML
INJECTION INTRAMUSCULAR; INTRAVENOUS PRN
Status: DISCONTINUED | OUTPATIENT
Start: 2021-11-17 | End: 2021-11-17 | Stop reason: SDUPTHER

## 2021-11-17 RX ORDER — HYDROCODONE BITARTRATE AND ACETAMINOPHEN 5; 325 MG/1; MG/1
1 TABLET ORAL EVERY 4 HOURS PRN
Qty: 20 TABLET | Refills: 0 | Status: SHIPPED | OUTPATIENT
Start: 2021-11-17 | End: 2021-11-24

## 2021-11-17 RX ORDER — MINERAL OIL 471.99 G/472ML
OIL TOPICAL
Status: COMPLETED | OUTPATIENT
Start: 2021-11-17 | End: 2021-11-17

## 2021-11-17 RX ORDER — OXYCODONE HYDROCHLORIDE 5 MG/1
10 TABLET ORAL PRN
Status: DISCONTINUED | OUTPATIENT
Start: 2021-11-17 | End: 2021-11-17 | Stop reason: HOSPADM

## 2021-11-17 RX ORDER — DEXAMETHASONE SODIUM PHOSPHATE 4 MG/ML
INJECTION, SOLUTION INTRA-ARTICULAR; INTRALESIONAL; INTRAMUSCULAR; INTRAVENOUS; SOFT TISSUE PRN
Status: DISCONTINUED | OUTPATIENT
Start: 2021-11-17 | End: 2021-11-17 | Stop reason: SDUPTHER

## 2021-11-17 RX ADMIN — ONDANSETRON 4 MG: 2 INJECTION INTRAMUSCULAR; INTRAVENOUS at 08:05

## 2021-11-17 RX ADMIN — FENTANYL CITRATE 25 MCG: 50 INJECTION, SOLUTION INTRAMUSCULAR; INTRAVENOUS at 07:42

## 2021-11-17 RX ADMIN — DEXAMETHASONE SODIUM PHOSPHATE 8 MG: 4 INJECTION, SOLUTION INTRAMUSCULAR; INTRAVENOUS at 08:05

## 2021-11-17 RX ADMIN — SODIUM CHLORIDE, POTASSIUM CHLORIDE, SODIUM LACTATE AND CALCIUM CHLORIDE: 600; 310; 30; 20 INJECTION, SOLUTION INTRAVENOUS at 07:30

## 2021-11-17 RX ADMIN — PROPOFOL 50 MCG/KG/MIN: 10 INJECTION, EMULSION INTRAVENOUS at 07:46

## 2021-11-17 RX ADMIN — FENTANYL CITRATE 25 MCG: 50 INJECTION, SOLUTION INTRAMUSCULAR; INTRAVENOUS at 08:18

## 2021-11-17 RX ADMIN — CEFAZOLIN 2000 MG: 10 INJECTION, POWDER, FOR SOLUTION INTRAVENOUS at 07:32

## 2021-11-17 RX ADMIN — LIDOCAINE HYDROCHLORIDE 50 MG: 20 INJECTION, SOLUTION EPIDURAL; INFILTRATION; INTRACAUDAL; PERINEURAL at 07:46

## 2021-11-17 RX ADMIN — MIDAZOLAM 1 MG: 1 INJECTION INTRAMUSCULAR; INTRAVENOUS at 07:42

## 2021-11-17 RX ADMIN — PROPOFOL 30 MG: 10 INJECTION, EMULSION INTRAVENOUS at 08:15

## 2021-11-17 RX ADMIN — MIDAZOLAM 1 MG: 1 INJECTION INTRAMUSCULAR; INTRAVENOUS at 07:38

## 2021-11-17 RX ADMIN — PROPOFOL 30 MG: 10 INJECTION, EMULSION INTRAVENOUS at 08:02

## 2021-11-17 RX ADMIN — PROPOFOL 50 MG: 10 INJECTION, EMULSION INTRAVENOUS at 07:46

## 2021-11-17 ASSESSMENT — PAIN - FUNCTIONAL ASSESSMENT
PAIN_FUNCTIONAL_ASSESSMENT: 0-10
PAIN_FUNCTIONAL_ASSESSMENT: PREVENTS OR INTERFERES SOME ACTIVE ACTIVITIES AND ADLS

## 2021-11-17 ASSESSMENT — PULMONARY FUNCTION TESTS
PIF_VALUE: 0
PIF_VALUE: 1
PIF_VALUE: 1
PIF_VALUE: 0
PIF_VALUE: 1
PIF_VALUE: 0
PIF_VALUE: 1
PIF_VALUE: 1
PIF_VALUE: 0
PIF_VALUE: 1
PIF_VALUE: 0
PIF_VALUE: 1
PIF_VALUE: 0
PIF_VALUE: 0
PIF_VALUE: 1
PIF_VALUE: 0
PIF_VALUE: 0
PIF_VALUE: 1
PIF_VALUE: 0
PIF_VALUE: 0
PIF_VALUE: 1
PIF_VALUE: 0
PIF_VALUE: 0
PIF_VALUE: 1
PIF_VALUE: 0
PIF_VALUE: 1
PIF_VALUE: 1
PIF_VALUE: 0
PIF_VALUE: 1
PIF_VALUE: 0
PIF_VALUE: 1

## 2021-11-17 ASSESSMENT — PAIN DESCRIPTION - DESCRIPTORS: DESCRIPTORS: ACHING

## 2021-11-17 NOTE — PROGRESS NOTES
Phase 2 - awake,room air,denies pain ,consuming po intake tolerating well. Teaching/ education completed for home care including pain management, wound care and infection control. activity,safety precautions Patient verbalized understanding.

## 2021-11-17 NOTE — PROGRESS NOTES
Discharge instructions reviewed with patient and father All home medications have been reviewed, questions answered and patient verbalized understanding. Discharge instructions signed and copies given. Patient discharged per w/c with belongings.

## 2021-11-17 NOTE — ANESTHESIA POSTPROCEDURE EVALUATION
Department of Anesthesiology  Postprocedure Note    Patient: Emma Ledesma  MRN: [id number]  YOB: 1975  Date of evaluation: 11/17/2021  Time:  10:04 AM     Procedure Summary     Date: 11/17/21 Room / Location: [address]    Anesthesia Start: 0740 Anesthesia Stop: 0836    Procedures:       EXCISION SQUAMOUS CELL CARCINOMA LEFT SCALP; FROZEN SECTION (Left Head)      FULL THICKNESS SKIN GRAFT (Left ) Diagnosis: (C44.42  SQUAMOUS CELL CARCINOMA LEFT SCALP)    Surgeons: Ashutosh Castano MD Responsible Provider: Malik Lyles MD    Anesthesia Type: MAC ASA Status: 2          Anesthesia Type: MAC    Rey Phase I: Rey Score: 10    Rey Phase II: Rey Score: 10    Last vitals: Reviewed and per EMR flowsheets.        Anesthesia Post Evaluation    Patient location during evaluation: PACU  Level of consciousness: awake  Complications: no  Multimodal analgesia pain management approach

## 2021-11-17 NOTE — TELEPHONE ENCOUNTER
Patients father is calling Thierry Ventura back for his son Amando's lab results please call Omid Grigsby back at [phone]

## 2021-11-17 NOTE — ANESTHESIA PRE PROCEDURE
Department of Anesthesiology  Preprocedure Note       Name:  Gavin Levine   Age:  55 y.o.  :  1975                                          MRN:  [id number]         Date:  2021      Surgeon: Pam Ferrer): Rosa Allen MD    Procedure: Procedure(s):  EXCISION SQUAMOUS CELL CARCINOMA LEFT SCALP; FROZEN SECTION  FULL THICKNESS SKIN GRAFT    Medications prior to admission:   Prior to Admission medications    Medication Sig Start Date End Date Taking? Authorizing Provider   meclizine (ANTIVERT) 25 MG tablet Take 1 tablet by mouth 3 times daily as needed for Dizziness 21 Yes Carolann Carvalho MD   DILANTIN 100 MG ER capsule Take 1 cap in am and 1 in pm 11/3/21  Yes Carolann Carvalho MD   tamsulosin [facility]) 0.4 MG capsule Take 1 capsule by mouth daily 10/14/21  Yes Michelle Locke MD   lamoTRIgine (LAMICTAL) 150 MG tablet TAKE 1 TABLET TWICE A DAY 10/4/21  Yes Carolann Carvalho MD   levETIRAcetam (KEPPRA) 500 MG tablet TAKE 2 TABLETS TWICE A DAY  Patient taking differently: 1 TABLET IN AM & TAKE 2 TABLETS HS TWICE A DAY 21  Yes Carolann Carvalho MD   polyethylene glycol (GLYCOLAX) 17 g packet Take 17 g by mouth 2 times daily  Patient taking differently: Take 17 g by mouth 3x weekly 20  Yes Carolann Carvalho MD   betamethasone valerate (VALISONE) 0.1 % cream Apply topically 1 times daily.  20  Yes Carolann Carvalho MD   acetaminophen (TYLENOL) 500 MG tablet Take 2 tablets by mouth 3 times daily as needed for Pain 20  Yes Carolann Carvalho MD   lamoTRIgine (LAMICTAL) 25 MG tablet TAKE 1 TABLET TWICE A DAY 10/4/21   Carolann Carvalho MD       Current medications:    Current Facility-Administered Medications   Medication Dose Route Frequency Provider Last Rate Last Admin    lidocaine PF 1 % injection 0.5 mL  0.5 mL IntraDERmal Once Rosa Allen MD        lactated ringers infusion   IntraVENous Continuous Edgar Plata MD        ceFAZolin (ANCEF) 2000 mg in dextrose 5 % 50 mL IVPB  2,000 mg IntraVENous On Call to [facility] Amy Lange MD        meperidine (DEMEROL) injection 12.5 mg  12.5 mg IntraVENous Q5 Min PRN Sarina Sanon MD        HYDROmorphone (DILAUDID) injection 0.25 mg  0.25 mg IntraVENous Q5 Min PRN Sarina Sanon MD        fentaNYL (SUBLIMAZE) injection 50 mcg  50 mcg IntraVENous Q5 Min PRN Sarina Sanon MD        HYDROmorphone (DILAUDID) injection 0.25 mg  0.25 mg IntraVENous Q5 Min PRN Sarina Sanon MD        HYDROmorphone (DILAUDID) injection 0.5 mg  0.5 mg IntraVENous Q5 Min PRN Sarina Sanon MD        oxyCODONE (ROXICODONE) immediate release tablet 5 mg  5 mg Oral PRN Sarina Sanon MD        Or    oxyCODONE (ROXICODONE) immediate release tablet 10 mg  10 mg Oral PRN Sarina Sanon MD        [facility]) injection 6.25 mg  6.25 mg IntraVENous PRN Sarina Sanon MD        diphenhydrAMINE (BENADRYL) injection 12.5 mg  12.5 mg IntraVENous Once PRN Sarina Sanon MD        labetalol (NORMODYNE;TRANDATE) injection 5 mg  5 mg IntraVENous Q10 Min PRN Sarina Sanon MD           Allergies:     Allergies   Allergen Reactions    Aspirin      H/O leukemia aand was told not take ASA       Problem List:    Patient Active Problem List   Diagnosis Code    Seizure disorder ([facility] [address] G40.909    Adhesive capsulitis of shoulder M75.00    Seborrheic dermatitis L21.9    History of leukemia Z85.6    Balance disorder R26.89    Primary osteoarthritis involving multiple joints M89.49    Colitis K52.9    Colonic dysmotility K59.9       Past Medical History:        Diagnosis Date    Adhesive capsulitis of shoulder     Arthritis     Complex partial seizures with impaired consciousness at onset [facility])     Leukemia in remission ([facility] [address]     Seborrheic dermatitis, unspecified     Seizures ([facility] [address]     Last seizure 3/2018       Past Surgical History:        Procedure Laterality Date    COLONOSCOPY N/A 5/13/2020    COLONOSCOPY FLEXIBLE W/ DECOMPRESSION performed by Sridhar Orellana MD at [address]  5/13/2020    COLONOSCOPY WITH BIOPSY performed by Sridhar Orellana MD at [address]  5/18/2020    COLONOSCOPY DIAGNOSTIC performed by Marbin Pritchard MD at [address] [facility] COLONOSCOPY N/A 6/5/2020    COLONOSCOPY CONTROL HEMORRHAGE performed by Nicky Galloway MD at [address] N/A 5/27/2020    DIAGNOSTIC LAPAROSCOPY,REMOVAL OF FECAL IMPACTION, SIGMOID COLON RESECTION performed by Surjit Polo MD at [address] ARTHROSCOPY Right 04/27/2018    RIGHT SHOULDER ARTHROSCOPY SUBACROMIAL DECOMPRESSION, OPEN    SIGMOIDOSCOPY N/A 5/21/2020    SIGMOIDOSCOPY DIAGNOSTIC FLEXIBLE performed by Marbin Pritchard MD at [facility] N/A 5/18/2020    EGD BIOPSY performed by Marbin Pritchard MD at [address] History:    Social History     Tobacco Use    Smoking status: Never Smoker    Smokeless tobacco: Never Used   Substance Use Topics    Alcohol use: Never                                Counseling given: Not Answered      Vital Signs (Current):   Vitals:    11/12/21 1108   Weight: 150 lb (68 kg)   Height: 6' 1\" (1.854 m)                                              BP Readings from Last 3 Encounters:   11/03/21 128/78   10/14/21 130/76   05/12/21 124/80       NPO Status:                                                                                 BMI:   Wt Readings from Last 3 Encounters:   11/12/21 150 lb (68 kg)   11/03/21 155 lb (70.3 kg)   10/14/21 154 lb 9.6 oz (70.1 kg)     Body mass index is 19.79 kg/m².     CBC:   Lab Results   Component Value Date    WBC 4.7 11/03/2021    RBC 4.67 11/03/2021    HGB 14.3 11/03/2021    HCT 42.1 11/03/2021    MCV 90.2 11/03/2021    RDW 13.7 11/03/2021     11/03/2021       CMP:   Lab Results   Component Value Date     11/03/2021    K 4.1 11/03/2021    K 4.2

## 2021-11-17 NOTE — OP NOTE
[facility]                     350 [facility], [address] [place] [address]                                OPERATIVE REPORT    PATIENT NAME: Magdalena Barber                    :        1975  MED REC NO:   [id number]                          ROOM:  ACCOUNT NO:   [de-identified]                           ADMIT DATE: 2021  PROVIDER:     Florence Pizano MD    DATE OF PROCEDURE:  2021    PREOPERATIVE DIAGNOSIS:  Basal cell carcinoma of the scalp. POSTOPERATIVE DIAGNOSIS:  Basal cell carcinoma of the scalp. OPERATION PERFORMED:  Excision 2 x 2 cm basal cell carcinoma of the  scalp, frozen section and repair with a full-thickness skin graft. SURGEON:  Florence Pizano MD    ANESTHESIA:  Local 1% lidocaine with epinephrine and IV sedation. BLOOD LOSS:  Minimal.    INDICATIONS:  A 51-year-old male who presented with obvious basal cell  carcinoma, possible squamous cell carcinoma of the scalp. After  discussing options at length, elected to proceed with definitive  excision, frozen section repair using a full-thickness skin graft. The  issue of bleeding, infection, wound dehiscence, and scarring as well as  the issue of recurrence and total or partial loss of graft itself was  discussed and consent was obtained. In fact, detailed consent form can  be found in the chart. OPERATIVE PROCEDURE:  The patient was taken to the operating room on  , placed in supine position at which time IV sedation was provided. The scalp as well as the left groin area was sterilely prepped and  draped in usual fashion using Betadine scrub solution. I also  infiltrated using 1% lidocaine with epinephrine 1:100,000. The lesion  was then excised with 2 mm margin. Frozen section confirmed diagnosis  with clear margins. A full-thickness skin graft was then obtained from  the left groin region.   After hemostasis, donor site was closed with 3-0  Vicryl, subcutaneous, subcuticular, applied some Steri-Strips, Telfa,  and Tegaderm dressing. Skin graft was appropriately defatted, placed on the recipient site  using 4-0 Vicryl. A bolster dressing made of Xeroform and cotton balls  soaked in mineral oil was placed secured with a 4-0 Vicryl. The patient  was subsequently taken to the recovery room in satisfactory condition. No complications were noted. At the end of the procedure, sponge,  needle and instrument counts were entirely correct. INSTRUCTIONS:  Keep the head elevated at all times. Follow up in one  week. I did give him a script for Norco as well as Keflex.         Everlean Libman, MD    D: 11/17/2021 8:40:45       T: 11/17/2021 10:35:49     [id number]  Job#: [id number]     Doc#: [id number]    CC:

## 2021-11-17 NOTE — PROGRESS NOTES
Received from [address] [place] [address] air,scalp and left groin dressings dry and intact,semi fowlers,scds,vss.

## 2021-11-17 NOTE — H&P
I have reviewed the history and physical and examined the patient and find no relevant changes. I have reviewed with the patient and/or family the risks, benefits, and alternatives to the procedure. Aspirin    Last recorded vitals:  /84   Pulse 82   Temp 98.1 °F (36.7 °C) (Temporal)   Resp 16   Ht 6' 1\" (1.854 m)   Wt 150 lb (68 kg)   SpO2 100%   BMI 19.79 kg/m²     Past Surgical History:   Procedure Laterality Date    COLONOSCOPY N/A 5/13/2020    COLONOSCOPY FLEXIBLE W/ DECOMPRESSION performed by Raudel Mathur MD at [address] [place] [address]  5/13/2020    COLONOSCOPY WITH BIOPSY performed by Raudel Mathur MD at [address] [place] [address]  5/18/2020    COLONOSCOPY DIAGNOSTIC performed by Sarah Oconnor MD at [address] [place] [address] COLONOSCOPY N/A 6/5/2020    COLONOSCOPY CONTROL HEMORRHAGE performed by Brando Sanders MD at [place] [address] 5/27/2020    DIAGNOSTIC LAPAROSCOPY,REMOVAL OF FECAL IMPACTION, SIGMOID COLON RESECTION performed by Jared Do MD at [address] ARTHROSCOPY Right 04/27/2018    RIGHT SHOULDER ARTHROSCOPY SUBACROMIAL DECOMPRESSION, OPEN    SIGMOIDOSCOPY N/A 5/21/2020    SIGMOIDOSCOPY DIAGNOSTIC FLEXIBLE performed by Sarah Oconnor MD at [facility] N/A 5/18/2020    EGD BIOPSY performed by Sarah Oconnor MD at [address] [place] [address]       Prior to Admission medications    Medication Sig Start Date End Date Taking?  Authorizing Provider   meclizine (ANTIVERT) 25 MG tablet Take 1 tablet by mouth 3 times daily as needed for Dizziness 11/4/21 11/19/21 Yes Juan Jett MD   DILANTIN 100 MG ER capsule Take 1 cap in am and 1 in pm 11/3/21  Yes Juan Jett MD   tamsulosin [facility]) 0.4 MG capsule Take 1 capsule by mouth daily 10/14/21  Yes Bandar Novak MD   lamoTRIgine (LAMICTAL) 150 MG tablet TAKE 1 TABLET TWICE A DAY 10/4/21  Yes Jae Medley MD   levETIRAcetam (KEPPRA) 500 MG tablet TAKE 2 TABLETS TWICE A DAY  Patient taking differently: 1 TABLET IN AM & TAKE 2 TABLETS HS TWICE A DAY 7/19/21  Yes Jae Medley MD   polyethylene glycol (GLYCOLAX) 17 g packet Take 17 g by mouth 2 times daily  Patient taking differently: Take 17 g by mouth 3x weekly 8/11/20  Yes Jae Medely MD   betamethasone valerate (VALISONE) 0.1 % cream Apply topically 1 times daily.  5/11/20  Yes Jae Medley MD   acetaminophen (TYLENOL) 500 MG tablet Take 2 tablets by mouth 3 times daily as needed for Pain 5/6/20  Yes Jae Medley MD   lamoTRIgine (LAMICTAL) 25 MG tablet TAKE 1 TABLET TWICE A DAY 10/4/21   Jae Medley MD         Current Facility-Administered Medications:     lidocaine PF 1 % injection 0.5 mL, 0.5 mL, IntraDERmal, Once, Edgar Jasso MD    lactated ringers infusion, , IntraVENous, Continuous, Edgar Jasso MD    ceFAZolin (ANCEF) 2000 mg in dextrose 5 % 50 mL IVPB, 2,000 mg, IntraVENous, On Call to OR, Liliana Scott MD    meperidine (DEMEROL) injection 12.5 mg, 12.5 mg, IntraVENous, Q5 Min PRN, Maddie Davis MD    HYDROmorphone (DILAUDID) injection 0.25 mg, 0.25 mg, IntraVENous, Q5 Min PRN, Maddie Davis MD    fentaNYL (SUBLIMAZE) injection 50 mcg, 50 mcg, IntraVENous, Q5 Min PRN, Maddie Davis MD    HYDROmorphone (DILAUDID) injection 0.25 mg, 0.25 mg, IntraVENous, Q5 Min PRN, Maddie Davis MD    HYDROmorphone (DILAUDID) injection 0.5 mg, 0.5 mg, IntraVENous, Q5 Min PRN, Maddie Davis MD    oxyCODONE (ROXICODONE) immediate release tablet 5 mg, 5 mg, Oral, PRN **OR** oxyCODONE (ROXICODONE) immediate release tablet 10 mg, 10 mg, Oral, PRN, Maddie Davis MD    promethazine [facility]) injection 6.25 mg, 6.25 mg, IntraVENous, PRN, Maddie Davis MD    diphenhydrAMINE (BENADRYL) injection 12.5 mg, 12.5 mg, IntraVENous, Once PRN, Maddie Davis MD    labetalol (NORMODYNE;TRANDATE) injection 5 mg, 5 mg, IntraVENous, Q10 Min PRN, MD Manuel Rosas MD  11/17/2021

## 2021-11-23 ENCOUNTER — TELEPHONE (OUTPATIENT)
Dept: FAMILY MEDICINE CLINIC | Age: 46
End: 2021-11-23

## 2021-11-23 NOTE — TELEPHONE ENCOUNTER
meclizine (ANTIVERT) 25 MG tablet [[id number]]    [facility] [address] [place] [address],[address], [address] [facility],[address] [address]   Phone:  [phone]  Fax:  [phone]

## 2021-11-26 ENCOUNTER — TELEPHONE (OUTPATIENT)
Dept: FAMILY MEDICINE CLINIC | Age: 46
End: 2021-11-26

## 2021-11-26 NOTE — TELEPHONE ENCOUNTER
FYI:   He has been having more small seizures according to his dad. He has noticed some mini seizures and pt does not realize he is having one. He states they are not major seizures, he just wanted this documented about the small ones. He is doing much better since the change in his medications. His depression is better. He had the spot cut off of his head that was skin cancer. His behavior seems better since adjustment of medications. His dad has been checking up on him more often.

## 2021-12-06 RX ORDER — TAMSULOSIN HYDROCHLORIDE 0.4 MG/1
0.4 CAPSULE ORAL DAILY
Qty: 90 CAPSULE | Refills: 1 | Status: SHIPPED | OUTPATIENT
Start: 2021-12-06 | End: 2022-06-13

## 2021-12-21 ENCOUNTER — OFFICE VISIT (OUTPATIENT)
Dept: FAMILY MEDICINE CLINIC | Age: 46
End: 2021-12-21
Payer: MEDICARE

## 2021-12-21 VITALS
BODY MASS INDEX: 20.24 KG/M2 | RESPIRATION RATE: 12 BRPM | SYSTOLIC BLOOD PRESSURE: 108 MMHG | OXYGEN SATURATION: 97 % | HEART RATE: 102 BPM | WEIGHT: 153.38 LBS | DIASTOLIC BLOOD PRESSURE: 78 MMHG | TEMPERATURE: 97.8 F

## 2021-12-21 DIAGNOSIS — K59.9 COLONIC DYSMOTILITY: ICD-10-CM

## 2021-12-21 DIAGNOSIS — R26.89 BALANCE DISORDER: ICD-10-CM

## 2021-12-21 DIAGNOSIS — R42 DIZZINESS: ICD-10-CM

## 2021-12-21 DIAGNOSIS — Z01.818 PREOP EXAMINATION: ICD-10-CM

## 2021-12-21 DIAGNOSIS — H18.9 CORNEAL ABNORMALITY: Primary | ICD-10-CM

## 2021-12-21 DIAGNOSIS — G40.909 SEIZURE DISORDER (HCC): ICD-10-CM

## 2021-12-21 PROCEDURE — 99214 OFFICE O/P EST MOD 30 MIN: CPT | Performed by: FAMILY MEDICINE

## 2021-12-21 RX ORDER — MECLIZINE HYDROCHLORIDE 25 MG/1
25 TABLET ORAL 3 TIMES DAILY PRN
Qty: 45 TABLET | Refills: 2 | Status: SHIPPED | OUTPATIENT
Start: 2021-12-21 | End: 2021-12-21

## 2021-12-21 RX ORDER — MECLIZINE HCL 12.5 MG/1
12.5 TABLET ORAL 3 TIMES DAILY PRN
Qty: 90 TABLET | Refills: 2 | Status: SHIPPED | OUTPATIENT
Start: 2021-12-21 | End: 2022-06-03

## 2021-12-21 NOTE — PROGRESS NOTES
Preoperative Consultation    Shelba Heimlich  YOB: 1975    This patient presents to the office today for a preoperative consultation at the request of surgeon, Dr. Nereida Cuellar, who plans on performing Super K on January 17 at [facility]. Since last appointment time patient is not had any significant dizzy episodes. He does request refill of the meclizine medication which he takes several times a day although he still has the dizziness. He was found to have significant elevations of seizure medications and last lab test demonstrates that these are well controlled. Patient is very compliant with medications with no adverse reactions.     Planned anesthesia: Local and IV sedation   Known anesthesia problems: None   Bleeding risk: No recent or remote history of abnormal bleeding  Personal or FH of DVT/PE: No      Patient Active Problem List   Diagnosis    Seizure disorder ([address]    Adhesive capsulitis of shoulder    Seborrheic dermatitis    History of leukemia    Balance disorder    Primary osteoarthritis involving multiple joints    Colitis    Colonic dysmotility     Past Surgical History:   Procedure Laterality Date    COLONOSCOPY N/A 5/13/2020    COLONOSCOPY FLEXIBLE W/ DECOMPRESSION performed by Sridhar Orellana MD at [address]  5/13/2020    COLONOSCOPY WITH BIOPSY performed by Sridhar Orellana MD at [address]  5/18/2020    COLONOSCOPY DIAGNOSTIC performed by Marbin Pritchard MD at [address] COLONOSCOPY N/A 6/5/2020    COLONOSCOPY CONTROL HEMORRHAGE performed by Nicky Galloway MD at [place] [address] 5/27/2020    DIAGNOSTIC LAPAROSCOPY,REMOVAL OF FECAL IMPACTION, SIGMOID COLON RESECTION performed by Surjit Polo MD at [address] / [address] Left 11/17/2021    EXCISION SQUAMOUS CELL CARCINOMA LEFT SCALP; FROZEN SECTION performed by Leda Matson MD at [address] ARTHROSCOPY Right 04/27/2018    RIGHT SHOULDER ARTHROSCOPY SUBACROMIAL DECOMPRESSION, OPEN    SIGMOIDOSCOPY N/A 5/21/2020    SIGMOIDOSCOPY DIAGNOSTIC FLEXIBLE performed by Deena Bowman MD at [facility] Left 11/17/2021    FULL THICKNESS SKIN GRAFT performed by Bola Poe MD at [address] N/A 5/18/2020    EGD BIOPSY performed by Deena Bowman MD at [facility]       Allergies   Allergen Reactions    Aspirin      H/O leukemia aand was told not take ASA     Outpatient Medications Marked as Taking for the 12/21/21 encounter (Office Visit) with Dimple Ratliff MD   Medication Sig Dispense Refill    tamsulosin (FLOMAX) 0.4 MG capsule Take 1 capsule by mouth daily 90 capsule 1    DILANTIN 100 MG ER capsule Take 1 cap in am and 1 in pm 180 capsule 1    lamoTRIgine (LAMICTAL) 150 MG tablet TAKE 1 TABLET TWICE A  tablet 1    lamoTRIgine (LAMICTAL) 25 MG tablet TAKE 1 TABLET TWICE A  tablet 1    levETIRAcetam (KEPPRA) 500 MG tablet TAKE 2 TABLETS TWICE A DAY (Patient taking differently: 1 TABLET IN AM & TAKE 2 TABLETS HS TWICE A DAY) 360 tablet 1    polyethylene glycol (GLYCOLAX) 17 g packet Take 17 g by mouth 2 times daily (Patient taking differently: Take 17 g by mouth 3x weekly) 100 each 5    betamethasone valerate (VALISONE) 0.1 % cream Apply topically 1 times daily.  30 g 5    acetaminophen (TYLENOL) 500 MG tablet Take 2 tablets by mouth 3 times daily as needed for Pain 180 tablet 0       Social History     Tobacco Use    Smoking status: Never Smoker    Smokeless tobacco: Never Used   Substance Use Topics    Alcohol use: Never     Family History   Problem Relation Age of Onset    Diabetes Father     High Blood Pressure Father     Sleep Apnea Father     Kidney stones Father     Rheum Arthritis Mother        Review of Systems  A comprehensive review of systems was negative except for what

## 2021-12-26 DIAGNOSIS — G40.909 SEIZURE DISORDER (HCC): ICD-10-CM

## 2021-12-28 RX ORDER — LEVETIRACETAM 500 MG/1
TABLET ORAL
Qty: 360 TABLET | Refills: 1 | Status: SHIPPED | OUTPATIENT
Start: 2021-12-28 | End: 2022-06-13

## 2022-01-07 ENCOUNTER — TELEPHONE (OUTPATIENT)
Dept: FAMILY MEDICINE CLINIC | Age: 47
End: 2022-01-07

## 2022-01-07 NOTE — TELEPHONE ENCOUNTER
PT is requesting to know the correct dosage for the levETIRAcetam (KEPPRA) 500 MG tablet it says : (1 TABLET IN AM & TAKE 2 TABLETS HS TWICE A DAY) Please Advise? ?

## 2022-01-13 ENCOUNTER — TELEPHONE (OUTPATIENT)
Dept: FAMILY MEDICINE CLINIC | Age: 47
End: 2022-01-13

## 2022-01-13 NOTE — TELEPHONE ENCOUNTER
Patient is calling because Yesenia Ang with [facility] told the patient to call his PCP to get orders put in for a home health nurse for after his surgery on 01/17.      Please advise     Provider out of the office

## 2022-01-14 ENCOUNTER — TELEPHONE (OUTPATIENT)
Dept: FAMILY MEDICINE CLINIC | Age: 47
End: 2022-01-14

## 2022-01-14 NOTE — TELEPHONE ENCOUNTER
Patient states that he is having eye surgery that will make him almost blind in one eye. Called the specialist and informed them that they are the ones who should be placing the orders.

## 2022-01-18 ENCOUNTER — TELEPHONE (OUTPATIENT)
Dept: FAMILY MEDICINE CLINIC | Age: 47
End: 2022-01-18

## 2022-01-18 NOTE — TELEPHONE ENCOUNTER
Patient is calling to speak to  about the issue that has been going on. He states that he went and got his eyes looked at  And they said it's not from his eyes.      Please advise

## 2022-01-18 NOTE — TELEPHONE ENCOUNTER
Patient is concerned that he still having dizzy episodes. He just feels dizzy most of the time. He thought having his eyes corrected would improve his symptoms. Last time he had severe vertigo his levels were off on his seizure medications. Patient has not had his lab work performed from his office visit in December. Recommended patient do his lab testing.   Patient is going to proceed in that direction

## 2022-01-19 LAB
A/G RATIO: 1 UNK (ref 1–2)
ABSOLUTE BASO #: 0.02 X10(3)/MCL (ref 0–0.1)
ABSOLUTE EOS #: 0.07 X10(3)/MCL (ref 0–0.6)
ABSOLUTE LYMPH #: 1.35 X10(3)/MCL (ref 1–4.8)
ABSOLUTE MONO #: 0.58 X10(3)/MCL (ref 0–0.6)
ABSOLUTE NEUT #: 2.99 X10(3)/MCL (ref 1.8–7.7)
ALBUMIN SERPL-MCNC: 4.2 GM/DL (ref 3.4–5)
ALP BLD-CCNC: 118 UNIT/L (ref 40–150)
ALT SERPL-CCNC: 15 UNIT/L (ref 12–78)
ANION GAP SERPL CALCULATED.3IONS-SCNC: 6 MMOL/L (ref 4–15)
AST SERPL-CCNC: 13 UNIT/L (ref 9–37)
BASOPHILS # BLD: 0.4 % (ref 0–1)
BILIRUB SERPL-MCNC: 0.4 MG/DL (ref 0.1–1.1)
BUN BLDV-MCNC: 9 MG/DL (ref 7–18)
CALCIUM SERPL-MCNC: 9.4 MG/DL (ref 8.3–10.1)
CHLORIDE BLD-SCNC: 102 MMOL/L (ref 98–107)
CO2: 29 MMOL/L (ref 21–31)
CREAT SERPL-MCNC: 0.81 MG/DL (ref 0.67–1.17)
DILANTIN FREE/TOTAL: 15.9 MCG/ML (ref 10–20)
EOSINOPHIL # BLD: 1.4 % (ref 0–5)
GFR, ESTIMATED: ABNORMAL ML/MIN/1.73M2
GLOBULIN: 4.6 GM/DL
GLUCOSE BLD-MCNC: 92 MG/DL (ref 74–106)
HCT VFR BLD CALC: 43.3 % (ref 40–52)
HEMOGLOBIN: 14.4 GM/DL (ref 13.3–17.7)
HEMOLYSIS: 2 UNK (ref 0–2)
HEMOLYSIS: SLIGHT
ICTERUS: 0 UNK (ref 0–2)
IMMATURE GRANS (ABS): 0.01 X10(3)/MCL (ref 0–0.09)
IMMATURE GRANULOCYTES %: 0.2 % (ref 0–0.6)
LIPEMIA: 0 UNK (ref 0–3)
LYMPHOCYTES # BLD: 26.9 % (ref 15–45)
MCH RBC QN AUTO: 30.3 PG (ref 26–34)
MCHC RBC AUTO-ENTMCNC: 33.3 GM/DL (ref 31–36)
MCV RBC AUTO: 91 FL (ref 80–96)
MONOCYTES # BLD: 11.6 % (ref 0–8)
NRBC AUTOMATED: 0 %
NUCLEATED RBCS: 0 X10(3)/MCL
PDW BLD-RTO: 12.7 %
PHENYTOIN DOSE AMOUNT: NORMAL
PHENYTOIN DOSE TIME: NORMAL
PHENYTOIN DOSE TIME: NORMAL
PLATELET # BLD: 227 X10(3)/MCL (ref 135–466)
PMV BLD AUTO: 10.3 FL (ref 9.7–11.9)
POTASSIUM SERPL-SCNC: 3.9 MMOL/L (ref 3.5–5.1)
RBC # BLD: 4.76 X10(6)/MCL (ref 4.4–5.9)
SEGS: 59.5 % (ref 40–70)
SODIUM BLD-SCNC: 137 MMOL/L (ref 136–145)
TOTAL PROTEIN: 8.8 GM/DL (ref 6.4–8.2)
WBC # BLD: 5.02 X10(3)/MCL (ref 4.5–11)

## 2022-01-20 LAB
KEPPRA DOSE AMT: NORMAL
KEPPRA: 27.3 MCG/ML (ref 5–45)
Lab: NORMAL
Lab: NORMAL

## 2022-01-24 ENCOUNTER — TELEPHONE (OUTPATIENT)
Dept: FAMILY MEDICINE CLINIC | Age: 47
End: 2022-01-24

## 2022-02-25 ENCOUNTER — TELEPHONE (OUTPATIENT)
Dept: FAMILY MEDICINE CLINIC | Age: 47
End: 2022-02-25

## 2022-02-25 NOTE — TELEPHONE ENCOUNTER
Received fax from [address] for clarification on [address]. Filled out and faxed back. Scanned to encounter.

## 2022-03-08 RX ORDER — TAMSULOSIN HYDROCHLORIDE 0.4 MG/1
CAPSULE ORAL
Qty: 90 CAPSULE | Refills: 1 | OUTPATIENT
Start: 2022-03-08

## 2022-04-25 RX ORDER — LAMOTRIGINE 150 MG/1
TABLET ORAL
Qty: 180 TABLET | Refills: 0 | Status: SHIPPED | OUTPATIENT
Start: 2022-04-25 | End: 2022-10-03 | Stop reason: SDUPTHER

## 2022-04-25 RX ORDER — LAMOTRIGINE 25 MG/1
TABLET ORAL
Qty: 180 TABLET | Refills: 0 | Status: SHIPPED | OUTPATIENT
Start: 2022-04-25 | End: 2022-10-03 | Stop reason: SDUPTHER

## 2022-04-25 NOTE — TELEPHONE ENCOUNTER
Medication:   Requested Prescriptions     Pending Prescriptions Disp Refills    lamoTRIgine (LAMICTAL) 150 MG tablet [Pharmacy Med Name: LAMOTRIGINE  TAB 150MG] 180 tablet 1     Sig: TAKE 1 TABLET TWICE A DAY    lamoTRIgine (LAMICTAL) 25 MG tablet [Pharmacy Med Name: LAMOTRIGINE  TAB 25MG] 180 tablet 1     Sig: TAKE 1 TABLET TWICE A DAY      Last Filled:      Patient Phone Number: [phone] (home)     Last appt: 12/21/2021   Next appt: Visit date not found    Last OARRS: No flowsheet data found.   PDMP Monitoring:    Last PDMP Jose Wilson as Reviewed [facility]):  Review User Review Instant Review Result   Faisaldanielmartin Rivera 5/11/2020  5:08 AM Reviewed PDMP [1]     Preferred Pharmacy:   [address] [place] [address]  [address] [address]  Phone: [phone] Fax:  [address] [place], [place] [address]  [address] [facility],[address]  [address] [facility],[address] [address]  Phone: [phone] Fax: [phone]

## 2022-06-03 DIAGNOSIS — R42 DIZZINESS: ICD-10-CM

## 2022-06-03 RX ORDER — MECLIZINE HCL 12.5 MG/1
TABLET ORAL
Qty: 90 TABLET | Refills: 0 | Status: SHIPPED | OUTPATIENT
Start: 2022-06-03 | End: 2022-06-27

## 2022-06-03 RX ORDER — PHENYTOIN SODIUM 100 MG/1
CAPSULE, EXTENDED RELEASE ORAL
Qty: 180 CAPSULE | Refills: 0 | Status: SHIPPED | OUTPATIENT
Start: 2022-06-03 | End: 2022-08-30

## 2022-06-13 DIAGNOSIS — G40.909 SEIZURE DISORDER (HCC): ICD-10-CM

## 2022-06-13 RX ORDER — LEVETIRACETAM 500 MG/1
TABLET ORAL
Qty: 270 TABLET | Refills: 0 | Status: SHIPPED | OUTPATIENT
Start: 2022-06-13 | End: 2022-09-15

## 2022-06-13 RX ORDER — TAMSULOSIN HYDROCHLORIDE 0.4 MG/1
CAPSULE ORAL
Qty: 90 CAPSULE | Refills: 1 | Status: SHIPPED | OUTPATIENT
Start: 2022-06-13

## 2022-06-26 DIAGNOSIS — R42 DIZZINESS: ICD-10-CM

## 2022-06-27 RX ORDER — MECLIZINE HCL 12.5 MG/1
TABLET ORAL
Qty: 90 TABLET | Refills: 0 | Status: SHIPPED | OUTPATIENT
Start: 2022-06-27 | End: 2022-08-26 | Stop reason: SDUPTHER

## 2022-07-19 ENCOUNTER — TELEPHONE (OUTPATIENT)
Dept: FAMILY MEDICINE CLINIC | Age: 47
End: 2022-07-19

## 2022-07-19 DIAGNOSIS — R42 DIZZINESS: ICD-10-CM

## 2022-07-19 RX ORDER — LAMOTRIGINE 150 MG/1
TABLET ORAL
Qty: 180 TABLET | Refills: 0 | OUTPATIENT
Start: 2022-07-19

## 2022-07-19 RX ORDER — LAMOTRIGINE 25 MG/1
TABLET ORAL
Qty: 180 TABLET | Refills: 0 | OUTPATIENT
Start: 2022-07-19

## 2022-07-19 RX ORDER — MECLIZINE HCL 12.5 MG/1
TABLET ORAL
Qty: 90 TABLET | Refills: 0 | OUTPATIENT
Start: 2022-07-19

## 2022-08-03 ENCOUNTER — TELEPHONE (OUTPATIENT)
Dept: FAMILY MEDICINE CLINIC | Age: 47
End: 2022-08-03

## 2022-08-03 DIAGNOSIS — G40.909 SEIZURE DISORDER (HCC): Primary | ICD-10-CM

## 2022-08-03 NOTE — TELEPHONE ENCOUNTER
Patient advised of information and gave the phone number to the patient. Patient states he has an appt in October and is requesting blood orders to be mailed to him so he can get them done before his appt in October. Please advise what labs to order.

## 2022-08-03 NOTE — TELEPHONE ENCOUNTER
Patient is calling stating that he forgot what podiatrist he sees. He would like for [facility] to refer him to a new doctor.      Please advise

## 2022-08-25 DIAGNOSIS — R42 DIZZINESS: ICD-10-CM

## 2022-08-25 NOTE — TELEPHONE ENCOUNTER
meclizine (ANTIVERT) 12.5 MG tablet 90 tablet 0 6/27/2022     Sig: TAKE 1 TABLET 3 TIMES A DAYAS NEEDED FOR DIZZINESS      [facility] in chart     Provider out of the office

## 2022-08-25 NOTE — TELEPHONE ENCOUNTER
Medication:   Requested Prescriptions     Pending Prescriptions Disp Refills    meclizine (ANTIVERT) 12.5 MG tablet 90 tablet 0     Sig: TAKE 1 TABLET 3 TIMES A DAYAS NEEDED FOR DIZZINESS      Last Filled:      Patient Phone Number: [phone] (home)     Last appt: 12/21/2021   Next appt: 10/3/2022    Last OARRS: No flowsheet data found.   PDMP Monitoring:    Last PDMP Marylee Liter as Reviewed [facility]):  Review User Review Instant Review Result   Dom Muir 5/11/2020  5:08 AM Reviewed PDMP [1]     Preferred Pharmacy:   [address] [place] [address].. [address] [address]  Phone: [phone] Fax: [phone]

## 2022-08-26 RX ORDER — MECLIZINE HCL 12.5 MG/1
TABLET ORAL
Qty: 90 TABLET | Refills: 0 | Status: SHIPPED | OUTPATIENT
Start: 2022-08-26 | End: 2022-09-22

## 2022-08-30 RX ORDER — PHENYTOIN SODIUM 100 MG/1
CAPSULE, EXTENDED RELEASE ORAL
Qty: 180 CAPSULE | Refills: 0 | Status: SHIPPED | OUTPATIENT
Start: 2022-08-30 | End: 2022-10-03 | Stop reason: SDUPTHER

## 2022-08-30 NOTE — TELEPHONE ENCOUNTER
Medication:   Requested Prescriptions     Pending Prescriptions Disp Refills    DILANTIN 100 MG ER capsule [Pharmacy Med Name: Ninoska Garcia CAP 100MG] 180 capsule 0     Sig: TAKE 1 CAPSULE IN THE      MORNING AND 1 CAPSULE IN   THE EVENING      Last Filled:      Patient Phone Number: [phone] (home)     Last appt: 12/21/2021   Next appt: 10/3/2022    Last OARRS: No flowsheet data found.   PDMP Monitoring:    Last PDMP Jaun Brown as Reviewed [facility]):  Review User Review Instant Review Result   Michelle Wu 5/11/2020  5:08 AM Reviewed PDMP [1]     Preferred Pharmacy:   [address] [place] [address]  [address] [address]  Phone: [phone] Fax:  [address] [place] [address]  [address] [facility],[address]  [address] [facility],[address] [address]  Phone: [phone] Fax: [phone]

## 2022-09-15 DIAGNOSIS — G40.909 SEIZURE DISORDER (HCC): ICD-10-CM

## 2022-09-15 RX ORDER — LEVETIRACETAM 500 MG/1
TABLET ORAL
Qty: 270 TABLET | Refills: 0 | Status: SHIPPED | OUTPATIENT
Start: 2022-09-15

## 2022-09-16 ENCOUNTER — OFFICE VISIT (OUTPATIENT)
Dept: FAMILY MEDICINE CLINIC | Age: 47
End: 2022-09-16
Payer: MEDICARE

## 2022-09-16 VITALS — HEART RATE: 81 BPM | TEMPERATURE: 97.4 F | OXYGEN SATURATION: 97 %

## 2022-09-16 DIAGNOSIS — J06.9 VIRAL URI: Primary | ICD-10-CM

## 2022-09-16 PROCEDURE — 99213 OFFICE O/P EST LOW 20 MIN: CPT | Performed by: FAMILY MEDICINE

## 2022-09-16 NOTE — PROGRESS NOTES
Chief Complaint   Patient presents with    Pharyngitis       Here with ST, started a week ago, got some better and then came back today but not as bad. Also stuffy nose but that is better. Not really coughing. No fever. No sob. Home Covid test : no      Vitals:    09/16/22 1600   Pulse: 81   Temp: 97.4 °F (36.3 °C)   SpO2: 97%     Wt Readings from Last 3 Encounters:   12/21/21 153 lb 6 oz (69.6 kg)   11/17/21 150 lb (68 kg)   11/03/21 155 lb (70.3 kg)     There is no height or weight on file to calculate BMI. Alert and oriented x 4 NAD, affect appropriate and normal appearing weight, well hydrated, well developed. Left TM nl, canal nl and pinna nl  Right TM nl, canal nl and pinna nl  No nodes neck  Nares red and congested, clear drainage  OP mild erythema, no exudate, no swelling  Lung clear with good air movement and effort  CV RRR no M      ASSESSMENT AND PLAN:       Dede Trujillo was seen today for pharyngitis. Diagnoses and all orders for this visit:    Viral URI         Likely viral infection causing symptoms. Continue symptomatic treatment. Call or return to office if worsening or not starting to improve after 7-10 days of symptoms.

## 2022-09-22 DIAGNOSIS — R42 DIZZINESS: ICD-10-CM

## 2022-09-22 RX ORDER — MECLIZINE HCL 12.5 MG/1
TABLET ORAL
Qty: 90 TABLET | Refills: 0 | Status: SHIPPED | OUTPATIENT
Start: 2022-09-22 | End: 2022-10-17

## 2022-10-03 ENCOUNTER — OFFICE VISIT (OUTPATIENT)
Dept: FAMILY MEDICINE CLINIC | Age: 47
End: 2022-10-03
Payer: MEDICARE

## 2022-10-03 VITALS
BODY MASS INDEX: 20.88 KG/M2 | DIASTOLIC BLOOD PRESSURE: 64 MMHG | OXYGEN SATURATION: 97 % | WEIGHT: 158.25 LBS | RESPIRATION RATE: 12 BRPM | SYSTOLIC BLOOD PRESSURE: 108 MMHG | TEMPERATURE: 97.2 F | HEART RATE: 95 BPM

## 2022-10-03 DIAGNOSIS — K59.9 COLONIC DYSMOTILITY: ICD-10-CM

## 2022-10-03 DIAGNOSIS — G40.909 SEIZURE DISORDER (HCC): Primary | ICD-10-CM

## 2022-10-03 DIAGNOSIS — M15.9 PRIMARY OSTEOARTHRITIS INVOLVING MULTIPLE JOINTS: ICD-10-CM

## 2022-10-03 DIAGNOSIS — Z23 NEED FOR INFLUENZA VACCINATION: ICD-10-CM

## 2022-10-03 DIAGNOSIS — G93.9 BRAIN LESION: ICD-10-CM

## 2022-10-03 PROCEDURE — G0008 ADMIN INFLUENZA VIRUS VAC: HCPCS | Performed by: FAMILY MEDICINE

## 2022-10-03 PROCEDURE — 99214 OFFICE O/P EST MOD 30 MIN: CPT | Performed by: FAMILY MEDICINE

## 2022-10-03 PROCEDURE — 90674 CCIIV4 VAC NO PRSV 0.5 ML IM: CPT | Performed by: FAMILY MEDICINE

## 2022-10-03 RX ORDER — LAMOTRIGINE 25 MG/1
TABLET ORAL
Qty: 180 TABLET | Refills: 1 | Status: SHIPPED | OUTPATIENT
Start: 2022-10-03

## 2022-10-03 RX ORDER — LAMOTRIGINE 150 MG/1
TABLET ORAL
Qty: 180 TABLET | Refills: 1 | Status: SHIPPED | OUTPATIENT
Start: 2022-10-03

## 2022-10-03 RX ORDER — PHENYTOIN SODIUM 100 MG/1
CAPSULE, EXTENDED RELEASE ORAL
Qty: 180 CAPSULE | Refills: 1 | Status: SHIPPED | OUTPATIENT
Start: 2022-10-03

## 2022-10-03 ASSESSMENT — PATIENT HEALTH QUESTIONNAIRE - PHQ9
3. TROUBLE FALLING OR STAYING ASLEEP: 3
7. TROUBLE CONCENTRATING ON THINGS, SUCH AS READING THE NEWSPAPER OR WATCHING TELEVISION: 0
4. FEELING TIRED OR HAVING LITTLE ENERGY: 2
SUM OF ALL RESPONSES TO PHQ QUESTIONS 1-9: 16
10. IF YOU CHECKED OFF ANY PROBLEMS, HOW DIFFICULT HAVE THESE PROBLEMS MADE IT FOR YOU TO DO YOUR WORK, TAKE CARE OF THINGS AT HOME, OR GET ALONG WITH OTHER PEOPLE: 2
6. FEELING BAD ABOUT YOURSELF - OR THAT YOU ARE A FAILURE OR HAVE LET YOURSELF OR YOUR FAMILY DOWN: 3
SUM OF ALL RESPONSES TO PHQ QUESTIONS 1-9: 16
9. THOUGHTS THAT YOU WOULD BE BETTER OFF DEAD, OR OF HURTING YOURSELF: 0
SUM OF ALL RESPONSES TO PHQ QUESTIONS 1-9: 16
SUM OF ALL RESPONSES TO PHQ9 QUESTIONS 1 & 2: 6
2. FEELING DOWN, DEPRESSED OR HOPELESS: 3
5. POOR APPETITE OR OVEREATING: 2
1. LITTLE INTEREST OR PLEASURE IN DOING THINGS: 3
SUM OF ALL RESPONSES TO PHQ QUESTIONS 1-9: 16
8. MOVING OR SPEAKING SO SLOWLY THAT OTHER PEOPLE COULD HAVE NOTICED. OR THE OPPOSITE, BEING SO FIGETY OR RESTLESS THAT YOU HAVE BEEN MOVING AROUND A LOT MORE THAN USUAL: 0

## 2022-10-03 NOTE — PROGRESS NOTES
Subjective:      Patient ID: Jonathan Sandhoff is a 52 y.o. male. CC: Patient presents for re-evaluation of chronic health problems including seizure disorder, osteoarthritis, colonic dysmotility and brain lesion. Kaur HORAN Pt is here for a follow up, test results in accompaniment of father. Patient states he has successful eye surgery in December was told he needed additional eye surgery but then a follow-up examination was told he just needed glasses which she is wearing currently. Has had no further seizure disorder. He denies any side effects of medication at this time. He was to have a repeat CT scan of the brain in November 2021 but did not want it done at that time. Patient feels there is a lot of problems getting things like this done because of transportation issues. Father is currently leaving town soon but will be back in mid May. He states he has been compliant with his medications with no adverse reactions. He also was told recently by his podiatrist that he has vascular evaluation of his lower extremities he has a stent up in the future. Bowels continue to work well as observation MiraLAX medication.     Review of Systems  Patient Active Problem List   Diagnosis    Seizure disorder (HCC)    Adhesive capsulitis of shoulder    Seborrheic dermatitis    History of leukemia    Balance disorder    Primary osteoarthritis involving multiple joints    Colitis    Colonic dysmotility       Outpatient Medications Marked as Taking for the 10/3/22 encounter (Office Visit) with Carol Oconnor MD   Medication Sig Dispense Refill    meclizine (ANTIVERT) 12.5 MG tablet TAKE 1 TABLET 3 TIMES A DAYAS NEEDED FOR DIZZINESS 90 tablet 0    levETIRAcetam (KEPPRA) 500 MG tablet TAKE 1 TABLET IN THE       MORNING AND 2 TABLETS AT   BEDTIME. 270 tablet 0    DILANTIN 100 MG ER capsule TAKE 1 CAPSULE IN THE      MORNING AND 1 CAPSULE IN   THE EVENING 180 capsule 0    tamsulosin (FLOMAX) 0.4 mg capsule TAKE 1 CAPSULE DAILY 90 capsule 1    lamoTRIgine (LAMICTAL) 150 MG tablet TAKE 1 TABLET TWICE A  tablet 0    lamoTRIgine (LAMICTAL) 25 MG tablet TAKE 1 TABLET TWICE A  tablet 0    betamethasone valerate (VALISONE) 0.1 % cream Apply topically 1 times daily. 30 g 5    acetaminophen (TYLENOL) 500 MG tablet Take 2 tablets by mouth 3 times daily as needed for Pain 180 tablet 0       Allergies   Allergen Reactions    Aspirin      H/O leukemia aand was told not take ASA       Social History     Tobacco Use    Smoking status: Never    Smokeless tobacco: Never   Substance Use Topics    Alcohol use: Never       /64 (Site: Left Upper Arm, Position: Sitting, Cuff Size: Medium Adult)   Pulse 95   Temp 97.2 °F (36.2 °C) (Infrared)   Resp 12   Wt 158 lb 4 oz (71.8 kg)   SpO2 97%   BMI 20.88 kg/m²      Objective:   Physical Exam  Constitutional:       General: He is not in acute distress. Appearance: Normal appearance. He is well-developed. Neck:      Vascular: No carotid bruit. Cardiovascular:      Rate and Rhythm: Normal rate and regular rhythm. Pulses:           Dorsalis pedis pulses are 2+ on the right side and 2+ on the left side. Posterior tibial pulses are 2+ on the right side and 2+ on the left side. Heart sounds: Normal heart sounds. No murmur heard. No friction rub. No gallop. Pulmonary:      Effort: Pulmonary effort is normal.      Breath sounds: Normal breath sounds. Abdominal:      General: Bowel sounds are normal. There is no distension. Palpations: Abdomen is soft. Abdomen is not rigid. There is no hepatomegaly, splenomegaly or mass. Tenderness: There is no abdominal tenderness. There is no right CVA tenderness, left CVA tenderness, guarding or rebound. Hernia: No hernia is present. Musculoskeletal:         General: No tenderness. Normal range of motion. Right lower leg: No edema. Left lower leg: No edema. Skin:     General: Skin is warm. Findings: No rash. Neurological:      General: No focal deficit present. Mental Status: He is alert and oriented to person, place, and time. Mental status is at baseline. Sensory: Sensation is intact. Motor: Motor function is intact. Psychiatric:         Mood and Affect: Mood is anxious. Mood is not depressed. Behavior: Behavior is cooperative. Assessment:      Mame was seen today for follow-up. Diagnoses and all orders for this visit:    Seizure disorder ([facility] [address]    Need for influenza vaccination  -     Influenza, FLUCELVAX, (age 10 mo+), IM, Preservative Free, 0.5 mL    Primary osteoarthritis involving multiple joints    Colonic dysmotility    Brain lesion    Other orders  -     lamoTRIgine (LAMICTAL) 150 MG tablet; TAKE 1 TABLET TWICE A DAY  -     lamoTRIgine (LAMICTAL) 25 MG tablet; TAKE 1 TABLET TWICE A DAY  -     DILANTIN 100 MG ER capsule; TAKE 1 CAPSULE IN THE      MORNING AND 1 CAPSULE IN   THE EVENING          Plan:      Reviewed labs and test results with patient. Maintain current seizure medication all his Lamictal level is slightly elevated. The Dilantin and Keppra dosages continue to be therapeutic with the reduced medication strength from several years ago. Certainly may proceed with vascular consultation although my examination he has good peripheral pulses. Discussed with the brain lesion he needs a CT scan but he wants to wait until his father is going to be back in town for extended period time this will be planned for May 2023. Patient received counseling on the following healthy behaviors: nutrition and exercise     Patient given educational materials     Health maintenance updated    Discussed use, benefit, and side effects of prescribed medications. Barriers to medication compliance addressed. All patient questions answered. Pt voiced understanding. Patient needs RTC in 6 months. Medical decision making of moderate complexity.     Please note that this chart was generated using Dragon dictation software. Although every effort was made to ensure the accuracy of this automated transcription, some errors in transcription may have occurred.

## 2022-10-03 NOTE — PROGRESS NOTES
Vaccine Information Sheet, \"Influenza - Inactivated\"  given to Jennifer Pérez, or parent/legal guardian of  Jennifer Pérez and verbalized understanding. Patient responses:    Have you ever had a reaction to a flu vaccine? No  Are you able to eat eggs without adverse effects? Yes  Do you have any current illness? No  Have you ever had Guillian Sedalia Syndrome? No    Flu vaccine given per order. Please see immunization tab.     Immunization(s) given during visit:     Immunizations Administered       Name Date Dose Route    Influenza, FLUCELVAX, (age 10 mo+), MDCK, PF, 0.5mL 10/3/2022 0.5 mL Intramuscular    Site: Deltoid- Right    Lot: [id number]    NDC: 18135-305-17

## 2022-10-15 DIAGNOSIS — R42 DIZZINESS: ICD-10-CM

## 2022-10-17 RX ORDER — MECLIZINE HCL 12.5 MG/1
TABLET ORAL
Qty: 90 TABLET | Refills: 1 | Status: SHIPPED | OUTPATIENT
Start: 2022-10-17

## 2022-11-22 RX ORDER — TAMSULOSIN HYDROCHLORIDE 0.4 MG/1
CAPSULE ORAL
Qty: 90 CAPSULE | Refills: 1 | Status: SHIPPED | OUTPATIENT
Start: 2022-11-22

## 2022-11-30 DIAGNOSIS — R42 DIZZINESS: ICD-10-CM

## 2022-11-30 RX ORDER — MECLIZINE HCL 12.5 MG/1
TABLET ORAL
Qty: 90 TABLET | Refills: 1 | Status: SHIPPED | OUTPATIENT
Start: 2022-11-30

## 2022-12-14 DIAGNOSIS — G40.909 SEIZURE DISORDER (HCC): ICD-10-CM

## 2022-12-14 RX ORDER — LEVETIRACETAM 500 MG/1
TABLET ORAL
Qty: 270 TABLET | Refills: 0 | Status: SHIPPED | OUTPATIENT
Start: 2022-12-14

## 2023-01-15 DIAGNOSIS — R42 DIZZINESS: ICD-10-CM

## 2023-01-16 RX ORDER — MECLIZINE HCL 12.5 MG/1
TABLET ORAL
Qty: 270 TABLET | Refills: 0 | Status: SHIPPED | OUTPATIENT
Start: 2023-01-16

## 2023-01-16 NOTE — TELEPHONE ENCOUNTER
Medication:   Requested Prescriptions     Pending Prescriptions Disp Refills    meclizine (ANTIVERT) 12.5 MG tablet [Pharmacy Med Name: Debbi Males  TAB 12.5MG] 90 tablet 1     Sig: TAKE 1 TABLET 3 TIMES A DAYAS NEEDED FOR DIZZINESS      Last Filled:      Patient Phone Number: [phone] (home)     Last appt: 10/3/2022   Next appt: 5/9/2023    Last OARRS: No flowsheet data found.   PDMP Monitoring:    Last PDMP Yue Burdick as Reviewed [facility]):  Review User Review Instant Review Result   Svitlana Ask 5/11/2020  5:08 AM Reviewed PDMP [1]     Preferred Pharmacy:   [address]  [address] [address]  Phone: [phone] Fax: [phone]    [facility] [id number] - [facility]AV[facility] [id number], [facility] [address] [phone] - F [phone]  [address] [place] [address]  Phone: [phone] Fax: [phone]

## 2023-03-03 DIAGNOSIS — G40.909 SEIZURE DISORDER (HCC): ICD-10-CM

## 2023-03-03 RX ORDER — LAMOTRIGINE 150 MG/1
TABLET ORAL
Qty: 180 TABLET | Refills: 0 | Status: SHIPPED | OUTPATIENT
Start: 2023-03-03

## 2023-03-03 RX ORDER — LAMOTRIGINE 25 MG/1
TABLET ORAL
Qty: 180 TABLET | Refills: 0 | Status: SHIPPED | OUTPATIENT
Start: 2023-03-03

## 2023-03-03 RX ORDER — LEVETIRACETAM 500 MG/1
TABLET ORAL
Qty: 270 TABLET | Refills: 0 | Status: SHIPPED | OUTPATIENT
Start: 2023-03-03

## 2023-04-29 DIAGNOSIS — R42 DIZZINESS: ICD-10-CM

## 2023-05-01 RX ORDER — MECLIZINE HCL 12.5 MG/1
TABLET ORAL
Qty: 270 TABLET | Refills: 0 | Status: SHIPPED | OUTPATIENT
Start: 2023-05-01

## 2023-05-01 NOTE — TELEPHONE ENCOUNTER
Medication:   Requested Prescriptions     Pending Prescriptions Disp Refills    meclizine (ANTIVERT) 12.5 MG tablet [Pharmacy Med Name: Jeannette Garcia  TAB 12.5MG] 270 tablet 0     Sig: TAKE 1 TABLET 3 TIMES A DAYAS NEEDED FOR DIZZINESS      Last Filled:      Patient Phone Number: [phone] (home) [phone] (work)    Last appt:   Next appt: 5/9/2023    Last OARRS: No flowsheet data found.   PDMP Monitoring:    Last PDMP Omid Koenig as Reviewed [facility]):  Review User Review Instant Review Result   Scot Franco 5/11/2020  5:08 AM Reviewed PDMP [1]     Preferred Pharmacy:   [facility] - [address] [address]  Phone: [phone] Fax: [phone]    Linda Vanessa [id number] - GFMXZFM [address] [phone] - F [phone]  [address] [place] [address]  Phone: [phone] Fax: [phone]

## 2023-05-09 ENCOUNTER — OFFICE VISIT (OUTPATIENT)
Dept: FAMILY MEDICINE CLINIC | Age: 48
End: 2023-05-09
Payer: MEDICARE

## 2023-05-09 VITALS
DIASTOLIC BLOOD PRESSURE: 76 MMHG | OXYGEN SATURATION: 98 % | WEIGHT: 163.25 LBS | SYSTOLIC BLOOD PRESSURE: 122 MMHG | HEART RATE: 81 BPM | TEMPERATURE: 97.5 F | BODY MASS INDEX: 21.54 KG/M2 | RESPIRATION RATE: 12 BRPM

## 2023-05-09 DIAGNOSIS — R26.89 BALANCE DISORDER: ICD-10-CM

## 2023-05-09 DIAGNOSIS — G40.909 SEIZURE DISORDER (HCC): ICD-10-CM

## 2023-05-09 DIAGNOSIS — M75.00 ADHESIVE CAPSULITIS OF SHOULDER, UNSPECIFIED LATERALITY: ICD-10-CM

## 2023-05-09 DIAGNOSIS — M15.9 PRIMARY OSTEOARTHRITIS INVOLVING MULTIPLE JOINTS: ICD-10-CM

## 2023-05-09 DIAGNOSIS — S39.012D STRAIN OF LUMBAR REGION, SUBSEQUENT ENCOUNTER: ICD-10-CM

## 2023-05-09 DIAGNOSIS — Z00.00 WELL ADULT EXAM: Primary | ICD-10-CM

## 2023-05-09 PROCEDURE — 99214 OFFICE O/P EST MOD 30 MIN: CPT | Performed by: FAMILY MEDICINE

## 2023-05-09 RX ORDER — PHENYTOIN SODIUM 100 MG/1
CAPSULE, EXTENDED RELEASE ORAL
Qty: 180 CAPSULE | Refills: 3 | Status: SHIPPED | OUTPATIENT
Start: 2023-05-09

## 2023-05-09 RX ORDER — LAMOTRIGINE 150 MG/1
150 TABLET ORAL 2 TIMES DAILY
Qty: 180 TABLET | Refills: 3 | Status: SHIPPED | OUTPATIENT
Start: 2023-05-09

## 2023-05-09 RX ORDER — TAMSULOSIN HYDROCHLORIDE 0.4 MG/1
0.4 CAPSULE ORAL DAILY
Qty: 90 CAPSULE | Refills: 3 | Status: SHIPPED | OUTPATIENT
Start: 2023-05-09

## 2023-05-09 RX ORDER — LEVETIRACETAM 500 MG/1
TABLET ORAL
Qty: 270 TABLET | Refills: 3 | Status: SHIPPED | OUTPATIENT
Start: 2023-05-09

## 2023-05-09 RX ORDER — LAMOTRIGINE 25 MG/1
25 TABLET ORAL 2 TIMES DAILY
Qty: 180 TABLET | Refills: 3 | Status: SHIPPED | OUTPATIENT
Start: 2023-05-09

## 2023-05-09 SDOH — ECONOMIC STABILITY: FOOD INSECURITY: WITHIN THE PAST 12 MONTHS, YOU WORRIED THAT YOUR FOOD WOULD RUN OUT BEFORE YOU GOT MONEY TO BUY MORE.: NEVER TRUE

## 2023-05-09 SDOH — ECONOMIC STABILITY: INCOME INSECURITY: HOW HARD IS IT FOR YOU TO PAY FOR THE VERY BASICS LIKE FOOD, HOUSING, MEDICAL CARE, AND HEATING?: NOT HARD AT ALL

## 2023-05-09 SDOH — ECONOMIC STABILITY: HOUSING INSECURITY
IN THE LAST 12 MONTHS, WAS THERE A TIME WHEN YOU DID NOT HAVE A STEADY PLACE TO SLEEP OR SLEPT IN A SHELTER (INCLUDING NOW)?: NO

## 2023-05-09 SDOH — ECONOMIC STABILITY: FOOD INSECURITY: WITHIN THE PAST 12 MONTHS, THE FOOD YOU BOUGHT JUST DIDN'T LAST AND YOU DIDN'T HAVE MONEY TO GET MORE.: NEVER TRUE

## 2023-05-09 ASSESSMENT — PATIENT HEALTH QUESTIONNAIRE - PHQ9
SUM OF ALL RESPONSES TO PHQ QUESTIONS 1-9: 0
3. TROUBLE FALLING OR STAYING ASLEEP: 0
7. TROUBLE CONCENTRATING ON THINGS, SUCH AS READING THE NEWSPAPER OR WATCHING TELEVISION: 0
SUM OF ALL RESPONSES TO PHQ QUESTIONS 1-9: 0
4. FEELING TIRED OR HAVING LITTLE ENERGY: 0
SUM OF ALL RESPONSES TO PHQ QUESTIONS 1-9: 0
SUM OF ALL RESPONSES TO PHQ QUESTIONS 1-9: 0
SUM OF ALL RESPONSES TO PHQ9 QUESTIONS 1 & 2: 0
2. FEELING DOWN, DEPRESSED OR HOPELESS: 0
1. LITTLE INTEREST OR PLEASURE IN DOING THINGS: 0
5. POOR APPETITE OR OVEREATING: 0
8. MOVING OR SPEAKING SO SLOWLY THAT OTHER PEOPLE COULD HAVE NOTICED. OR THE OPPOSITE, BEING SO FIGETY OR RESTLESS THAT YOU HAVE BEEN MOVING AROUND A LOT MORE THAN USUAL: 0
10. IF YOU CHECKED OFF ANY PROBLEMS, HOW DIFFICULT HAVE THESE PROBLEMS MADE IT FOR YOU TO DO YOUR WORK, TAKE CARE OF THINGS AT HOME, OR GET ALONG WITH OTHER PEOPLE: 0
9. THOUGHTS THAT YOU WOULD BE BETTER OFF DEAD, OR OF HURTING YOURSELF: 0
6. FEELING BAD ABOUT YOURSELF - OR THAT YOU ARE A FAILURE OR HAVE LET YOURSELF OR YOUR FAMILY DOWN: 0

## 2023-05-09 NOTE — PROGRESS NOTES
daily  -     lamoTRIgine (LAMICTAL) 150 MG tablet; Take 1 tablet by mouth 2 times daily  -     tamsulosin (FLOMAX) 0.4 MG capsule; Take 1 capsule by mouth daily  -     DILANTIN 100 MG ER capsule; TAKE 1 CAPSULE IN THE      MORNING AND 1 CAPSULE IN   THE EVENING        Reviewed labs and test results with patient. Maintain current medications for seizure disorder    Proceed with lumbar spine x-ray but probably consistent with osteoarthritis. Discussed continue using his cane for balance    Patient received counseling on the following healthy behaviors: nutrition and exercise     Patient given educational materials     Health maintenance updated    Discussed use, benefit, and side effects of prescribed medications. Barriers to medication compliance addressed. All patient questions answered. Pt voiced understanding. Patient needs RTC in 6 months. Medical decision making of moderate complexity. Please note that this chart was generated using Dragon dictation software. Although every effort was made to ensure the accuracy of this automated transcription, some errors in transcription may have occurred. Patient was evaluated and examined. I performed the physical examination and key/critical portions of the history were approved and edited.  I also confirm that the note above accurately reflects all work, treatment, procedures, and medical decision making performed by me, Rodney Shaw M.D.

## 2023-08-18 DIAGNOSIS — R42 DIZZINESS: ICD-10-CM

## 2023-08-21 RX ORDER — MECLIZINE HCL 12.5 MG/1
TABLET ORAL
Qty: 270 TABLET | Refills: 0 | Status: SHIPPED | OUTPATIENT
Start: 2023-08-21

## 2023-08-21 RX ORDER — PHENYTOIN SODIUM 100 MG/1
CAPSULE, EXTENDED RELEASE ORAL
Qty: 180 CAPSULE | Refills: 1 | Status: SHIPPED | OUTPATIENT
Start: 2023-08-21

## 2023-08-29 ENCOUNTER — OFFICE VISIT (OUTPATIENT)
Dept: FAMILY MEDICINE CLINIC | Age: 48
End: 2023-08-29
Payer: MEDICARE

## 2023-08-29 VITALS
RESPIRATION RATE: 12 BRPM | WEIGHT: 155.5 LBS | DIASTOLIC BLOOD PRESSURE: 70 MMHG | TEMPERATURE: 97.5 F | SYSTOLIC BLOOD PRESSURE: 122 MMHG | BODY MASS INDEX: 20.52 KG/M2 | HEART RATE: 72 BPM

## 2023-08-29 DIAGNOSIS — S93.602A FOOT SPRAIN, LEFT, INITIAL ENCOUNTER: Primary | ICD-10-CM

## 2023-08-29 PROCEDURE — 99213 OFFICE O/P EST LOW 20 MIN: CPT | Performed by: FAMILY MEDICINE

## 2023-08-29 NOTE — PROGRESS NOTES
Subjective:      Patient ID: Rama Quezada is a 50 y.o. male. CC: Patient presents for acute medical problem-left foot strain. Medical assistant notes reviewed. Foot Pain   The pain is present in the left foot and left toes (middle toe). This is a new problem. Episode onset: 4 days ago. There has been a history of trauma. The problem occurs intermittently. The problem has been gradually worsening. The quality of the pain is described as aching and dull. The pain is at a severity of 5/10. The pain is mild. Associated symptoms include an inability to bear weight, joint swelling, numbness, stiffness and tingling. Exacerbated by: walking. He has tried acetaminophen for the symptoms. The treatment provided no relief. Patient has been taking Tylenol as he is not able take aspirin medication. He has not used any ice but he has been elevating his foot. Review of Systems   Musculoskeletal:  Positive for stiffness. Neurological:  Positive for tingling and numbness. Allergies   Allergen Reactions    Aspirin      H/O leukemia aand was told not take ASA      Objective:   Physical Exam  Vitals and nursing note reviewed. Constitutional:       General: He is not in acute distress. Appearance: He is well-developed. Musculoskeletal:      Left foot: Decreased range of motion. Normal capillary refill (Swelling of the second toe with ecchymosis and discomfort with range of motion. No other point tenderness of his foot. ). Swelling and deformity present. Skin:     General: Skin is warm. Findings: Bruising present. No rash. Neurological:      Mental Status: He is alert. Psychiatric:         Behavior: Behavior is cooperative. Assessment:      Meenu Torres was seen today for foot pain. Diagnoses and all orders for this visit:    Foot sprain, left, initial encounter  -     XR FOOT LEFT (2 VIEWS);  Future            Plan:      Informational handout provided  Recommend that he continue using his shoe support

## 2023-11-13 ENCOUNTER — OFFICE VISIT (OUTPATIENT)
Dept: FAMILY MEDICINE CLINIC | Age: 48
End: 2023-11-13
Payer: MEDICARE

## 2023-11-13 VITALS
OXYGEN SATURATION: 97 % | RESPIRATION RATE: 12 BRPM | DIASTOLIC BLOOD PRESSURE: 86 MMHG | BODY MASS INDEX: 21.13 KG/M2 | TEMPERATURE: 98.1 F | HEART RATE: 86 BPM | WEIGHT: 160.13 LBS | SYSTOLIC BLOOD PRESSURE: 124 MMHG

## 2023-11-13 DIAGNOSIS — M75.00 ADHESIVE CAPSULITIS OF SHOULDER, UNSPECIFIED LATERALITY: ICD-10-CM

## 2023-11-13 DIAGNOSIS — R42 DIZZINESS: ICD-10-CM

## 2023-11-13 DIAGNOSIS — G40.909 SEIZURE DISORDER (HCC): Primary | ICD-10-CM

## 2023-11-13 DIAGNOSIS — M15.9 PRIMARY OSTEOARTHRITIS INVOLVING MULTIPLE JOINTS: ICD-10-CM

## 2023-11-13 DIAGNOSIS — R26.89 BALANCE DISORDER: ICD-10-CM

## 2023-11-13 PROCEDURE — 99214 OFFICE O/P EST MOD 30 MIN: CPT | Performed by: FAMILY MEDICINE

## 2023-11-13 RX ORDER — MECLIZINE HCL 12.5 MG/1
TABLET ORAL
Qty: 270 TABLET | Refills: 0 | Status: CANCELLED | OUTPATIENT
Start: 2023-11-13

## 2023-11-13 NOTE — PROGRESS NOTES
Subjective:      Patient ID: Puja Vasquez is a 50 y.o. male. CC: Patient presents for re-evaluation of chronic health problems including seizure disorder, intermittent dizziness, osteoarthritis and balance disorder. Bonnie HORAN \pt is here for a follow up. Patient feels unchanged last appointment time. He had no falls. He does use a cane for balance. He states he takes his medications his bowels are working regularly for him. He has been seizure-free for quite some time. Unfortunately he only had a CMP performed with laboratory testing and he is due for reevaluation of all his seizure medications. Patient is very compliant with medications with no adverse reactions.     Review of Systems  Patient Active Problem List   Diagnosis    Seizure disorder (HCC)    Adhesive capsulitis of shoulder    Seborrheic dermatitis    History of leukemia    Balance disorder    Primary osteoarthritis involving multiple joints    Colitis    Colonic dysmotility    Brain lesion       Outpatient Medications Marked as Taking for the 11/13/23 encounter (Office Visit) with Jackson Pink MD   Medication Sig Dispense Refill    meclizine (ANTIVERT) 12.5 MG tablet TAKE 1 TABLET 3 TIMES A DAYAS NEEDED FOR DIZZINESS 270 tablet 0    DILANTIN 100 MG ER capsule TAKE 1 CAPSULE IN THE      MORNING AND 1 CAPSULE IN   THE EVENING 180 capsule 1    Probiotic Product (PROBIOTIC PO) Take by mouth      levETIRAcetam (KEPPRA) 500 MG tablet TAKE 1 TABLET IN THE       MORNING AND 2 TABLETS AT   BEDTIME. 270 tablet 3    lamoTRIgine (LAMICTAL) 25 MG tablet Take 1 tablet by mouth 2 times daily 180 tablet 3    lamoTRIgine (LAMICTAL) 150 MG tablet Take 1 tablet by mouth 2 times daily 180 tablet 3    tamsulosin (FLOMAX) 0.4 MG capsule Take 1 capsule by mouth daily 90 capsule 3    polyethylene glycol (GLYCOLAX) 17 g packet Take 17 g by mouth 2 times daily (Patient taking differently: Take 1 packet by mouth 3x weekly) 100 each 5    betamethasone valerate

## 2023-11-16 DIAGNOSIS — R42 DIZZINESS: ICD-10-CM

## 2023-11-16 RX ORDER — MECLIZINE HCL 12.5 MG/1
TABLET ORAL
Qty: 270 TABLET | Refills: 0 | Status: SHIPPED | OUTPATIENT
Start: 2023-11-16

## 2023-11-16 NOTE — TELEPHONE ENCOUNTER
Medication:   Requested Prescriptions     Pending Prescriptions Disp Refills    meclizine (ANTIVERT) 12.5 MG tablet [Pharmacy Med Name: [place]  TAB 12.5MG] 270 tablet 0     Sig: TAKE 1 TABLET 3 TIMES A DAYAS NEEDED FOR DIZZINESS      Last Filled:      Patient Phone Number: [phone] (home) [phone] (work)    Last appt: 11/13/2023   Next appt: Visit date not found    Last OARRS:        No data to display              PDMP Monitoring:    Last PDMP Hector Sit as Reviewed [facility]):  Review User Review Instant Review Result   Leonardo Veras 5/11/2020  5:08 AM Reviewed PDMP [1]     Preferred Pharmacy:   [place] [address] [phone] - F [phone]  [address] [address]  Phone: [phone] Fax: [phone]

## 2024-01-17 NOTE — TELEPHONE ENCOUNTER
ECC received a call from:    Name of Caller: Andrei Osorio    Relationship to patient:self    Organization name: N/A    Best contact number: [phone]    Reason for call: Patient is calling to schedule a post op appointment. Surgery date: 6/5/2020, Stomach/bowel/, [facility].
Bedside care participation

## 2024-04-23 ENCOUNTER — OFFICE VISIT (OUTPATIENT)
Dept: FAMILY MEDICINE CLINIC | Age: 49
End: 2024-04-23
Payer: MEDICARE

## 2024-04-23 VITALS
OXYGEN SATURATION: 99 % | DIASTOLIC BLOOD PRESSURE: 78 MMHG | HEART RATE: 88 BPM | SYSTOLIC BLOOD PRESSURE: 126 MMHG | TEMPERATURE: 100.4 F

## 2024-04-23 DIAGNOSIS — M79.672 LEFT FOOT PAIN: Primary | ICD-10-CM

## 2024-04-23 PROCEDURE — 99213 OFFICE O/P EST LOW 20 MIN: CPT | Performed by: STUDENT IN AN ORGANIZED HEALTH CARE EDUCATION/TRAINING PROGRAM

## 2024-04-23 ASSESSMENT — PATIENT HEALTH QUESTIONNAIRE - PHQ9
SUM OF ALL RESPONSES TO PHQ QUESTIONS 1-9: 5
SUM OF ALL RESPONSES TO PHQ9 QUESTIONS 1 & 2: 2
9. THOUGHTS THAT YOU WOULD BE BETTER OFF DEAD, OR OF HURTING YOURSELF: NOT AT ALL
6. FEELING BAD ABOUT YOURSELF - OR THAT YOU ARE A FAILURE OR HAVE LET YOURSELF OR YOUR FAMILY DOWN: NOT AT ALL
4. FEELING TIRED OR HAVING LITTLE ENERGY: SEVERAL DAYS
1. LITTLE INTEREST OR PLEASURE IN DOING THINGS: MORE THAN HALF THE DAYS
10. IF YOU CHECKED OFF ANY PROBLEMS, HOW DIFFICULT HAVE THESE PROBLEMS MADE IT FOR YOU TO DO YOUR WORK, TAKE CARE OF THINGS AT HOME, OR GET ALONG WITH OTHER PEOPLE: NOT DIFFICULT AT ALL
SUM OF ALL RESPONSES TO PHQ9 QUESTIONS 1 & 2: 4
SUM OF ALL RESPONSES TO PHQ QUESTIONS 1-9: 5
3. TROUBLE FALLING OR STAYING ASLEEP: SEVERAL DAYS
8. MOVING OR SPEAKING SO SLOWLY THAT OTHER PEOPLE COULD HAVE NOTICED. OR THE OPPOSITE, BEING SO FIGETY OR RESTLESS THAT YOU HAVE BEEN MOVING AROUND A LOT MORE THAN USUAL: NOT AT ALL
3. TROUBLE FALLING OR STAYING ASLEEP: SEVERAL DAYS
1. LITTLE INTEREST OR PLEASURE IN DOING THINGS: SEVERAL DAYS
2. FEELING DOWN, DEPRESSED OR HOPELESS: MORE THAN HALF THE DAYS
7. TROUBLE CONCENTRATING ON THINGS, SUCH AS READING THE NEWSPAPER OR WATCHING TELEVISION: SEVERAL DAYS
8. MOVING OR SPEAKING SO SLOWLY THAT OTHER PEOPLE COULD HAVE NOTICED. OR THE OPPOSITE, BEING SO FIGETY OR RESTLESS THAT YOU HAVE BEEN MOVING AROUND A LOT MORE THAN USUAL: NOT AT ALL
4. FEELING TIRED OR HAVING LITTLE ENERGY: SEVERAL DAYS
5. POOR APPETITE OR OVEREATING: NOT AT ALL
6. FEELING BAD ABOUT YOURSELF - OR THAT YOU ARE A FAILURE OR HAVE LET YOURSELF OR YOUR FAMILY DOWN: NOT AT ALL
SUM OF ALL RESPONSES TO PHQ QUESTIONS 1-9: 8
SUM OF ALL RESPONSES TO PHQ QUESTIONS 1-9: 5
2. FEELING DOWN, DEPRESSED OR HOPELESS: SEVERAL DAYS
7. TROUBLE CONCENTRATING ON THINGS, SUCH AS READING THE NEWSPAPER OR WATCHING TELEVISION: MORE THAN HALF THE DAYS
9. THOUGHTS THAT YOU WOULD BE BETTER OFF DEAD, OR OF HURTING YOURSELF: NOT AT ALL
SUM OF ALL RESPONSES TO PHQ QUESTIONS 1-9: 5
SUM OF ALL RESPONSES TO PHQ QUESTIONS 1-9: 8
5. POOR APPETITE OR OVEREATING: NOT AT ALL
SUM OF ALL RESPONSES TO PHQ QUESTIONS 1-9: 8
SUM OF ALL RESPONSES TO PHQ QUESTIONS 1-9: 8

## 2024-04-23 NOTE — PROGRESS NOTES
Office Note  2024  Patient Name: Amando Huffman  MRN: [id number] : 1975    SUBJECTIVE:     CHIEF COMPLAINT:  Chief Complaint   Patient presents with    Foot Pain     Patient hurt both feet yesterday when answering the door. He even fell when doing, patient denies neck pain and back pain but states he is having gluteal pain. Patient has tried tylenol.       HISTORY OF PRESENT ILLNESS:  He presents today with the following concerns:    Left foot pain:  Had a fall yesterday afternoon. Having left foot pain and swelling since then. Swelling is minimal. He is unable to bear weight without significant pain. No bruising. He did take Tylenol last night but did not today.     Past Medical History:  Past Medical History:   Diagnosis Date    Adhesive capsulitis of shoulder     Arthritis     Complex partial seizures with impaired consciousness at onset (HCC)     Leukemia in remission (HCC)     Seborrheic dermatitis, unspecified     Seizures (HCC)     Last seizure 3/2018     Past Surgical History:  Past Surgical History:   Procedure Laterality Date    COLONOSCOPY N/A 2020    COLONOSCOPY FLEXIBLE W/ DECOMPRESSION performed by Douglas Vogel MD at [facility] ENDOSCOPY    COLONOSCOPY  2020    COLONOSCOPY WITH BIOPSY performed by Douglas Vogel MD at [facility] ENDOSCOPY    COLONOSCOPY  2020    COLONOSCOPY DIAGNOSTIC performed by Juan M Cisneros MD at [facility] ENDOSCOPY    COLONOSCOPY N/A 2020    COLONOSCOPY CONTROL HEMORRHAGE performed by Flaquito Johnson MD at [facility] ENDOSCOPY    HEMICOLECTOMY N/A 2020    DIAGNOSTIC LAPAROSCOPY,REMOVAL OF FECAL IMPACTION, SIGMOID COLON RESECTION performed by Samson Lopez MD at [facility] OR    LOBECTOMY FOR SEIZURE FOCUS      PRE-MALIGNANT / BENIGN SKIN LESION EXCISION Left 2021    EXCISION SQUAMOUS CELL CARCINOMA LEFT SCALP; FROZEN SECTION performed by Edgar Jasso MD at [facility] OR    SHOULDER ARTHROSCOPY Right 2018    RIGHT SHOULDER ARTHROSCOPY

## 2024-04-24 ENCOUNTER — TELEPHONE (OUTPATIENT)
Dept: FAMILY MEDICINE CLINIC | Age: 49
End: 2024-04-24

## 2024-04-24 NOTE — TELEPHONE ENCOUNTER
Per Amando he has figured out the dizziness. He stated he is able to walk with a cane when the dizziness goes faster he is unable to ambulate. He is currently in the fast stage.     He would like to know what he should do next.   He can be reached at [phone].     Please advise.

## 2024-04-25 NOTE — TELEPHONE ENCOUNTER
Called to clarify with the patient what he was in need of and he stated he was able to get an appointment scheduled.     Please advise.

## 2024-04-30 ENCOUNTER — OFFICE VISIT (OUTPATIENT)
Dept: FAMILY MEDICINE CLINIC | Age: 49
End: 2024-04-30
Payer: MEDICARE

## 2024-04-30 VITALS
OXYGEN SATURATION: 95 % | BODY MASS INDEX: 20.71 KG/M2 | HEART RATE: 85 BPM | TEMPERATURE: 99.2 F | WEIGHT: 157 LBS | SYSTOLIC BLOOD PRESSURE: 124 MMHG | DIASTOLIC BLOOD PRESSURE: 64 MMHG

## 2024-04-30 DIAGNOSIS — M15.9 PRIMARY OSTEOARTHRITIS INVOLVING MULTIPLE JOINTS: Primary | ICD-10-CM

## 2024-04-30 DIAGNOSIS — R26.89 BALANCE DISORDER: ICD-10-CM

## 2024-04-30 DIAGNOSIS — S99.922S FOOT INJURY, LEFT, SEQUELA: ICD-10-CM

## 2024-04-30 PROCEDURE — 99213 OFFICE O/P EST LOW 20 MIN: CPT | Performed by: FAMILY MEDICINE

## 2024-04-30 NOTE — PROGRESS NOTES
SUBJECTIVE:    Amando Huffman is a 49 y.o. male who presents for a follow up visit.    Chief Complaint   Patient presents with    Follow-up        Foot Pain   The pain is present in the left foot (Now just the great toe). This is a new problem. The current episode started 1 to 4 weeks ago. The problem occurs constantly. The problem has been gradually improving. The quality of the pain is described as aching. The pain is mild. The symptoms are aggravated by standing and activity. He has tried acetaminophen for the symptoms. The treatment provided moderate relief.   Patient was originally seen by Dr. Zoila Teresa the day after his fall.  In her note he fell on 22 April.  2 days later he was seen in the emergency room at [facility].  Patient has trouble with his balance and uses a quad cane for ambulating normally.  Today he does state that his pain is much improved with only pain now in the first MTP of that left foot.    Patient's medications, allergies, past medical,surgical, social and family histories were reviewed and updated as appropriate.     Past Medical History:   Diagnosis Date    Adhesive capsulitis of shoulder     Arthritis     Complex partial seizures with impaired consciousness at onset (HCC)     Leukemia in remission (HCC)     Seborrheic dermatitis, unspecified     Seizures (HCC)     Last seizure 3/2018     Past Surgical History:   Procedure Laterality Date    COLONOSCOPY N/A 5/13/2020    COLONOSCOPY FLEXIBLE W/ DECOMPRESSION performed by Douglas Vogel MD at [facility] ENDOSCOPY    COLONOSCOPY  5/13/2020    COLONOSCOPY WITH BIOPSY performed by Douglas Vogel MD at [facility] ENDOSCOPY    COLONOSCOPY  5/18/2020    COLONOSCOPY DIAGNOSTIC performed by Juan M Cisneros MD at [facility] ENDOSCOPY    COLONOSCOPY N/A 6/5/2020    COLONOSCOPY CONTROL HEMORRHAGE performed by Flaquito Johnson MD at [facility] ENDOSCOPY    HEMICOLECTOMY N/A 5/27/2020    DIAGNOSTIC LAPAROSCOPY,REMOVAL OF FECAL IMPACTION, SIGMOID COLON

## 2024-05-08 DIAGNOSIS — R42 DIZZINESS: ICD-10-CM

## 2024-05-08 DIAGNOSIS — G40.909 SEIZURE DISORDER (HCC): ICD-10-CM

## 2024-05-08 RX ORDER — LEVETIRACETAM 500 MG/1
TABLET ORAL
Qty: 270 TABLET | Refills: 0 | Status: SHIPPED | OUTPATIENT
Start: 2024-05-08

## 2024-05-08 RX ORDER — MECLIZINE HCL 12.5 MG/1
TABLET ORAL
Qty: 270 TABLET | Refills: 0 | Status: SHIPPED | OUTPATIENT
Start: 2024-05-08

## 2024-05-13 NOTE — TELEPHONE ENCOUNTER
Discharge Instructions [facility]  [address]   [address], [address]  [place] [address]   Telephone: [phone]     FAX [phone]    NAME:  Kylee Koo  YOB: 1940  MEDICAL RECORD NUMBER:  [id number]  DATE:  5/13/2024    CPT code:Debridement SQ (22156)    WOUND CARE ORDERS:  Left and Right lower leg wounds :Cleanse with soap and water , apply primary dressing Silver Alginate  cover with secondary dressing ABD, Roll Gauze, and Tape .  Apply Double tubi  Pt./pcg/HH nurse to change (freq) 3x Weekly  and as needed for compromise.Follow up with provider in 2 Week(s).   Home Health Agency: [facility]  TREATMENT ORDERS:    Elevate leg(s) above the level of the heart when sitting.   Avoid prolonged standing in one place.  Do no get dressing/wrap wet.  Follow Diet as prescribed:   [] Diet as tolerated: [] Calorie Diabetic Diet: Low carb and no Sugar [] No Added Salt:  [] Increase Protein: [] Limit the amount of liquid you are drinking and avoid drinking in between meals     Return Appointment:  [x] Return Appointment: With Dr. Love in  2 Week(s)  [] Nurse Visit : *** days  [] Ordered tests:    Electronically signed Vy Phillips RN on 5/13/2024 at 9:16 AM     Wound Care Center Information: Should you experience any significant changes in your wound(s) or have questions about your wound care, please contact the [facility] at MONDAY - FRIDAY 8:00 am - 4:30.  If you need help with your wound outside these hours and cannot wait until we are again available, contact your PCP or go to the hospital emergency room.   PLEASE NOTE: IF YOU ARE UNABLE TO OBTAIN WOUND SUPPLIES, CONTINUE TO USE THE SUPPLIES YOU HAVE AVAILABLE UNTIL YOU ARE ABLE TO REACH US. IT IS MOST IMPORTANT TO KEEP THE WOUND COVERED AT ALL TIMES.     Physician Signature:_______________________    Date: ___________ Time:  ____________     Medication:   Requested Prescriptions     Pending Prescriptions Disp Refills    meclizine (ANTIVERT) 12.5 MG tablet [Pharmacy Med Name: Paco Estimable  TAB 12.5MG] 90 tablet 2     Sig: TAKE 1 TABLET 3 TIMES A DAYAS NEEDED FOR DIZZINESS    DILANTIN 100 MG ER capsule [Pharmacy Med Name: Talia Jt CAP 100MG] 180 capsule 1     Sig: TAKE 1 CAPSULE IN THE      MORNING AND 1 CAPSULE IN   THE EVENING      Last Filled:     Patient Phone Number: [phone] (home)     Last appt: 5/12/2021  Next appt: Visit date not found    Last OARRS: No flowsheet data found.   PDMP Monitoring:    Last PDMP Bonilla Reynolds as Reviewed [facility]):  Review User Review Instant Review Result   Dessie Crigler 5/11/2020  5:08 AM Reviewed PDMP [1]     Preferred Pharmacy:   [address] [place] [address].. [address] [address]  Phone: [phone] Fax:  [address] [facility] [address]  [address] [facility],[address] [address]  Phone: [phone] Fax: [phone]

## 2024-05-22 DIAGNOSIS — G40.909 SEIZURE DISORDER (HCC): ICD-10-CM

## 2024-05-22 RX ORDER — LEVETIRACETAM 500 MG/1
TABLET ORAL
Qty: 270 TABLET | Refills: 0 | OUTPATIENT
Start: 2024-05-22

## 2024-05-28 RX ORDER — PHENYTOIN SODIUM 100 MG/1
CAPSULE, EXTENDED RELEASE ORAL
Qty: 180 CAPSULE | Refills: 0 | Status: SHIPPED | OUTPATIENT
Start: 2024-05-28 | End: 2024-05-31 | Stop reason: SDUPTHER

## 2024-05-28 RX ORDER — LAMOTRIGINE 25 MG/1
TABLET ORAL
Qty: 180 TABLET | Refills: 0 | Status: SHIPPED | OUTPATIENT
Start: 2024-05-28

## 2024-05-29 NOTE — TELEPHONE ENCOUNTER
Medication:   Requested Prescriptions     Pending Prescriptions Disp Refills    phenytoin (DILANTIN) 100 MG ER capsule [Pharmacy Med Name: DILANTIN 100MG CAPS]      lamoTRIgine (LAMICTAL) 25 MG tablet [Pharmacy Med Name: LAMOTRIGINE 25MG TABS]        Last Filled:  5/28/2024    Patient Phone Number: [phone] (home) [phone] (work)    Last appt: 4/30/2024 w/RB, last appt w/11/13/23  Next appt: Visit date not found    Last OARRS:        No data to display              PDMP Monitoring:    Last PDMP Adonay as Reviewed ([place]):  Review User Review Instant Review Result   KALANI SARABIA 5/11/2020  5:08 AM Reviewed PDMP [1]     Preferred Pharmacy:   [facility] - [place], [place] - [address] [facility] -  [phone] - F [phone]  [address] [place] [address] A  [facility] [address]  Phone: [phone] Fax: [phone]    [facility] [id number] - [facility] [address] [facility] -  [phone] - F [phone]  [address] [place] [address]  Phone: [phone] Fax: [phone]    [facility] - [place], [place] - [address] [facility] - P [phone] - F [phone] [address] [facility] [address]  Phone: [phone] Fax: [phone]

## 2024-05-30 RX ORDER — LAMOTRIGINE 25 MG/1
TABLET ORAL
OUTPATIENT
Start: 2024-05-30

## 2024-05-30 RX ORDER — PHENYTOIN SODIUM 100 MG/1
CAPSULE, EXTENDED RELEASE ORAL
OUTPATIENT
Start: 2024-05-30

## 2024-05-31 RX ORDER — PHENYTOIN SODIUM 100 MG/1
CAPSULE, EXTENDED RELEASE ORAL
Qty: 180 CAPSULE | Refills: 0 | Status: SHIPPED | OUTPATIENT
Start: 2024-05-31

## 2024-05-31 RX ORDER — PHENYTOIN SODIUM 100 MG/1
CAPSULE, EXTENDED RELEASE ORAL
OUTPATIENT
Start: 2024-05-31

## 2024-06-05 ENCOUNTER — TELEPHONE (OUTPATIENT)
Dept: FAMILY MEDICINE CLINIC | Age: 49
End: 2024-06-05

## 2024-06-05 NOTE — TELEPHONE ENCOUNTER
----- Message from Madelyn Pereira sent at 6/3/2024  7:28 AM EDT -----  Regarding: FW: ECC Appointment Request    ----- Message -----  From: Tatianna Benedict LPN  Sent: 5/29/2024   5:27 PM EDT  To: Madelyn Pereira  Subject: FW: ECC Appointment Request                        ----- Message -----  From: Sharlene Ramon  Sent: 5/29/2024   3:33 PM EDT  To: [facility] [place] [facility]  Subject: ECC Appointment Request                          ECC Appointment Request    Patient needs appointment for ECC Appointment Type: Existing Condition Follow Up.    Reason for Appointment Request: Available appointments did not meet patient need  Would like to have a follow up sometime next week in the afternoon with physical wellness if possible.   --------------------------------------------------------------------------------------------------------------------------    Relationship to Patient: Self     Call Back Information: OK to leave message on voicemail  Preferred Call Back Number: Phone [phone]

## 2024-06-12 ENCOUNTER — TELEPHONE (OUTPATIENT)
Dept: FAMILY MEDICINE CLINIC | Age: 49
End: 2024-06-12

## 2024-06-12 NOTE — TELEPHONE ENCOUNTER
Patient went to [facility] to have fingernails cut and they are now hurting from being cut to short. He would like to know what he can do to help with the pain. Please give him a call back at [phone].     Please advise.

## 2024-06-14 ENCOUNTER — TELEPHONE (OUTPATIENT)
Dept: FAMILY MEDICINE CLINIC | Age: 49
End: 2024-06-14

## 2024-06-14 NOTE — TELEPHONE ENCOUNTER
Patient called and has skin under fingernails that is painful and wants to know if he should schedule an appointment. Please give him a call at [phone]    Please advise.

## 2024-06-17 NOTE — TELEPHONE ENCOUNTER
Patient called and has skin under fingernails that is painful and wants to know if he should schedule an appointment. Please give him a call at [phone]     Please advise.

## 2024-06-19 NOTE — PROGRESS NOTES
97.9 °F (36.6 °C)   TempSrc: Infrared   SpO2: 99%   Weight: 70.9 kg (156 lb 3.2 oz)      Body mass index is 20.61 kg/m².    PHYSICAL EXAM:  GENERAL: NAD, alert and oriented  HEENT: Head: NC/AT. Eyes: clear conjunctiva.  SKIN: Skin color, texture, and turgor normal. Hangnail?/piece of skin underneath the left ring finger  PSYCH: Normal affect and speech    ASSESSMENT & PLAN:     49 y.o. male presents to the office for the followin. Hangnail of digit of left hand  - Small hangnail-like piece of skin under the left ring finger  - Gently removed with sterile curved iris scissors.  - Patient tolerated well. No bleeding.  - Advised patient to monitor for signs of infection and call if he does      RTO: No follow-ups on file.      If the patient has a future appointment, it is displayed below:  Future Appointments   Date Time Provider Department Center   2024  2:30 PM Cain Capps MD [facility] [place] [facility]         Electronically signed by Zoila Teresa DO on 2024 at 3:27 PM

## 2024-06-20 ENCOUNTER — OFFICE VISIT (OUTPATIENT)
Dept: FAMILY MEDICINE CLINIC | Age: 49
End: 2024-06-20
Payer: MEDICARE

## 2024-06-20 VITALS
BODY MASS INDEX: 20.61 KG/M2 | WEIGHT: 156.2 LBS | SYSTOLIC BLOOD PRESSURE: 118 MMHG | OXYGEN SATURATION: 99 % | TEMPERATURE: 97.9 F | HEART RATE: 80 BPM | DIASTOLIC BLOOD PRESSURE: 84 MMHG

## 2024-06-20 DIAGNOSIS — L03.012 HANGNAIL OF DIGIT OF LEFT HAND: Primary | ICD-10-CM

## 2024-06-20 PROCEDURE — 99213 OFFICE O/P EST LOW 20 MIN: CPT | Performed by: STUDENT IN AN ORGANIZED HEALTH CARE EDUCATION/TRAINING PROGRAM

## 2024-06-20 SDOH — ECONOMIC STABILITY: FOOD INSECURITY: WITHIN THE PAST 12 MONTHS, YOU WORRIED THAT YOUR FOOD WOULD RUN OUT BEFORE YOU GOT MONEY TO BUY MORE.: NEVER TRUE

## 2024-06-20 SDOH — ECONOMIC STABILITY: FOOD INSECURITY: WITHIN THE PAST 12 MONTHS, THE FOOD YOU BOUGHT JUST DIDN'T LAST AND YOU DIDN'T HAVE MONEY TO GET MORE.: NEVER TRUE

## 2024-06-20 SDOH — ECONOMIC STABILITY: INCOME INSECURITY: HOW HARD IS IT FOR YOU TO PAY FOR THE VERY BASICS LIKE FOOD, HOUSING, MEDICAL CARE, AND HEATING?: NOT HARD AT ALL

## 2024-06-20 NOTE — PATIENT INSTRUCTIONS
to schedule transportation.      All [facility] [employer] [facility] are Private Pay  Schedule 3 business days in advance     Provider Phone   [facility] [phone]   [facility] [phone]   [facility] [phone]   [facility] [phone]   [facility] [phone]   [facility] [phone]   [facility] [phone]   [facility] [phone]   [facility] [phone]   [facility] [phone]   [facility] [phone]

## 2024-07-23 DIAGNOSIS — G40.909 SEIZURE DISORDER (HCC): ICD-10-CM

## 2024-07-23 RX ORDER — TAMSULOSIN HYDROCHLORIDE 0.4 MG/1
0.4 CAPSULE ORAL DAILY
Qty: 90 CAPSULE | Refills: 0 | Status: SHIPPED | OUTPATIENT
Start: 2024-07-23

## 2024-07-23 RX ORDER — LEVETIRACETAM 500 MG/1
TABLET ORAL
Qty: 270 TABLET | Refills: 0 | Status: SHIPPED | OUTPATIENT
Start: 2024-07-23

## 2024-07-23 NOTE — TELEPHONE ENCOUNTER
Medication:   Requested Prescriptions     Pending Prescriptions Disp Refills    tamsulosin (FLOMAX) 0.4 MG capsule 90 capsule 3     Sig: Take 1 capsule by mouth daily    levETIRAcetam (KEPPRA) 500 MG tablet 270 tablet 0     Sig: TAKE 1 TABLET IN THE       MORNING AND 2 TABLETS AT   BEDTIME.      Last Filled:      Patient Phone Number: [phone] (home) [phone] (work)    Last appt: 6/20/2024   Next appt: 9/16/2024    Last OARRS:        No data to display              PDMP Monitoring:    Last PDMP Adonay as Reviewed ([place]):  Review User Review Instant Review Result   KALANI SARABIA 5/11/2020  5:08 AM Reviewed PDMP [1]     Preferred Pharmacy:     [id number] - [place], [place] - [address] [facility] -  [phone] - F [phone] [address] [facility] [address]  Phone: [phone] Fax: [phone]

## 2024-07-29 NOTE — TELEPHONE ENCOUNTER
Medication:   Requested Prescriptions     Pending Prescriptions Disp Refills    DILANTIN 100 MG ER capsule 180 capsule 0     Sig: To be filled by Provider    lamoTRIgine (LAMICTAL) 150 MG tablet 180 tablet 0     Sig: Take 1 tablet by mouth 2 times daily      Last Filled:  5/31/24-5/21/24    Patient Phone Number: [phone] (home) [phone] (work)    Last appt: 6/20/2024   Next appt: 9/16/2024    Last OARRS:        No data to display              PDMP Monitoring:    Last PDMP Adonay as Reviewed ([place]):  Review User Review Instant Review Result   KALANI SARABIA 5/11/2020  5:08 AM Reviewed PDMP [1]     Preferred Pharmacy:     [facility] - [place], [place] - [address] - P [phone] - F [phone]  [address]  [address]  [facility] [address]  Phone: [phone] Fax: [phone]

## 2024-07-29 NOTE — TELEPHONE ENCOUNTER
DILANTIN 100 MG ER capsule     lamoTRIgine (LAMICTAL) 25 MG tablet     [facility] - [place], [place] - [address] [facility] - P [phone] - F [phone]  [address] [facility] [address]  Phone: [phone]  Fax: [phone]

## 2024-07-30 RX ORDER — PHENYTOIN SODIUM 100 MG/1
100 CAPSULE, EXTENDED RELEASE ORAL 2 TIMES DAILY
Qty: 180 CAPSULE | Refills: 0 | Status: SHIPPED | OUTPATIENT
Start: 2024-07-30

## 2024-07-30 RX ORDER — LAMOTRIGINE 150 MG/1
150 TABLET ORAL 2 TIMES DAILY
Qty: 180 TABLET | Refills: 0 | Status: SHIPPED | OUTPATIENT
Start: 2024-07-30

## 2024-07-30 RX ORDER — PHENYTOIN SODIUM 100 MG/1
CAPSULE, EXTENDED RELEASE ORAL
Qty: 180 CAPSULE | Refills: 0 | Status: SHIPPED | OUTPATIENT
Start: 2024-07-30 | End: 2024-07-30

## 2024-08-08 ENCOUNTER — TELEPHONE (OUTPATIENT)
Dept: FAMILY MEDICINE CLINIC | Age: 49
End: 2024-08-08

## 2024-08-08 DIAGNOSIS — Z00.00 WELL ADULT EXAM: ICD-10-CM

## 2024-08-08 DIAGNOSIS — G40.909 SEIZURE DISORDER (HCC): ICD-10-CM

## 2024-08-08 DIAGNOSIS — Z13.220 SCREENING FOR LIPID DISORDERS: ICD-10-CM

## 2024-08-08 LAB
ALBUMIN SERPL-MCNC: 4.3 G/DL (ref 3.4–5)
ALBUMIN/GLOB SERPL: 1.6 {RATIO} (ref 1.1–2.2)
ALP SERPL-CCNC: 96 U/L (ref 40–129)
ALT SERPL-CCNC: <5 U/L (ref 10–40)
ANION GAP SERPL CALCULATED.3IONS-SCNC: 10 MMOL/L (ref 3–16)
AST SERPL-CCNC: 8 U/L (ref 15–37)
BILIRUB SERPL-MCNC: 0.3 MG/DL (ref 0–1)
BUN SERPL-MCNC: 10 MG/DL (ref 7–20)
CALCIUM SERPL-MCNC: 9.2 MG/DL (ref 8.3–10.6)
CHLORIDE SERPL-SCNC: 102 MMOL/L (ref 99–110)
CHOLEST SERPL-MCNC: 221 MG/DL (ref 0–199)
CO2 SERPL-SCNC: 29 MMOL/L (ref 21–32)
CREAT SERPL-MCNC: 0.9 MG/DL (ref 0.9–1.3)
DEPRECATED RDW RBC AUTO: 14.2 % (ref 12.4–15.4)
GFR SERPLBLD CREATININE-BSD FMLA CKD-EPI: >90 ML/MIN/{1.73_M2}
GLUCOSE SERPL-MCNC: 106 MG/DL (ref 70–99)
HCT VFR BLD AUTO: 37.7 % (ref 40.5–52.5)
HDLC SERPL-MCNC: 81 MG/DL (ref 40–60)
HGB BLD-MCNC: 12.8 G/DL (ref 13.5–17.5)
LDLC SERPL CALC-MCNC: 126 MG/DL
LEVETIRACETAM SERPL-MCNC: 31.5 UG/ML (ref 6–46)
MCH RBC QN AUTO: 30.9 PG (ref 26–34)
MCHC RBC AUTO-ENTMCNC: 33.8 G/DL (ref 31–36)
MCV RBC AUTO: 91.3 FL (ref 80–100)
MEDICATION DOSE-MCNC: NORMAL
PHENYTOIN DOSE: NORMAL MG
PHENYTOIN SERPL-MCNC: 19.9 UG/ML (ref 10–20)
PLATELET # BLD AUTO: 234 K/UL (ref 135–450)
PMV BLD AUTO: 9.4 FL (ref 5–10.5)
POTASSIUM SERPL-SCNC: 3.7 MMOL/L (ref 3.5–5.1)
PROT SERPL-MCNC: 7 G/DL (ref 6.4–8.2)
RBC # BLD AUTO: 4.13 M/UL (ref 4.2–5.9)
SODIUM SERPL-SCNC: 141 MMOL/L (ref 136–145)
TRIGL SERPL-MCNC: 69 MG/DL (ref 0–150)
VLDLC SERPL CALC-MCNC: 14 MG/DL
WBC # BLD AUTO: 5.1 K/UL (ref 4–11)

## 2024-08-08 NOTE — TELEPHONE ENCOUNTER
Called in about labs, confirmed pt can go to any [facility] lab to fulfill collection.

## 2024-08-10 LAB — LAMOTRIGINE SERPL-MCNC: 11.6 UG/ML (ref 3–15)

## 2024-08-16 DIAGNOSIS — G40.909 SEIZURE DISORDER (HCC): ICD-10-CM

## 2024-08-16 RX ORDER — LEVETIRACETAM 500 MG/1
TABLET ORAL
Qty: 270 TABLET | Refills: 0 | Status: SHIPPED | OUTPATIENT
Start: 2024-08-16

## 2024-08-19 SDOH — HEALTH STABILITY: PHYSICAL HEALTH: ON AVERAGE, HOW MANY DAYS PER WEEK DO YOU ENGAGE IN MODERATE TO STRENUOUS EXERCISE (LIKE A BRISK WALK)?: 0 DAYS

## 2024-08-19 SDOH — HEALTH STABILITY: PHYSICAL HEALTH: ON AVERAGE, HOW MANY MINUTES DO YOU ENGAGE IN EXERCISE AT THIS LEVEL?: 0 MIN

## 2024-08-21 ENCOUNTER — OFFICE VISIT (OUTPATIENT)
Dept: ORTHOPEDIC SURGERY | Age: 49
End: 2024-08-21
Payer: MEDICARE

## 2024-08-21 VITALS — WEIGHT: 150 LBS | BODY MASS INDEX: 19.88 KG/M2 | HEIGHT: 73 IN

## 2024-08-21 DIAGNOSIS — S83.211A BUCKET-HANDLE TEAR OF MEDIAL MENISCUS OF RIGHT KNEE AS CURRENT INJURY, INITIAL ENCOUNTER: Primary | ICD-10-CM

## 2024-08-21 DIAGNOSIS — M25.561 RIGHT KNEE PAIN, UNSPECIFIED CHRONICITY: ICD-10-CM

## 2024-08-21 PROCEDURE — 99203 OFFICE O/P NEW LOW 30 MIN: CPT

## 2024-08-23 NOTE — PROGRESS NOTES
perfused      right Knee Examination:     Skin:  There are no skin lesions, cellulitis, or extreme edema in the lower extremities. Sensation is grossly intact to light touch bilaterally lower extremity. The patient has warm and well-perfused Bilateral     Inspection:   negative effusion, no ecchymosis, discoloration, erythema, excessive warmth. No gross deformities, no signs of infection.     Palpation: There is mild patellofemoral crepitus, there is Medial joint line tenderness.  No other osseous or soft tissue tenderness around the knee.  Negative calf tenderness    Range of Motion:  Patellar mobility is normal and moves 1 quadrant in both the medial and lateral directions. Flexion arc is  0-145 degrees with  pain/difficulty    Strength:  5/5 quadriceps, 5/5 hamstrings    Special Tests:   No ligament instability, valgus stress test and MCL stable at 0 and 30°, varus stress test and LCL are stable at 0 and 30°.  Lachman's exhibits a solid endpoint.  Nelson's test  positive.  Negative Homans sign  Q-angle normal    Gait:  Steady, Non antalgic, without the use of assistive devices    Alignment:  Neutral alignment     Neuro: Sensation equal bilateral lower extremities    Vascular: 2+ posterior tibialis pulse       Radiology:     4 View X-rays (AP Standing, 45deg PA weight-bearing, lateral, and merchant views) of both knees were obtained and reviewed in office.  Impression: no acute abnormalities, no fracture. Large protrusion posterior tibia.       Assessment: Patient is a 49 y.o. male with 3+ years of right mechanical knee pain concerning for meniscus tear.    Impression:  Visit Diagnoses         Codes    Bucket-handle tear of medial meniscus of right knee as current injury, initial encounter    -  Primary S83.211A    Right knee pain, unspecified chronicity     M25.561            Office Procedures:  No orders of the defined types were placed in this encounter.    Orders Placed This Encounter   Procedures    XR KNEE

## 2024-09-15 SDOH — HEALTH STABILITY: PHYSICAL HEALTH: ON AVERAGE, HOW MANY MINUTES DO YOU ENGAGE IN EXERCISE AT THIS LEVEL?: 0 MIN

## 2024-09-15 SDOH — HEALTH STABILITY: PHYSICAL HEALTH
ON AVERAGE, HOW MANY DAYS PER WEEK DO YOU ENGAGE IN MODERATE TO STRENUOUS EXERCISE (LIKE A BRISK WALK)?: PATIENT DECLINED

## 2024-09-15 ASSESSMENT — LIFESTYLE VARIABLES
HOW MANY STANDARD DRINKS CONTAINING ALCOHOL DO YOU HAVE ON A TYPICAL DAY: 0
HOW MANY STANDARD DRINKS CONTAINING ALCOHOL DO YOU HAVE ON A TYPICAL DAY: PATIENT DOES NOT DRINK
HOW OFTEN DO YOU HAVE A DRINK CONTAINING ALCOHOL: 1
HOW OFTEN DO YOU HAVE A DRINK CONTAINING ALCOHOL: NEVER
HOW OFTEN DO YOU HAVE SIX OR MORE DRINKS ON ONE OCCASION: 1

## 2024-09-15 ASSESSMENT — PATIENT HEALTH QUESTIONNAIRE - PHQ9
1. LITTLE INTEREST OR PLEASURE IN DOING THINGS: NOT AT ALL
SUM OF ALL RESPONSES TO PHQ QUESTIONS 1-9: 2
SUM OF ALL RESPONSES TO PHQ9 QUESTIONS 1 & 2: 2
SUM OF ALL RESPONSES TO PHQ QUESTIONS 1-9: 2
SUM OF ALL RESPONSES TO PHQ QUESTIONS 1-9: 2
2. FEELING DOWN, DEPRESSED OR HOPELESS: MORE THAN HALF THE DAYS
SUM OF ALL RESPONSES TO PHQ QUESTIONS 1-9: 2

## 2024-09-16 ENCOUNTER — OFFICE VISIT (OUTPATIENT)
Dept: FAMILY MEDICINE CLINIC | Age: 49
End: 2024-09-16
Payer: MEDICARE

## 2024-09-16 VITALS
TEMPERATURE: 98.4 F | BODY MASS INDEX: 19.09 KG/M2 | HEART RATE: 92 BPM | OXYGEN SATURATION: 98 % | HEIGHT: 73 IN | DIASTOLIC BLOOD PRESSURE: 82 MMHG | SYSTOLIC BLOOD PRESSURE: 118 MMHG | WEIGHT: 144 LBS

## 2024-09-16 DIAGNOSIS — M15.9 PRIMARY OSTEOARTHRITIS INVOLVING MULTIPLE JOINTS: ICD-10-CM

## 2024-09-16 DIAGNOSIS — Z85.6 HISTORY OF LEUKEMIA: ICD-10-CM

## 2024-09-16 DIAGNOSIS — K29.00 ACUTE GASTRITIS, PRESENCE OF BLEEDING UNSPECIFIED, UNSPECIFIED GASTRITIS TYPE: ICD-10-CM

## 2024-09-16 DIAGNOSIS — K59.9 COLONIC DYSMOTILITY: ICD-10-CM

## 2024-09-16 DIAGNOSIS — Z00.00 INITIAL MEDICARE ANNUAL WELLNESS VISIT: Primary | ICD-10-CM

## 2024-09-16 DIAGNOSIS — R26.89 BALANCE DISORDER: ICD-10-CM

## 2024-09-16 DIAGNOSIS — G40.909 SEIZURE DISORDER (HCC): ICD-10-CM

## 2024-09-16 PROBLEM — K52.9 COLITIS: Status: RESOLVED | Noted: 2020-05-10 | Resolved: 2024-09-16

## 2024-09-16 PROCEDURE — G0438 PPPS, INITIAL VISIT: HCPCS | Performed by: FAMILY MEDICINE

## 2024-09-16 PROCEDURE — 99214 OFFICE O/P EST MOD 30 MIN: CPT | Performed by: FAMILY MEDICINE

## 2024-09-16 RX ORDER — ONDANSETRON 4 MG/1
4 TABLET, FILM COATED ORAL 3 TIMES DAILY PRN
Qty: 15 TABLET | Refills: 0 | Status: SHIPPED | OUTPATIENT
Start: 2024-09-16 | End: 2024-09-19 | Stop reason: SDUPTHER

## 2024-09-16 RX ORDER — LAMOTRIGINE 150 MG/1
150 TABLET ORAL 2 TIMES DAILY
Qty: 180 TABLET | Refills: 0 | Status: SHIPPED | OUTPATIENT
Start: 2024-09-16

## 2024-09-16 RX ORDER — TAMSULOSIN HYDROCHLORIDE 0.4 MG/1
0.4 CAPSULE ORAL DAILY
Qty: 90 CAPSULE | Refills: 0 | Status: SHIPPED | OUTPATIENT
Start: 2024-09-16

## 2024-09-16 RX ORDER — PANTOPRAZOLE SODIUM 40 MG/1
40 TABLET, DELAYED RELEASE ORAL DAILY
Qty: 30 TABLET | Refills: 0 | Status: SHIPPED | OUTPATIENT
Start: 2024-09-16

## 2024-09-16 RX ORDER — LEVETIRACETAM 500 MG/1
TABLET ORAL
Qty: 270 TABLET | Refills: 0 | Status: SHIPPED | OUTPATIENT
Start: 2024-09-16

## 2024-09-16 RX ORDER — LAMOTRIGINE 25 MG/1
25 TABLET ORAL DAILY
Qty: 180 TABLET | Refills: 0 | Status: SHIPPED | OUTPATIENT
Start: 2024-09-16

## 2024-09-16 RX ORDER — PHENYTOIN SODIUM 100 MG/1
100 CAPSULE, EXTENDED RELEASE ORAL 2 TIMES DAILY
Qty: 180 CAPSULE | Refills: 0 | Status: SHIPPED | OUTPATIENT
Start: 2024-09-16

## 2024-09-18 ENCOUNTER — OFFICE VISIT (OUTPATIENT)
Dept: FAMILY MEDICINE CLINIC | Age: 49
End: 2024-09-18
Payer: MEDICARE

## 2024-09-18 VITALS
TEMPERATURE: 97.7 F | BODY MASS INDEX: 19.26 KG/M2 | SYSTOLIC BLOOD PRESSURE: 100 MMHG | DIASTOLIC BLOOD PRESSURE: 72 MMHG | OXYGEN SATURATION: 99 % | HEART RATE: 104 BPM | WEIGHT: 146 LBS

## 2024-09-18 DIAGNOSIS — Z09 HOSPITAL DISCHARGE FOLLOW-UP: ICD-10-CM

## 2024-09-18 DIAGNOSIS — N20.1 RIGHT URETERAL STONE: Primary | ICD-10-CM

## 2024-09-18 PROCEDURE — 99214 OFFICE O/P EST MOD 30 MIN: CPT | Performed by: NURSE PRACTITIONER

## 2024-09-18 RX ORDER — PROMETHAZINE HYDROCHLORIDE 12.5 MG/1
12.5 TABLET ORAL EVERY 6 HOURS PRN
COMMUNITY
Start: 2024-09-17

## 2024-09-18 RX ORDER — ONDANSETRON 4 MG/1
TABLET, ORALLY DISINTEGRATING ORAL
COMMUNITY
Start: 2024-09-17

## 2024-09-18 RX ORDER — OXYCODONE HYDROCHLORIDE 5 MG/1
5 TABLET ORAL EVERY 6 HOURS PRN
COMMUNITY
Start: 2024-09-17 | End: 2024-09-20

## 2024-09-19 RX ORDER — ONDANSETRON 4 MG/1
4 TABLET, FILM COATED ORAL 3 TIMES DAILY PRN
Qty: 15 TABLET | Refills: 0 | Status: SHIPPED | OUTPATIENT
Start: 2024-09-19

## 2024-09-23 RX ORDER — TAMSULOSIN HYDROCHLORIDE 0.4 MG/1
0.4 CAPSULE ORAL DAILY
Qty: 90 CAPSULE | Refills: 1 | Status: SHIPPED | OUTPATIENT
Start: 2024-09-23

## 2024-09-30 ENCOUNTER — TELEPHONE (OUTPATIENT)
Dept: FAMILY MEDICINE CLINIC | Age: 49
End: 2024-09-30

## 2024-09-30 DIAGNOSIS — N99.820 POSTOPERATIVE HEMORRHAGE INVOLVING GENITOURINARY SYSTEM FOLLOWING GENITOURINARY PROCEDURE: Primary | ICD-10-CM

## 2024-09-30 NOTE — TELEPHONE ENCOUNTER
Patient had surgery 3 days ago, went back to ER last night due to bleeding. Father is doing home care now but us unable. Amando is requesting Home Healthcare due to needing care after surgery. Nurse from surgery has not called back for at home care scheduling. Please call patient back at [phone]    Please advise

## 2024-10-14 RX ORDER — PANTOPRAZOLE SODIUM 40 MG/1
40 TABLET, DELAYED RELEASE ORAL DAILY
Qty: 90 TABLET | Refills: 0 | Status: SHIPPED | OUTPATIENT
Start: 2024-10-14

## 2024-10-17 ENCOUNTER — TELEPHONE (OUTPATIENT)
Dept: FAMILY MEDICINE CLINIC | Age: 49
End: 2024-10-17

## 2024-10-18 DIAGNOSIS — G40.909 SEIZURE DISORDER (HCC): ICD-10-CM

## 2024-10-18 RX ORDER — LEVETIRACETAM 500 MG/1
TABLET ORAL
Qty: 270 TABLET | Refills: 1 | Status: SHIPPED | OUTPATIENT
Start: 2024-10-18

## 2024-11-11 ENCOUNTER — CARE COORDINATION (OUTPATIENT)
Dept: CASE MANAGEMENT | Age: 49
End: 2024-11-11

## 2024-11-11 ENCOUNTER — TELEPHONE (OUTPATIENT)
Dept: FAMILY MEDICINE CLINIC | Age: 49
End: 2024-11-11

## 2024-11-11 DIAGNOSIS — N20.0 KIDNEY STONE: Primary | ICD-10-CM

## 2024-11-11 PROCEDURE — 1111F DSCHRG MED/CURRENT MED MERGE: CPT | Performed by: FAMILY MEDICINE

## 2024-11-11 NOTE — CARE COORDINATION
Care Transitions Note    Initial Call - Call within 2 business days of discharge: Yes    Attempted to reach patient for transitions of care follow up. Unable to reach patient.    Outreach Attempts:   HIPAA compliant voicemail left for patient.     Patient: Amando Huffman    Patient : 1975   MRN: [id number]    Reason for Admission: kidney stone with stent placement  Discharge Date: 21  RURS: No data recorded  Last Discharge Facility       Date Complaint Diagnosis Description Type Department Provider    21  Squamous cell carcinoma, scalp/neck Admission (Discharged) Edgar Hernandez MD            Was this an external facility discharge? Yes. Discharge Date: . Facility Name:   [facility]    Follow Up Appointment:   Patient has hospital follow up appointment scheduled within 7 days of discharge.    Future Appointments         Provider Specialty Dept Phone    2024 10:00 AM Zoila Teresa DO Family Medicine [phone]            Plan for follow-up call in 2-5 days     MERLYN Sabillon, RN   [facility] Post Acute Care Coordinator  [phone]      Care Transitions Note    Initial Call - Call within 2 business days of discharge: Yes    Patient Current Location:  Home: [address] [place] [address]    Post Acute Care Manager contacted the patient by telephone to perform post hospital discharge assessment, verified name and  as identifiers. Provided introduction to self, and explanation of the Post Acute Care Manager role.     Patient: Amando Huffman    Patient : 1975   MRN: [id number]    Reason for Admission:  kidney stone with stent placement  Discharge Date: 21  RURS: No data recorded    Last Discharge Facility       Date Complaint Diagnosis Description Type Department Provider    21  Squamous cell carcinoma, scalp/neck Admission (Discharged) Edgar Hernandez MD            Was this an external

## 2024-11-11 NOTE — TELEPHONE ENCOUNTER
Yesica from [facility] called to verify Dr. Capps will follow the patient for Home Care with [facility]. Please give her a call at [phone].     Please advise.

## 2024-11-12 ENCOUNTER — OFFICE VISIT (OUTPATIENT)
Dept: FAMILY MEDICINE CLINIC | Age: 49
End: 2024-11-12

## 2024-11-12 VITALS
DIASTOLIC BLOOD PRESSURE: 72 MMHG | WEIGHT: 145 LBS | TEMPERATURE: 98.7 F | SYSTOLIC BLOOD PRESSURE: 106 MMHG | OXYGEN SATURATION: 98 % | HEART RATE: 113 BPM | BODY MASS INDEX: 19.13 KG/M2

## 2024-11-12 DIAGNOSIS — N20.1 RIGHT URETERAL STONE: ICD-10-CM

## 2024-11-12 DIAGNOSIS — N99.820 POSTOPERATIVE HEMORRHAGE INVOLVING GENITOURINARY SYSTEM FOLLOWING GENITOURINARY PROCEDURE: ICD-10-CM

## 2024-11-12 DIAGNOSIS — Z09 HOSPITAL DISCHARGE FOLLOW-UP: Primary | ICD-10-CM

## 2024-11-12 RX ORDER — PHENAZOPYRIDINE HYDROCHLORIDE 100 MG/1
TABLET, FILM COATED ORAL
COMMUNITY
Start: 2024-10-18

## 2024-11-12 RX ORDER — METOPROLOL TARTRATE 25 MG/1
12.5 TABLET, FILM COATED ORAL 2 TIMES DAILY
COMMUNITY
Start: 2024-10-22

## 2024-11-12 RX ORDER — OXYCODONE HYDROCHLORIDE 5 MG/1
5 TABLET ORAL EVERY 4 HOURS PRN
COMMUNITY

## 2024-11-12 NOTE — PROGRESS NOTES
not take ASA     Social History:  Social History     Tobacco Use    Smoking status: Never    Smokeless tobacco: Never   Vaping Use    Vaping status: Never Used   Substance Use Topics    Alcohol use: Never    Drug use: No        ROS and PHYSICAL:   ROS:  10 point ROS otherwise negative except as mentioned in the HPI    VITALS:  Vitals:    24 1000   BP: 106/72   Site: Left Upper Arm   Position: Sitting   Cuff Size: Medium Adult   Pulse: (!) 113   Temp: 98.7 °F (37.1 °C)   TempSrc: Infrared   SpO2: 98%   Weight: 65.8 kg (145 lb)      Body mass index is 19.13 kg/m².    PHYSICAL EXAM:  GENERAL: NAD, alert and oriented  HEENT: Head: NC/AT. Eyes: clear conjunctiva.   SKIN: Skin color, texture, and turgor normal. No rash  PSYCH: Normal affect and speech    ASSESSMENT & PLAN:     49 y.o. male presents to the office for the followin. Hospital discharge follow-up  2. Right ureteral stone  3. Postoperative hemorrhage involving genitourinary system following genitourinary procedure  - Doing well now after the surgery. Has another procedure scheduled tomorrow for cystoscopy, right ureteroscopy, laser stone manipulation and right stent exchange. Still experiencing some back pain but overall symptoms have improved.  - Follow up with urology as instructed.      *I have spent 30 minutes reviewing previous notes, test results and face to face with the patient discussing the diagnosis and importance of compliance with the treatment plan as well as documenting on the day of the visit.       RTO: No follow-ups on file.      If the patient has a future appointment, it is displayed below:  No future appointments.      Electronically signed by Zoila Teresa DO on 2024 at 11:20 AM

## 2024-12-15 DIAGNOSIS — G40.909 SEIZURE DISORDER (HCC): ICD-10-CM

## 2024-12-16 RX ORDER — LAMOTRIGINE 150 MG/1
150 TABLET ORAL 2 TIMES DAILY
Qty: 180 TABLET | Refills: 0 | Status: SHIPPED | OUTPATIENT
Start: 2024-12-16

## 2024-12-16 RX ORDER — LEVETIRACETAM 500 MG/1
TABLET ORAL
Qty: 270 TABLET | Refills: 0 | Status: SHIPPED | OUTPATIENT
Start: 2024-12-16

## 2024-12-16 RX ORDER — PHENYTOIN SODIUM 100 MG/1
100 CAPSULE, EXTENDED RELEASE ORAL 2 TIMES DAILY
Qty: 180 CAPSULE | Refills: 0 | Status: SHIPPED | OUTPATIENT
Start: 2024-12-16

## 2024-12-19 DIAGNOSIS — G40.909 SEIZURE DISORDER (HCC): ICD-10-CM

## 2024-12-19 RX ORDER — LEVETIRACETAM 500 MG/1
TABLET ORAL
Qty: 270 TABLET | Refills: 0 | OUTPATIENT
Start: 2024-12-19

## 2025-02-09 SDOH — HEALTH STABILITY: PHYSICAL HEALTH: ON AVERAGE, HOW MANY DAYS PER WEEK DO YOU ENGAGE IN MODERATE TO STRENUOUS EXERCISE (LIKE A BRISK WALK)?: 1 DAY

## 2025-02-09 SDOH — HEALTH STABILITY: PHYSICAL HEALTH: ON AVERAGE, HOW MANY MINUTES DO YOU ENGAGE IN EXERCISE AT THIS LEVEL?: 10 MIN

## 2025-02-12 ENCOUNTER — OFFICE VISIT (OUTPATIENT)
Dept: PRIMARY CARE CLINIC | Age: 50
End: 2025-02-12
Payer: MEDICARE

## 2025-02-12 VITALS
RESPIRATION RATE: 16 BRPM | WEIGHT: 154.2 LBS | TEMPERATURE: 97.7 F | BODY MASS INDEX: 20.88 KG/M2 | DIASTOLIC BLOOD PRESSURE: 76 MMHG | OXYGEN SATURATION: 96 % | HEART RATE: 97 BPM | SYSTOLIC BLOOD PRESSURE: 132 MMHG | HEIGHT: 72 IN

## 2025-02-12 DIAGNOSIS — G40.909 SEIZURE DISORDER (HCC): ICD-10-CM

## 2025-02-12 DIAGNOSIS — K80.20 GALLSTONES: ICD-10-CM

## 2025-02-12 DIAGNOSIS — N20.0 BILATERAL KIDNEY STONES: ICD-10-CM

## 2025-02-12 DIAGNOSIS — R93.421 ABNORMAL FINDING ON DIAGNOSTIC IMAGING OF RIGHT KIDNEY: Primary | ICD-10-CM

## 2025-02-12 DIAGNOSIS — L98.9 SCALP LESION: ICD-10-CM

## 2025-02-12 PROCEDURE — 99214 OFFICE O/P EST MOD 30 MIN: CPT | Performed by: INTERNAL MEDICINE

## 2025-02-12 SDOH — ECONOMIC STABILITY: FOOD INSECURITY: WITHIN THE PAST 12 MONTHS, YOU WORRIED THAT YOUR FOOD WOULD RUN OUT BEFORE YOU GOT MONEY TO BUY MORE.: NEVER TRUE

## 2025-02-12 SDOH — ECONOMIC STABILITY: FOOD INSECURITY: WITHIN THE PAST 12 MONTHS, THE FOOD YOU BOUGHT JUST DIDN'T LAST AND YOU DIDN'T HAVE MONEY TO GET MORE.: NEVER TRUE

## 2025-02-12 ASSESSMENT — ENCOUNTER SYMPTOMS
EYE REDNESS: 0
COUGH: 0
BACK PAIN: 0
SORE THROAT: 0
CONSTIPATION: 0
SHORTNESS OF BREATH: 0
TROUBLE SWALLOWING: 0
NAUSEA: 0
EYE PAIN: 0
COLOR CHANGE: 0
BLOOD IN STOOL: 0
SINUS PRESSURE: 0
WHEEZING: 0
ABDOMINAL PAIN: 0
DIARRHEA: 0
CHEST TIGHTNESS: 0
ABDOMINAL DISTENTION: 0
VOMITING: 0

## 2025-02-12 ASSESSMENT — PATIENT HEALTH QUESTIONNAIRE - PHQ9
9. THOUGHTS THAT YOU WOULD BE BETTER OFF DEAD, OR OF HURTING YOURSELF: SEVERAL DAYS
SUM OF ALL RESPONSES TO PHQ QUESTIONS 1-9: 4
1. LITTLE INTEREST OR PLEASURE IN DOING THINGS: NOT AT ALL
7. TROUBLE CONCENTRATING ON THINGS, SUCH AS READING THE NEWSPAPER OR WATCHING TELEVISION: NOT AT ALL
5. POOR APPETITE OR OVEREATING: NOT AT ALL
10. IF YOU CHECKED OFF ANY PROBLEMS, HOW DIFFICULT HAVE THESE PROBLEMS MADE IT FOR YOU TO DO YOUR WORK, TAKE CARE OF THINGS AT HOME, OR GET ALONG WITH OTHER PEOPLE: SOMEWHAT DIFFICULT
8. MOVING OR SPEAKING SO SLOWLY THAT OTHER PEOPLE COULD HAVE NOTICED. OR THE OPPOSITE, BEING SO FIGETY OR RESTLESS THAT YOU HAVE BEEN MOVING AROUND A LOT MORE THAN USUAL: NOT AT ALL
SUM OF ALL RESPONSES TO PHQ QUESTIONS 1-9: 5
SUM OF ALL RESPONSES TO PHQ QUESTIONS 1-9: 5
2. FEELING DOWN, DEPRESSED OR HOPELESS: NEARLY EVERY DAY
4. FEELING TIRED OR HAVING LITTLE ENERGY: NOT AT ALL
SUM OF ALL RESPONSES TO PHQ9 QUESTIONS 1 & 2: 3
SUM OF ALL RESPONSES TO PHQ QUESTIONS 1-9: 5
6. FEELING BAD ABOUT YOURSELF - OR THAT YOU ARE A FAILURE OR HAVE LET YOURSELF OR YOUR FAMILY DOWN: SEVERAL DAYS
3. TROUBLE FALLING OR STAYING ASLEEP: NOT AT ALL

## 2025-02-12 ASSESSMENT — COLUMBIA-SUICIDE SEVERITY RATING SCALE - C-SSRS
6. HAVE YOU EVER DONE ANYTHING, STARTED TO DO ANYTHING, OR PREPARED TO DO ANYTHING TO END YOUR LIFE?: NO
1. WITHIN THE PAST MONTH, HAVE YOU WISHED YOU WERE DEAD OR WISHED YOU COULD GO TO SLEEP AND NOT WAKE UP?: NO
2. HAVE YOU ACTUALLY HAD ANY THOUGHTS OF KILLING YOURSELF?: NO

## 2025-02-12 NOTE — ASSESSMENT & PLAN NOTE
stable  Last noticeable seizure around 2015.  Continue taking Dilantin 100 mg twice daily, lamotrigine 150 mg twice daily, Keppra 500 in the morning 1000 mg at night

## 2025-02-12 NOTE — PROGRESS NOTES
Amando Huffman (1975) is a 49 y.o. male   New Patient (ER visit 3 days ago )     General health:  This patient presents for check up and refills. The problem and medicine lists and chart were reviewed in detail. The patient has been worked up and treated for this/these condition/s and is compliant with taking the medication without any significant side effects. The patient's condition/s is/are chronic and unchanged and otherwise remains stable. Feels well with minor complaints.    Main Problem Review - Right Kidney lesion -patient was seen emergency department 2/5/2025 for abdominal pain.  He had CT abdomen which showed lesion to the right kidney which warrants further investigation.  He also has a history of bilateral kidney stones.  Patient denies any significant pain today, he has already been given an order to get his follow-up right kidney US which is scheduled for 2/20/25.  Patient has seen urology in the past for kidney stones however he would like to consider establishing with a new provider.    Other problems review    1. Seizure Disorder -patient diagnosed with seizures in early teens he saw a pediatric neurologist at [facility] however he has not seen neurology recently.  Patient mentions his last seizure was maybe around 2015.  He was diagnosed with complex partial seizures.  During adulthood he has not seen neurology he has been relatively stable and his PCP has been filling his seizure meds and checking blood levels.  Today denies any headaches recent seizures no other issues.    2.  Gallstones -patient had incidental finding of gallstones on recent CAT scan done in the ER 2/5/2025.  Although patient was seen seen for right-sided abdominal pain, he denies pain triggered by eating.    3. Scalp lesion   -patient complaining of lesion to the right side of his scalp for months.      Health Maintenance    Annual retinal eye exam -  Patient due  will need to schedule   Annual Dentist visit -

## 2025-02-12 NOTE — PATIENT INSTRUCTIONS
[place]    Phone: [phone]   Website: Upworthy.restOpolis  Phone: [phone]   Website: PeerTrader.CloudFlare/oh    [facility] Community Plan   Phone: [phone]   Website: "[employer]".CloudFlare/oh    [employer]  Phone: [phone]   Website: [employer]/oh/plans/    [place] Medicaid   Phone: [phone]   Website: [place]ix[employer] Health Plan   Phone: [phone]   Website: Invesdor/    [employer]  Phone: 1-988.778.3379   Website: Pump Audio/medicaid/[place]          Traditional Medicare   Does not pay for transportation for non-emergency medical appointments   Medicare Advantage Plans  Some plans may provide transportation.  Members should contact the Member Services or Transportation number on the back of their insurance card for more information or to schedule transportation.      All [facility] Services are Private Pay  Schedule 3 business days in advance     Provider Phone   [facility] [phone]   [facility] [phone]   [facility] [phone]   [facility] [phone]   [facility] [phone]   [facility] [phone]   [facility] [phone]   [facility] [phone]   [facility] [phone]   [facility] [phone]   [facility] [phone]

## 2025-02-12 NOTE — ASSESSMENT & PLAN NOTE
this was an incidental finding on recent CT scan.  Patient unsure whether food is triggering right-sided abdominal pain.  He will take close attention and if he is having frequent postprandial pain we will refer to general surgery for further assess

## 2025-02-12 NOTE — ASSESSMENT & PLAN NOTE
patient has recommended follow-up right renal US scheduled 2/20/2025.  He has also been referred to a new urologist.

## 2025-02-16 SDOH — ECONOMIC STABILITY: FOOD INSECURITY: WITHIN THE PAST 12 MONTHS, THE FOOD YOU BOUGHT JUST DIDN'T LAST AND YOU DIDN'T HAVE MONEY TO GET MORE.: SOMETIMES TRUE

## 2025-02-16 SDOH — ECONOMIC STABILITY: INCOME INSECURITY: IN THE LAST 12 MONTHS, WAS THERE A TIME WHEN YOU WERE NOT ABLE TO PAY THE MORTGAGE OR RENT ON TIME?: NO

## 2025-02-16 SDOH — ECONOMIC STABILITY: TRANSPORTATION INSECURITY
IN THE PAST 12 MONTHS, HAS LACK OF TRANSPORTATION KEPT YOU FROM MEETINGS, WORK, OR FROM GETTING THINGS NEEDED FOR DAILY LIVING?: YES

## 2025-02-16 SDOH — ECONOMIC STABILITY: FOOD INSECURITY: WITHIN THE PAST 12 MONTHS, YOU WORRIED THAT YOUR FOOD WOULD RUN OUT BEFORE YOU GOT MONEY TO BUY MORE.: OFTEN TRUE

## 2025-02-16 SDOH — HEALTH STABILITY: PHYSICAL HEALTH: ON AVERAGE, HOW MANY MINUTES DO YOU ENGAGE IN EXERCISE AT THIS LEVEL?: 10 MIN

## 2025-02-16 SDOH — ECONOMIC STABILITY: TRANSPORTATION INSECURITY
IN THE PAST 12 MONTHS, HAS THE LACK OF TRANSPORTATION KEPT YOU FROM MEDICAL APPOINTMENTS OR FROM GETTING MEDICATIONS?: YES

## 2025-02-16 SDOH — HEALTH STABILITY: PHYSICAL HEALTH: ON AVERAGE, HOW MANY DAYS PER WEEK DO YOU ENGAGE IN MODERATE TO STRENUOUS EXERCISE (LIKE A BRISK WALK)?: 1 DAY

## 2025-02-16 ASSESSMENT — PATIENT HEALTH QUESTIONNAIRE - PHQ9
SUM OF ALL RESPONSES TO PHQ QUESTIONS 1-9: 2
SUM OF ALL RESPONSES TO PHQ9 QUESTIONS 1 & 2: 2
2. FEELING DOWN, DEPRESSED OR HOPELESS: SEVERAL DAYS
1. LITTLE INTEREST OR PLEASURE IN DOING THINGS: SEVERAL DAYS

## 2025-02-16 ASSESSMENT — LIFESTYLE VARIABLES
HOW OFTEN DO YOU HAVE SIX OR MORE DRINKS ON ONE OCCASION: 1
HOW OFTEN DO YOU HAVE A DRINK CONTAINING ALCOHOL: 1
HOW MANY STANDARD DRINKS CONTAINING ALCOHOL DO YOU HAVE ON A TYPICAL DAY: PATIENT DOES NOT DRINK
HOW MANY STANDARD DRINKS CONTAINING ALCOHOL DO YOU HAVE ON A TYPICAL DAY: 0
HOW OFTEN DO YOU HAVE A DRINK CONTAINING ALCOHOL: NEVER

## 2025-02-17 ENCOUNTER — CARE COORDINATION (OUTPATIENT)
Dept: CARE COORDINATION | Age: 50
End: 2025-02-17

## 2025-02-17 NOTE — CARE COORDINATION
Ambulatory Care Coordination Note     2025 9:02 AM     Patient Current Location:  Home: [address] [place] [id number]     This patient was received as a referral from Provider.    ACM contacted the patient by telephone. Verified name and  with patient as identifiers. Provided introduction to self, and explanation of the ACM role.   Patient declined care management services at this time.          ACM: Isabela Timmons RN     Challenges to be reviewed by the provider   Additional needs identified to be addressed with provider No  none               Method of communication with provider: none.    Utilization: Initial Call - N/A    Care Summary Note: ACM received referral from PCP d/t possible transportation needs.  ACM outreached patient who was provided a list of transport options with his AVS.  ACM discussed needs with patient who is independent and able to manage his care needs and obtain medications without significant barriers.  ACM educated pt on the tzonebd.com, their BGo Curb to Curb option and the access of scheduling via the katya.  Patient may be eligible for  Half Fare option d/t having Medicare.  ACM encouraged pt to go to [email or web address].[email or web address] to explore all available options. Pt VU and appreciation. ACM sent [employer] link to patient via Rock My World.      PCP/Specialist follow up:   Future Appointments         Provider Specialty Dept Phone    2025 2:00 PM Stuart Barnes MD Primary Care [phone]            Follow Up:   No further Ambulatory Care Management follow-up scheduled at this time.  Patient  has Ambulatory Care Manager's contact information for any further questions, concerns or needs.

## 2025-02-19 ENCOUNTER — OFFICE VISIT (OUTPATIENT)
Dept: PRIMARY CARE CLINIC | Age: 50
End: 2025-02-19

## 2025-02-19 ENCOUNTER — TELEPHONE (OUTPATIENT)
Dept: PRIMARY CARE CLINIC | Age: 50
End: 2025-02-19

## 2025-02-19 VITALS
DIASTOLIC BLOOD PRESSURE: 78 MMHG | HEIGHT: 72 IN | RESPIRATION RATE: 16 BRPM | WEIGHT: 155 LBS | BODY MASS INDEX: 20.99 KG/M2 | OXYGEN SATURATION: 99 % | SYSTOLIC BLOOD PRESSURE: 130 MMHG | HEART RATE: 81 BPM | TEMPERATURE: 98 F

## 2025-02-19 DIAGNOSIS — N20.0 BILATERAL KIDNEY STONES: ICD-10-CM

## 2025-02-19 DIAGNOSIS — K80.20 GALLSTONES: ICD-10-CM

## 2025-02-19 DIAGNOSIS — Z11.59 ENCOUNTER FOR HEPATITIS C SCREENING TEST FOR LOW RISK PATIENT: ICD-10-CM

## 2025-02-19 DIAGNOSIS — Z13.220 SCREENING CHOLESTEROL LEVEL: ICD-10-CM

## 2025-02-19 DIAGNOSIS — R93.421 ABNORMAL FINDING ON DIAGNOSTIC IMAGING OF RIGHT KIDNEY: Primary | ICD-10-CM

## 2025-02-19 DIAGNOSIS — G40.909 SEIZURE DISORDER (HCC): ICD-10-CM

## 2025-02-19 DIAGNOSIS — R53.83 OTHER FATIGUE: ICD-10-CM

## 2025-02-19 DIAGNOSIS — E55.9 VITAMIN D DEFICIENCY: ICD-10-CM

## 2025-02-19 DIAGNOSIS — Z23 NEEDS FLU SHOT: ICD-10-CM

## 2025-02-19 DIAGNOSIS — Z11.4 ENCOUNTER FOR SCREENING FOR HIV: ICD-10-CM

## 2025-02-19 DIAGNOSIS — R93.421 ABNORMAL FINDING ON DIAGNOSTIC IMAGING OF RIGHT KIDNEY: ICD-10-CM

## 2025-02-19 DIAGNOSIS — R73.09 IMPAIRED GLUCOSE REGULATION: ICD-10-CM

## 2025-02-19 DIAGNOSIS — Z00.00 MEDICARE ANNUAL WELLNESS VISIT, SUBSEQUENT: Primary | ICD-10-CM

## 2025-02-19 ASSESSMENT — LIFESTYLE VARIABLES
HOW MANY STANDARD DRINKS CONTAINING ALCOHOL DO YOU HAVE ON A TYPICAL DAY: PATIENT DOES NOT DRINK
HOW OFTEN DO YOU HAVE A DRINK CONTAINING ALCOHOL: NEVER

## 2025-02-19 ASSESSMENT — PATIENT HEALTH QUESTIONNAIRE - PHQ9
4. FEELING TIRED OR HAVING LITTLE ENERGY: SEVERAL DAYS
SUM OF ALL RESPONSES TO PHQ QUESTIONS 1-9: 10
3. TROUBLE FALLING OR STAYING ASLEEP: NEARLY EVERY DAY
SUM OF ALL RESPONSES TO PHQ9 QUESTIONS 1 & 2: 4
10. IF YOU CHECKED OFF ANY PROBLEMS, HOW DIFFICULT HAVE THESE PROBLEMS MADE IT FOR YOU TO DO YOUR WORK, TAKE CARE OF THINGS AT HOME, OR GET ALONG WITH OTHER PEOPLE: SOMEWHAT DIFFICULT
2. FEELING DOWN, DEPRESSED OR HOPELESS: MORE THAN HALF THE DAYS
7. TROUBLE CONCENTRATING ON THINGS, SUCH AS READING THE NEWSPAPER OR WATCHING TELEVISION: MORE THAN HALF THE DAYS
6. FEELING BAD ABOUT YOURSELF - OR THAT YOU ARE A FAILURE OR HAVE LET YOURSELF OR YOUR FAMILY DOWN: NOT AT ALL
SUM OF ALL RESPONSES TO PHQ QUESTIONS 1-9: 10
5. POOR APPETITE OR OVEREATING: NOT AT ALL
1. LITTLE INTEREST OR PLEASURE IN DOING THINGS: MORE THAN HALF THE DAYS
8. MOVING OR SPEAKING SO SLOWLY THAT OTHER PEOPLE COULD HAVE NOTICED. OR THE OPPOSITE, BEING SO FIGETY OR RESTLESS THAT YOU HAVE BEEN MOVING AROUND A LOT MORE THAN USUAL: NOT AT ALL
9. THOUGHTS THAT YOU WOULD BE BETTER OFF DEAD, OR OF HURTING YOURSELF: NOT AT ALL

## 2025-02-19 NOTE — TELEPHONE ENCOUNTER
Please call the above number,  request fax number.  Please fax over referral paperwork then call patient and let them know they could schedule appointment.

## 2025-02-19 NOTE — PROGRESS NOTES
Medicare Annual Wellness Visit    Amando Huffman is here for Medicare AWV    Assessment & Plan   Medicare annual wellness visit, subsequent  Assessment & Plan:   Patient comes in for Medicare AWV.   we discussed age appropriate USPSTF screens  Over 75% of the visit was spent counselling patient on appropriate lifestyle choices.      Orders:  -     CBC with Auto Differential; Future  -     Comprehensive Metabolic Panel; Future  -     Hep C AB RLFX HCV PCR-A; Future  -     Hemoglobin A1C; Future  -     HIV Screen; Future  -     Lipid Panel; Future  -     Vitamin D 25 Hydroxy; Future  -     TSH reflex to T4F; Future  -     Levetiracetam Level; Future  -     Phenytoin Level, Total; Future  -     Lamotrigine Level; Future  Abnormal finding on diagnostic imaging of right kidney  Assessment & Plan:    patient has recommended follow-up right renal US scheduled 2/20/2025.  He has also been referred to a new urologist.  Bilateral kidney stones  Assessment & Plan:    patient will follow-up with urology  Gallstones  Assessment & Plan:    this was an incidental finding on recent CT scan.  Patient unsure whether food is triggering right-sided abdominal pain.  He will pay close attention and if he is having frequent postprandial pain we will refer to general surgery for further assessment  Seizure disorder (HCC)  Assessment & Plan:    stable  Last noticeable seizure around 2015.  Continue taking Dilantin 100 mg twice daily, lamotrigine 150 mg twice daily, Keppra 500 in the morning 1000 mg at night  Orders:  -     Levetiracetam Level; Future  -     Phenytoin Level, Total; Future  -     Lamotrigine Level; Future  Encounter for hepatitis C screening test for low risk patient  -     Hep C AB RLFX HCV PCR-A; Future  Encounter for screening for HIV  -     HIV Screen; Future  Impaired glucose regulation  -     CBC with Auto Differential; Future  -     Comprehensive Metabolic Panel; Future  -     Hemoglobin A1C; Future  Vitamin D

## 2025-02-19 NOTE — TELEPHONE ENCOUNTER
Amando would like Dr. Barnes  to send a referral for a Urologist closer to him. Dr. Chuy Houston. Ph# [phone].

## 2025-02-19 NOTE — PATIENT INSTRUCTIONS
in the condom when you open the wrapper.  Squeeze the tip of the condom to keep out air.  Pull down the loose skin (foreskin) from the head of an uncircumcised penis.  While squeezing the tip of the condom, unroll it all the way down to the base of the firm penis.  Never use petroleum jelly (such as Vaseline), grease, hand lotion, baby oil, or anything with oil in it. These products can make holes in the condom.  After sex, hold the condom on your penis as you remove your penis from your partner. This will keep semen from spilling out of the condom.  Learn to use a female condom:  You can put in a female condom up to 8 hours before sex.  Squeeze the smaller ring at the closed end and insert it deep into the vagina. The larger ring at the open end should stay outside the vagina.  During sex, make sure the penis goes into the condom.  After the penis is removed, close the open end of the condom by twisting it. Remove the condom.  Do not use a female condom and male condom at the same time.  Do not have sex with anyone who has symptoms of an STI, such as sores on the genitals or mouth. The herpes virus that causes cold sores can spread to and from the penis and vagina.  Do not drink a lot of alcohol or use drugs before sex. This can cause you to let down your guard and not practice safer sex.  Having one sex partner (who does not have STIs and does not have sex with anyone else) is a sure way to avoid STIs.  Talk to your partner before you have sex. Find out if he or she has or is at risk for any STI. Keep in mind that a person may be able to spread an STI even if he or she does not have symptoms. You and your partner may want to get an HIV test. You should get tested again 6 months later.    © 6642-1304 Healthwise, Incorporated. Care instructions adapted under license by [facility] [employer]. This care instruction is for use with your licensed healthcare professional. If you have questions about a medical

## 2025-02-19 NOTE — ASSESSMENT & PLAN NOTE
this was an incidental finding on recent CT scan.  Patient unsure whether food is triggering right-sided abdominal pain.  He will pay close attention and if he is having frequent postprandial pain we will refer to general surgery for further assessment

## 2025-02-19 NOTE — ASSESSMENT & PLAN NOTE
Patient comes in for Medicare AWV.   we discussed age appropriate USPSTF screens  Over 75% of the visit was spent counselling patient on appropriate lifestyle choices.

## 2025-02-21 DIAGNOSIS — R53.83 OTHER FATIGUE: ICD-10-CM

## 2025-02-21 DIAGNOSIS — G40.909 SEIZURE DISORDER (HCC): ICD-10-CM

## 2025-02-21 DIAGNOSIS — Z11.59 ENCOUNTER FOR HEPATITIS C SCREENING TEST FOR LOW RISK PATIENT: ICD-10-CM

## 2025-02-21 DIAGNOSIS — R73.09 IMPAIRED GLUCOSE REGULATION: ICD-10-CM

## 2025-02-21 DIAGNOSIS — Z13.220 SCREENING CHOLESTEROL LEVEL: ICD-10-CM

## 2025-02-21 DIAGNOSIS — Z11.4 ENCOUNTER FOR SCREENING FOR HIV: ICD-10-CM

## 2025-02-21 DIAGNOSIS — E55.9 VITAMIN D DEFICIENCY: ICD-10-CM

## 2025-02-21 DIAGNOSIS — Z00.00 MEDICARE ANNUAL WELLNESS VISIT, SUBSEQUENT: ICD-10-CM

## 2025-02-21 LAB
25(OH)D3 SERPL-MCNC: 15.8 NG/ML
ALBUMIN SERPL-MCNC: 4.8 G/DL (ref 3.4–5)
ALBUMIN/GLOB SERPL: 1.7 {RATIO} (ref 1.1–2.2)
ALP SERPL-CCNC: 105 U/L (ref 40–129)
ALT SERPL-CCNC: 14 U/L (ref 10–40)
ANION GAP SERPL CALCULATED.3IONS-SCNC: 12 MMOL/L (ref 3–16)
AST SERPL-CCNC: 15 U/L (ref 15–37)
BASOPHILS # BLD: 0 K/UL (ref 0–0.2)
BASOPHILS NFR BLD: 0.2 %
BILIRUB SERPL-MCNC: <0.2 MG/DL (ref 0–1)
BUN SERPL-MCNC: 20 MG/DL (ref 7–20)
CALCIUM SERPL-MCNC: 9.6 MG/DL (ref 8.3–10.6)
CHLORIDE SERPL-SCNC: 103 MMOL/L (ref 99–110)
CHOLEST SERPL-MCNC: 239 MG/DL (ref 0–199)
CO2 SERPL-SCNC: 27 MMOL/L (ref 21–32)
CREAT SERPL-MCNC: 1 MG/DL (ref 0.9–1.3)
DEPRECATED RDW RBC AUTO: 14.1 % (ref 12.4–15.4)
EOSINOPHIL # BLD: 0.1 K/UL (ref 0–0.6)
EOSINOPHIL NFR BLD: 2.2 %
GFR SERPLBLD CREATININE-BSD FMLA CKD-EPI: >90 ML/MIN/{1.73_M2}
GLUCOSE SERPL-MCNC: 84 MG/DL (ref 70–99)
HCT VFR BLD AUTO: 41.8 % (ref 40.5–52.5)
HDLC SERPL-MCNC: 102 MG/DL (ref 40–60)
HGB BLD-MCNC: 13.8 G/DL (ref 13.5–17.5)
LDLC SERPL CALC-MCNC: 125 MG/DL
LYMPHOCYTES # BLD: 1.5 K/UL (ref 1–5.1)
LYMPHOCYTES NFR BLD: 27.4 %
MCH RBC QN AUTO: 29.5 PG (ref 26–34)
MCHC RBC AUTO-ENTMCNC: 33.1 G/DL (ref 31–36)
MCV RBC AUTO: 89.2 FL (ref 80–100)
MONOCYTES # BLD: 0.6 K/UL (ref 0–1.3)
MONOCYTES NFR BLD: 11.7 %
NEUTROPHILS # BLD: 3.2 K/UL (ref 1.7–7.7)
NEUTROPHILS NFR BLD: 58.5 %
PHENYTOIN DOSE: ABNORMAL MG
PHENYTOIN SERPL-MCNC: 7.7 UG/ML (ref 10–20)
PLATELET # BLD AUTO: 204 K/UL (ref 135–450)
PMV BLD AUTO: 9.7 FL (ref 5–10.5)
POTASSIUM SERPL-SCNC: 4.2 MMOL/L (ref 3.5–5.1)
PROT SERPL-MCNC: 7.7 G/DL (ref 6.4–8.2)
RBC # BLD AUTO: 4.69 M/UL (ref 4.2–5.9)
REASON FOR REJECTION: NORMAL
REJECTED TEST: NORMAL
SODIUM SERPL-SCNC: 142 MMOL/L (ref 136–145)
TRIGL SERPL-MCNC: 60 MG/DL (ref 0–150)
TSH SERPL DL<=0.005 MIU/L-ACNC: 2.62 UIU/ML (ref 0.27–4.2)
VLDLC SERPL CALC-MCNC: 12 MG/DL
WBC # BLD AUTO: 5.5 K/UL (ref 4–11)

## 2025-02-22 LAB
EST. AVERAGE GLUCOSE BLD GHB EST-MCNC: 111.2 MG/DL
HBA1C MFR BLD: 5.5 %
HIV 1+2 AB+HIV1 P24 AG SERPL QL IA: NORMAL
HIV 2 AB SERPL QL IA: NORMAL
HIV1 AB SERPL QL IA: NORMAL
HIV1 P24 AG SERPL QL IA: NORMAL

## 2025-02-23 LAB
HCV AB S/CO SERPL IA: 0.07 IV
HCV AB SERPL QL IA: NEGATIVE

## 2025-02-24 ENCOUNTER — TELEPHONE (OUTPATIENT)
Dept: PRIMARY CARE CLINIC | Age: 50
End: 2025-02-24

## 2025-02-24 DIAGNOSIS — E55.9 VITAMIN D DEFICIENCY: Primary | ICD-10-CM

## 2025-02-24 NOTE — TELEPHONE ENCOUNTER
Odalys from [facility] called to say that they could not run the Keppra because the tube was not labeled.

## 2025-02-24 NOTE — RESULT ENCOUNTER NOTE
Please let patient know phenytoin level was low at 7.7, please find out whether prescription relates taking medication as prescribed.    Patient Vit D is low at  15.8. I have sent a prescription for vitamin D 2000 units daily with refills to their pharmacy.    Please  advise patient  to take with largest meal.      His bad cholesterol LDL was 125 which should be under 100, his good cholesterol but 102 should be under 60.  Patient could treat these with low-fat diet and exercise, but if he would like to consider taking medication to treat this please let us know.    His Keppra test was rejected we can repeat this test but he may have to get it drawn at [facility].

## 2025-03-03 DIAGNOSIS — E55.9 VITAMIN D DEFICIENCY: ICD-10-CM

## 2025-03-03 DIAGNOSIS — E78.2 MIXED HYPERLIPIDEMIA: Primary | ICD-10-CM

## 2025-03-03 RX ORDER — ATORVASTATIN CALCIUM 10 MG/1
10 TABLET, FILM COATED ORAL NIGHTLY
Qty: 30 TABLET | Refills: 5 | Status: SHIPPED | OUTPATIENT
Start: 2025-03-03

## 2025-03-05 ENCOUNTER — TELEPHONE (OUTPATIENT)
Dept: PRIMARY CARE CLINIC | Age: 50
End: 2025-03-05

## 2025-03-05 NOTE — TELEPHONE ENCOUNTER
Amando's insurance needs a prior authorization on his medication. He wasn't sure which one but he belies it was for the Vit.D. Please call to advise.

## 2025-03-05 NOTE — TELEPHONE ENCOUNTER
Please let patient know that the FDA does not recognize policosanol as lipid lowering supplement with morbidity/mortality benefit.  There is not any oversight or regulation when it comes to these supplements and we cannot  recommend that he takes it.

## 2025-03-05 NOTE — TELEPHONE ENCOUNTER
Patient would to take the same medication his father takes, which is a supplement. Policosanol.     He would like to know Dr. Bowman thoughts?

## 2025-03-05 NOTE — TELEPHONE ENCOUNTER
Jason called back to say that he can not take the Lipitor because of the reasons of why not to take it.  He would like to be prescribe something else .Please call to advise

## 2025-03-07 SDOH — ECONOMIC STABILITY: FOOD INSECURITY: WITHIN THE PAST 12 MONTHS, THE FOOD YOU BOUGHT JUST DIDN'T LAST AND YOU DIDN'T HAVE MONEY TO GET MORE.: OFTEN TRUE

## 2025-03-07 SDOH — ECONOMIC STABILITY: FOOD INSECURITY: WITHIN THE PAST 12 MONTHS, YOU WORRIED THAT YOUR FOOD WOULD RUN OUT BEFORE YOU GOT MONEY TO BUY MORE.: OFTEN TRUE

## 2025-03-07 SDOH — ECONOMIC STABILITY: INCOME INSECURITY: IN THE LAST 12 MONTHS, WAS THERE A TIME WHEN YOU WERE NOT ABLE TO PAY THE MORTGAGE OR RENT ON TIME?: NO

## 2025-03-07 NOTE — TELEPHONE ENCOUNTER
Called patient and left detailed vm along with sending a message in [employer]. In regards to  response to patients request to take supplement Policosanol.

## 2025-03-10 ENCOUNTER — OFFICE VISIT (OUTPATIENT)
Dept: PRIMARY CARE CLINIC | Age: 50
End: 2025-03-10
Payer: MEDICARE

## 2025-03-10 ENCOUNTER — TELEPHONE (OUTPATIENT)
Dept: PRIMARY CARE CLINIC | Age: 50
End: 2025-03-10

## 2025-03-10 VITALS
HEART RATE: 79 BPM | TEMPERATURE: 98 F | DIASTOLIC BLOOD PRESSURE: 80 MMHG | OXYGEN SATURATION: 99 % | RESPIRATION RATE: 16 BRPM | SYSTOLIC BLOOD PRESSURE: 130 MMHG | HEIGHT: 72 IN | WEIGHT: 150 LBS | BODY MASS INDEX: 20.32 KG/M2

## 2025-03-10 DIAGNOSIS — E78.00 PURE HYPERCHOLESTEROLEMIA: ICD-10-CM

## 2025-03-10 DIAGNOSIS — G40.909 SEIZURE DISORDER (HCC): ICD-10-CM

## 2025-03-10 DIAGNOSIS — R93.421 ABNORMAL FINDING ON DIAGNOSTIC IMAGING OF RIGHT KIDNEY: Primary | ICD-10-CM

## 2025-03-10 DIAGNOSIS — K80.20 GALLSTONES: ICD-10-CM

## 2025-03-10 PROCEDURE — 99214 OFFICE O/P EST MOD 30 MIN: CPT | Performed by: INTERNAL MEDICINE

## 2025-03-10 RX ORDER — EZETIMIBE 10 MG/1
10 TABLET ORAL DAILY
Qty: 90 TABLET | Refills: 3 | Status: SHIPPED | OUTPATIENT
Start: 2025-03-10

## 2025-03-10 RX ORDER — EZETIMIBE 10 MG/1
10 TABLET ORAL DAILY
Qty: 30 TABLET | Refills: 11 | Status: SHIPPED | OUTPATIENT
Start: 2025-03-10 | End: 2025-03-10

## 2025-03-10 ASSESSMENT — PATIENT HEALTH QUESTIONNAIRE - PHQ9
SUM OF ALL RESPONSES TO PHQ QUESTIONS 1-9: 0
5. POOR APPETITE OR OVEREATING: NOT AT ALL
9. THOUGHTS THAT YOU WOULD BE BETTER OFF DEAD, OR OF HURTING YOURSELF: NOT AT ALL
3. TROUBLE FALLING OR STAYING ASLEEP: NOT AT ALL
SUM OF ALL RESPONSES TO PHQ QUESTIONS 1-9: 0
2. FEELING DOWN, DEPRESSED OR HOPELESS: NOT AT ALL
8. MOVING OR SPEAKING SO SLOWLY THAT OTHER PEOPLE COULD HAVE NOTICED. OR THE OPPOSITE, BEING SO FIGETY OR RESTLESS THAT YOU HAVE BEEN MOVING AROUND A LOT MORE THAN USUAL: NOT AT ALL
6. FEELING BAD ABOUT YOURSELF - OR THAT YOU ARE A FAILURE OR HAVE LET YOURSELF OR YOUR FAMILY DOWN: NOT AT ALL
1. LITTLE INTEREST OR PLEASURE IN DOING THINGS: NOT AT ALL
7. TROUBLE CONCENTRATING ON THINGS, SUCH AS READING THE NEWSPAPER OR WATCHING TELEVISION: NOT AT ALL
10. IF YOU CHECKED OFF ANY PROBLEMS, HOW DIFFICULT HAVE THESE PROBLEMS MADE IT FOR YOU TO DO YOUR WORK, TAKE CARE OF THINGS AT HOME, OR GET ALONG WITH OTHER PEOPLE: NOT DIFFICULT AT ALL
SUM OF ALL RESPONSES TO PHQ QUESTIONS 1-9: 0
SUM OF ALL RESPONSES TO PHQ QUESTIONS 1-9: 0
4. FEELING TIRED OR HAVING LITTLE ENERGY: NOT AT ALL

## 2025-03-10 ASSESSMENT — ENCOUNTER SYMPTOMS
ABDOMINAL DISTENTION: 0
EYE PAIN: 0
CONSTIPATION: 0
SHORTNESS OF BREATH: 0
BACK PAIN: 0
DIARRHEA: 0
COUGH: 0
EYE REDNESS: 0
SORE THROAT: 0
BLOOD IN STOOL: 0
WHEEZING: 0
SINUS PRESSURE: 0
ABDOMINAL PAIN: 0
NAUSEA: 0
VOMITING: 0
TROUBLE SWALLOWING: 0
COLOR CHANGE: 0
CHEST TIGHTNESS: 0

## 2025-03-10 NOTE — ASSESSMENT & PLAN NOTE
patient has recommended follow-up right renal US scheduled 2/20/2025.  Patient says he has urology appointment next week.

## 2025-03-10 NOTE — TELEPHONE ENCOUNTER
Amando said that his ezetimibe needs to go to  [facility] pharmacy and not [facility].

## 2025-03-10 NOTE — PROGRESS NOTES
Amando Huffman (1975) is a 49 y.o. male   Other (Medication questions/)     General health:  This patient presents for check up and refills. The problem and medicine lists and chart were reviewed in detail. The patient has been worked up and treated for this/these condition/s and is compliant with taking the medication without any significant side effects. The patient's condition/s is/are chronic and unchanged and otherwise remains stable. Feels well with minor complaints.    Main Problem Review -Right Kidney lesion -patient was seen emergency department 2/5/2025 for abdominal pain.  He had CT abdomen which showed lesion to the right kidney which warrants further investigation.  He also has a history of bilateral kidney stones.  Patient denies any significant pain today, he has already been given an order to get his follow-up right kidney US which is scheduled for 2/20/25.  Patient has seen urology in the past for kidney stones however he would like to consider establishing with a new provider.    Other problems review    1. Seizure Disorder -patient diagnosed with seizures in early teens he saw a pediatric neurologist at [facility] however he has not seen neurology recently.  Patient mentions his last seizure was maybe around 2015.  He was diagnosed with complex partial seizures.  During adulthood he has not seen neurology he has been relatively stable and his PCP has been filling his seizure meds and checking blood levels.  Today denies any headaches recent seizures no other issues.     2.  Gallstones -patient had incidental finding of gallstones on recent CAT scan done in the ER 2/5/2025.  Although patient was seen seen for right-sided abdominal pain, he denies pain triggered by eating.       Health Maintenance    Annual retinal eye exam -  Patient due  will need to schedule   Annual Dentist visit - Patient due  will need to schedule   Tobacco smoking   - NO  Alcohol Misuse - NO  Illicit Drug

## 2025-03-10 NOTE — ASSESSMENT & PLAN NOTE
patient does not want to start statin drugs.  He agreed to Zetia.  Medicine reviewed does not have drug interactions with his seizure medicine

## 2025-03-15 ENCOUNTER — OFFICE VISIT (OUTPATIENT)
Age: 50
End: 2025-03-15

## 2025-03-15 VITALS
BODY MASS INDEX: 20.75 KG/M2 | TEMPERATURE: 97.7 F | SYSTOLIC BLOOD PRESSURE: 115 MMHG | WEIGHT: 153.2 LBS | DIASTOLIC BLOOD PRESSURE: 78 MMHG | HEIGHT: 72 IN | HEART RATE: 83 BPM | OXYGEN SATURATION: 95 %

## 2025-03-15 DIAGNOSIS — T14.8XXA BLEEDING FROM WOUND: Primary | ICD-10-CM

## 2025-03-15 RX ORDER — CALCIUM CARBONATE 500(1250)
500 TABLET ORAL DAILY
COMMUNITY

## 2025-03-15 NOTE — PATIENT INSTRUCTIONS
SLEEP ON OTHER SIDE TO KEEP GRAVITY AND TO KEEP NO LESS CHANCE TO BLEED.IF STILL BLEEDING GO TO ER.

## 2025-03-15 NOTE — PROGRESS NOTES
Amando Huffman (:  1975) is a 49 y.o. male,New patient, here for evaluation of the following chief complaint(s):  Ear Problem (Patient states that he had a biopsy on the top of ear lobe on the right ear this morning and had some bleeding patient wants to make sure that his ear lobe is not  still bleeding .)      ASSESSMENT/PLAN:  1. Bleeding from wound   -USE BANDAGE  -KEEP PRESSURE    Return if symptoms worsen or fail to improve.    SUBJECTIVE/OBJECTIVE:  PRESENT TO CLINIC AS BLEEDING FROM RIGHT EAR PINNA AFTER BIOPSY DONE TODAY.WORRY AS STILL BLEEDING . DISCUSSED AS MAY NOT RECOMMENDED TO STITCHES IT.      History provided by:  Patient      Vitals:    03/15/25 1410   BP: 115/78   BP Site: Left Upper Arm   Patient Position: Sitting   BP Cuff Size: Large Adult   Pulse: 83   Temp: 97.7 °F (36.5 °C)   TempSrc: Oral   SpO2: 95%   Weight: 69.5 kg (153 lb 3.2 oz)   Height: 1.829 m (6')       Review of Systems   Skin:  Positive for wound.       Physical Exam  Constitutional:       Appearance: Normal appearance.   HENT:      Head: Normocephalic and atraumatic.      Nose: Nose normal.      Mouth/Throat:      Mouth: Mucous membranes are moist.   Eyes:      Pupils: Pupils are equal, round, and reactive to light.   Pulmonary:      Effort: Pulmonary effort is normal.      Breath sounds: Normal breath sounds.   Musculoskeletal:         General: Normal range of motion.      Cervical back: Normal range of motion and neck supple.   Skin:     General: Skin is warm.   Neurological:      Mental Status: He is alert and oriented to person, place, and time.   Psychiatric:         Mood and Affect: Mood normal.         Behavior: Behavior normal.           An electronic signature was used to authenticate this note.    --Lillian Velasquez DO

## 2025-03-21 PROBLEM — Z00.00 MEDICARE ANNUAL WELLNESS VISIT, SUBSEQUENT: Status: RESOLVED | Noted: 2025-02-19 | Resolved: 2025-03-21

## 2025-04-15 ENCOUNTER — TELEPHONE (OUTPATIENT)
Dept: PRIMARY CARE CLINIC | Age: 50
End: 2025-04-15

## 2025-04-15 NOTE — TELEPHONE ENCOUNTER
Patient requesting a refill for the below medication:    tamsulosin (FLOMAX) 0.4 MG capsule [[id number]]    lamoTRIgine (LAMICTAL) 150 MG tablet [[id number]]    Pharmacy    [facility] - [place], [place] - [address] -  [phone] - F [phone]  [address] [place] [id number]  Phone: [phone]  Fax: [phone]

## 2025-04-16 DIAGNOSIS — N20.0 BILATERAL KIDNEY STONES: Primary | ICD-10-CM

## 2025-04-16 RX ORDER — TAMSULOSIN HYDROCHLORIDE 0.4 MG/1
0.4 CAPSULE ORAL DAILY
Qty: 90 CAPSULE | Refills: 1 | Status: SHIPPED | OUTPATIENT
Start: 2025-04-16

## 2025-06-09 ENCOUNTER — OFFICE VISIT (OUTPATIENT)
Dept: PRIMARY CARE CLINIC | Age: 50
End: 2025-06-09
Payer: MEDICARE

## 2025-06-09 VITALS
SYSTOLIC BLOOD PRESSURE: 116 MMHG | WEIGHT: 149 LBS | OXYGEN SATURATION: 95 % | HEART RATE: 78 BPM | BODY MASS INDEX: 20.21 KG/M2 | DIASTOLIC BLOOD PRESSURE: 72 MMHG

## 2025-06-09 DIAGNOSIS — N20.0 BILATERAL KIDNEY STONES: Primary | ICD-10-CM

## 2025-06-09 DIAGNOSIS — E78.00 PURE HYPERCHOLESTEROLEMIA: ICD-10-CM

## 2025-06-09 DIAGNOSIS — K80.20 GALLSTONES: ICD-10-CM

## 2025-06-09 DIAGNOSIS — G89.4 PAIN SYNDROME, CHRONIC: ICD-10-CM

## 2025-06-09 DIAGNOSIS — G40.909 SEIZURE DISORDER (HCC): ICD-10-CM

## 2025-06-09 DIAGNOSIS — M41.9 SCOLIOSIS, UNSPECIFIED SCOLIOSIS TYPE, UNSPECIFIED SPINAL REGION: ICD-10-CM

## 2025-06-09 PROCEDURE — 99214 OFFICE O/P EST MOD 30 MIN: CPT | Performed by: INTERNAL MEDICINE

## 2025-06-09 PROCEDURE — G2211 COMPLEX E/M VISIT ADD ON: HCPCS | Performed by: INTERNAL MEDICINE

## 2025-06-09 RX ORDER — LAMOTRIGINE 150 MG/1
150 TABLET ORAL 2 TIMES DAILY
Qty: 180 TABLET | Refills: 1 | Status: SHIPPED | OUTPATIENT
Start: 2025-06-09 | End: 2025-06-10 | Stop reason: SDUPTHER

## 2025-06-09 RX ORDER — LEVETIRACETAM 500 MG/1
TABLET ORAL
Qty: 180 TABLET | Refills: 1 | Status: SHIPPED | OUTPATIENT
Start: 2025-06-09 | End: 2025-06-10 | Stop reason: SDUPTHER

## 2025-06-09 RX ORDER — PHENYTOIN SODIUM 100 MG/1
100 CAPSULE, EXTENDED RELEASE ORAL 2 TIMES DAILY
Qty: 180 CAPSULE | Refills: 1 | Status: SHIPPED | OUTPATIENT
Start: 2025-06-09

## 2025-06-09 ASSESSMENT — ENCOUNTER SYMPTOMS
VOMITING: 0
SHORTNESS OF BREATH: 0
DIARRHEA: 0
TROUBLE SWALLOWING: 0
NAUSEA: 0
COLOR CHANGE: 0
WHEEZING: 0
SORE THROAT: 0
BACK PAIN: 0
EYE REDNESS: 0
EYE PAIN: 0
CHEST TIGHTNESS: 0
SINUS PRESSURE: 0
BLOOD IN STOOL: 0
COUGH: 0
ABDOMINAL PAIN: 0
ABDOMINAL DISTENTION: 0
CONSTIPATION: 0

## 2025-06-09 NOTE — PROGRESS NOTES
Plan:    this was an incidental finding on recent CT scan.  Patient unsure whether food is triggering right-sided abdominal pain.  He will pay close attention and if he is having frequent postprandial pain we will refer to general surgery for further assessment  4. Pure hypercholesterolemia  Assessment & Plan:    patient does not want to start statin drugs.  He agreed to Zetia.  Medicine reviewed does not have drug interactions with his seizure medicine  5. Pain syndrome, chronic  -     JOAN - Kennedy Luna MD, Pain Management, [facility]  6. Scoliosis, unspecified scoliosis type, unspecified spinal region  -     Kennedy Millard MD, Pain Management, [facility]      Given referral and will make appointment to specialist.    Preventative care discussed.  Encouraged healthy diet choices, reg exercise. Patient agreed w/plan and verbal understanding. Patient advised to call or return with any concerns or problems or worsening conditions. Go to the ER for any severe or potentially life threatening problems.    Return in about 3 months (around 9/9/2025) for Routine follow-up, 15-minutes .     This note was generated in part or in whole with voice recognition software.  Voice recognition is usually quite accurate but there are transcription errors that can often occur.  All attempts were made to correct these errors I apologized for any  typographical errors that were not detected and corrected.     Electronically signed by Stuart Barnes MD on 6/9/2025 at 2:44 PM.

## 2025-06-10 DIAGNOSIS — G40.909 SEIZURE DISORDER (HCC): ICD-10-CM

## 2025-06-10 RX ORDER — LEVETIRACETAM 500 MG/1
TABLET ORAL
Qty: 180 TABLET | Refills: 1 | Status: SHIPPED | OUTPATIENT
Start: 2025-06-10 | End: 2025-06-14 | Stop reason: SDUPTHER

## 2025-06-10 RX ORDER — LAMOTRIGINE 150 MG/1
150 TABLET ORAL 2 TIMES DAILY
Qty: 180 TABLET | Refills: 1 | Status: SHIPPED | OUTPATIENT
Start: 2025-06-10

## 2025-06-10 NOTE — TELEPHONE ENCOUNTER
Pharmacy mail service asking for refills. I called the pt to confirm the pharmacy since he was just here yesterday. Pt would like for the medication to be there instead of the local pharmacy. Medication pended

## 2025-06-14 ENCOUNTER — TELEPHONE (OUTPATIENT)
Age: 50
End: 2025-06-14

## 2025-06-14 ENCOUNTER — TELEMEDICINE ON DEMAND (OUTPATIENT)
Age: 50
End: 2025-06-14
Payer: MEDICARE

## 2025-06-14 DIAGNOSIS — G40.909 SEIZURE DISORDER (HCC): ICD-10-CM

## 2025-06-14 PROCEDURE — 99212 OFFICE O/P EST SF 10 MIN: CPT | Performed by: NURSE PRACTITIONER

## 2025-06-14 RX ORDER — LEVETIRACETAM 500 MG/1
TABLET ORAL
Qty: 180 TABLET | Refills: 1 | Status: SHIPPED | OUTPATIENT
Start: 2025-06-14

## 2025-06-14 RX ORDER — LEVETIRACETAM 500 MG/1
TABLET ORAL
Qty: 42 TABLET | Refills: 0 | Status: SHIPPED | OUTPATIENT
Start: 2025-06-14

## 2025-06-14 ASSESSMENT — ENCOUNTER SYMPTOMS
GASTROINTESTINAL NEGATIVE: 1
RESPIRATORY NEGATIVE: 1

## 2025-06-14 NOTE — PROGRESS NOTES
Refill provided with office and mail order closed and patient with only 1 days dose left.  Attempted to call [facility] (Mail order RX) but there [facility] is closed they are only open M-F 8 am-8pm EST. [phone].    - levETIRAcetam (KEPPRA) 500 MG tablet; TAKE 1 TABLET BY MOUTH EVERY MORNING AND TAKE TWO TABLETS AT BEDTIME  Dispense: 180 tablet; Refill: 1  - levETIRAcetam (KEPPRA) 500 MG tablet; TAKE 1 TABLET BY MOUTH EVERY MORNING AND TAKE TWO TABLETS AT BEDTIME. (14 days RX To cover patient while waiting on Mail order. Patient only has 1 day left.)  Dispense: 42 tablet; Refill: 0      The patient would benefit from future follow up with their usual PCP. As of the end of their Virtualist Visit today, follow up visit status is as follows: Patient to follow up as previously instructed by PCP      Total time spent on this encounter: 15 minutes    Return follow up with PCP as scheduled or in 3 days if symptoms worsen or fail to improve, for Refill of Seizure medication..    Amando Huffman, was evaluated through a synchronous (real-time) audio-video encounter. The patient (or guardian if applicable) is aware that this is a billable service, which includes applicable co-pays. This Virtual Visit was conducted with patient's (and/or legal guardian's) consent. Patient identification was verified, and a caregiver was present when appropriate.   The patient was located at Home: [address] [place] [address]  Provider was located at Other: HOME:[place]  Confirm you are appropriately licensed, registered, or certified to deliver care in the state where the patient is located as indicated above. If you are not or unsure, please re-schedule the visit: Yes, I confirm.        --JOHN Trevizo - CNP on 6/14/2025 at 1:35 PM    An electronic signature was used to authenticate this note.

## 2025-06-18 DIAGNOSIS — G40.909 SEIZURE DISORDER (HCC): ICD-10-CM

## 2025-06-18 RX ORDER — PHENYTOIN SODIUM 100 MG/1
100 CAPSULE, EXTENDED RELEASE ORAL 2 TIMES DAILY
Qty: 180 CAPSULE | Refills: 1 | Status: SHIPPED | OUTPATIENT
Start: 2025-06-18

## 2025-06-18 NOTE — TELEPHONE ENCOUNTER
LastVisit 6/9/2025  NextVisit Visit date not found   LastRefilled 6/9/2025  Refill was sent to local pharmacy, Pt requesting refill from [facility] mail out pharmacy.    Pharmacy:    [facility] - [place], [place] - [address] - P [phone] - F [phone] [address] [address]  Phone: [phone] Fax: [phone]     pharmacy confirmed in EPIC

## 2025-06-24 DIAGNOSIS — G40.909 SEIZURE DISORDER (HCC): ICD-10-CM

## 2025-06-24 DIAGNOSIS — G40.909 SEIZURE DISORDER (HCC): Primary | ICD-10-CM

## 2025-06-24 RX ORDER — LEVETIRACETAM 500 MG/1
TABLET ORAL
Qty: 270 TABLET | Refills: 3 | Status: SHIPPED | OUTPATIENT
Start: 2025-06-24

## 2025-06-24 RX ORDER — LEVETIRACETAM 500 MG/1
TABLET ORAL
Qty: 42 TABLET | Refills: 0 | OUTPATIENT
Start: 2025-06-24

## 2025-06-24 RX ORDER — LEVETIRACETAM 500 MG/1
TABLET ORAL
Qty: 90 TABLET | Refills: 0 | Status: SHIPPED | OUTPATIENT
Start: 2025-06-24

## 2025-06-24 NOTE — TELEPHONE ENCOUNTER
Please call patient to confirm that he has received all his seizure keppra/levetiracetam, lamotrigine, dilantin.

## 2025-06-24 NOTE — TELEPHONE ENCOUNTER
Spoke to the patient to confirm if he has all 3 of his seizure medications, states still waiting on the Keppra. I advised him to contact [facility] to make sure they are sending it so he doesn't run out. He verbalized understanding and would call 6/25/2025 to check on it.

## 2025-06-24 NOTE — TELEPHONE ENCOUNTER
Please let patient know that I sent in a 90-day supply of levetiracetam/Keppra to his mail order pharmacy.    We also faxed in 1 month supply to [facility] [address] in case he is running low.

## 2025-06-24 NOTE — TELEPHONE ENCOUNTER
Please call patient to confirm that he has received all his seizure keppra/levetiracetam, lamotrigine, dilantin

## 2025-07-07 ENCOUNTER — OFFICE VISIT (OUTPATIENT)
Dept: PRIMARY CARE CLINIC | Age: 50
End: 2025-07-07
Payer: MEDICARE

## 2025-07-07 VITALS
DIASTOLIC BLOOD PRESSURE: 70 MMHG | BODY MASS INDEX: 19.77 KG/M2 | WEIGHT: 146 LBS | OXYGEN SATURATION: 96 % | HEIGHT: 72 IN | HEART RATE: 75 BPM | SYSTOLIC BLOOD PRESSURE: 110 MMHG

## 2025-07-07 DIAGNOSIS — L98.9 SCALP LESION: Primary | ICD-10-CM

## 2025-07-07 PROCEDURE — 99213 OFFICE O/P EST LOW 20 MIN: CPT | Performed by: INTERNAL MEDICINE

## 2025-07-07 RX ORDER — MECLIZINE HYDROCHLORIDE 25 MG/1
25 TABLET ORAL 3 TIMES DAILY PRN
COMMUNITY

## 2025-07-07 ASSESSMENT — ENCOUNTER SYMPTOMS
SHORTNESS OF BREATH: 0
EYE PAIN: 0
ABDOMINAL PAIN: 0
NAUSEA: 0
SINUS PRESSURE: 0
ABDOMINAL DISTENTION: 0
EYE REDNESS: 0
CONSTIPATION: 0
SORE THROAT: 0
BLOOD IN STOOL: 0
CHEST TIGHTNESS: 0
TROUBLE SWALLOWING: 0
DIARRHEA: 0
BACK PAIN: 0
COLOR CHANGE: 0
WHEEZING: 0
VOMITING: 0
COUGH: 0

## 2025-07-07 NOTE — PROGRESS NOTES
Amando Huffman is 50 y.o. male who p/w Wound Check and Establish Care    Patient had excision of lesion to his scalp later found out to be a benign lesion.  Patient was instructed by his surgeon to remove bandage after 2 days.  He said the bandage was stuck onto the surgical area and was very painful for him to be moving to himself.  Patient has been keeping it dry.  Patient comes in today to get the bandages removed and for review.  Denies fever/chills no other complaints    The problem and medicine lists and chart were reviewed in detail.      Review of Systems   Constitutional:  Negative for activity change, appetite change, chills, fatigue, fever and unexpected weight change.   HENT:  Negative for congestion, ear pain, postnasal drip, sinus pressure, sore throat, tinnitus and trouble swallowing.    Eyes:  Negative for pain and redness.   Respiratory:  Negative for cough, chest tightness, shortness of breath and wheezing.    Cardiovascular:  Negative for chest pain, palpitations and leg swelling.   Gastrointestinal:  Negative for abdominal distention, abdominal pain, blood in stool, constipation, diarrhea, nausea and vomiting.   Endocrine: Negative for cold intolerance, heat intolerance and polydipsia.   Genitourinary:  Negative for decreased urine volume, dysuria, flank pain, frequency, hematuria and urgency.   Musculoskeletal:  Negative for arthralgias, back pain, joint swelling, neck pain and neck stiffness.   Skin:  Positive for wound. Negative for color change and rash.   Neurological:  Negative for dizziness, weakness, numbness and headaches.   Hematological:  Negative for adenopathy.   Psychiatric/Behavioral:  Negative for behavioral problems, sleep disturbance and suicidal ideas. The patient is not nervous/anxious.         /70 (BP Site: Left Upper Arm, Patient Position: Sitting, BP Cuff Size: Medium Adult)   Pulse 75   Ht 1.829 m (6')   Wt 66.2 kg (146 lb)   SpO2 96%   BMI 19.80 kg/m²

## 2025-07-07 NOTE — ASSESSMENT & PLAN NOTE
scalp lesion is s/p surgical excision.  Stuck on bandage was removed with sterile water  Patient will continue with care as instructed by his surgeon.  He will follow-up with incision 7/11/2025

## 2025-07-28 ENCOUNTER — TELEPHONE (OUTPATIENT)
Dept: PRIMARY CARE CLINIC | Age: 50
End: 2025-07-28

## 2025-07-28 DIAGNOSIS — G40.909 SEIZURE DISORDER (HCC): ICD-10-CM

## 2025-07-28 RX ORDER — PHENYTOIN SODIUM 100 MG/1
100 CAPSULE, EXTENDED RELEASE ORAL 2 TIMES DAILY
Qty: 180 CAPSULE | Refills: 1 | Status: SHIPPED | OUTPATIENT
Start: 2025-07-28

## 2025-07-28 RX ORDER — LEVETIRACETAM 500 MG/1
TABLET ORAL
Qty: 42 TABLET | Refills: 0 | Status: SHIPPED | OUTPATIENT
Start: 2025-07-28

## 2025-07-28 NOTE — TELEPHONE ENCOUNTER
Recent Visits  Date Type Provider Dept   07/07/25 Office Visit Stuart Barnes MD [facility]   06/09/25 Office Visit Stuart Barnes MD [facility]   03/10/25 Office Visit Stuart Barnes MD [facility]   02/19/25 Office Visit Stuart Barnes MD [facility]   02/12/25 Office Visit Stuart Barnes MD [facility]   09/16/24 Office Visit Cain Capps MD [facility]   Showing recent visits within past 540 days with a meds authorizing provider and meeting all other requirements  Future Appointments  Date Type Provider Dept   09/09/25 Appointment Stuart Barnes MD [facility]   Showing future appointments within next 150 days with a meds authorizing provider and meeting all other requirements     7/7/2025

## 2025-08-20 DIAGNOSIS — R42 VERTIGO: Primary | ICD-10-CM

## 2025-08-20 DIAGNOSIS — N20.0 BILATERAL KIDNEY STONES: ICD-10-CM

## 2025-08-20 DIAGNOSIS — G40.909 SEIZURE DISORDER (HCC): ICD-10-CM

## 2025-08-20 RX ORDER — MECLIZINE HYDROCHLORIDE 25 MG/1
25 TABLET ORAL 3 TIMES DAILY PRN
Qty: 180 TABLET | Refills: 1 | Status: SHIPPED | OUTPATIENT
Start: 2025-08-20

## 2025-08-20 RX ORDER — LAMOTRIGINE 150 MG/1
150 TABLET ORAL 2 TIMES DAILY
Qty: 180 TABLET | Refills: 1 | Status: SHIPPED | OUTPATIENT
Start: 2025-08-20

## 2025-08-20 RX ORDER — TAMSULOSIN HYDROCHLORIDE 0.4 MG/1
0.4 CAPSULE ORAL DAILY
Qty: 90 CAPSULE | Refills: 1 | Status: SHIPPED | OUTPATIENT
Start: 2025-08-20

## 2025-08-26 DIAGNOSIS — G40.909 SEIZURE DISORDER (HCC): ICD-10-CM

## 2025-08-26 RX ORDER — PHENYTOIN SODIUM 100 MG/1
100 CAPSULE, EXTENDED RELEASE ORAL 2 TIMES DAILY
Qty: 180 CAPSULE | Refills: 3 | Status: SHIPPED | OUTPATIENT
Start: 2025-08-26

## (undated) DEVICE — ELECTRODE PT RET AD L9FT HI MOIST COND ADH HYDRGEL CORDED

## (undated) DEVICE — PENCIL ES ULT VAC W TELSCP NOSE EZ CLN BLDE 10FT

## (undated) DEVICE — MOUTHPIECE ENDOSCP L CTRL OPN AND SIDE PORTS DISP

## (undated) DEVICE — LAPAROSCOPIC ACCESS SYSTEM: Brand: ALEXIS LAPAROSCOPIC SYSTEM WITH KII FIOS FIRST ENTRY

## (undated) DEVICE — GLOVE SURG SZ 6.5 L11.2IN FNGR THK9.8MIL STRW LTX POLYMER

## (undated) DEVICE — LEGGINGS, PAIR, 31X48, STERILE: Brand: MEDLINE

## (undated) DEVICE — BLADE ES L6IN ELASTOMERIC COAT INSUL DURABLE BEND UPTO

## (undated) DEVICE — PROCEDURE KIT ENDOSCP CUST

## (undated) DEVICE — NEEDLE HYPO 27GA L1.25IN GRY POLYPR HUB S STL REG BVL STR

## (undated) DEVICE — BW-412T DISP COMBO CLEANING BRUSH: Brand: SINGLE USE COMBINATION CLEANING BRUSH

## (undated) DEVICE — MASTISOL ADHESIVE LIQ 2/3ML

## (undated) DEVICE — INTENDED FOR TISSUE SEPARATION, AND OTHER PROCEDURES THAT REQUIRE A SHARP SURGICAL BLADE TO PUNCTURE OR CUT.: Brand: BARD-PARKER ® STAINLESS STEEL BLADES

## (undated) DEVICE — BLANKET WRM W29.9XL79.1IN UP BODY FORC AIR MISTRAL-AIR

## (undated) DEVICE — SET VLV 3 PC AWS DISPOSABLE GRDIAN SCOPEVALET

## (undated) DEVICE — SOLUTION IV IRRIG WATER 500ML POUR BRL ST 2F7113

## (undated) DEVICE — GLOVE SURG SZ 65 THK91MIL LTX FREE SYN POLYISOPRENE

## (undated) DEVICE — 3M™ IOBAN™ 2 ANTIMICROBIAL INCISE DRAPE 6650EZ: Brand: IOBAN™ 2

## (undated) DEVICE — 3M™ TEGADERM™ TRANSPARENT FILM DRESSING FRAME STYLE, 1626W, 4 IN X 4-3/4 IN (10 CM X 12 CM), 50/CT 4CT/CASE: Brand: 3M™ TEGADERM™

## (undated) DEVICE — MEDICINE CUP, GRADUATED, STER: Brand: MEDLINE

## (undated) DEVICE — INTENDED FOR TISSUE SEPARATION, AND OTHER PROCEDURES THAT REQUIRE A SHARP SURGICAL BLADE TO PUNCTURE OR CUT.: Brand: BARD-PARKER ® CARBON RIB-BACK BLADES

## (undated) DEVICE — GOWN SIRUS NONREIN LG W/TWL: Brand: MEDLINE INDUSTRIES, INC.

## (undated) DEVICE — FORCEPS BX L240CM WRK CHN 2.8MM STD CAP W/ NDL MIC MESH

## (undated) DEVICE — 60 ML SYRINGE,REGULAR TIP: Brand: MONOJECT

## (undated) DEVICE — SUTURE PDS II SZ 0 L60IN ABSRB VLT L48MM CTX 1/2 CIR Z990G

## (undated) DEVICE — COVER LT HNDL BLU PLAS

## (undated) DEVICE — Device: Brand: DISPOSABLE ELECTROSURGICAL SNARE

## (undated) DEVICE — LAPAROSCOPY PACK: Brand: MEDLINE INDUSTRIES, INC.

## (undated) DEVICE — DRAPE,EENT,SPLIT,STERILE: Brand: MEDLINE

## (undated) DEVICE — HYPODERMIC SAFETY NEEDLE: Brand: MAGELLAN

## (undated) DEVICE — CONTROL SYRINGE LUER-LOCK TIP: Brand: MONOJECT

## (undated) DEVICE — GLOVE ORANGE PI 7   MSG9070

## (undated) DEVICE — TROCAR SLEEVE: Brand: KII ® LOW PROFILE SLEEVE

## (undated) DEVICE — SYRINGE, LUER LOCK, 10ML: Brand: MEDLINE

## (undated) DEVICE — DRAPE THER FLUID WARMING 66X44 IN FLAT SLUSH DBL DISC ORS

## (undated) DEVICE — TRAY PREP DRY W/ PREM GLV 2 APPL 6 SPNG 2 UNDPD 1 OVERWRAP

## (undated) DEVICE — SUTURE MCRYL SZ 4-0 L27IN ABSRB UD L19MM PS-2 1/2 CIR PRIM Y426H

## (undated) DEVICE — Device

## (undated) DEVICE — DRESSING,GAUZE,XEROFORM,CURAD,5"X9",ST: Brand: CURAD

## (undated) DEVICE — 3M™ STERI-STRIP™ REINFORCED ADHESIVE SKIN CLOSURES, R1547, 1/2 IN X 4 IN (12 MM X 100 MM), 6 STRIPS/ENVELOPE: Brand: 3M™ STERI-STRIP™

## (undated) DEVICE — LAPAROSCOPIC TROCAR SLEEVE/SINGLE USE: Brand: KII® LOW PROFILE OPTICAL ACCESS SYSTEM

## (undated) DEVICE — DRAPE SURG L39XW46IN PERI OB

## (undated) DEVICE — STAPLER INT L75MM H1.5X1.8X2MM STD TI 6 ROW LIN CUT

## (undated) DEVICE — PLUMEPORT LAPAROSCOPIC SMOKE FILTRATION DEVICE: Brand: PLUMEPORT ACTIV

## (undated) DEVICE — FORCEPS ENDOSCP L230CM WRK CHN 2.8CM SHTH 2.4MM JAW OPN

## (undated) DEVICE — STERILE COTTON BALLS LARGE 5/P: Brand: MEDLINE

## (undated) DEVICE — TOWEL,OR,DSP,ST,BLUE,DLX,10/PK,8PK/CS: Brand: MEDLINE

## (undated) DEVICE — BLADE CLIPPER GEN PURP NS

## (undated) DEVICE — CONTAINER,SPECIMEN,OR STERILE,4OZ: Brand: MEDLINE

## (undated) DEVICE — CHLORAPREP 26ML ORANGE

## (undated) DEVICE — PACK PROCEDURE SURG EXTREMITY MFFOP CUST

## (undated) DEVICE — FORCEPS GRASPING PEDIATRIC RAT TOOTH

## (undated) DEVICE — CATHETER TRAY 16 FR 5 CC FOL ANTIREFLX SAMPLING PRT DOVER

## (undated) DEVICE — SOLUTION IV IRRIG POUR BRL 0.9% SODIUM CHL 2F7124

## (undated) DEVICE — SHEET,DRAPE,40X58,STERILE: Brand: MEDLINE

## (undated) DEVICE — ADHESIVE SKIN CLSR 0.7ML TOP DERMBND ADV

## (undated) DEVICE — [HIGH FLOW INSUFFLATOR,  DO NOT USE IF PACKAGE IS DAMAGED,  KEEP DRY,  KEEP AWAY FROM SUNLIGHT,  PROTECT FROM HEAT AND RADIOACTIVE SOURCES.]: Brand: PNEUMOSURE

## (undated) DEVICE — TOWEL,OR,DSP,ST,BLUE,STD,4/PK,20PK/CS: Brand: MEDLINE

## (undated) DEVICE — SUTURE PERMAHAND SZ 3-0 L18IN NONABSORBABLE BLK L26MM SH C013D

## (undated) DEVICE — MERCY FAIRFIELD TURNOVER KIT: Brand: MEDLINE INDUSTRIES, INC.

## (undated) DEVICE — SUTURE VCRL + SZ 3-0 L18IN ABSRB UD PS-2 3/8 CIR REV CUT VCP497H

## (undated) DEVICE — STAPLER INT H4.4X1.5MM DIA75MM 0DEG TI UNIV 6 ROW CART

## (undated) DEVICE — GAUZE,SPONGE,4"X4",16PLY,XRAY,STRL,LF: Brand: MEDLINE

## (undated) DEVICE — SUTURE VIC + ABS BR UD RB1 4-0 18IN VCP714D

## (undated) DEVICE — SEALER LAP L37CM MARYLAND JAW OPN NANO COAT MULTIFUNCTIONAL

## (undated) DEVICE — FIAPC® PROBE W/ FILTER 2200 A OD 2.3MM/6.9FR; L 2.2M/7.2FT: Brand: ERBE

## (undated) DEVICE — 30977 SEE SHARP - ENHANCED INTRAOPERATIVE LAPAROSCOPE CLEANING & DEFOGGING: Brand: 30977 SEE SHARP - ENHANCED INTRAOPERATIVE LAPAROSCOPE CLEANING & DEFOGGING

## (undated) DEVICE — PAD N ADH W3XL4IN POLY COT SFT PERF FLM EASILY CUT ABSRB

## (undated) DEVICE — CORD ES L10FT MPLR LAP

## (undated) DEVICE — DEVICE SEAL L23CM NANO COAT MARYLAND JAW OPN DIV LIGASURE

## (undated) DEVICE — THE DISPOSABLE RAPTOR GRASPING DEVICE IS USED TO GRASP TISSUE AND/OR RETRIEVE FOREIGN BODIES, EXCISED TISSUE AND STENTS DURING ENDOSCOPIC PROCEDURES.: Brand: RAPTOR

## (undated) DEVICE — DRAPE,LAP,CHOLE,W/TROUGHS,STERILE: Brand: MEDLINE

## (undated) DEVICE — APPLICATOR MEDICATED 26 CC SOLUTION HI LT ORNG CHLORAPREP

## (undated) DEVICE — GOWN AURORA NONREINF LG: Brand: MEDLINE INDUSTRIES, INC.